# Patient Record
Sex: FEMALE | Race: WHITE | URBAN - METROPOLITAN AREA
[De-identification: names, ages, dates, MRNs, and addresses within clinical notes are randomized per-mention and may not be internally consistent; named-entity substitution may affect disease eponyms.]

---

## 2018-02-15 ENCOUNTER — TRANSFERRED RECORDS (OUTPATIENT)
Dept: HEALTH INFORMATION MANAGEMENT | Facility: CLINIC | Age: 43
End: 2018-02-15

## 2018-02-16 ENCOUNTER — MEDICAL CORRESPONDENCE (OUTPATIENT)
Dept: HEALTH INFORMATION MANAGEMENT | Facility: CLINIC | Age: 43
End: 2018-02-16

## 2018-02-19 ENCOUNTER — PRE VISIT (OUTPATIENT)
Dept: OTOLARYNGOLOGY | Facility: CLINIC | Age: 43
End: 2018-02-19

## 2018-02-19 NOTE — TELEPHONE ENCOUNTER
1.  Date of consult: 2/23/2018    2.  Reason for consult: tongue cancer    3.  Referring provider/facility: Nicola Gallardo    4. Scheduled by: Patient    5.  Outside records requested from: [facility] (faxed request for records, slides)    6.  Additional Information: RN notified to enter imaging order

## 2018-02-20 NOTE — TELEPHONE ENCOUNTER
Path report from [facility] in Care Everywhere. Slides requested from [facility]. [email or web address] No outside imaging.

## 2018-02-21 DIAGNOSIS — C02.9 TONGUE CANCER (H): Primary | ICD-10-CM

## 2018-02-21 NOTE — TELEPHONE ENCOUNTER
Slides received from [facility] - sent to pathology for review. Records received from [facility] - sent to HIM for scanning.

## 2018-02-23 ENCOUNTER — OFFICE VISIT (OUTPATIENT)
Dept: OTOLARYNGOLOGY | Facility: CLINIC | Age: 43
End: 2018-02-23
Payer: COMMERCIAL

## 2018-02-23 ENCOUNTER — THERAPY VISIT (OUTPATIENT)
Dept: SPEECH THERAPY | Facility: CLINIC | Age: 43
End: 2018-02-23
Payer: COMMERCIAL

## 2018-02-23 ENCOUNTER — HOSPITAL ENCOUNTER (OUTPATIENT)
Dept: PET IMAGING | Facility: CLINIC | Age: 43
End: 2018-02-23
Attending: OTOLARYNGOLOGY
Payer: COMMERCIAL

## 2018-02-23 ENCOUNTER — HOSPITAL ENCOUNTER (OUTPATIENT)
Dept: PET IMAGING | Facility: CLINIC | Age: 43
Discharge: HOME | End: 2018-02-23
Attending: OTOLARYNGOLOGY | Admitting: OTOLARYNGOLOGY
Payer: COMMERCIAL

## 2018-02-23 VITALS — HEIGHT: 58 IN | WEIGHT: 90 LBS | BODY MASS INDEX: 18.89 KG/M2

## 2018-02-23 DIAGNOSIS — C02.9 TONGUE CANCER (H): ICD-10-CM

## 2018-02-23 DIAGNOSIS — R13.12 OROPHARYNGEAL DYSPHAGIA: Primary | ICD-10-CM

## 2018-02-23 DIAGNOSIS — C10.9 SQUAMOUS CELL CARCINOMA OF OROPHARYNX (H): ICD-10-CM

## 2018-02-23 DIAGNOSIS — C02.9 TONGUE CANCER (H): Primary | ICD-10-CM

## 2018-02-23 PROBLEM — D64.9 ANEMIA: Status: ACTIVE | Noted: 2018-02-14

## 2018-02-23 PROBLEM — C06.9 SQUAMOUS CELL CARCINOMA OF ORAL CAVITY (H): Status: ACTIVE | Noted: 2018-02-21

## 2018-02-23 LAB
COPATH REPORT: NORMAL
GLUCOSE BLDC GLUCOMTR-MCNC: 103 MG/DL (ref 70–99)

## 2018-02-23 PROCEDURE — 74177 CT ABD & PELVIS W/CONTRAST: CPT

## 2018-02-23 PROCEDURE — A9552 F18 FDG: HCPCS | Performed by: OTOLARYNGOLOGY

## 2018-02-23 PROCEDURE — 25000128 H RX IP 250 OP 636: Performed by: OTOLARYNGOLOGY

## 2018-02-23 PROCEDURE — 82962 GLUCOSE BLOOD TEST: CPT

## 2018-02-23 PROCEDURE — 70491 CT SOFT TISSUE NECK W/DYE: CPT

## 2018-02-23 PROCEDURE — 00000346 ZZHCL STATISTIC REVIEW OUTSIDE SLIDES TC 88321: Performed by: OTOLARYNGOLOGY

## 2018-02-23 PROCEDURE — 34300033 ZZH RX 343: Performed by: OTOLARYNGOLOGY

## 2018-02-23 RX ORDER — FOLIC ACID 1 MG/1
1 TABLET ORAL
COMMUNITY
Start: 2018-02-14 | End: 2019-02-14

## 2018-02-23 RX ORDER — OXYCODONE HYDROCHLORIDE 5 MG/1
5 TABLET ORAL EVERY 4 HOURS PRN
Qty: 30 TABLET | Refills: 0 | Status: SHIPPED | OUTPATIENT
Start: 2018-02-23 | End: 2018-03-06

## 2018-02-23 RX ORDER — TRAMADOL HYDROCHLORIDE 50 MG/1
50-100 TABLET ORAL
COMMUNITY
Start: 2018-02-21 | End: 2018-03-06

## 2018-02-23 RX ORDER — IOPAMIDOL 755 MG/ML
45-135 INJECTION, SOLUTION INTRAVASCULAR ONCE
Status: COMPLETED | OUTPATIENT
Start: 2018-02-23 | End: 2018-02-23

## 2018-02-23 RX ORDER — FEXOFENADINE HCL 180 MG/1
TABLET ORAL
COMMUNITY
End: 2018-03-06

## 2018-02-23 RX ORDER — COVID-19 ANTIGEN TEST
KIT MISCELLANEOUS
COMMUNITY
End: 2018-03-06

## 2018-02-23 RX ADMIN — FLUDEOXYGLUCOSE F-18 10.22 MCI.: 500 INJECTION, SOLUTION INTRAVENOUS at 08:15

## 2018-02-23 RX ADMIN — IOPAMIDOL 58 ML: 755 INJECTION, SOLUTION INTRAVENOUS at 09:24

## 2018-02-23 ASSESSMENT — PAIN SCALES - GENERAL: PAINLEVEL: EXTREME PAIN (8)

## 2018-02-23 NOTE — NURSING NOTE
"Chief Complaint   Patient presents with     Consult     tongue cancer      Height 1.48 m (4' 10.27\"), weight 40.8 kg (90 lb).    Gabriele Burrell LPN    "

## 2018-02-23 NOTE — MR AVS SNAPSHOT
After Visit Summary   2018    Melinda Milligan    MRN: [id number]           Patient Information     Date Of Birth          1975        Visit Information        Provider Department      2018 1:20 PM Marga Arana MD [facility] Ear Nose and Throat        Today's Diagnoses     Tongue cancer (H)    -  1      Care Instructions    1. We will arrange consults and call you with information.   2. Please call the ENT clinic with any questions,concerns, new or worsening symptoms.    -Clinic number is [phone]   - Meeta's direct line (Dr. Arana's nurse) [phone]              Follow-ups after your visit        Future tests that were ordered for you today     Open Future Orders        Priority Expected Expires Ordered    CT Soft Tissue Neck w Contrast Routine  2019    PET Oncology Whole Body Routine  2019            Who to contact     Please call your clinic at [phone] to:    Ask questions about your health    Make or cancel appointments    Discuss your medicines    Learn about your test results    Speak to your doctor            Additional Information About Your Visit        MyChart Information     Coppertino is an electronic gateway that provides easy, online access to your medical records. With Coppertino, you can request a clinic appointment, read your test results, renew a prescription or communicate with your care team.     To sign up for MyChurcht visit the website at [email or web address]   You will be asked to enter the access code listed below, as well as some personal information. Please follow the directions to create your username and password.     Your access code is: [email or web address]  Expires: 2018  6:30 AM     Your access code will  in 90 days. If you need help or a new code, please contact your [facility] or call [phone] for assistance.        Care EveryWhere ID     This is your Care EveryWhere  "ID. This could be used by other organizations to access your [facility] medical records  [id number]        Your Vitals Were     Height BMI (Body Mass Index)                1.48 m (4' 10.27\") 18.64 kg/m2           Blood Pressure from Last 3 Encounters:   No data found for BP    Weight from Last 3 Encounters:   02/23/18 40.8 kg (90 lb)              We Performed the Following     IMAGESTREAM RECORDING ORDER        Primary Care Provider Fax #    Physician No Ref-Primary [phone]       No address on file        Equal Access to Services     RADHA MORALES : Hadii aad ku hadasho Soomaali, waaxda luqadaha, qaybta kaalmada adeegyada, waxay idiin hayaan adeeg khhugolucretia keller . So [facility] [phone].    ATENCIÓN: Si habla español, tiene a hawley disposición servicios gratuitos de asistencia lingüística. Llame al [phone].    We comply with applicable federal civil rights laws and [place] laws. We do not discriminate on the basis of race, color, national origin, age, disability, sex, sexual orientation, or gender identity.            Thank you!     Thank you for choosing [facility] EAR NOSE AND THROAT  for your care. Our goal is always to provide you with excellent care. Hearing back from our patients is one way we can continue to improve our services. Please take a few minutes to complete the written survey that you may receive in the mail after your visit with us. Thank you!             Your Updated Medication List - Protect others around you: Learn how to safely use, store and throw away your medicines at www.disposemymeds.org.          This list is accurate as of 2/23/18  2:33 PM.  Always use your most recent med list.                   Brand Name Dispense Instructions for use Diagnosis    ALLEGRA ALLERGY 180 MG tablet   Generic drug:  fexofenadine           folic acid 1 MG tablet    FOLVITE     Take 1 mg by mouth        lidocaine visc 2% 2.5mL/5mL & maalox/mylanta w/ simeth 2.5mL/5mL & diphenhydrAMINE 5mg/5mL Susp " suspension    [facility]     5 mLs        naproxen sodium 220 MG capsule           traMADol 50 MG tablet    ULTRAM     Take  mg by mouth

## 2018-02-23 NOTE — PATIENT INSTRUCTIONS
1. We will arrange consults and call you with information.   2. Please call the ENT clinic with any questions,concerns, new or worsening symptoms.    -Clinic number is [phone]   - Meeta's direct line (Dr. Arana's nurse) [phone]

## 2018-02-23 NOTE — LETTER
2/23/2018       RE: Melinda Milligan  [address] [facility] [address]     Dear Colleague,    Thank you for referring your patient, Melinda Milligan, to the [facility] EAR NOSE AND THROAT at [facility]. Please see a copy of my visit note below.    Dear Dr. Monsalve:    I had the pleasure of meeting Melinda Milligan in consultation today at the [facility] at your request.     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  She has had ongoing issues for about 6-8 months of thrush.  She has had treatment for this without significant improvement.  She was seen by her primary care physician who ordered some blood tests and she was found at that time to be anemic and was referred to a hematologist.  The hematologist started iron transfusions at that time looked in her throat and thought that there was concerns for malignancy.  She was referred to Dr. Monsalve who performed a biopsy of her right lateral tongue which was consistent with a squamous cell carcinoma.  She has had ongoing issues with pain that is in her throat (right greater than left), under the tongue as well as in her ear and extending into her head.  She has some right-sided neck pain as well.  She complains of difficulty with swallowing.  She says that when she drinks she has issues with liquids coming out through her nose.  She is unable to eat solid few because of pain with chewing.  She denies any pain with swallowing.  She denies having any neck masses.  She is not having any issues with nasal congestion or nosebleeds.  She is lost about 8 pounds in the last 3 weeks.    Social history: She cut back significantly on her smoking last week but was previously at least a 1 pack per day smoker since 1994.  She denies any chewing tobacco use.  She quit drinking alcohol.  She  denies any drug use.  She is  and has a young child at home and another who just went off to college.  She works from home in .    Past medical and surgical history: Anemia but otherwise healthy and denies any previous surgeries.    Family history: Negative for head and neck cancers    MEDICATIONS:     Current Outpatient Prescriptions   Medication Sig Dispense Refill     fexofenadine (ALLEGRA ALLERGY) 180 MG tablet        magic mouthwash suspension (diphenhydrAMINE, lidocaine, aluminum-magnesium & simethicone) 5 mLs       traMADol (ULTRAM) 50 MG tablet Take  mg by mouth       folic acid (FOLVITE) 1 MG tablet Take 1 mg by mouth       naproxen sodium 220 MG capsule        oxyCODONE IR (ROXICODONE) 5 MG tablet Take 1 tablet (5 mg) by mouth every 4 hours as needed for pain 30 tablet 0       ALLERGIES:    Allergies   Allergen Reactions     Ceftriaxone      Other reaction(s): Unknown Reaction     Chicken-Derived Products (Egg)      Other reaction(s): Vomiting  Other reaction(s): Unknown Reaction       HABITS/SOCIAL HISTORY:   She cut back significantly on her smoking last week but was previously at least a 1 pack per day smoker since 1994.  She denies any chewing tobacco use.  She quit drinking alcohol.  She denies any drug use.  She is  and has a young child at home and another who just went off to college.  She works from home in .    Social History     Social History     Marital status:      Spouse name: N/A     Number of children: N/A     Years of education: N/A     Occupational History     Not on file.     Social History Main Topics     Smoking status: Current Some Day Smoker     Packs/day: 0.00     Years: 23.00     Types: Cigarettes     Start date: 1/1/1993     Last attempt to quit: 2/22/2018     Smokeless tobacco: Never Used     Alcohol use No     Drug use: No     Sexual activity: Yes     Partners: Male     Birth control/ protection: Pull-out method, Condom     Other  "Topics Concern     Not on file     Social History Narrative     No narrative on file       PAST MEDICAL HISTORY:   Past Medical History:   Diagnosis Date     Allergic rhinitis      Anemia      Cancer (H)      Hoarseness      Uncomplicated asthma         PAST SURGICAL HISTORY: No past surgical history on file.    FAMILY HISTORY:    Family History   Problem Relation Age of Onset     Substance Abuse Father      Dementia Maternal Grandmother      DIABETES Maternal Grandmother      Asthma Brother      CANCER Maternal Grandfather        REVIEW OF SYSTEMS:  12 point ROS was negative other than the symptoms noted above in the HPI.  Patient Supplied Answers to Review of Systems  [facility] ROS 2/23/2018   Constitutional Weight loss, Unexplained fatigue, Problems with sleep   Neurology Numbness, Unexplained weakness, Headache   Ears, Nose, Throat Ear pain, Nasal congestion or drainage, Sore throat, Trouble swallowing, Hoarseness   Allergy/Immunology Allergies or hay fever   Hematologic Easy bruising         PHYSICAL EXAMINATION:   Ht 1.48 m (4' 10.27\")  Wt 40.8 kg (90 lb)  BMI 18.64 kg/m2   Appearance:   normal; NAD, slightly older appearing than stated age, thin and cachectic appearing, small stature   Communication:   normal; communicates verbally, slight hyernasality   Head/Face:   inspection -  Normal; no scars or visible lesions   Salivary glands -  Normal size, no tenderness, swelling, or palpable masses   Facial strength -  Normal and symmetric bilateral; H/B I/VI   Skin:  normal, no rash   Ocular Motility:  normal occular movements   Ears:  auricle (AD) -  normal  EAC (AD) -  normal  TM (AD) -  Mild effusion  auricle (AS) -  normal  EAC (AS) -  normal  TM (AS) -  Normal, no effusion  Normal clinical speech reception   Nose:  Ext. inspection -  Normal  Internal Inspection -  Normal mucosa, septum, and turbinates   Nasopharynx:  mass in right nasopharynx that occludes torus   Oral Cavity:  lips -  Normal mucosa, oral " competence, and stoma size  Dentition in poor repair   Hard palate, buccal, floor of mouth mucosa normal   Tongue - mass along posterolateral tongue, tender to palpation, extends toward base of tongue   Oropharynx:  uvula absent with ulcerative tumor present  There is exophytic ulcerative appearing tumor along both tonsils and extending along the soft palate - extends along the nasal side of the soft palate; mass effect on the lateral pharyngeal walls that narrows the nasal and oropharyngeal airway; unable to fully visualize the lateral portion of the base of tongue due to bulkiness of tonsils   Hypopharynx:  Normal pyriform sinus and pharyngeal wall mucosa   No pooled secretions    Larynx:  Epiglottis, false vocal cord, true vocal cord normal in appearance, bilaterally mobile cords    Neck: No visible mass or asymmetry   Normal range of motion   Lymphatic:  Small palpable nodes present along both sides of neck, about 1 cm in size   Cardiovascular:  warm, pink, well-perfused extremities without swelling, tenderness, or edema   Respiratory:  Normal respiratory effort, no stridor   Neuro/Psych.:  mood/affect -  normal  mental status -  normal  cranial nerves -  normal          PROCEDURES:   Flexible fiberoptic laryngoscopy: Scope exam was indicated due to oropharyngeal tumor. Verbal consent was obtained. The nasal cavity was prepped with an aerosolized solution of topical anesthetic and vasoconstrictive agent. The scope was passed through the anterior nasal cavity and advanced. Inspection of the nasopharynx revealed a mass in the nasopharynx that obstructs the torus on the right side.  There is tumor in the basal side of the soft palate.  There is mass-effect in the nasal oropharyngeal airway on the right side.  There are bulky tonsils bilaterally.  Full visualization of the lateral base of tongue was not possible due to the bulky nature of the tonsils.  There is no obvious disease in the vallecula.  The larynx was  normal with bilaterally mobile vocal cords.  The epiglottis is normal.  The pyriform sinuses were clear. The airway is patent. Procedure tolerated well with no immediate complications noted.      RESULTS REVIEWED:   I reviewed the referral records from Dr. Monsalve along with the pathology results which show a squamous cell carcinoma that was biopsied in the posterior lateral tongue.    I independently reviewed the PET CT scan images as well as discussed these with the neuroradiologist.  There is hypermetabolic activity with a corresponding CT scan mass that involves both tonsils, the soft palate, the right nasopharynx, the right base of tongue extending into the oral tongue.      Findings: There is FDG avid mass involving the right and left palatine  tonsils, extending anteriorly to and involving the posterior aspect of  soft palate, superiorly on the right extending to the right  posterolateral nasopharyngeal wall, inferiorly involving the right  lateral oropharyngeal wall. The max SUV is 26.       FDG avid bilateral level 2 lymph nodes; measuring 1.4 cm with max SUV  of 6 on the right and 1.0 cm on the left with Max SUV of 6.2 on the  left.     Regarding the major salivary glands, no abnormality is identified.  Regarding the thyroid gland, no abnormality is identified.     Limited evaluation of the cervical vertebra demonstrates that there is  no overt spinal canal stenosis. Regarding the visualized paranasal  sinuses, there is no evidence of significant debris or fluid.  Regarding the mastoid air cells, there is no evidence of significant  debris or fluid. Regarding the major vasculature in the neck, no  abnormality is identified.         Impression:   1. Hypermetabolic mass involving the bilateral palatine tonsils,  anteriorly extending to and involving the soft palate, superiorly  extending to and involving the right posterior lateral nasopharyngeal  wall and inferiorly extending to the right lateral  oropharyngeal wall  representing primary squamosal cancer.  2. Right and left metastatic FDG avid level IIa lymph nodes.  3. Please refer to the whole body PET CT performed as a separate  report, for the findings of the remainder of the body.       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  I discussed the diagnosis with the patient and her  today.  We discussed that typically had neck cancer treated with both surgical and nonsurgical techniques.  I explained that I think given the extent of her disease that she would be best served by upfront nonsurgical therapy with definitive chemoradiation.  We discussed that she could still require salvage surgery pending her tumor responsiveness.  I did also discussed that we will need to review her case at tumor board and ensure a consensus on treatment plan.      I discussed chemoradiation with the patient including the fact that treatment will take place over approximately 7 weeks.  She understands that radiation would be every day for approximately 7 week.  Her therapy frequent on the final regimen but may be every week versus every 3 weeks.   We discussed some of the side effects of her proposed treatment plan.  We discussed things such as xerostomia, altered taste, dysphagia, fibrosis, skin burns, neutropenia, among others.      We discussed the importance of good dental care prior to her treatment as well as afterwards.  She will need to undergo dental evaluation and may need some teeth pulled prior prior to starting treatment.  I discussed the importance of maintaining good dental hygiene even after her radiation for the rest of her life.  We discussed the risk of osteoradionecrosis and avoiding dental extractions in the future.    I explained to her that she will need to do regular swallowing exercises to help minimize the risk of radiation-induced fibrosis and long-term dysphagia.   She was counseled on the importance of keeping up her nutrition orally both before treatment as well as during her treatment.  We discussed nutritional supplementations.  I explained to her that I think she will probably need a PEG tube just given the extent of her disease, need for treatment of both necks and the entire oropharynx, and her already experiencing weight loss before starting treatment.  She understands that even if she has a PEG she needs to continue to use her normal musculature to help maintain her long her swallowing function.  She knows that she may require a port to be placed as well.    We discussed the complexity of treatment of H&N cancer. She would like to have her treatment at [facility] given the proximity to her home.  We will work on arranging appointments for her with radiation and medical oncology as well as with dental.  She will see speech pathology today to start discussing her swallowing function.    I did also discuss with her that it is not uncommon for patients to need to use antidepressants during her treatments as it is a lot for them to process and deal with.  We discussed that this is also difficult on families.  She does have a younger child at home and we discussed that this can impact her child is well and that he may need help dealing with his mom's diagnosis and treatment.    We will contact the patient with the final recommendations from tumor board.  I will plan on seeing her back after she completes her treatment.    CC:  Noel Monsalve DO  [facility]  [address], [address]  [place] [address]      Albert Ambrosio MD  Department of Radiation Oncology  [facility]      Again, thank you for allowing me to participate in the care of your patient.      Sincerely,    Marga Arana MD

## 2018-02-23 NOTE — MR AVS SNAPSHOT
"              After Visit Summary   2/23/2018    Melinda Milligan    MRN: [id number]           Patient Information     Date Of Birth          1975        Visit Information        Provider Department      2/23/2018 1:20 PM Nidhi Keys S[facility]        Today's Diagnoses     Oropharyngeal dysphagia    -  1    Tongue cancer (H)           Follow-ups after your visit        Additional Services     SPEECH THERAPY REFERRAL       *This therapy referral will be filtered to a centralized scheduling office at [facility] and the patient will receive a call to schedule an appointment at a [facility] location most convenient for them. *     [facility] provides Speech Therapy evaluation and treatment and many specialty services across the [facility] system.  If requesting a specialty program, please choose from the list below.  If you have not heard from the scheduling office within 2 business days, please call [phone] for all locations, with the exception of [place], please call [phone] and [facility], please call [phone]      Treatment: Evaluation & Treatment  Speech Treatment Diagnosis: Dysphagia  Special Instructions: Does not need to be scheduled.   Special Programs: Clinical Swallow Study    Please be aware that coverage of these services is subject to the terms and limitations of your health insurance plan.  Call member services at your health plan with any benefit or coverage questions.      **Note to Provider:  If you are referring outside of [facility] for the therapy appointment, please list the name of the location in the \"special instructions\" above, print the referral and give to the patient to schedule the appointment.                  Your next 10 appointments already scheduled     Mar 06, 2018  8:45 AM CST   [facility] Visit with Benedicto [facility] MD   [facility]    [address] [place] [address] " [id number]   [phone]            Mar 06, 2018 10:00 AM CST   [facility] Visit with [facility]   [facility]    [address] [place] [id number]   [phone]            Mar 06, 2018 11:30 AM CST   Ct Sim with Albert Ambrosio MD, MG RT SIMULATION   [facility]    [address] [place] [id number]   [phone]              Who to contact     Please call your clinic at [phone] to:    Ask questions about your health    Make or cancel appointments    Discuss your medicines    Learn about your test results    Speak to your doctor            Additional Information About Your Visit        MyChart Information     [employer] is an electronic gateway that provides easy, online access to your medical records. With [employer], you can request a clinic appointment, read your test results, renew a prescription or communicate with your care team.     To sign up for [employer] visit the website at [email or web address]   You will be asked to enter the access code listed below, as well as some personal information. Please follow the directions to create your username and password.     Your access code is: [email or web address]  Expires: 2018  6:30 AM     Your access code will  in 90 days. If you need help or a new code, please contact your [facility] or call [phone] for assistance.        Care EveryWhere ID     This is your Care EveryWhere ID. This could be used by other organizations to access your [facility] medical records  [id number]         Blood Pressure from Last 3 Encounters:   No data found for BP    Weight from Last 3 Encounters:   No data found for Wt              Today, you had the following     No orders found for display         Today's Medication Changes          These changes are accurate as of 18 11:59 PM.  If you have any questions, ask  your nurse or doctor.               Start taking these medicines.        Dose/Directions    oxyCODONE IR 5 MG tablet   Commonly known as:  ROXICODONE   Used for:  Tongue cancer (H)   Started by:  Marga Arana MD        Dose:  5 mg   Take 1 tablet (5 mg) by mouth every 4 hours as needed for pain   Quantity:  30 tablet   Refills:  0            Where to get your medicines      Some of these will need a paper prescription and others can be bought over the counter.  Ask your nurse if you have questions.     Bring a paper prescription for each of these medications     oxyCODONE IR 5 MG tablet                Primary Care Provider Fax #    Physician No Ref-Primary [phone]       No address on file        Equal Access to Services     [facility] : Rogelio Haile, ann wadsworth, pelon burnham, whitney keller . So [facility] [phone].    ATENCIÓN: Si habla español, tiene a hawley disposición servicios gratuitos de asistencia lingüística. Llame al [phone].    We comply with applicable federal civil rights laws and [place] laws. We do not discriminate on the basis of race, color, national origin, age, disability, sex, sexual orientation, or gender identity.            Thank you!     Thank you for choosing [facility]  for your care. Our goal is always to provide you with excellent care. Hearing back from our patients is one way we can continue to improve our services. Please take a few minutes to complete the written survey that you may receive in the mail after your visit with us. Thank you!             Your Updated Medication List - Protect others around you: Learn how to safely use, store and throw away your medicines at www.disposemymeds.org.          This list is accurate as of 2/23/18 11:59 PM.  Always use your most recent med list.                   Brand Name Dispense Instructions for use Diagnosis    ALLEGRA ALLERGY 180 MG tablet   Generic drug:   fexofenadine           folic acid 1 MG tablet    FOLVITE     Take 1 mg by mouth        lidocaine visc 2% 2.5mL/5mL & maalox/mylanta w/ simeth 2.5mL/5mL & diphenhydrAMINE 5mg/5mL Susp suspension    [facility]     5 mLs        naproxen sodium 220 MG capsule           oxyCODONE IR 5 MG tablet    ROXICODONE    30 tablet    Take 1 tablet (5 mg) by mouth every 4 hours as needed for pain    Tongue cancer (H)       traMADol 50 MG tablet    ULTRAM     Take  mg by mouth

## 2018-02-24 PROBLEM — C10.9 SQUAMOUS CELL CARCINOMA OF OROPHARYNX (H): Status: ACTIVE | Noted: 2018-02-24

## 2018-02-24 NOTE — PROGRESS NOTES
Dear Dr. Monsalve:    I had the pleasure of meeting Melinda Milligan in consultation today at the [facility] at your request.     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  She has had ongoing issues for about 6-8 months of thrush.  She has had treatment for this without significant improvement.  She was seen by her primary care physician who ordered some blood tests and she was found at that time to be anemic and was referred to a hematologist.  The hematologist started iron transfusions at that time looked in her throat and thought that there was concerns for malignancy.  She was referred to Dr. Monsalve who performed a biopsy of her right lateral tongue which was consistent with a squamous cell carcinoma.  She has had ongoing issues with pain that is in her throat (right greater than left), under the tongue as well as in her ear and extending into her head.  She has some right-sided neck pain as well.  She complains of difficulty with swallowing.  She says that when she drinks she has issues with liquids coming out through her nose.  She is unable to eat solid few because of pain with chewing.  She denies any pain with swallowing.  She denies having any neck masses.  She is not having any issues with nasal congestion or nosebleeds.  She is lost about 8 pounds in the last 3 weeks.    Social history: She cut back significantly on her smoking last week but was previously at least a 1 pack per day smoker since 1994.  She denies any chewing tobacco use.  She quit drinking alcohol.  She denies any drug use.  She is  and has a young child at home and another who just went off to college.  She works from home in .    Past medical and surgical history: Anemia but otherwise healthy and denies any previous surgeries.    Family history: Negative for head and neck  cancers    MEDICATIONS:     Current Outpatient Prescriptions   Medication Sig Dispense Refill     fexofenadine (ALLEGRA ALLERGY) 180 MG tablet        magic mouthwash suspension (diphenhydrAMINE, lidocaine, aluminum-magnesium & simethicone) 5 mLs       traMADol (ULTRAM) 50 MG tablet Take  mg by mouth       folic acid (FOLVITE) 1 MG tablet Take 1 mg by mouth       naproxen sodium 220 MG capsule        oxyCODONE IR (ROXICODONE) 5 MG tablet Take 1 tablet (5 mg) by mouth every 4 hours as needed for pain 30 tablet 0       ALLERGIES:    Allergies   Allergen Reactions     Ceftriaxone      Other reaction(s): Unknown Reaction     Chicken-Derived Products (Egg)      Other reaction(s): Vomiting  Other reaction(s): Unknown Reaction       HABITS/SOCIAL HISTORY:   She cut back significantly on her smoking last week but was previously at least a 1 pack per day smoker since 1994.  She denies any chewing tobacco use.  She quit drinking alcohol.  She denies any drug use.  She is  and has a young child at home and another who just went off to college.  She works from home in .    Social History     Social History     Marital status:      Spouse name: N/A     Number of children: N/A     Years of education: N/A     Occupational History     Not on file.     Social History Main Topics     Smoking status: Current Some Day Smoker     Packs/day: 0.00     Years: 23.00     Types: Cigarettes     Start date: 1/1/1993     Last attempt to quit: 2/22/2018     Smokeless tobacco: Never Used     Alcohol use No     Drug use: No     Sexual activity: Yes     Partners: Male     Birth control/ protection: Pull-out method, Condom     Other Topics Concern     Not on file     Social History Narrative     No narrative on file       PAST MEDICAL HISTORY:   Past Medical History:   Diagnosis Date     Allergic rhinitis      Anemia      Cancer (H)      Hoarseness      Uncomplicated asthma         PAST SURGICAL HISTORY: No past surgical  "history on file.    FAMILY HISTORY:    Family History   Problem Relation Age of Onset     Substance Abuse Father      Dementia Maternal Grandmother      DIABETES Maternal Grandmother      Asthma Brother      CANCER Maternal Grandfather        REVIEW OF SYSTEMS:  12 point ROS was negative other than the symptoms noted above in the HPI.  Patient Supplied Answers to Review of Systems  [facility] ROS 2/23/2018   Constitutional Weight loss, Unexplained fatigue, Problems with sleep   Neurology Numbness, Unexplained weakness, Headache   Ears, Nose, Throat Ear pain, Nasal congestion or drainage, Sore throat, Trouble swallowing, Hoarseness   Allergy/Immunology Allergies or hay fever   Hematologic Easy bruising         PHYSICAL EXAMINATION:   Ht 1.48 m (4' 10.27\")  Wt 40.8 kg (90 lb)  BMI 18.64 kg/m2   Appearance:   normal; NAD, slightly older appearing than stated age, thin and cachectic appearing, small stature   Communication:   normal; communicates verbally, slight hyernasality   Head/Face:   inspection -  Normal; no scars or visible lesions   Salivary glands -  Normal size, no tenderness, swelling, or palpable masses   Facial strength -  Normal and symmetric bilateral; H/B I/VI   Skin:  normal, no rash   Ocular Motility:  normal occular movements   Ears:  auricle (AD) -  normal  EAC (AD) -  normal  TM (AD) -  Mild effusion  auricle (AS) -  normal  EAC (AS) -  normal  TM (AS) -  Normal, no effusion  Normal clinical speech reception   Nose:  Ext. inspection -  Normal  Internal Inspection -  Normal mucosa, septum, and turbinates   Nasopharynx:  mass in right nasopharynx that occludes torus   Oral Cavity:  lips -  Normal mucosa, oral competence, and stoma size  Dentition in poor repair   Hard palate, buccal, floor of mouth mucosa normal   Tongue - mass along posterolateral tongue, tender to palpation, extends toward base of tongue   Oropharynx:  uvula absent with ulcerative tumor present  There is exophytic ulcerative " appearing tumor along both tonsils and extending along the soft palate - extends along the nasal side of the soft palate; mass effect on the lateral pharyngeal walls that narrows the nasal and oropharyngeal airway; unable to fully visualize the lateral portion of the base of tongue due to bulkiness of tonsils   Hypopharynx:  Normal pyriform sinus and pharyngeal wall mucosa   No pooled secretions    Larynx:  Epiglottis, false vocal cord, true vocal cord normal in appearance, bilaterally mobile cords    Neck: No visible mass or asymmetry   Normal range of motion   Lymphatic:  Small palpable nodes present along both sides of neck, about 1 cm in size   Cardiovascular:  warm, pink, well-perfused extremities without swelling, tenderness, or edema   Respiratory:  Normal respiratory effort, no stridor   Neuro/Psych.:  mood/affect -  normal  mental status -  normal  cranial nerves -  normal          PROCEDURES:   Flexible fiberoptic laryngoscopy: Scope exam was indicated due to oropharyngeal tumor. Verbal consent was obtained. The nasal cavity was prepped with an aerosolized solution of topical anesthetic and vasoconstrictive agent. The scope was passed through the anterior nasal cavity and advanced. Inspection of the nasopharynx revealed a mass in the nasopharynx that obstructs the torus on the right side.  There is tumor in the basal side of the soft palate.  There is mass-effect in the nasal oropharyngeal airway on the right side.  There are bulky tonsils bilaterally.  Full visualization of the lateral base of tongue was not possible due to the bulky nature of the tonsils.  There is no obvious disease in the vallecula.  The larynx was normal with bilaterally mobile vocal cords.  The epiglottis is normal.  The pyriform sinuses were clear. The airway is patent. Procedure tolerated well with no immediate complications noted.      RESULTS REVIEWED:   I reviewed the referral records from Dr. Monsalve along with the pathology  results which show a squamous cell carcinoma that was biopsied in the posterior lateral tongue.    I independently reviewed the PET CT scan images as well as discussed these with the neuroradiologist.  There is hypermetabolic activity with a corresponding CT scan mass that involves both tonsils, the soft palate, the right nasopharynx, the right base of tongue extending into the oral tongue.      Findings: There is FDG avid mass involving the right and left palatine  tonsils, extending anteriorly to and involving the posterior aspect of  soft palate, superiorly on the right extending to the right  posterolateral nasopharyngeal wall, inferiorly involving the right  lateral oropharyngeal wall. The max SUV is 26.       FDG avid bilateral level 2 lymph nodes; measuring 1.4 cm with max SUV  of 6 on the right and 1.0 cm on the left with Max SUV of 6.2 on the  left.     Regarding the major salivary glands, no abnormality is identified.  Regarding the thyroid gland, no abnormality is identified.     Limited evaluation of the cervical vertebra demonstrates that there is  no overt spinal canal stenosis. Regarding the visualized paranasal  sinuses, there is no evidence of significant debris or fluid.  Regarding the mastoid air cells, there is no evidence of significant  debris or fluid. Regarding the major vasculature in the neck, no  abnormality is identified.         Impression:   1. Hypermetabolic mass involving the bilateral palatine tonsils,  anteriorly extending to and involving the soft palate, superiorly  extending to and involving the right posterior lateral nasopharyngeal  wall and inferiorly extending to the right lateral oropharyngeal wall  representing primary squamosal cancer.  2. Right and left metastatic FDG avid level IIa lymph nodes.  3. Please refer to the whole body PET CT performed as a separate  report, for the findings of the remainder of the body.       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  I discussed the diagnosis with the patient and her  today.  We discussed that typically had neck cancer treated with both surgical and nonsurgical techniques.  I explained that I think given the extent of her disease that she would be best served by upfront nonsurgical therapy with definitive chemoradiation.  We discussed that she could still require salvage surgery pending her tumor responsiveness.  I did also discussed that we will need to review her case at tumor board and ensure a consensus on treatment plan.      I discussed chemoradiation with the patient including the fact that treatment will take place over approximately 7 weeks.  She understands that radiation would be every day for approximately 7 week.  Her therapy frequent on the final regimen but may be every week versus every 3 weeks.   We discussed some of the side effects of her proposed treatment plan.  We discussed things such as xerostomia, altered taste, dysphagia, fibrosis, skin burns, neutropenia, among others.      We discussed the importance of good dental care prior to her treatment as well as afterwards.  She will need to undergo dental evaluation and may need some teeth pulled prior prior to starting treatment.  I discussed the importance of maintaining good dental hygiene even after her radiation for the rest of her life.  We discussed the risk of osteoradionecrosis and avoiding dental extractions in the future.    I explained to her that she will need to do regular swallowing exercises to help minimize the risk of radiation-induced fibrosis and long-term dysphagia.  She was counseled on the importance of keeping up her nutrition orally both before treatment as well as during her treatment.  We discussed nutritional supplementations.  I explained to her that I think she will probably need a PEG tube just given the extent of her disease, need for treatment of  both necks and the entire oropharynx, and her already experiencing weight loss before starting treatment.  She understands that even if she has a PEG she needs to continue to use her normal musculature to help maintain her long her swallowing function.  She knows that she may require a port to be placed as well.    We discussed the complexity of treatment of H&N cancer. She would like to have her treatment at [facility] given the proximity to her home.  We will work on arranging appointments for her with radiation and medical oncology as well as with dental.  She will see speech pathology today to start discussing her swallowing function.    I did also discuss with her that it is not uncommon for patients to need to use antidepressants during her treatments as it is a lot for them to process and deal with.  We discussed that this is also difficult on families.  She does have a younger child at home and we discussed that this can impact her child is well and that he may need help dealing with his mom's diagnosis and treatment.    We will contact the patient with the final recommendations from tumor board.  I will plan on seeing her back after she completes her treatment.    Thank you very much for the opportunity to participate in the care of your patient.      Marga Arana M.D.  Otolaryngology- Head & Neck Surgery        CC:  Noel Monsalve,   [facility] [address], [address]  [place] [address]      Albert Ambrosio MD  Department of Radiation Oncology  [facility]

## 2018-02-26 ENCOUNTER — CARE COORDINATION (OUTPATIENT)
Dept: OTOLARYNGOLOGY | Facility: CLINIC | Age: 43
End: 2018-02-26

## 2018-02-26 NOTE — PROGRESS NOTES
Received a call from patient's spouse stating that since patient started using oxycodone she has developed a rash on her back and abdomen. Returned call to patient to discuss further. Patient states that rash has progressed from back to abdomen and into her waist area. Patient advised to discontinue oxycodone and see PCP for evaluation. Patient will see her PCP today and call back with further questions or concerns. She will check with them on prescribing her something different for pain as she will need a written prescription. Advised patient to call back if PCP is not willing to prescribe pain medications and we will get her a new prescription. Patient verbalized understanding.     Meeta Elder, RN, BSN

## 2018-02-27 ENCOUNTER — CARE COORDINATION (OUTPATIENT)
Dept: OTOLARYNGOLOGY | Facility: CLINIC | Age: 43
End: 2018-02-27

## 2018-02-27 NOTE — PROGRESS NOTES
Called patient and spouse with the following appointment info:    1. Dental radiation clearance- Tomorrow 2/28 at 2:00pm.   2. Medical oncology consult in [place]- 3/6 at 8:45am with Dr. Nieto  3. Radiation oncology consult in [place]- Dr. Ambrosio- 3/6 at 10:00am.     Patient and spouse verbalized understanding and confirmed appointments. They will be called on Friday with tumor board recommendations. Patient and spouse were encouraged to call with further questions or concerns.     Meeta Elder, RN, BSN      
Name band;

## 2018-02-28 ENCOUNTER — DOCUMENTATION ONLY (OUTPATIENT)
Dept: DENTISTRY | Facility: CLINIC | Age: 43
End: 2018-02-28

## 2018-02-28 NOTE — PROGRESS NOTES
Dental Service Progress Note        Melinda Milligan MRN# [id number]   YOB: 1975 Age: 42 year old              Chief Complaint:   None - Pt referred for dental clearance prior to chemoradiation         History of Present Illness:   This patient is a 42 year old female who presents with SCC of base of tongue to the [facility] for dental clearance prior to chemoradiation            Past Medical History:     Past Medical History:   Diagnosis Date     Allergic rhinitis      Anemia      Cancer (H)      Hoarseness      Uncomplicated asthma              Past Surgical History:   No past surgical history on file.            Social History:     Social History   Substance Use Topics     Smoking status: Current Some Day Smoker     Packs/day: 0.00     Years: 23.00     Types: Cigarettes     Start date: 1/1/1993     Last attempt to quit: 2/22/2018     Smokeless tobacco: Never Used     Alcohol use No             Family History:     Family History   Problem Relation Age of Onset     Substance Abuse Father      Dementia Maternal Grandmother      DIABETES Maternal Grandmother      Asthma Brother      CANCER Maternal Grandfather              Immunizations:     There is no immunization history on file for this patient.          Allergies:     Allergies   Allergen Reactions     Ceftriaxone      Other reaction(s): Unknown Reaction     Chicken-Derived Products (Egg)      Other reaction(s): Vomiting  Other reaction(s): Unknown Reaction             Medications:     Current Outpatient Prescriptions Ordered in Epic   Medication     fexofenadine (ALLEGRA ALLERGY) 180 MG tablet     magic mouthwash suspension (diphenhydrAMINE, lidocaine, aluminum-magnesium & simethicone)     traMADol (ULTRAM) 50 MG tablet     folic acid (FOLVITE) 1 MG tablet     naproxen sodium 220 MG capsule     oxyCODONE IR (ROXICODONE) 5 MG tablet     No current Epic-ordered facility-administered medications on file.             Review of Systems:    General: Pt appears healthy, awake alert and oriented at all times  HEENT: Unremarkable  Mouth: SCC tumor visible during dental examination, significant sensitivity and discomfort on examination, otherwise unremarkable  Neck: Unremarkable            Physical Exam:   Head and neck exam:  Unremarkable    Soft Tissue exam:  SCC tumor visible at base of tongue during dental examination, otherwise unremarkable    Hard Tissue exam:  Unremarkable         Data:   Radiographic interpretation: Full bony impacted ectopic UR third molar, inferior border of mandible intact and continuous, condyles passively seated, sinuses clear, normal bone trabeculation  Osseous pathology: None detected  Pulpal pathology: None detected  Periodontal Pathology: None detected  Caries: Small interproximal carious lesions noted on both maxillary central incisors and a defective restoration noted on maxillary left lateral incisor. Large breakdown of LR 2nd premolar. Decay noted on LR 2nd molar mesial surface  Odontogenic pathology: None detected         Assessment and Plan:   Assessment:  Restoration of LR 2nd premolar indicated prior to initiation of chemoradiation due to increased risk of dental infection from exposed dentin nearing pulp. Cleaning indicated prior to initiation of chemoradiation due to significant deposits and bacterial load in the oral cavity. Radiation guards and fluoride trays will be made as protective measures to maintain dentition and prevent burns from radiation    Plan:  Cleaning and LR 2nd premolar restoration to be completed prior to initiation of chemotherapy. Pt will have radiation guards and fluoride trays delivered at the next appointment as well    The patient was seen by and discussed with:   Heather Knutson DDS, and Cj Atkinson DDS    Next Appointment:   3/2/18 at 8:30am for cleaning, fluoride tray and radiation guard delivery  3/6/18 at 2pm for restoration of LR second premolar, and possibly LR 2nd molar as both  teeth are in the same quadrant      Heather Knutson DDS   PGY 1  Pager: [phone]

## 2018-03-02 DIAGNOSIS — C10.9 SQUAMOUS CELL CARCINOMA OF OROPHARYNX (H): Primary | ICD-10-CM

## 2018-03-02 RX ORDER — CLINDAMYCIN PHOSPHATE 900 MG/50ML
900 INJECTION, SOLUTION INTRAVENOUS SEE ADMIN INSTRUCTIONS
Status: CANCELLED | OUTPATIENT
Start: 2018-03-02

## 2018-03-02 RX ORDER — CLINDAMYCIN PHOSPHATE 900 MG/50ML
900 INJECTION, SOLUTION INTRAVENOUS
Status: CANCELLED | OUTPATIENT
Start: 2018-03-02

## 2018-03-02 NOTE — PROGRESS NOTES
RADIATION ONCOLOGY CONSULT NOTE    Date of Visit: Mar 6, 2018  Patient Name: Melinda Milligan  MRN: [id number]  : 1975    Melinda Milligan is being seen today for initial consultation at the request of Dr. Marga Arana for consideration of radiation therapy.    HISTORY OF PRESENT ILLNESS:  Ms. Milligan is a 42 year old female with locally advanced oropharyngeal SCC, p16 negative.    She presented with a lesion on her tongue and thrush for about 6-8 mos; her PCP treated her for thrush and amoxicillin, but they did not improve, and he eventually referred her to Dr. Monsalve.     2/15/18: Biopsy of L tongue lesion showed invasive keratinizing SCCa, mod diff, p16 negative.    18: PET and neck CT w/ contrast showed an FDG avid mass involving the R and L palatine tonsils, posterior soft palate, R posterolateral NPX wall occluding torus, R OPX wall with SUV max 26, and bilat level II LN up to 1.4 cm w/ SUV max of 6 on R and 1.0 cm with SUV max 6.2 on L; no distant metastases.    She saw Dr. Arana, who performed FFL showing an exophytic ulcerative tumor along both tonsils extending to the soft palate, narrowing the pharyngeal walls and NPX/OPX airway, normal vallecula, epiglottis, piriform sinus, and larynx; and small palpable LN bilat. Her case was discussed at tumor board with recommendation for chemoRT. She was referred to dental and speech pathology.    She has a ~25 pk yr hx of smoking but has started to cut back significantly. 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    Today, she complains of pain in her throat and R ear, taking naproxen but will stop and switch to tramadol/oxyIR. The pain is currently rated 3/10 but is 8/10 at its worst. She has difficulty swallowing including some regurgitation/reflux. She weighs 87 lbs currently, down from her baseline of 98 lbs. She is taking 2-3 ensures/day. She can only eat  a liquid/soft diet currently. She is currently smoking, and is down to 3 cigs/day. She also feels that her R ear is plugged. She is otherwise healthy and active. She is here today with her .    CHEMOTHERAPY HISTORY: none    RADIATION THERAPY HISTORY:  none    PAST MEDICAL/SURGICAL HISTORY:  Past Medical History:   Diagnosis Date     Allergic rhinitis      Anemia      Hoarseness      Oropharyngeal cancer (H) 02/15/2018     Uncomplicated asthma      Past Surgical History:   Procedure Laterality Date     BIOPSY  02/15/2018    Left Tongue - [facility]       ALLERGIES:  Allergies as of 03/06/2018 - [facility] as Reviewed 03/06/2018   Allergen Reaction Noted     Ceftriaxone  08/25/2012     Chicken-derived products (egg)  08/25/2012     No clinical screening - see comments Hives 03/06/2018       MEDICATIONS:  Current Outpatient Prescriptions   Medication Sig Dispense Refill     oxyCODONE IR (ROXICODONE) 5 MG tablet Take 1-2 tablets (5-10 mg) by mouth every 4 hours as needed for pain 60 tablet 0     folic acid (FOLVITE) 1 MG tablet Take 1 mg by mouth       Fexofenadine HCl (ALLEGRA ALLERGY PO) Take by mouth as needed for allergies          FAMILY HISTORY:  Family History   Problem Relation Age of Onset     Substance Abuse Father      Dementia Maternal Grandmother      DIABETES Maternal Grandmother      Asthma Brother      Prostate Cancer Maternal Grandfather        SOCIAL HISTORY:  Social History     Social History     Marital status:      Spouse name: N/A     Number of children: N/A     Years of education: N/A     Occupational History     Not on file.     Social History Main Topics     Smoking status: Current Every Day Smoker     Packs/day: 1.00     Years: 23.00     Types: Cigarettes     Start date: 1/1/1993     Smokeless tobacco: Never Used      Comment: Patient reports currently smoking 3 cigarettes per day - updated 3/6/2018     Alcohol use No     Drug use: No     Sexual activity: Yes     Partners: Male      "Birth control/ protection: Pull-out method, Condom     Other Topics Concern     Not on file     Social History Narrative       REVIEW OF SYSTEMS: A 10-point review of systems was obtained. Pertinent findings are noted in the HPI and are otherwise unremarkable.     PHYSICAL EXAM:  VITALS: /77  Pulse 87  Temp 99.3  F (37.4  C) (Oral)  Resp 16  Ht 4' 10\"  Wt 87 lb 6 oz  LMP 01/01/2018 (Approximate)  SpO2 99%  BMI 18.26 kg/m2  GEN: appears well, in no acute distress  HEENT: normocephalic and atraumatic, EOMI, PERRL, anicteric sclerae. 2 cm ulcerated leukoplakic lesion in R lateral base of tongue, palpable tumor in R BOT. Visible exophytic tumor with leukoplakic appearance extensively occupying bilat tonsils, soft palate, and R glossotonsillar sulcus. Uvula is not apparent. No other suspicious visible/palpable lesions in oral cavity.  Jaw opening 4 cm, no trismus. Normal tongue mobility.  CV: RRR, no m/g/r, no LE edema, no JVD  RESP: CTAB, normal respiration on room air, no stridor  ABDOMEN: soft, NT, ND  SKIN: normal color and turgor  MSK: moving all extremities well  LYMPHATICS: palpable 1 cm bilat level IIA LAD, mobile; no other cervical/supraclavicular LAD  NEURO: CN II-XII grossly intact, no focal neurologic deficit  PSYCH: appropriate mood, affect, and judgment    ECOG PERFORMANCE STATUS: 0    Flexible fiberoptic nasal endoscopy: the procedure was explained and risks/benefits/alternatives discussed, consent obtained. The bilat nasal passages were sprayed with topical anesthetic. The scope was advanced through the R nasal passage, however scope was unable to traverse the turbinates due to narrowing. No visible mass identified in anterior NPX. The scope was retracted and advanced through the L nasal passage. Tumor was visible in the posterior NPX which appeared to arise from R posterolateral NPX and extend to midline; bulky tumor visible occupying bilat OPX and obscuring view, unable to completely " evaluate BOT but tumor does not appear to lingual surface of epiglottis. Normal VC mobility, normal appearance of larynx and hypopharynx.    She tolerated the procedure well.    All pertinent laboratory, imaging, and pathology findings have been reviewed.     IMPRESSION AND RECOMMENDATIONS:  In summary, Ms. Milligan is a 42 year old female with cT3N2c SCCA of the oropharynx, likely arising from the R side but extending to the L OPX, soft palate, and NPX.    Her case was discussed at tumor board with recommendation for definitive chemoradiation, with which I agree. She has seen dental and speech pathology; no extractions needed but will have cleaning and had mouthguards made. She has seen medical oncology with recommendation for concurrent bolus cisplatin. I would recommend concurrent cisplatin with radiation to the head and neck region for 7 weeks, with interim PET at 4 weeks for adaptive radiation planning to account for weight changes and potential reduction in radiation volumes off of critical structures to lower the risk of toxicity.    Given her baseline anorexia and dysphagia, I would recommend placement of a feeding tube. I instructed her to try to maintain as much nutrition PO as possible to maintain her swallowing function. She has seen speech therapy and will see our dietitian. We also discussed smoking cessation; she is going to quit cold turkey with nicotine lozenges and I offered assistance and resources if she needs it.    The risks, benefits, alternatives, and logistics to radiation therapy were discussed in detail. Side effects of radiation therapy include, but are not limited to, dry mouth, changes in taste sensation, dysphagia that may require a feeding tube that can be permanent in rare cases, voice changes such as hoarseness, mucositis, skin changes including blisters, bleeding, infection, fibrosis and stiffness in the head and neck, lymphedema, hypothyroidism, dental complications including  caries and compromised oral cavity vascular supply, and rare risks such as osteoradionecrosis, injury to the spinal cord or other nerves or second malignancy. I also discussed the importance of thorough dental hygiene and follow-up with a dentist. She is aware that the side effects of radiation therapy may be severe and permanent, although we expect that such risks would be low and that they are outweighed by the benefit of treatment. She was given the opportunity to ask questions, which were answered. Informed consent was obtained. CT simulation performed today.    Albert Ambrosio M.D.  Attending Physician  Radiation Oncology  Clinic Phone #: [phone]  Pager #: [phone]

## 2018-03-05 ENCOUNTER — TELEPHONE (OUTPATIENT)
Dept: RADIATION ONCOLOGY | Facility: CLINIC | Age: 43
End: 2018-03-05

## 2018-03-05 ENCOUNTER — TELEPHONE (OUTPATIENT)
Dept: SURGERY | Facility: CLINIC | Age: 43
End: 2018-03-05

## 2018-03-05 DIAGNOSIS — C02.9 TONGUE CANCER (H): Primary | ICD-10-CM

## 2018-03-05 RX ORDER — CLINDAMYCIN PHOSPHATE 900 MG/50ML
900 INJECTION, SOLUTION INTRAVENOUS
Status: CANCELLED | OUTPATIENT
Start: 2018-03-05

## 2018-03-05 RX ORDER — CLINDAMYCIN PHOSPHATE 900 MG/50ML
900 INJECTION, SOLUTION INTRAVENOUS SEE ADMIN INSTRUCTIONS
Status: CANCELLED | OUTPATIENT
Start: 2018-03-05

## 2018-03-05 NOTE — TELEPHONE ENCOUNTER
Left a message for the patient to return my call at [phone] to schedule surgery with Dr Martinez.

## 2018-03-06 ENCOUNTER — OFFICE VISIT (OUTPATIENT)
Dept: RADIATION ONCOLOGY | Facility: CLINIC | Age: 43
End: 2018-03-06
Payer: COMMERCIAL

## 2018-03-06 ENCOUNTER — ONCOLOGY VISIT (OUTPATIENT)
Dept: ONCOLOGY | Facility: CLINIC | Age: 43
End: 2018-03-06
Payer: COMMERCIAL

## 2018-03-06 ENCOUNTER — CARE COORDINATION (OUTPATIENT)
Dept: ONCOLOGY | Facility: CLINIC | Age: 43
End: 2018-03-06

## 2018-03-06 ENCOUNTER — TELEPHONE (OUTPATIENT)
Dept: SURGERY | Facility: CLINIC | Age: 43
End: 2018-03-06

## 2018-03-06 VITALS
HEART RATE: 87 BPM | RESPIRATION RATE: 16 BRPM | SYSTOLIC BLOOD PRESSURE: 109 MMHG | DIASTOLIC BLOOD PRESSURE: 77 MMHG | WEIGHT: 87.38 LBS | BODY MASS INDEX: 18.34 KG/M2 | HEIGHT: 58 IN | OXYGEN SATURATION: 99 % | TEMPERATURE: 99.3 F

## 2018-03-06 VITALS
WEIGHT: 97.38 LBS | BODY MASS INDEX: 20.44 KG/M2 | DIASTOLIC BLOOD PRESSURE: 77 MMHG | SYSTOLIC BLOOD PRESSURE: 109 MMHG | HEIGHT: 58 IN | HEART RATE: 87 BPM | TEMPERATURE: 99.3 F | OXYGEN SATURATION: 99 %

## 2018-03-06 DIAGNOSIS — C10.9 SQUAMOUS CELL CARCINOMA OF OROPHARYNX (H): Primary | ICD-10-CM

## 2018-03-06 DIAGNOSIS — Z51.11 ENCOUNTER FOR ANTINEOPLASTIC CHEMOTHERAPY: ICD-10-CM

## 2018-03-06 DIAGNOSIS — C02.9 TONGUE CANCER (H): ICD-10-CM

## 2018-03-06 DIAGNOSIS — C06.9 SQUAMOUS CELL CARCINOMA OF ORAL CAVITY (H): Primary | ICD-10-CM

## 2018-03-06 DIAGNOSIS — R13.10 DYSPHAGIA: ICD-10-CM

## 2018-03-06 LAB — BETA HCG QUAL IFA URINE: NEGATIVE

## 2018-03-06 PROCEDURE — 77334 RADIATION TREATMENT AID(S): CPT | Performed by: RADIOLOGY

## 2018-03-06 PROCEDURE — 84703 CHORIONIC GONADOTROPIN ASSAY: CPT | Performed by: RADIOLOGY

## 2018-03-06 PROCEDURE — 99205 OFFICE O/P NEW HI 60 MIN: CPT | Performed by: INTERNAL MEDICINE

## 2018-03-06 PROCEDURE — 31575 DIAGNOSTIC LARYNGOSCOPY: CPT | Performed by: RADIOLOGY

## 2018-03-06 PROCEDURE — 99205 OFFICE O/P NEW HI 60 MIN: CPT | Mod: 25 | Performed by: RADIOLOGY

## 2018-03-06 PROCEDURE — 77470 SPECIAL RADIATION TREATMENT: CPT | Performed by: RADIOLOGY

## 2018-03-06 RX ORDER — SODIUM CHLORIDE 9 MG/ML
1000 INJECTION, SOLUTION INTRAVENOUS CONTINUOUS PRN
Status: CANCELLED
Start: 2018-03-15

## 2018-03-06 RX ORDER — EPINEPHRINE 0.3 MG/.3ML
0.3 INJECTION SUBCUTANEOUS EVERY 5 MIN PRN
Status: CANCELLED | OUTPATIENT
Start: 2018-03-15

## 2018-03-06 RX ORDER — LORAZEPAM 0.5 MG/1
0.5 TABLET ORAL DAILY PRN
Qty: 10 TABLET | Refills: 0 | Status: SHIPPED | OUTPATIENT
Start: 2018-03-06 | End: 2018-03-26

## 2018-03-06 RX ORDER — OXYCODONE HYDROCHLORIDE 5 MG/1
5-10 TABLET ORAL EVERY 4 HOURS PRN
Qty: 60 TABLET | Refills: 0 | Status: ON HOLD | OUTPATIENT
Start: 2018-03-06 | End: 2018-03-14

## 2018-03-06 RX ORDER — LORAZEPAM 0.5 MG/1
0.5 TABLET ORAL DAILY PRN
Qty: 10 TABLET | Refills: 0 | Status: SHIPPED | OUTPATIENT
Start: 2018-03-06 | End: 2018-03-06

## 2018-03-06 RX ORDER — ALBUTEROL SULFATE 90 UG/1
1-2 AEROSOL, METERED RESPIRATORY (INHALATION)
Status: CANCELLED
Start: 2018-03-15

## 2018-03-06 RX ORDER — MEPERIDINE HYDROCHLORIDE 50 MG/ML
25 INJECTION INTRAMUSCULAR; INTRAVENOUS; SUBCUTANEOUS EVERY 30 MIN PRN
Status: CANCELLED | OUTPATIENT
Start: 2018-03-15

## 2018-03-06 RX ORDER — PALONOSETRON 0.05 MG/ML
0.25 INJECTION, SOLUTION INTRAVENOUS ONCE
Status: CANCELLED
Start: 2018-03-15

## 2018-03-06 RX ORDER — ALBUTEROL SULFATE 0.83 MG/ML
2.5 SOLUTION RESPIRATORY (INHALATION)
Status: CANCELLED | OUTPATIENT
Start: 2018-03-15

## 2018-03-06 RX ORDER — METHYLPREDNISOLONE SODIUM SUCCINATE 125 MG/2ML
125 INJECTION, POWDER, LYOPHILIZED, FOR SOLUTION INTRAMUSCULAR; INTRAVENOUS
Status: CANCELLED
Start: 2018-03-15

## 2018-03-06 RX ORDER — LORAZEPAM 2 MG/ML
0.5 INJECTION INTRAMUSCULAR EVERY 4 HOURS PRN
Status: CANCELLED
Start: 2018-03-15

## 2018-03-06 RX ORDER — EPINEPHRINE 1 MG/ML
0.3 INJECTION, SOLUTION INTRAMUSCULAR; SUBCUTANEOUS EVERY 5 MIN PRN
Status: CANCELLED | OUTPATIENT
Start: 2018-03-15

## 2018-03-06 RX ORDER — DIPHENHYDRAMINE HYDROCHLORIDE 50 MG/ML
50 INJECTION INTRAMUSCULAR; INTRAVENOUS
Status: CANCELLED
Start: 2018-03-15

## 2018-03-06 ASSESSMENT — PAIN SCALES - GENERAL
PAINLEVEL: MODERATE PAIN (4)
PAINLEVEL: MILD PAIN (3)

## 2018-03-06 NOTE — NURSING NOTE
"Oncology Rooming Note    March 6, 2018 8:46 AM   Melinda Milligan is a 42 year old female who presents for:    Chief Complaint   Patient presents with     Oncology Clinic Visit     tongue cancer     Initial Vitals: /77  Pulse 87  Temp 99.3  F (37.4  C)  Ht 1.48 m (4' 10.25\")  Wt 44.2 kg (97 lb 6 oz)  SpO2 99%  BMI 20.18 kg/m2 Estimated body mass index is 20.18 kg/(m^2) as calculated from the following:    Height as of this encounter: 1.48 m (4' 10.25\").    Weight as of this encounter: 44.2 kg (97 lb 6 oz). Body surface area is 1.35 meters squared.  Moderate Pain (4) Comment: up to 8 in mouth and throat, ambesol   No LMP recorded.  Allergies reviewed: Yes  Medications reviewed: Yes    Medications: MEDICATION REFILLS NEEDED TODAY. Provider was notified.  Pharm[facility] into [facility] - [place], [place] - [address]      5 minutes for nursing intake (face to face time)     Sri Artis LPN              "

## 2018-03-06 NOTE — NURSING NOTE
"INITIAL PATIENT ASSESSMENT    Diagnosis: Oropharyngeal cancer    Prior radiation therapy: None    Prior chemotherapy: None    Prior hormonal therapy:No    Pain Eval:  Current history of pain associated with this visit:   Intensity: 3/10 at current visit, patient reports pain does not fall below \"3/10\" and without pain medications increases to \"8/10\".  Current: dull, aching and throbbing  Location: Right head, ear, mouth and throat.  Patient reports difficulty chewing food due to pain also reports upper throat pain/difficulty with swallowing.  Treatment: Patient reports taking Oxycodone 5 mg po every four hours, reports previously taking Naproxen po as needed and was recommended to stop taking this per medical oncology with chemotherapy.    Psychosocial  Living arrangements: Lives at home in [place] with  and children  Fall Risk: independent   referral needs: Not needed    Advanced Directive: No  Implantable Cardiac Device? No    Onset of menarche: age 11  LMP: Patient's last menstrual period was 01/01/2018 (approximate).  Onset of menopause: NA  Abnormal vaginal bleeding/discharge: No  Are you pregnant? No  Reproductive note: Patient reports history of four pregnancies with two live births, first at age 21.  Patient reports she has a 19-year-old daughter and a 13-year-old son.    Review of Systems     Constitutional: Positive for chills and weight loss. Negative for diaphoresis, fever and malaise/fatigue.        Patient reports average weight of 98 pounds, current weight 87 pounds.  Patient reports drinking two to three Ensure per day, able to eat soft, thick foods such as pudding and oatmeal.  Patient reports \"I chill easily\" at baseline.   HENT: Positive for ear pain, hearing loss and sore throat. Negative for congestion, ear discharge, nosebleeds and sinus pain.         Patient reports \"it feels like I am under water in my right ear\".  Patient reports on-going throat pain, worse with " swallowing.  Patient reports occasional drooling related to difficulty with swallowing.  Patient reports recent cold symptoms, notes frequent nasal drainage.  She also reports intermittent episodes of water coming out her nose when drinking fluids.   Eyes: Negative.    Respiratory: Negative.  Negative for stridor.    Cardiovascular: Negative.    Gastrointestinal: Positive for constipation. Negative for abdominal pain, blood in stool, diarrhea, heartburn, melena, nausea and vomiting.        Patient reports recent constipation, taking narcotic pain medications, reviewed use of stool softeners while taking pain medications.   Genitourinary: Negative.    Musculoskeletal: Negative.    Skin: Positive for rash. Negative for itching.        Patient reports recent development of rash on abdomen with use of Magic Mouthwash, discontinued use.   Neurological: Positive for headaches. Negative for dizziness, tingling, tremors, speech change, focal weakness, seizures, loss of consciousness and weakness.        See pain note.   Endo/Heme/Allergies: Negative.    Psychiatric/Behavioral: Negative.        Nurse face-to-face time: Level 5:  over 15 min face to face time

## 2018-03-06 NOTE — LETTER
3/6/2018         RE: Melinda Milligan  [address] [facility] [address]        Dear Colleague,    Thank you for referring your patient, Melinda Milligan, to the [facility]. Please see a copy of my visit note below.    Oncology initial visit:  Date on this visit: 3/6/2018    Melinda Milligan  is referred by Dr.Sobia Elroy Arana for an oncology consultation. She requires evaluation for new diagnosis of     Primary Physician: No Ref-Primary, Physician     History Of Present Illness:  Ms. 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 She initially presented with thrush several months ago which was not improved after treatment and more recently she was noted to have worsening swelling and pain on the right side of the tongue and cheek and she was referred to ENT for a biopsy of the tongue lesion which was consistent with squamous cell cancer. P 16 negative.  As part of her workup she had a PET scan and neck CT which showed   1. Hypermetabolic mass involving the bilateral palatine tonsils, anteriorly extending to and involving the soft palate, superiorly extending to and involving the right posterior lateral nasopharyngeal wall and inferiorly extending to the right lateral oropharyngeal wall representing primary squamosal cancer.  2. Right and left metastatic FDG avid level IIa lymph nodes.    Today she comes in with her  and tells me her main complaint has been pain for which he is taking oxycodone on average 6 a day and at least 4 times a day. It is difficult for her to chew and swallow and she has lost about 8 lbs during this time.  She feels tired but it is also related to how bad the pain is. If she is having less pain than she is able to do more work. She was started on Magic mouthwash but developed hives and itchy skin from it so she stopped it so now the skin is improving. Denies any hearing problem apart from  noticing fogginess in the right ear. No tinnitus. No neuropathy. No infections. No trouble breathing. No other swellings apart from mild swelling in the right side of the neck.    She also mentioned that she has heavy periods and was recently found to have iron deficiency anemia and was started on intravenous iron infed and she has received 2 out of 40 scheduled doses of it.      ROS:  A comprehensive ROS was otherwise neg      Past Medical/Surgical History:  Past Medical History:   Diagnosis Date     Allergic rhinitis      Anemia      Hoarseness      Oropharyngeal cancer (H) 02/15/2018     Uncomplicated asthma     iron deficiency anemia due to heavy menses  Past Surgical History:   Procedure Laterality Date     BIOPSY  02/15/2018    Left Tongue - [facility]     Cancer History:   As above    Allergies:  Allergies as of 03/06/2018 - [facility] as Reviewed 03/06/2018   Allergen Reaction Noted     Ceftriaxone  08/25/2012     Chicken-derived products (egg)  08/25/2012     No clinical screening - see comments Hives 03/06/2018     Current Medications:  Current Outpatient Prescriptions   Medication Sig Dispense Refill     oxyCODONE IR (ROXICODONE) 5 MG tablet Take 1-2 tablets (5-10 mg) by mouth every 4 hours as needed for pain 60 tablet 0     folic acid (FOLVITE) 1 MG tablet Take 1 mg by mouth       Fexofenadine HCl (ALLEGRA ALLERGY PO) Take by mouth as needed for allergies        Family History:  Family History   Problem Relation Age of Onset     Substance Abuse Father      Dementia Maternal Grandmother      DIABETES Maternal Grandmother      Asthma Brother      Prostate Cancer Maternal Grandfather      Social History:  Social History     Social History     Marital status:      Spouse name: N/A     Number of children: N/A     Years of education: N/A     Occupational History     Not on file.     Social History Main Topics     Smoking status: Current Every Day Smoker     Packs/day: 1.00     Years: 23.00     Types:  "Cigarettes     Start date: 1/1/1993     Smokeless tobacco: Never Used      Comment: Patient reports currently smoking 3 cigarettes per day - updated 3/6/2018     Alcohol use No     Drug use: No     Sexual activity: Yes     Partners: Male     Birth control/ protection: Pull-out method, Condom     Other Topics Concern     Not on file     Social History Narrative    she used to smoke 1 pack a day but has cut down to 3 cigarettes a day and is in process of quitting. Denies any use of alcohol. Lives with her . 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  Physical Exam:  /77  Pulse 87  Temp 99.3  F (37.4  C)  Ht 1.48 m (4' 10.25\")  Wt 44.2 kg (97 lb 6 oz)  SpO2 99%  BMI 20.18 kg/m2  CONSTITUTIONAL: no acute distress  EYES: PERRLA, mild palor no icterus.   ENT/MOUTH: Ears look normal. On the right side of the tongue there is the lesion which was recently biopsied and found to have a squamous cell cancer.  CVS: s1s2 no m r g .   RESPIRATORY: clear to auscultation b/l  GI: soft non tender no hepatosplenomegaly  NEURO: AAOX3  Grossly non focal neuro exam  INTEGUMENT: She has resolving hives across her abdomen and back  LYMPHATIC: There is right anterior cervical lymph nodes are mildly enlarged and palpable. There is possibly a small left anterior cervical lymph node which is also palpable. No other lymphadenopathy  MUSCULOSKELETAL: Unremarkable. No bony tenderness.   EXTREMITIES: no edema  PSYCH: Mentation, mood and affect are normal. Decision making capacity is intact    Laboratory/Imaging Studies    Labs also reviewed in care everywhere  Results for orders placed or performed during the hospital encounter of 02/23/18   CT Soft Tissue Neck w Contrast    Narrative    PET CT fusion examination  1. Neck CT with contrast  2. PET study of the neck  3. PET CT fusion study of the neck    Provided History:  ; Tongue cancer (H)   Comparison: none    Technique: Please refer to the accompanying " whole body PET-CT for  report of the dose and whole body PET-CT findings.  Regarding the neck, axial images were obtained after nonionic  intravenous contrast administration, with sagittal and coronal  reconstructions performed. Neck CT images were reviewed in bone, soft  tissue, and lung windows, with review of the fused PET-CT images as  well in multiple planes.  Dose: 58 cc of Isovue 370 inj    Findings: There is FDG avid mass involving the right and left palatine  tonsils, extending anteriorly to and involving the posterior aspect of  soft palate, superiorly on the right extending to the right  posterolateral nasopharyngeal wall, inferiorly involving the right  lateral oropharyngeal wall. The max SUV is 26.      FDG avid bilateral level 2 lymph nodes; measuring 1.4 cm with max SUV  of 6 on the right and 1.0 cm on the left with Max SUV of 6.2 on the  left.    Regarding the major salivary glands, no abnormality is identified.  Regarding the thyroid gland, no abnormality is identified.    Limited evaluation of the cervical vertebra demonstrates that there is  no overt spinal canal stenosis. Regarding the visualized paranasal  sinuses, there is no evidence of significant debris or fluid.  Regarding the mastoid air cells, there is no evidence of significant  debris or fluid. Regarding the major vasculature in the neck, no  abnormality is identified.      Impression    Impression:   1. Hypermetabolic mass involving the bilateral palatine tonsils,  anteriorly extending to and involving the soft palate, superiorly  extending to and involving the right posterior lateral nasopharyngeal  wall and inferiorly extending to the right lateral oropharyngeal wall  representing primary squamosal cancer.  2. Right and left metastatic FDG avid level IIa lymph nodes.  3. Please refer to the whole body PET CT performed as a separate  report, for the findings of the remainder of the body.    I have personally reviewed the examination  and initial interpretation  and I agree with the findings.    AAMIR KENYON MD   Pathology Consult   Result Value Ref Range    Copath Report       Patient Name: BOYD JIMENEZ  MR#: [id number]  Specimen #: [id number]  Collected: 2/23/2018  Received: 2/23/2018  Reported: 2/23/2018 16:47  Ordering Phy(s): TOMASA VELASCO  Additional Phy(s): [facility], Department of Pathology    For improved result formatting, select 'View Enhanced Report Format' under   Linked Documents section.    TEST(S):  3 Slides, case # [id number]    FINAL DIAGNOSIS:  CASE FROM [facility], [place], [place] ([id number], OBTAINED   2/15/18):  TONGUE, LEFT, BIOPSY:  - Invasive keratinizing squamous cell carcinoma, moderately differentiated    COMMENT:  P16 immunohistochemical stain was performed at the referring institution   and is negative.    I have personally reviewed all specimens and/or slides, including the   listed special stains, and used them  with my medical judgement to determine or confirm the final diagnosis.    Electronically signed out by:    Erika Dallas M.D., PhD, [facility]    CLINICAL HISTORY:  The patient is a 42 -year-old female.    GROSS:  Received from [facility] in [place], [place] are 3 stained   slides labeled [id number] (obtained  2/15/18) and a copy of the referring pathologist's report with patient   identifying information.  All slides  are returned.    CPT Codes:  A: 26908-VXO8    TESTING LAB LOCATION:  [address], [address]   [address] [phone]    COLLECTION SITE:  Client:  [facility]  Location:  [facility] (B)    Resident  [email or web address]     Glucose by meter   Result Value Ref Range    Glucose 103 (H) 70 - 99 mg/dL     ASSESSMENT/PLAN:    Stage IV a aD1D1pF3 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  She is not a good surgical candidate. I would recommend  concurrent chemotherapy and radiation with curative intent.  ECOG is 1  I recommend high-dose cisplatin along with radiation. We discussed the rationale for schedule and potential side effects of it. We also discussed a small chance of becoming infertile due to chemotherapy. She tells me that she has completed her family  We would like to start it when the radiation starts. This would hopefully be next week.  At this time she does not need port placement and we can try with peripheral lines.  She will be seen regularly during the treatment  12 weeks after completing treatment we will repeat PET scan. She should have close follow-up with ENT.    Because of cisplatin use I will check baseline audiogram.    Pain. I told her not to take a leave but she can try oxycodone 5-10 mg every 4 hours as needed. I refilled oxycodone. I also gave her a referral to see palliative care. I also told her that she can try taking Tylenol 500 mg 4 times a day.    Nutrition. She is going to get a G-tube placed. I also referred her to see a nutritionist because of recent weight loss.    Recent iron deficiency anemia due to heavy menses. She is getting intravenous iron infusions through her hematologist and she has 2 more planned which I encouraged her to take. I also encouraged her to talk to GYN doctor regarding heavy menses.    Smoking. I gave her smoking cessation counseling. She has significantly cut down on them from 1 pack a day to 3 cigarettes a day which is encouraging.    I answered all of her and her 's questions to their satisfaction and they're agreeable and comfortable with the plan    Benedicto Nieto        Again, thank you for allowing me to participate in the care of your patient.        Sincerely,        Benedicto Nieto MD

## 2018-03-06 NOTE — PATIENT INSTRUCTIONS
Refer to nutritionist    Schedule Audiogram    Start Cisplatin when radiation starts    1 week after start of Chemo, see Tyson with BMP prior    Needs provider appointment every week while getting treatment    3 weeks after start of chemo, see me or tyson with labs and next cisplatin    Cont iron infusions at your doctor's office    Refer to pall care    Refilled oxycodone

## 2018-03-06 NOTE — NURSING NOTE
Patient scheduled for radiation therapy while in clinic. Patient given start date for radiation therapy 3/15/18 9195 along with chemotherapy start prior and patient verbalized understanding. Patient informed of appointments to be added and will have scheduled mailed to home once completed. Patient verbalized understanding and had no questions     Roge ZAMORANO

## 2018-03-06 NOTE — PROGRESS NOTES
Oncology initial visit:  Date on this visit: 3/6/2018    Melinda Milligan  is referred by Dr.Sobia Elroy Arana for an oncology consultation. She requires evaluation for new diagnosis of     Primary Physician: No Ref-Primary, Physician     History Of Present Illness:  Ms. 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 She initially presented with thrush several months ago which was not improved after treatment and more recently she was noted to have worsening swelling and pain on the right side of the tongue and cheek and she was referred to ENT for a biopsy of the tongue lesion which was consistent with squamous cell cancer. P 16 negative.  As part of her workup she had a PET scan and neck CT which showed   1. Hypermetabolic mass involving the bilateral palatine tonsils, anteriorly extending to and involving the soft palate, superiorly extending to and involving the right posterior lateral nasopharyngeal wall and inferiorly extending to the right lateral oropharyngeal wall representing primary squamosal cancer.  2. Right and left metastatic FDG avid level IIa lymph nodes.    Today she comes in with her  and tells me her main complaint has been pain for which he is taking oxycodone on average 6 a day and at least 4 times a day. It is difficult for her to chew and swallow and she has lost about 8 lbs during this time.  She feels tired but it is also related to how bad the pain is. If she is having less pain than she is able to do more work. She was started on Magic mouthwash but developed hives and itchy skin from it so she stopped it so now the skin is improving. Denies any hearing problem apart from noticing fogginess in the right ear. No tinnitus. No neuropathy. No infections. No trouble breathing. No other swellings apart from mild swelling in the right side of the neck.    She also mentioned that she has heavy periods and was recently found to  have iron deficiency anemia and was started on intravenous iron infed and she has received 2 out of 40 scheduled doses of it.      ROS:  A comprehensive ROS was otherwise neg      Past Medical/Surgical History:  Past Medical History:   Diagnosis Date     Allergic rhinitis      Anemia      Hoarseness      Oropharyngeal cancer (H) 02/15/2018     Uncomplicated asthma     iron deficiency anemia due to heavy menses  Past Surgical History:   Procedure Laterality Date     BIOPSY  02/15/2018    Left Tongue - [facility]     Cancer History:   As above    Allergies:  Allergies as of 03/06/2018 - [facility] as Reviewed 03/06/2018   Allergen Reaction Noted     Ceftriaxone  08/25/2012     Chicken-derived products (egg)  08/25/2012     No clinical screening - see comments Hives 03/06/2018     Current Medications:  Current Outpatient Prescriptions   Medication Sig Dispense Refill     oxyCODONE IR (ROXICODONE) 5 MG tablet Take 1-2 tablets (5-10 mg) by mouth every 4 hours as needed for pain 60 tablet 0     folic acid (FOLVITE) 1 MG tablet Take 1 mg by mouth       Fexofenadine HCl (ALLEGRA ALLERGY PO) Take by mouth as needed for allergies        Family History:  Family History   Problem Relation Age of Onset     Substance Abuse Father      Dementia Maternal Grandmother      DIABETES Maternal Grandmother      Asthma Brother      Prostate Cancer Maternal Grandfather      Social History:  Social History     Social History     Marital status:      Spouse name: N/A     Number of children: N/A     Years of education: N/A     Occupational History     Not on file.     Social History Main Topics     Smoking status: Current Every Day Smoker     Packs/day: 1.00     Years: 23.00     Types: Cigarettes     Start date: 1/1/1993     Smokeless tobacco: Never Used      Comment: Patient reports currently smoking 3 cigarettes per day - updated 3/6/2018     Alcohol use No     Drug use: No     Sexual activity: Yes     Partners: Male     Birth control/  "protection: Pull-out method, Condom     Other Topics Concern     Not on file     Social History Narrative    she used to smoke 1 pack a day but has cut down to 3 cigarettes a day and is in process of quitting. Denies any use of alcohol. Lives with her . 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  Physical Exam:  /77  Pulse 87  Temp 99.3  F (37.4  C)  Ht 1.48 m (4' 10.25\")  Wt 44.2 kg (97 lb 6 oz)  SpO2 99%  BMI 20.18 kg/m2  CONSTITUTIONAL: no acute distress  EYES: PERRLA, mild palor no icterus.   ENT/MOUTH: Ears look normal. On the right side of the tongue there is the lesion which was recently biopsied and found to have a squamous cell cancer.  CVS: s1s2 no m r g .   RESPIRATORY: clear to auscultation b/l  GI: soft non tender no hepatosplenomegaly  NEURO: AAOX3  Grossly non focal neuro exam  INTEGUMENT: She has resolving hives across her abdomen and back  LYMPHATIC: There is right anterior cervical lymph nodes are mildly enlarged and palpable. There is possibly a small left anterior cervical lymph node which is also palpable. No other lymphadenopathy  MUSCULOSKELETAL: Unremarkable. No bony tenderness.   EXTREMITIES: no edema  PSYCH: Mentation, mood and affect are normal. Decision making capacity is intact    Laboratory/Imaging Studies    Labs also reviewed in care everywhere  Results for orders placed or performed during the hospital encounter of 02/23/18   CT Soft Tissue Neck w Contrast    Narrative    PET CT fusion examination  1. Neck CT with contrast  2. PET study of the neck  3. PET CT fusion study of the neck    Provided History:  ; Tongue cancer (H)   Comparison: none    Technique: Please refer to the accompanying whole body PET-CT for  report of the dose and whole body PET-CT findings.  Regarding the neck, axial images were obtained after nonionic  intravenous contrast administration, with sagittal and coronal  reconstructions performed. Neck CT images were " reviewed in bone, soft  tissue, and lung windows, with review of the fused PET-CT images as  well in multiple planes.  Dose: 58 cc of Isovue 370 inj    Findings: There is FDG avid mass involving the right and left palatine  tonsils, extending anteriorly to and involving the posterior aspect of  soft palate, superiorly on the right extending to the right  posterolateral nasopharyngeal wall, inferiorly involving the right  lateral oropharyngeal wall. The max SUV is 26.      FDG avid bilateral level 2 lymph nodes; measuring 1.4 cm with max SUV  of 6 on the right and 1.0 cm on the left with Max SUV of 6.2 on the  left.    Regarding the major salivary glands, no abnormality is identified.  Regarding the thyroid gland, no abnormality is identified.    Limited evaluation of the cervical vertebra demonstrates that there is  no overt spinal canal stenosis. Regarding the visualized paranasal  sinuses, there is no evidence of significant debris or fluid.  Regarding the mastoid air cells, there is no evidence of significant  debris or fluid. Regarding the major vasculature in the neck, no  abnormality is identified.      Impression    Impression:   1. Hypermetabolic mass involving the bilateral palatine tonsils,  anteriorly extending to and involving the soft palate, superiorly  extending to and involving the right posterior lateral nasopharyngeal  wall and inferiorly extending to the right lateral oropharyngeal wall  representing primary squamosal cancer.  2. Right and left metastatic FDG avid level IIa lymph nodes.  3. Please refer to the whole body PET CT performed as a separate  report, for the findings of the remainder of the body.    I have personally reviewed the examination and initial interpretation  and I agree with the findings.    AAMIR KENYON MD   Pathology Consult   Result Value Ref Range    Copath Report       Patient Name: BOYD JIMENEZ  MR#: [id number]  Specimen #: [id number]  Collected:  2/23/2018  Received: 2/23/2018  Reported: 2/23/2018 16:47  Ordering Phy(s): TOMASA VELASCO  Additional Phy(s): [facility], Department of Pathology    For improved result formatting, select 'View Enhanced Report Format' under   Linked Documents section.    TEST(S):  3 Slides, case # [id number]    FINAL DIAGNOSIS:  CASE FROM [facility], [place], [place] ([id number], OBTAINED   2/15/18):  TONGUE, LEFT, BIOPSY:  - Invasive keratinizing squamous cell carcinoma, moderately differentiated    COMMENT:  P16 immunohistochemical stain was performed at the referring institution   and is negative.    I have personally reviewed all specimens and/or slides, including the   listed special stains, and used them  with my medical judgement to determine or confirm the final diagnosis.    Electronically signed out by:    Erika Dallas M.D., PhD, [facility]    CLINICAL HISTORY:  The patient is a 42 -year-old female.    GROSS:  Received from [facility] in [place], [place] are 3 stained   slides labeled [id number] (obtained  2/15/18) and a copy of the referring pathologist's report with patient   identifying information.  All slides  are returned.    CPT Codes:  A: [id number]    TESTING LAB LOCATION:  [address], [address]   [address]  [phone]    COLLECTION SITE:  Client:  [facility]  Location:  [facility] (B)    Resident  OWG1     Glucose by meter   Result Value Ref Range    Glucose 103 (H) 70 - 99 mg/dL     ASSESSMENT/PLAN:    Stage IV a pP7N6vY1 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  She is not a good surgical candidate. I would recommend concurrent chemotherapy and radiation with curative intent.  ECOG is 1  I recommend high-dose cisplatin along with radiation. We discussed the rationale for schedule and potential side effects of it. We also discussed a small chance of  becoming infertile due to chemotherapy. She tells me that she has completed her family  We would like to start it when the radiation starts. This would hopefully be next week.  At this time she does not need port placement and we can try with peripheral lines.  She will be seen regularly during the treatment  12 weeks after completing treatment we will repeat PET scan. She should have close follow-up with ENT.    Because of cisplatin use I will check baseline audiogram.    Pain. I told her not to take a leave but she can try oxycodone 5-10 mg every 4 hours as needed. I refilled oxycodone. I also gave her a referral to see palliative care. I also told her that she can try taking Tylenol 500 mg 4 times a day.    Nutrition. She is going to get a G-tube placed. I also referred her to see a nutritionist because of recent weight loss.    Recent iron deficiency anemia due to heavy menses. She is getting intravenous iron infusions through her hematologist and she has 2 more planned which I encouraged her to take. I also encouraged her to talk to GYN doctor regarding heavy menses.    Smoking. I gave her smoking cessation counseling. She has significantly cut down on them from 1 pack a day to 3 cigarettes a day which is encouraging.    I answered all of her and her 's questions to their satisfaction and they're agreeable and comfortable with the plan    Benedicto Nieto

## 2018-03-06 NOTE — PROGRESS NOTES
"\"New Chemo,\" white folder given to patient with cancer related resources: American [employer], print-outs specific to Cisplatin chemotherapy educated pt on how chemos are given, frequency, route of admin & chemo side effects specific to therapy. Educated pt how to manage chemo side effects (ie. Lowered blood counts, Neutropenia, N/V, nutrition, diarrhea, constipation). Mouth care reviewed and instructed on baking soda/salt rinses.  Provided contact #s for RN coordinator, Triage, On-call MD. Emphasized reasons to call clinic or seek emergency care: fevers, chills, SOB, chest pain, uncontrolled bleeding, pain, n/v, diarrhea, constipation. Pt and spouse verbalized understanding.  Patient scheduled to have PEG tube placement next week.  She is basically only able to use liquid supplements like Ensure due to extreme sore throat- prescribed Oxycodone and will be scheduled to see palliative care. Discussed with her about seeing our dietician when she gets scheduled for chemotherapy.  She is accompanied by her , who is very supportive; discussed our supportive care staff in the clinic, psychologist,  and we could get her scheduled for those services.  Allowed time for questions.  She will be seen by radiation today and will need to coordinate with her chemotherapy every 3 weeks.  "

## 2018-03-06 NOTE — MR AVS SNAPSHOT
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)    -  1    Encounter for antineoplastic chemotherapy        Tongue cancer (H)          Care Instructions    Refer to nutritionist    Schedule Audiogram    Start Cisplatin when radiation starts    1 week after start of Chemo, see Tyson with BMP prior    Needs provider appointment every week while getting treatment    3 weeks after start of chemo, see me or tyson with labs and next cisplatin    Cont iron infusions at your doctor's office    Refer to pall care    Refilled oxycodone              Follow-ups after your visit        Additional Services     AUDIOLOGY ADULT REFERRAL       Your provider has referred you to: PREFERRED PROVIDERS:    Treatment:  Evaluation & Treatment  Specialty Testing:  Audiogram w/Tymps and Reflexes    Please be aware that coverage of these services is subject to the terms and limitations of your health insurance plan.  Call member services at your health plan with any benefit or coverage questions.      Please bring the following to your appointment:  >>   Any x-rays, CTs or MRIs which have been performed.  Contact the facility where they were done to arrange for  prior to your scheduled appointment.   >>   List of current medications  >>   This referral request   >>   Any documents/labs given to you for this referral            NUTRITION REFERRAL       Your provider has referred you to: PREFERRED PROVIDERS:    Please be aware that coverage of these services is subject to the terms and limitations of your health insurance plan.  Call member services at your health plan with any benefit or coverage questions.      Please bring the following with  you to your appointment:    (1) This referral request  (2) Any documents given to you regarding this referral  (3) Any specific questions you have about diet and/or food choices            PALLIATIVE CARE REFERRAL       Your provider has referred you to Palliative Care Services.    To schedule an Outpatient Palliative Care Referral appointment, please call: PREFERRED PROVIDERS:.    Please be aware that coverage of these services is subject to the terms and limitations of your health insurance plan.  Call member services at your health plan with any benefit or coverage questions.      Please bring the following with you to your appointment:    (1) Any X-Rays, CTs or MRIs which have been performed.  Contact the facility where they were done to arrange for  prior to your scheduled appointment.   (2) If you have recently seen a provider outside of the [facility], please bring your most recent clinic note and/or imaging results  (3) List of current medications - please bring ALL of the medications that you are currently taking (in their original bottles) to your appointment  (4) This referral request  (5) Any documents/labs given to you for this referral    Services Requested: Evaluate and treat symptoms (including writing prescriptions)    Please complete the following questions:  1. What is patient's life-limiting diagnosis?   2. What is the reason for the referral? Pain management  3. What is the patient's prognosis? good    Palliative Care Definition:    Palliative Care is specialized medical care for people with serious illness.  This type of care is focused on providing patients with relief from symptoms, pain and stresses of serious illness - whatever the diagnosis may be.  The goal of Palliative Care is to improve quality of life for both the patient and the family.  Palliative Care is provided by a team of doctors, nurses and other specialists who work with the patient's other doctors to provide an  extra layer of support.  Palliative Care is appropriate at any age and at any stage in a serious illness, and can be provided together with curative treatment.                  Your next 10 appointments already scheduled     Mar 07, 2018  9:30 AM CST   New Visit with Bronson Gold   Formerly named [facility])    [address] [place] [address]   [phone]            Mar 15, 2018  9:30 AM CDT   LAB with LAB ONC [facility])    [address] [place] [address]   [phone]           Please do not eat 10-12 hours before your appointment if you are coming in fasting for labs on lipids, cholesterol, or glucose (sugar). This does not apply to pregnant women. Water, hot tea and black coffee (with nothing added) are okay. Do not drink other fluids, diet soda or chew gum.            Mar 15, 2018 10:00 AM CDT   Level 6 with BAY 8 INFUSION   Formerly named [facility])    [address] [place] [address]   [phone]            Mar 15, 2018  4:15 PM CDT   New Treatment with Albert Ambrosio MD, RADIATION THERAPIST   Formerly named [facility])    [address] [place] [address]   [phone]            Mar 16, 2018 11:00 AM CDT   TREATMENT with RADIATION THERAPIST   Formerly named [facility])    [address] [place] [address]   [phone]            Mar 19, 2018  2:15 PM CDT   TREATMENT with RADIATION THERAPIST   Formerly named [facility])    [address] [place] [address]   [phone]            Mar 19, 2018  2:45 PM CDT   on treatment visit with Albert Ambrosio MD   Formerly named [facility])    [address] [facility] [address]   [phone]             Mar 20, 2018 12:45 PM CDT   TREATMENT with RADIATION THERAPIST   [facility]    [address] [place] [address] [phone]            Mar 20, 2018  1:30 PM CDT   Return Visit with Juli Monique RD   [facility]    [address] [place] [address] [phone]              Who to contact     If you have questions or need follow up information about today's clinic visit or your schedule please contact [facility] directly at [phone].  Normal or non-critical lab and imaging results will be communicated to you by MyChart, letter or phone within 4 business days after the clinic has received the results. If you do not hear from us within 7 days, please contact the clinic through [employer] or phone. If you have a critical or abnormal lab result, we will notify you by phone as soon as possible.  Submit refill requests through Normal or call your pharmacy and they will forward the refill request to us. Please allow 3 business days for your refill to be completed.          Additional Information About Your Visit        Normal Information     Normal is an electronic gateway that provides easy, online access to your medical records. With Normal, you can request a clinic appointment, read your test results, renew a prescription or communicate with your care team.     To sign up for Normal visit the website at [email or web address]   You will be asked to enter the access code listed below, as well as some personal information. Please follow the directions to create your username and password.     Your access code is: [email or web address]  Expires: 2018  6:30 AM     Your access code will  in 90 days. If you need help or a new code, please contact your [facility] or call [phone] for assistance.        Care EveryWhere ID   "   This is your Care EveryWhere ID. This could be used by other organizations to access your [facility] medical records  [id number]        Your Vitals Were     Pulse Temperature Height Pulse Oximetry BMI (Body Mass Index)       87 99.3  F (37.4  C) 1.48 m (4' 10.25\") 99% 20.18 kg/m2        Blood Pressure from Last 3 Encounters:   03/06/18 109/77   03/06/18 109/77    Weight from Last 3 Encounters:   03/06/18 39.6 kg (87 lb 6 oz)   03/06/18 44.2 kg (97 lb 6 oz)   02/23/18 40.8 kg (90 lb)              We Performed the Following     AUDIOLOGY ADULT REFERRAL     NUTRITION REFERRAL     PALLIATIVE CARE REFERRAL          Today's Medication Changes          These changes are accurate as of 3/6/18  1:10 PM.  If you have any questions, ask your nurse or doctor.               Start taking these medicines.        Dose/Directions    LORazepam 0.5 MG tablet   Commonly known as:  ATIVAN   Used for:  Squamous cell carcinoma of oropharynx (H)   Started by:  Albert Ambrosio MD        Dose:  0.5 mg   Take 1 tablet (0.5 mg) by mouth daily as needed for anxiety (for radiation)   Quantity:  10 tablet   Refills:  0         These medicines have changed or have updated prescriptions.        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  Ask your nurse if you have questions.     Bring a paper prescription for each of these medications     LORazepam 0.5 MG tablet    oxyCODONE IR 5 MG tablet                Primary Care Provider Office Phone # Fax #    Quang NEWTON " RADHA Wall [phone]       [facility] [address]  [facility] [address]        Equal Access to Services     JAVIER MORALES : Hadii aad ku hadtajo Solinusali, waaxda luqadaha, qaybta kaalmada adeegyada, whitney guardado magangerardo greer laCharliebess pace. So [facility] [phone].    ATENCIÓN: Si habla español, tiene a hawley disposición servicios gratuitos de asistencia lingüística. Llame al [phone].    We comply with applicable federal civil rights laws and [place] laws. We do not discriminate on the basis of race, color, national origin, age, disability, sex, sexual orientation, or gender identity.            Thank you!     Thank you for choosing [facility]  for your care. Our goal is always to provide you with excellent care. Hearing back from our patients is one way we can continue to improve our services. Please take a few minutes to complete the written survey that you may receive in the mail after your visit with us. Thank you!             Your Updated Medication List - Protect others around you: Learn how to safely use, store and throw away your medicines at www.disposemymeds.org.          This list is accurate as of 3/6/18  1:10 PM.  Always use your most recent med list.                   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

## 2018-03-06 NOTE — LETTER
3/6/2018         RE: Melinda Milligan  [address] [facility] [address]        Dear Colleague,    Thank you for referring your patient, Melinda Milligan, to the [facility]. Please see a copy of my visit note below.    RADIATION ONCOLOGY CONSULT NOTE    Date of Visit: Mar 6, 2018  Patient Name: Melinda Milligan  MRN: [id number]  : 1975    Melinda Milligan is being seen today for initial consultation at the request of Dr. Marga Arana for consideration of radiation therapy.    HISTORY OF PRESENT ILLNESS:  Ms. Milligan is a 42 year old female with locally advanced oropharyngeal SCC, p16 negative.    She presented with a lesion on her tongue and thrush for about 6-8 mos; her PCP treated her for thrush and amoxicillin, but they did not improve, and he eventually referred her to Dr. Monsalve.     2/15/18: Biopsy of L tongue lesion showed invasive keratinizing SCCa, mod diff, p16 negative.    18: PET and neck CT w/ contrast showed an FDG avid mass involving the R and L palatine tonsils, posterior soft palate, R posterolateral NPX wall occluding torus, R OPX wall with SUV max 26, and bilat level II LN up to 1.4 cm w/ SUV max of 6 on R and 1.0 cm with SUV max 6.2 on L; no distant metastases.    She saw Dr. Arana, who performed FFL showing an exophytic ulcerative tumor along both tonsils extending to the soft palate, narrowing the pharyngeal walls and NPX/OPX airway, normal vallecula, epiglottis, piriform sinus, and larynx; and small palpable LN bilat. Her case was discussed at tumor board with recommendation for chemoRT. She was referred to dental and speech pathology.    She has a ~25 pk yr hx of smoking but has started to cut back significantly. 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    Today, she complains of pain in her throat and R ear, taking naproxen but will stop and switch to  tramadol/oxyIR. The pain is currently rated 3/10 but is 8/10 at its worst. She has difficulty swallowing including some regurgitation/reflux. She weighs 87 lbs currently, down from her baseline of 98 lbs. She is taking 2-3 ensures/day. She can only eat a liquid/soft diet currently. She is currently smoking, and is down to 3 cigs/day. She also feels that her R ear is plugged. She is otherwise healthy and active. She is here today with her .    CHEMOTHERAPY HISTORY: none    RADIATION THERAPY HISTORY:  none    PAST MEDICAL/SURGICAL HISTORY:  Past Medical History:   Diagnosis Date     Allergic rhinitis      Anemia      Hoarseness      Oropharyngeal cancer (H) 02/15/2018     Uncomplicated asthma      Past Surgical History:   Procedure Laterality Date     BIOPSY  02/15/2018    Left Tongue - [facility]       ALLERGIES:  Allergies as of 03/06/2018 - Alfred as Reviewed 03/06/2018   Allergen Reaction Noted     Ceftriaxone  08/25/2012     Chicken-derived products (egg)  08/25/2012     No clinical screening - see comments Hives 03/06/2018       MEDICATIONS:  Current Outpatient Prescriptions   Medication Sig Dispense Refill     oxyCODONE IR (ROXICODONE) 5 MG tablet Take 1-2 tablets (5-10 mg) by mouth every 4 hours as needed for pain 60 tablet 0     folic acid (FOLVITE) 1 MG tablet Take 1 mg by mouth       Fexofenadine HCl (ALLEGRA ALLERGY PO) Take by mouth as needed for allergies          FAMILY HISTORY:  Family History   Problem Relation Age of Onset     Substance Abuse Father      Dementia Maternal Grandmother      DIABETES Maternal Grandmother      Asthma Brother      Prostate Cancer Maternal Grandfather        SOCIAL HISTORY:  Social History     Social History     Marital status:      Spouse name: N/A     Number of children: N/A     Years of education: N/A     Occupational History     Not on file.     Social History Main Topics     Smoking status: Current Every Day Smoker     Packs/day: 1.00     Years: 23.00      "Types: Cigarettes     Start date: 1/1/1993     Smokeless tobacco: Never Used      Comment: Patient reports currently smoking 3 cigarettes per day - updated 3/6/2018     Alcohol use No     Drug use: No     Sexual activity: Yes     Partners: Male     Birth control/ protection: Pull-out method, Condom     Other Topics Concern     Not on file     Social History Narrative       REVIEW OF SYSTEMS: A 10-point review of systems was obtained. Pertinent findings are noted in the HPI and are otherwise unremarkable.     PHYSICAL EXAM:  VITALS: /77  Pulse 87  Temp 99.3  F (37.4  C) (Oral)  Resp 16  Ht 4' 10\"  Wt 87 lb 6 oz  LMP 01/01/2018 (Approximate)  SpO2 99%  BMI 18.26 kg/m2  GEN: appears well, in no acute distress  HEENT: normocephalic and atraumatic, EOMI, PERRL, anicteric sclerae. 2 cm ulcerated leukoplakic lesion in R lateral base of tongue, palpable tumor in R BOT. Visible exophytic tumor with leukoplakic appearance extensively occupying bilat tonsils, soft palate, and R glossotonsillar sulcus. Uvula is not apparent. No other suspicious visible/palpable lesions in oral cavity.  Jaw opening 4 cm, no trismus. Normal tongue mobility.  CV: RRR, no m/g/r, no LE edema, no JVD  RESP: CTAB, normal respiration on room air, no stridor  ABDOMEN: soft, NT, ND  SKIN: normal color and turgor  MSK: moving all extremities well  LYMPHATICS: palpable 1 cm bilat level IIA LAD, mobile; no other cervical/supraclavicular LAD  NEURO: CN II-XII grossly intact, no focal neurologic deficit  PSYCH: appropriate mood, affect, and judgment    ECOG PERFORMANCE STATUS: 0    Flexible fiberoptic nasal endoscopy: the procedure was explained and risks/benefits/alternatives discussed, consent obtained. The bilat nasal passages were sprayed with topical anesthetic. The scope was advanced through the R nasal passage, however scope was unable to traverse the turbinates due to narrowing. No visible mass identified in anterior NPX. The scope was " retracted and advanced through the L nasal passage. Tumor was visible in the posterior NPX which appeared to arise from R posterolateral NPX and extend to midline; bulky tumor visible occupying majority of bilat OPX and vallecula, abutting but appearing distinct from lingual surface of epiglottis. Normal VC mobility, normal appearance of larynx and hypopharynx.    She tolerated the procedure well.    All pertinent laboratory, imaging, and pathology findings have been reviewed.     IMPRESSION AND RECOMMENDATIONS:  In summary, Ms. Milligan is a 42 year old female with cT3N2c SCCA of the oropharynx, likely arising from the R side but extending to the L OPX, soft palate, and NPX.    Her case was discussed at tumor board with recommendation for definitive chemoradiation, with which I agree. She has seen dental and speech pathology; no extractions needed but will have cleaning and had mouthguards made. She has seen medical oncology with recommendation for concurrent bolus cisplatin. I would recommend concurrent cisplatin with radiation to the head and neck region for 7 weeks, with interim PET at 4 weeks for adaptive radiation planning to account for weight changes and potential reduction in radiation volumes off of critical structures to lower the risk of toxicity.    Given her baseline anorexia and dysphagia, I would recommend placement of a feeding tube. I instructed her to try to maintain as much nutrition PO as possible to maintain her swallowing function. She has seen speech therapy and will see our dietitian. We also discussed smoking cessation; she is going to quit cold turkey with nicotine lozenges and I offered assistance and resources if she needs it.    The risks, benefits, alternatives, and logistics to radiation therapy were discussed in detail. Side effects of radiation therapy include, but are not limited to, dry mouth, changes in taste sensation, dysphagia that may require a feeding tube that can be  permanent in rare cases, voice changes such as hoarseness, mucositis, skin changes including blisters, bleeding, infection, fibrosis and stiffness in the head and neck, lymphedema, hypothyroidism, dental complications including caries and compromised oral cavity vascular supply, and rare risks such as osteoradionecrosis, injury to the spinal cord or other nerves or second malignancy. I also discussed the importance of thorough dental hygiene and follow-up with a dentist. She is aware that the side effects of radiation therapy may be severe and permanent, although we expect that such risks would be low and that they are outweighed by the benefit of treatment. She was given the opportunity to ask questions, which were answered. Informed consent was obtained. CT simulation performed today.    Albert Ambrosio M.D.  Attending Physician  Radiation Oncology  Clinic Phone #: [phone]  Pager #: [phone]        Again, thank you for allowing me to participate in the care of your patient.        Sincerely,        Albert Ambrosio MD

## 2018-03-06 NOTE — TELEPHONE ENCOUNTER
Pt called back and we decided on 3/14 for surgery. She said her oncologist told her she only needs a PEG placement now, not a port too. So we scheduled just PEG. Setting her up for a PEG check appt and including all the info in her surgery packet email.

## 2018-03-06 NOTE — MR AVS SNAPSHOT
"              After Visit Summary   3/6/2018    Melinda Milligan    MRN: [id number]           Patient Information     Date Of Birth          1975        Visit Information        Provider Department      3/6/2018 10:00 AM Albert Ambrosio MD [facility]        Today's Diagnoses     Squamous cell carcinoma of oropharynx (H)    -  1    Dysphagia           Follow-ups after your visit        Additional Services     SPEECH THERAPY REFERRAL       *This therapy referral will be filtered to a centralized scheduling office at [facility] and the patient will receive a call to schedule an appointment at a [place] location most convenient for them. *     [facility] provides Speech Therapy evaluation and treatment and many specialty services across the [place] system.  If requesting a specialty program, please choose from the list below.  If you have not heard from the scheduling office within 2 business days, please call [phone] for all locations, with the exception of [place], please call [phone] and [facility], please call [phone]      Treatment: Evaluation & Treatment  Speech Treatment Diagnosis: Dysphagia  Special Instructions: Evaluation and treatment, newly diagnosed head and neck cancer with plans to start chemotherapy and radiation treatment on 3/15/2018  Special Programs: Cancer Rehabilitation    Please be aware that coverage of these services is subject to the terms and limitations of your health insurance plan.  Call member services at your health plan with any benefit or coverage questions.      **Note to Provider:  If you are referring outside of [place] for the therapy appointment, please list the name of the location in the \"special instructions\" above, print the referral and give to the patient to schedule the appointment.                  Your next 10 appointments already scheduled     Mar 07, 2018  9:30 AM CST   [facility] " [facility]    [address] [facility] [address]   [phone]            Mar 15, 2018  9:30 AM CDT   LAB with LAB ONC [facility] ([facility])    [address] [facility] [address]   [phone]           Please do not eat 10-12 hours before your appointment if you are coming in fasting for labs on lipids, cholesterol, or glucose (sugar). This does not apply to pregnant women. Water, hot tea and black coffee (with nothing added) are okay. Do not drink other fluids, diet soda or chew gum.            Mar 15, 2018 10:00 AM CDT   Level 6 with BAY 8 INFUSION   [facility]    [address] [facility] [address]   [phone]            Mar 15, 2018  4:15 PM CDT   New Treatment with Albert Ambrosio MD, RADIATION THERAPIST   [facility])    [address] [facility] [address]   [phone]            Mar 16, 2018 11:00 AM CDT   TREATMENT with RADIATION THERAPIST   [facility]    [address] [facility] [address]   [phone]            Mar 19, 2018  2:15 PM CDT   TREATMENT with RADIATION THERAPIST   [facility]    [address] [facility] [address]   [phone]            Mar 19, 2018  2:45 PM CDT   on treatment visit with Albert Ambrosio MD   [facility])    [address] [facility] [address]   [phone]            Mar 20, 2018 12:45 PM CDT   TREATMENT with RADIATION THERAPIST   [facility]    [address] [facility] [address]   [phone]            Mar 20, 2018  1:30 PM CDT   Return Visit with Juli Pope  ZACKERY Monique   [facility] ([facility])    [address] [place] [address] [phone]              Who to contact     If you have questions or need follow up information about today's clinic visit or your schedule please contact [facility] directly at [phone].  Normal or non-critical lab and imaging results will be communicated to you by MyChart, letter or phone within 4 business days after the clinic has received the results. If you do not hear from us within 7 days, please contact the clinic through MyChart or phone. If you have a critical or abnormal lab result, we will notify you by phone as soon as possible.  Submit refill requests through [employer] or call your pharmacy and they will forward the refill request to us. Please allow 3 business days for your refill to be completed.          Additional Information About Your Visit        [employer] Information     [employer] is an electronic gateway that provides easy, online access to your medical records. With [employer], you can request a clinic appointment, read your test results, renew a prescription or communicate with your care team.     To sign up for [employer] visit the website at [email or web address]   You will be asked to enter the access code listed below, as well as some personal information. Please follow the directions to create your username and password.     Your access code is: [email or web address]  Expires: 2018  6:30 AM     Your access code will  in 90 days. If you need help or a new code, please contact your [facility] or call [phone] for assistance.        Care EveryWhere ID     This is your Care EveryWhere ID. This could be used by other organizations to access your [place] medical records  [id number]        Your Vitals Were     Pulse Temperature Respirations Height Last Period Pulse Oximetry    87 99.3  F (37.4  C) (Oral) 16 4'  "10\" 01/01/2018 (Approximate) 99%    BMI (Body Mass Index)                   18.26 kg/m2            Blood Pressure from Last 3 Encounters:   03/06/18 109/77   03/06/18 109/77    Weight from Last 3 Encounters:   03/06/18 87 lb 6 oz   03/06/18 97 lb 6 oz   02/23/18 90 lb              We Performed the Following     Beta HCG qual IFA urine     SPEECH THERAPY REFERRAL          Today's Medication Changes          These changes are accurate as of 3/6/18  1:24 PM.  If you have any questions, ask your nurse or doctor.               Start taking these medicines.        Dose/Directions    LORazepam 0.5 MG tablet   Commonly known as:  ATIVAN   Used for:  Squamous cell carcinoma of oropharynx (H)   Started by:  Albert Ambrosio MD        Dose:  0.5 mg   Take 1 tablet (0.5 mg) by mouth daily as needed for anxiety (for radiation)   Quantity:  10 tablet   Refills:  0         These medicines have changed or have updated prescriptions.        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  Ask your nurse if you have questions.     Bring a paper prescription for each of these medications     LORazepam 0.5 MG tablet    oxyCODONE IR 5 MG tablet                Primary Care Provider Office Phone # Fax #    Quang Wall PA-C [phone]       [facility] [address] [facility] [address]  [facility] [address]        Equal Access to Services     JAVIER MORALES AH: Rogelio Haile, ann wadsworth, qaybta kaalmary kay burnham, whitney hewitt " geo keller ah. So [facility] [place] [phone].    ATENCIÓN: Si habla wes, tiene a hawley disposición servicios gratuitos de asistencia lingüística. Juanjose diana [phone].    We comply with applicable federal civil rights laws and [place] laws. We do not discriminate on the basis of race, color, national origin, age, disability, sex, sexual orientation, or gender identity.            Thank you!     Thank you for choosing [facility]  for your care. Our goal is always to provide you with excellent care. Hearing back from our patients is one way we can continue to improve our services. Please take a few minutes to complete the written survey that you may receive in the mail after your visit with us. Thank you!             Your Updated Medication List - Protect others around you: Learn how to safely use, store and throw away your medicines at www.disposemymeds.org.          This list is accurate as of 3/6/18  1:24 PM.  Always use your most recent med list.                   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

## 2018-03-07 ENCOUNTER — TRANSFERRED RECORDS (OUTPATIENT)
Dept: HEALTH INFORMATION MANAGEMENT | Facility: CLINIC | Age: 43
End: 2018-03-07

## 2018-03-07 ENCOUNTER — OFFICE VISIT (OUTPATIENT)
Dept: AUDIOLOGY | Facility: CLINIC | Age: 43
End: 2018-03-07
Payer: COMMERCIAL

## 2018-03-07 DIAGNOSIS — H93.8X1 EAR FULLNESS, RIGHT: Primary | ICD-10-CM

## 2018-03-07 PROCEDURE — 92588 EVOKED AUDITORY TST COMPLETE: CPT | Performed by: AUDIOLOGIST

## 2018-03-07 PROCEDURE — 92557 COMPREHENSIVE HEARING TEST: CPT | Performed by: AUDIOLOGIST

## 2018-03-07 PROCEDURE — 92567 TYMPANOMETRY: CPT | Performed by: AUDIOLOGIST

## 2018-03-07 NOTE — MR AVS SNAPSHOT
After Visit Summary   3/7/2018    Melinda Milligan    MRN: [id number]           Patient Information     Date Of Birth          1975        Visit Information        Provider Department      3/7/2018 9:30 AM Caleb Monsalve AuD [facility]        Today's Diagnoses     Ear fullness, right    -  1       Follow-ups after your visit        Your next 10 appointments already scheduled     Mar 09, 2018 11:30 AM CST   Return Visit with Juli Monique RD   [facility])    [address] [place] [address]   [phone]            Mar 14, 2018   Procedure with Edna Martinez MD   [facility] [place], Same Day Surgery (--)    [address]  [place] [address]   [phone]            Mar 15, 2018  9:30 AM CDT   LAB with LAB ONC [facility])    [address] [place] [address]   [phone]           Please do not eat 10-12 hours before your appointment if you are coming in fasting for labs on lipids, cholesterol, or glucose (sugar). This does not apply to pregnant women. Water, hot tea and black coffee (with nothing added) are okay. Do not drink other fluids, diet soda or chew gum.            Mar 15, 2018 10:00 AM CDT   Level 6 with BAY 8 INFUSION   [facility] ([facility])    [address] [place] [address]   [phone]            Mar 15, 2018  4:15 PM CDT   New Treatment with Albert Ambrosio MD, RADIATION THERAPIST   [facility])    [address] [place] [address]   [phone]            Mar 16, 2018 11:00 AM CDT   TREATMENT with RADIATION THERAPIST   [facility] ([facility])    [address] [place] [address]   [phone]            Mar 19, 2018  2:15 PM CDT   TREATMENT with  RADIATION THERAPIST   [facility] ([facility])    [address] [place] [address] [phone]            Mar 19, 2018  2:45 PM CDT   on treatment visit with Albert Ambrosio MD   [facility] ([facility])    [address] [place] [address]   [phone]            Mar 20, 2018  9:00 AM CDT   (Arrive by 8:45 AM)   Return Visit with NANCY Cardenas CNP   [facility] ([facility])    [address] [facility]  [address]  [facility] [address] [phone]              Who to contact     If you have questions or need follow up information about today's clinic visit or your schedule please contact [facility] directly at [phone].  Normal or non-critical lab and imaging results will be communicated to you by MyChart, letter or phone within 4 business days after the clinic has received the results. If you do not hear from us within 7 days, please contact the clinic through gogamingohart or phone. If you have a critical or abnormal lab result, we will notify you by phone as soon as possible.  Submit refill requests through [employer] or call your pharmacy and they will forward the refill request to us. Please allow 3 business days for your refill to be completed.          Additional Information About Your Visit        gogamingohart Information     [employer] is an electronic gateway that provides easy, online access to your medical records. With [employer], you can request a clinic appointment, read your test results, renew a prescription or communicate with your care team.     To sign up for [employer] visit the website at [email or web address]   You will be asked to enter the access code listed below, as well as some personal information. Please follow the directions to create your username and password.     Your access code is: [email or web address]  Expires:  2018  6:30 AM     Your access code will  in 90 days. If you need help or a new code, please contact your [facility] or call [phone] for assistance.        Care EveryWhere ID     This is your Care EveryWhere ID. This could be used by other organizations to access your [place] medical records  [id number]         Blood Pressure from Last 3 Encounters:   18 109/77   18 109/77    Weight from Last 3 Encounters:   18 87 lb 6 oz (39.6 kg)   18 97 lb 6 oz (44.2 kg)   18 90 lb (40.8 kg)              We Performed the Following     AUD EVOKED OTOACOUSTIC EMISSIONS, COMPREHENSIVE     AUDIOGRAM/TYMPANOGRAM - INTERFACE     COMPREHENSIVE HEARING TEST     TYMPANOMETRY        Primary Care Provider Office Phone # Fax #    Quang Wall PA-C [phone]       [facility] [address] [facility] [address]  [facility] [address]        Equal Access to Services     JAVIER MORALES : Hadii aad ku hadasho Soomaali, waaxda luqadaha, qaybta kaalmada adeegyada, waxay idiin hayaan maganeg kharalucretia keller . So [facility] [phone].    ATENCIÓN: Si habla español, tiene a hawley disposición servicios gratuitos de asistencia lingüística. [facility] [phone].    We comply with applicable federal civil rights laws and [place] laws. We do not discriminate on the basis of race, color, national origin, age, disability, sex, sexual orientation, or gender identity.            Thank you!     Thank you for choosing [facility]  for your care. Our goal is always to provide you with excellent care. Hearing back from our patients is one way we can continue to improve our services. Please take a few minutes to complete the written survey that you may receive in the mail after your visit with us. Thank you!             Your Updated Medication List - Protect others around you: Learn how to safely use, store and throw away your medicines at www.disposemymeds.org.           This list is accurate as of 3/7/18 10:13 AM.  Always use your most recent med list.                   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

## 2018-03-07 NOTE — PROGRESS NOTES
AUDIOLOGY REPORT    SUBJECTIVE:  Melinda Milligan is a 42 year old female who was seen in the Audiology Clinic at the [facility]  for audiologic evaluation, referred by Benedicto Nieto MD. The patient is scheduled to undergo radiation and chemotherapy next week for cancer of the oropharynx. The patient reports intermittent otalgia, aural fullness, and some concern about hearing in her right ear. The patient denies otorrhea, tinnitus, and dizziness.      OBJECTIVE:    Otoscopic exam indicates ears are clear of cerumen bilaterally     Pure Tone Thresholds assessed using conventional audiometry with good  reliability from 250-8000 Hz bilaterally using circumaural headphones     RIGHT:  normal hearing thresholds    LEFT:    normal hearing thresholds    Tympanogram:    RIGHT: negative pressure     LEFT:   normal eardrum mobility    Reflexes (reported by stimulus ear):  Could not maintain seals    Speech Reception Threshold:    RIGHT: 10 dB HL    LEFT:   5 dB HL  Word Recognition Score:     RIGHT: 100% at 50 dB HL using NU-6 recorded word list.    LEFT:   100% at 45 dB HL using NU-6 recorded word list.      ASSESSMENT:     ICD-10-CM    1. Ear fullness, right H93.8X1 COMPREHENSIVE HEARING TEST     TYMPANOMETRY     AUD EVOKED OTOACOUSTIC EMISSIONS, COMPREHENSIVE       Today s results were discussed with the patient in detail.     PLAN:  Patient was counseled regarding ototoxicity. It is recommended that the patient's hearing be monitored throughout treatment.  Please call this clinic with questions regarding these results or recommendations.        Shraddha Lopez.  Doctor of Audiology  [place] License # [id number]

## 2018-03-08 ENCOUNTER — CARE COORDINATION (OUTPATIENT)
Dept: RADIATION ONCOLOGY | Facility: CLINIC | Age: 43
End: 2018-03-08

## 2018-03-08 NOTE — PROGRESS NOTES
Received telephone message from patient wondering if there is anything she could do for her nasal issues/congestion while sleeping and trying to breath at night.  This RN reviewed with Dr. Ambrosio, discussed use of saline nasal spray, humidifier at bedside or Guaifenesin, however concerns are likely due to the nasal obstruction from her tumor so she may not experience much relief.  This RN attempted contact with patient x 2 to review recommendations from Dr. Ambrosio.  No answer, voice message left requesting return call to this RN at [phone].    Vandana Clifton, RN BSN [facility]

## 2018-03-09 ENCOUNTER — CARE COORDINATION (OUTPATIENT)
Dept: ONCOLOGY | Facility: CLINIC | Age: 43
End: 2018-03-09

## 2018-03-09 ENCOUNTER — ONCOLOGY VISIT (OUTPATIENT)
Dept: ONCOLOGY | Facility: CLINIC | Age: 43
End: 2018-03-09
Payer: COMMERCIAL

## 2018-03-09 ENCOUNTER — TELEPHONE (OUTPATIENT)
Dept: ONCOLOGY | Facility: CLINIC | Age: 43
End: 2018-03-09

## 2018-03-09 DIAGNOSIS — C06.9 SQUAMOUS CELL CARCINOMA OF ORAL CAVITY (H): Primary | ICD-10-CM

## 2018-03-09 DIAGNOSIS — C02.9 TONGUE CANCER (H): ICD-10-CM

## 2018-03-09 PROCEDURE — 97802 MEDICAL NUTRITION INDIV IN: CPT | Performed by: DIETITIAN, REGISTERED

## 2018-03-09 NOTE — MR AVS SNAPSHOT
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)    -  1    Tongue cancer (H)           Follow-ups after your visit        Your next 10 appointments already scheduled     Mar 14, 2018   Procedure with Edna Martinez MD   [facility] [place], Same Day Surgery (--)    [address]  [place] [address]   [phone]            Mar 15, 2018  9:30 AM CDT   LAB with LAB ONC [facility])    [address] [place] [id number] [phone]           Please do not eat 10-12 hours before your appointment if you are coming in fasting for labs on lipids, cholesterol, or glucose (sugar). This does not apply to pregnant women. Water, hot tea and black coffee (with nothing added) are okay. Do not drink other fluids, diet soda or chew gum.            Mar 15, 2018 10:00 AM CDT   Level 6 with BAY 8 INFUSION   [facility] ([facility])    [address] [facility] [address] [phone]            Mar 15, 2018  4:15 PM CDT   New Treatment with Albert Ambrosio MD, RADIATION THERAPIST   [facility])    [address] [facility] [address] [phone]            Mar 16, 2018 11:00 AM CDT   TREATMENT with RADIATION THERAPIST   [facility] ([facility])    [address] [facility] [address] [phone]            Mar 19, 2018  2:15 PM CDT   TREATMENT with RADIATION THERAPIST   [facility] ([facility])    [address] [facility] [address] [phone]            Mar  19, 2018  2:45 PM CDT   on treatment visit with Albert Ambrosio MD   [facility]    [address] [place] [id number] [phone]            Mar 20, 2018  9:00 AM CDT   (Arrive by 8:45 AM)   Return Visit with NANCY Cardenas CNP   [facility] ([facility])    [address] [facility]  [address]  [facility] [address] [phone]            Mar 20, 2018 12:45 PM CDT   TREATMENT with RADIATION THERAPIST   [facility])    [address] [place] [id number] [phone]              Who to contact     If you have questions or need follow up information about today's clinic visit or your schedule please contact [facility] directly at [phone].  Normal or non-critical lab and imaging results will be communicated to you by [employer], letter or phone within 4 business days after the clinic has received the results. If you do not hear from us within 7 days, please contact the clinic through [employer] or phone. If you have a critical or abnormal lab result, we will notify you by phone as soon as possible.  Submit refill requests through [employer] or call your pharmacy and they will forward the refill request to us. Please allow 3 business days for your refill to be completed.          Additional Information About Your Visit        [employer] Information     [employer] is an electronic gateway that provides easy, online access to your medical records. With [employer], you can request a clinic appointment, read your test results, renew a prescription or communicate with your care team.     To sign up for [employer] visit the website at [email or web address]   You will be asked to enter the access code listed below, as well as some personal information. Please follow the directions to create your username and password.     Your  access code is: [email or web address]  Expires: 2018  6:30 AM     Your access code will  in 90 days. If you need help or a new code, please contact your [facility] or call [phone] for assistance.        Care EveryWhere ID     This is your Care EveryWhere ID. This could be used by other organizations to access your [place] medical records  [id number]         Blood Pressure from Last 3 Encounters:   18 109/77   18 109/77    Weight from Last 3 Encounters:   18 39.6 kg (87 lb 6 oz)   18 44.2 kg (97 lb 6 oz)   18 40.8 kg (90 lb)              We Performed the Following     MNT INDIVIDUAL INITIAL EA 15 MIN        Primary Care Provider Office Phone # Fax #    Quang Wall PA-C [phone]       [facility] [address]  [facility] [address]        Equal Access to Services     JAVIER MORALES AH: Hadii aad ku hadasho Soomaali, waaxda luqadaha, qaybta kaalmada adeegyada, waxay idiin hayaan adeeg kharash lamargarita . So [facility] [phone].    ATENCIÓN: Si habla español, tiene a hawley disposición servicios gratuitos de asistencia lingüística. [facility] [phone].    We comply with applicable federal civil rights laws and [place] laws. We do not discriminate on the basis of race, color, national origin, age, disability, sex, sexual orientation, or gender identity.            Thank you!     Thank you for choosing [facility]  for your care. Our goal is always to provide you with excellent care. Hearing back from our patients is one way we can continue to improve our services. Please take a few minutes to complete the written survey that you may receive in the mail after your visit with us. Thank you!             Your Updated Medication List - Protect others around you: Learn how to safely use, store and throw away your medicines at www.disposemymeds.org.          This list is accurate as of 3/9/18  1:00 PM.  Always use  your most recent med list.                   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

## 2018-03-09 NOTE — PROGRESS NOTES
02/23/18 1320   General Information   Type Of Visit Initial   Start Of Care Date 02/23/18   Referring Physician Dr. Marga Arana   Orders Evaluate And Treat   Orders Comment Clinical swallow evaluation   Medical Diagnosis Tongue cancer; oropharyngeal dysphagia   Onset Of Illness/injury Or Date Of Surgery 02/15/18   Precautions/limitations No Known Precautions/limitations   Hearing Adequate for conversation   Pertinent History of Current Problem/OT: Additional Occupational Profile Info Melinda Milligan is a 42-year-old woman with a recent diagnosis of SCC of tongue. Tentative plan is for definitive chemoradiation. Upon clinical interview, pt endorsed swallowing difficulties at this time. She reported odynophagia and pain while chewing which significantly hinders her ability to eat solid foods. As such, she is taking mostly a liquid diet at this time. She endorsed unintentional weight loss. She denied recent pneumonias. She reported occasionally having liquids come out her nose when swallowing.    Respiratory Status Room air   Prior Level Of Function Swallowing   Prior Level Of Function Comment Liquid diet only d/t pain   General Observations Pt was pleasant and cooperative. She stated she was feeling overwhelmed today.    Patient/family Goals To continue to eat and drink during and after treatment.    General Information Comments Pt was accompanied by her spouse.    Pain Assessment   Pain Reported Yes   Pain Location Mouth   Fall Risk Screen   Have you fallen 2 or more times in the past year? No   Have you fallen and had an injury in the past year? No   Is patient a fall risk? No   Clinical Swallow Evaluation   Oral Musculature anomalies present   Structural Abnormalities present   Dentition present and adequate   Mandibular Strength and Mobility impaired  (reduced ROM)   Oral Labial Strength and Mobility WFL   Lingual Strength and Mobility impaired protrusion;impaired anterior elevation;impaired left lateral  movement;impaired right lateral movement;impaired coordination  (reduced strength)   Velar Elevation impaired   Oral Musculature Comments Reduced strength and ROM of oral musculature.    Additional Documentation Yes   Clinical Swallow Eval: Thin Liquid Texture Trial   Mode of Presentation, Thin Liquids cup;self-fed   Volume of Liquid or Food Presented sips via cup   Oral Phase of Swallow WFL   Pharyngeal Phase of Swallow intact   Diagnostic Statement No overt s/sx of aspiration.   Clinical Swallow Eval: Puree Solid Texture Trial   Mode of Presentation, Puree spoon;self-fed   Volume of Puree Presented teaspoons   Oral Phase, Puree other (see comments)  (pain)   Pharyngeal Phase, Puree intact   Diagnostic Statement No overt s/sx of aspiration.   Swallow Compensations   Swallow Compensations Alternate viscosity of consistencies   Results Other (see comments)  (Pain with intake of purees)   Educational Assessment   Barriers to Learning No barriers   Esophageal Phase of Swallow   Patient reports or presents with symptoms of esophageal dysphagia No   General Therapy Interventions   Planned Therapy Interventions Dysphagia Treatment   Dysphagia treatment Oropharyngeal exercise training;Modified diet education;Instruction of safe swallow strategies;Compensatory strategies for swallowing   Swallow Eval: Clinical Impressions   Skilled Criteria for Therapy Intervention Skilled criteria met.  Treatment indicated.   Functional Assessment Scale (FAS) 3   Treatment Diagnosis Moderate oropharyngeal dysphagia   Diet texture recommendations Thin liquids;Dysphagia diet level 1   Recommended Feeding/Eating Techniques alternate between small bites and sips of food/liquid;small sips/bites;other (see comments)  (oral cares)   Rehab Potential fair, will monitor progress closely   Therapy Frequency other (see comments)  (2x/month x 3 months)   Anticipated Discharge Disposition home w/ outpatient services   Risks and Benefits of Treatment  have been explained. Yes   Patient, family and/or staff in agreement with Plan of Care Yes   Clinical Impression Comments Pt presents with moderate oropharyngeal dysphagia d/t SCC of tongue. Oral phase is marked by reduced ROM and coordination of oral musculature which in turn impacts mastication of solid foods and closure of nasal cavity. Poor movement of velum results in nasal regurgitation of liquids at times. Pain with mastication is pt's greatest barrier to intake of solid foods at this time. Pharyngeal phase is marked by odynophagia. Pt demonstrates prompt swallow response with consistent hyolaryngeal elevation. No overt s/sx of aspiration present with all PO trials on this date; however, odynophagia further hinders pt's ability to eat solid textures. Recommend pt continue thin liquid diet with trials of puree textures as she is able to tolerate d/t odynophagia. Recommend consideration of PEG prior to treatment to ensure adequate nutrition and hydration. Pt is at risk of progressive dysphagia secondary to XRT fibrosis, mucositis, odynophagia, xerostomia, and trismus; thus, she will likely benefit from ongoing SLP services to prevent and treat these issues in order to maintain functional and safe swallowing abilities during and after radiation.     Swallow Goals   SLP Swallow Goals 1   Swallow Goal 1   Goal Identifier Education   Goal Description 1. Pt/caregivers will verbalize/demonstrate understanding of recommended diet textures, acute side effects of radiation and management strategies, SLP goals throughout treatment, and swallowing exercises when presented written/verbal information.    Target Date 02/23/18   Date Met 02/23/18   Total Session Time   Total Session Time 30   Total Evaluation Time 10     Thank you for the referral of Melinda Milligan. If you have any questions about this report, please contact me using the information below.     Nidhi Keys MA, CCC-SLP  Speech-Language Pathologist    [facility] [place]  Department of Otolaryngology/D&T - 4th floor  Pager: [phone]  Phone: [phone]  Email: [email or web address]

## 2018-03-09 NOTE — PROGRESS NOTES
"CLINICAL NUTRITION SERVICES - ASSESSMENT NOTE    Melinda Milligan 42 year old referred for MNT related to tongue cancer    Time Spent: 45 minutes  Visit Type: Initial  Referring Physician: Dr. Nieto  Pt accompanied by: self.     NUTRITION HISTORY  Factors affecting nutrition intake include:decreased appetite and swallowing difficulties  Current diet: soft foods, liquids  Current appetite/intake: Poor  PEG Tube: Scheduled for placement on 3/14  Chemotherapy: Planned weekly Cisplatin starting on 3/15   Radiation: Starting 3/15       Melinda reports that her intake has been poor 2/2 dysphagia.    She has been relying on mostly soft foods and ONS (Ensure Plus).   She was drinking 3 Ensure Plus/day (meeting >75% of her calorie/protien needs), however, she reports that she has reduced to 1-2/day as they have been less tolerating, causing too much thick phlegm.      Diet Recall  She has been eating oatmeal made with almond milk, yoplait yogurt (regular), Zuchinni bread (no butter), 2 Ensure Plus and >64 oz water.      ANTHROPOMETRICS  Height: 4'10\"  Weight: 87 lbs/39kg  BMI: 18.1  Weight Status:  Underweight BMI <18.5  IBW: 90 lbs  % IBW: 96%  Weight History: down 3 lbs x past 2 weeks (4% )  Wt Readings from Last 6 Encounters:   03/06/18 39.6 kg (87 lb 6 oz)   02/23/18 40.8 kg (90 lb)     Medications/vitamins/minerals/herbals:   Reviewed    LABS  Labs reviewed    ASSESSED NUTRITION NEEDS:  Estimated Energy Needs: 0187-4187 kcals (30-35 Kcal/Kg)  Justification: increased needs for repletion/CRT  Estimated Protein Needs: 45-60 grams protein (1.2-1.5 g pro/Kg)  Justification:  increased needs for repletion/CRT  Estimated Fluid Needs: 7084-2979 mL   Justification: increased needs with Cisplatin    MALNUTRITION:  % Weight Loss:  > 2% in 1 week (severe malnutrition)  % Intake:  <75% for >/= 1 month (non-severe malnutrition)  Subcutaneous Fat Loss:  Orbital region severe depletion, Thoracic region severe depletion and Lumbar " region severe depletion  Muscle Loss:  Clavicle bone region severe depletion, Patellar region severe depletion and Anterior thigh region severe depletion  Fluid Retention:  Not observed     Malnutrition Diagnosis: Severe malnutrition  In Context of:  Chronic illness or disease    NUTRITION DIAGNOSIS:  Inadequate oral intake related to dysphagia as evidenced by diet recall, 4 % wt loss x past 2 weeks.     INTERVENTIONS  Recommendations / Nutrition Prescription  1. 1200-1400kcal/day via small freqent meals - aim for 3-4 ONS (Ensure Plus, Ensure Enlive)  2. If pt is unable to consume ~1000kcal/day - would suggest initiating EN upon placement of PEG tube.  At this time, pt may be at risk for refeeding syndrome.  Would suggest the following:  RECOMMENDATIONS FOR MD/PROVIDER TO ORDER:   Enteral Nutrition   Formula: Isosource 1.5 mariel  Volume: 3.5  cartons/day (850mL, 665 ml free water)  Provisions:  1312kcal (33kcal/kg), 85 g protein (1.5g/kg)    If patient is not able to consume at least 1000kcal/day with weight maintenance over the past 5-7 days, she may be at risk for refeeding with initiation of EN feedings.  Electrolytes (Mg, Phos and Potassium will need to be monitored).     Nutrition Education     Provided written & verbal education:  - Ways to maximize kcal and protein intake. Discussed calorie and protein needs for maintenance of weight and nutrition status.  Advised pt to aim for at least 1200kcal and 60g protein via 5-6 small frequent meals.   - PO intake/PEG tube transition - Discussed transition from PO intake to PEG feedings. Discussed formula and administration methods (Continuous, gravity and syringe feedings).  Discussed that this would be able to provide full nutrition prn.  Discussed potential risk for refeeding if she is not able to consume at least 1000kcal with weight maintenance over the past 5-7 days until PEG placement.    - Discussed ONS (Ensure Enlive, Ensure Plus/Boost Plus, CIB, Benecalbert,  Scandi shake etc). Suggested ways to incorporate these supplements to avoid flavor fatigue. Encouraged pt to start consuming 3-4 ONS daily.       Provided pt with corresponding education materials/handouts on:  High Calorie, High Protein Recipe booklet, Tips to Increase the Calories in Your Diet    Pt verbalize understanding of materials provided during consult.   Patient Understanding: Good  Expected Compliance: Good     Implementation  Collaboration and Referral of Nutrition care - message sent to [facility] inquiring about PLC class for Feeding Tubes.  Pt could take class on 3/20 when she returns to the  for her PEG tube check.     Goals  1.  Aim for 5-6 small frequent meals  2.  Aim for 1200kcal and 60g protein/day  3. Weight maintenance through cancer treatment      Follow-Up Plans: Pt has RD contact information for questions.    Pt encouraged to follow up with RD in 2 weeks at the time of infusion or radiation treatment.     MONITORING AND EVALUATION:  -Food intake  -EN access/need to initiate   -Liquid meal replacement or supplement  -Weight trends    Juli Monique RD, LD

## 2018-03-09 NOTE — LETTER
"    3/9/2018         RE: Melinda Milligan  [address] [facility] [address]        Dear Colleague,    Thank you for referring your patient, Melinda Milligan, to the [facility]. Please see a copy of my visit note below.    CLINICAL NUTRITION SERVICES - ASSESSMENT NOTE    Melinda Milligan 42 year old referred for MNT related to tongue cancer    Time Spent: 45 minutes  Visit Type:  Initial  Referring Physician: Dr. Nieto  Pt accompanied by:  self.     NUTRITION HISTORY  Factors affecting nutrition intake include:decreased appetite and swallowing difficulties  Current diet: soft foods, liquids  Current appetite/intake: Poor  PEG Tube: Scheduled for placement on 3/14  Chemotherapy: Planned weekly Cisplatin starting on 3/15   Radiation: Starting 3/15       Melinda reports that her intake has been poor 2/2 dysphagia.    She has been relying on mostly soft foods and ONS (Ensure Plus).   She was drinking 3 Ensure Plus/day (meeting >75% of her calorie/protien needs), however, she reports that she has reduced to 1-2/day as they have been less tolerating, causing too much thick phlegm.      Diet Recall  She has been eating oatmeal made with almond milk, yoplait yogurt (regular), Zuchinni bread (no butter), 2 Ensure Plus and >64 oz water.      ANTHROPOMETRICS  Height: 4'10\"  Weight: 87 lbs/39kg  BMI: 18.1  Weight Status:  Underweight BMI <18.5  IBW: 90 lbs  % IBW: 96%  Weight History: down 3 lbs x past 2 weeks (4% )  Wt Readings from Last 6 Encounters:   03/06/18 39.6 kg (87 lb 6 oz)   02/23/18 40.8 kg (90 lb)     Medications/vitamins/minerals/herbals:   Reviewed    LABS  Labs reviewed    ASSESSED NUTRITION NEEDS:  Estimated Energy Needs: 7320-4458 kcals (30-35 Kcal/Kg)  Justification: increased needs for repletion/CRT  Estimated Protein Needs: 45-60 grams protein (1.2-1.5 g pro/Kg)  Justification:  increased needs for repletion/CRT  Estimated Fluid Needs: 6897-6747 mL   Justification: increased " needs with Cisplatin    MALNUTRITION:  % Weight Loss:  > 2% in 1 week (severe malnutrition)  % Intake:  <75% for >/= 1 month (non-severe malnutrition)  Subcutaneous Fat Loss:  Orbital region severe depletion, Thoracic region severe depletion and Lumbar region severe depletion  Muscle Loss:  Clavicle bone region severe depletion, Patellar region severe depletion and Anterior thigh region severe depletion  Fluid Retention:  Not observed     Malnutrition Diagnosis: Severe malnutrition  In Context of:  Chronic illness or disease    NUTRITION DIAGNOSIS:  Inadequate oral intake related to dysphagia as evidenced by diet recall, 4 % wt loss x past 2 weeks.     INTERVENTIONS  Recommendations / Nutrition Prescription  1. 1200-1400kcal/day via small freqent meals - aim for 3-4 ONS (Ensure Plus, Ensure Enlive)  2. If pt is unable to consume ~1000kcal/day - would suggest initiating EN upon placement of PEG tube.  At this time, pt may be at risk for refeeding syndrome.  Would suggest the following:  RECOMMENDATIONS FOR MD/PROVIDER TO ORDER:   Enteral Nutrition   Formula: Isosource 1.5 mariel  Volume: 3.5  cartons/day (850mL, 665 ml free water)  Provisions:  1312kcal (33kcal/kg), 85 g protein (1.5g/kg)    If patient is not able to consume at least 1000kcal/day with weight maintenance over the past 5-7 days, she may be at risk for refeeding with initiation of EN feedings.  Electrolytes (Mg, Phos and Potassium will need to be monitored).     Nutrition Education     Provided written & verbal education:  - Ways to maximize kcal and protein intake. Discussed calorie and protein needs for maintenance of weight and nutrition status.  Advised pt to aim for at least 1200kcal and 60g protein via 5-6 small frequent meals.   - PO intake/PEG tube transition - Discussed transition from PO intake to PEG feedings. Discussed formula and administration methods (Continuous, gravity and syringe feedings).  Discussed that this would be able to provide  full nutrition prn.  Discussed potential risk for refeeding if she is not able to consume at least 1000kcal with weight maintenance over the past 5-7 days until PEG placement.    - Discussed ONS (Ensure Enlive, Ensure Plus/Boost Plus, CIB, Benecalorie, Scandi shake etc). Suggested ways to incorporate these supplements to avoid flavor fatigue. Encouraged pt to start consuming 3-4 ONS daily.       Provided pt with corresponding education materials/handouts on:  High Calorie, High Protein Recipe booklet, Tips to Increase the Calories in Your Diet    Pt verbalize understanding of materials provided during consult.   Patient Understanding: Good  Expected Compliance: Good     Implementation  Collaboration and Referral of Nutrition care - message sent to [employer] inquiring about PLC class for Feeding Tubes.  Pt could take class on 3/20 when she returns to the  for her PEG tube check.     Goals  1.  Aim for 5-6 small frequent meals  2.  Aim for 1200kcal and 60g protein/day  3. Weight maintenance through cancer treatment      Follow-Up Plans: Pt has RD contact information for questions.    Pt encouraged to follow up with RD in 2 weeks at the time of infusion or radiation treatment.     MONITORING AND EVALUATION:  -Food intake  -EN access/need to initiate   -Liquid meal replacement or supplement  -Weight trends    Juli Monique RD, LD        Again, thank you for allowing me to participate in the care of your patient.        Sincerely,        Juli Monique RD

## 2018-03-09 NOTE — TELEPHONE ENCOUNTER
Received call from ,Noe. No consent to communicate documented so writer just listened. States in the information they received for resources available to patient, there was reference made to a therapist available for emotional support. Patient had a panic attack last evening from thinking about her upcoming treatments. It was bad enough he had to call a family friend to come over and talk to pt. Advised he speak to his wife and have her call clinic for appointment. Noe expressed understanding and will speak to her shortly and call back.  Yarelis Spring  RN, BSN, OCN

## 2018-03-09 NOTE — PROGRESS NOTES
Reached out to patient (per recommendation of Juli) to let her know after her feeding tube insertion, she has been scheduled [facility] the  for education on the feeding tube.  Message was left on patient's voicemail with date and time for 3/14 @ 1:00 p.m. And advised to just let staff know she is scheduled there after procedure.

## 2018-03-13 ENCOUNTER — APPOINTMENT (OUTPATIENT)
Dept: RADIATION ONCOLOGY | Facility: CLINIC | Age: 43
End: 2018-03-13
Payer: COMMERCIAL

## 2018-03-13 ENCOUNTER — ANESTHESIA EVENT (OUTPATIENT)
Dept: SURGERY | Facility: CLINIC | Age: 43
End: 2018-03-13
Payer: COMMERCIAL

## 2018-03-13 PROCEDURE — 77300 RADIATION THERAPY DOSE PLAN: CPT | Performed by: RADIOLOGY

## 2018-03-13 PROCEDURE — 77338 DESIGN MLC DEVICE FOR IMRT: CPT | Performed by: RADIOLOGY

## 2018-03-13 PROCEDURE — 77301 RADIOTHERAPY DOSE PLAN IMRT: CPT | Performed by: RADIOLOGY

## 2018-03-13 ASSESSMENT — LIFESTYLE VARIABLES: TOBACCO_USE: 1

## 2018-03-13 NOTE — ANESTHESIA PREPROCEDURE EVALUATION
Anesthesia Evaluation     .             ROS/MED HX    ENT/Pulmonary:     (+)allergic rhinitis, other ENT- oropharyngeal cancer involving tongue, soft palate, oropharyngeal and nasopharyngeal cavities, hoarsness  , tobacco use, Past use 1 packs/day  Intermittent asthma , . .    Neurologic:  - neg neurologic ROS     Cardiovascular: Comment: History of congestive heart failure with birth of son, per PCP note recovered after 1 year        METS/Exercise Tolerance:     Hematologic: Comments: On iron infusions     (+) Anemia, -      Musculoskeletal:  - neg musculoskeletal ROS       GI/Hepatic: Comment: Alcoholic consuming ~6 beers/night    (+) Other GI/Hepatic malnutrition 2/2 difficulty with swallowing       Renal/Genitourinary:  - ROS Renal section negative       Endo:  - neg endo ROS       Psychiatric:     (+) psychiatric history anxiety and depression      Infectious Disease:  - neg infectious disease ROS       Malignancy:   (+) Malignancy History of Other  Other CA oropharyngeal cancner (squamous cell) Active status post         Other:    - neg other ROS                 Physical Exam  Normal systems: pulmonary and dental    Airway   Mallampati: III  TM distance: >3 FB  Neck ROM: full    Dental     Cardiovascular   Rhythm and rate: regular and normal      Pulmonary                     Anesthesia Plan      History & Physical Review  History and physical reviewed and following examination; no interval change.    ASA Status:  3 .    NPO Status:  > 8 hours    Plan for General and ETT with Intravenous induction. Maintenance will be Inhalation and Balanced.    PONV prophylaxis:  Ondansetron (or other 5HT-3) and Dexamethasone or Solumedrol  Additional equipment: Videolaryngoscope      Postoperative Care  Postoperative pain management:  IV analgesics.      Consents  Anesthetic plan, risks, benefits and alternatives discussed with:  Patient..        Procedure: Procedure(s):  Laparoscopic Assisted Percutaneous Endoscopic  Gastrostomy Tube Insertion - Wound Class:     HPI: 42 year old female with malnutrition 2/2 oropharyngeal cancer who requires anesthesia for Procedure(s):  Laparoscopic Assisted Percutaneous Endoscopic Gastrostomy Tube Insertion - Wound Class: .     PMHx significant for current daily tobacco use (23 pk/yr history), uncomplicated asthma, allergic rhinitis, hoarseness, anemia, MDD/PAOLA, alcoholism, malnutrition, and recently diagnosed oropharyngeal cancer.    PMH/PSH:  Past Medical History:   Diagnosis Date     Allergic rhinitis      Anemia      Hoarseness      Oropharyngeal cancer (H) 02/15/2018     Uncomplicated asthma        Past Surgical History:   Procedure Laterality Date     BIOPSY  02/15/2018    Left Tongue - [facility]         No current facility-administered medications on file prior to encounter.   Current Outpatient Prescriptions on File Prior to Encounter:  oxyCODONE IR (ROXICODONE) 5 MG tablet Take 1-2 tablets (5-10 mg) by mouth every 4 hours as needed for pain   Fexofenadine HCl (ALLEGRA ALLERGY PO) Take by mouth as needed for allergies   LORazepam (ATIVAN) 0.5 MG tablet Take 1 tablet (0.5 mg) by mouth daily as needed for anxiety (for radiation)   folic acid (FOLVITE) 1 MG tablet Take 1 mg by mouth       SH:   Social History   Substance Use Topics     Smoking status: Current Every Day Smoker     Packs/day: 1.00     Years: 23.00     Types: Cigarettes     Start date: 1/1/1993     Smokeless tobacco: Never Used      Comment: Patient reports currently smoking 3 cigarettes per day - updated 3/6/2018     Alcohol use No       Allergies:   Allergies   Allergen Reactions     Ceftriaxone      Other reaction(s): Unknown Reaction     Chicken-Derived Products (Egg)      Other reaction(s): Vomiting  Other reaction(s): Unknown Reaction     No Clinical Screening - See Comments Hives     MAGIC MOUTHWASH       NPO Status: Per ASA Guidelines    Labs:    Blood Bank:  No results found for: ABO, RH, AS  BMP:  No results for  input(s): NA, POTASSIUM, CHLORIDE, CO2, BUN, CR, GLC, ARLENE in the last 07973 hours.  CBC:   No results for input(s): WBC, RBC, HGB, HCT, MCV, MCH, MCHC, RDW, PLT in the last 89485 hours.  Coags:  No results for input(s): INR, PTT, FIBR in the last 70673 hours.    To be discussed with staff.   - ASA 3  - GETA with standard ASA monitors, IV induction, balanced anesthetic  - PIV  - Antibiotics per surgery  - PONV prophylaxis    Andrew Dc DO  [facility]   Department of Anesthesiology  P: [phone]                      .

## 2018-03-14 ENCOUNTER — ANESTHESIA (OUTPATIENT)
Dept: SURGERY | Facility: CLINIC | Age: 43
End: 2018-03-14
Payer: COMMERCIAL

## 2018-03-14 ENCOUNTER — CARE COORDINATION (OUTPATIENT)
Dept: ONCOLOGY | Facility: CLINIC | Age: 43
End: 2018-03-14

## 2018-03-14 ENCOUNTER — SURGERY (OUTPATIENT)
Age: 43
End: 2018-03-14

## 2018-03-14 ENCOUNTER — HOSPITAL ENCOUNTER (OUTPATIENT)
Facility: CLINIC | Age: 43
Discharge: HOME | End: 2018-03-14
Attending: STUDENT IN AN ORGANIZED HEALTH CARE EDUCATION/TRAINING PROGRAM | Admitting: STUDENT IN AN ORGANIZED HEALTH CARE EDUCATION/TRAINING PROGRAM
Payer: COMMERCIAL

## 2018-03-14 ENCOUNTER — APPOINTMENT (OUTPATIENT)
Dept: EDUCATION SERVICES | Facility: CLINIC | Age: 43
End: 2018-03-14
Attending: STUDENT IN AN ORGANIZED HEALTH CARE EDUCATION/TRAINING PROGRAM
Payer: COMMERCIAL

## 2018-03-14 ENCOUNTER — HOME INFUSION (PRE-WILLOW HOME INFUSION) (OUTPATIENT)
Dept: PHARMACY | Facility: CLINIC | Age: 43
End: 2018-03-14

## 2018-03-14 VITALS
OXYGEN SATURATION: 99 % | SYSTOLIC BLOOD PRESSURE: 108 MMHG | DIASTOLIC BLOOD PRESSURE: 67 MMHG | WEIGHT: 83.55 LBS | TEMPERATURE: 98.6 F | RESPIRATION RATE: 12 BRPM | HEIGHT: 59 IN | BODY MASS INDEX: 16.84 KG/M2

## 2018-03-14 DIAGNOSIS — C06.9 SQUAMOUS CELL CARCINOMA OF ORAL CAVITY (H): Primary | ICD-10-CM

## 2018-03-14 DIAGNOSIS — C10.9 SQUAMOUS CELL CARCINOMA OF OROPHARYNX (H): ICD-10-CM

## 2018-03-14 DIAGNOSIS — C06.9 SQUAMOUS CELL CARCINOMA OF ORAL CAVITY (H): ICD-10-CM

## 2018-03-14 DIAGNOSIS — C02.9 TONGUE CANCER (H): Primary | ICD-10-CM

## 2018-03-14 LAB
ANION GAP SERPL CALCULATED.3IONS-SCNC: 12 MMOL/L (ref 3–14)
B-HCG SERPL-ACNC: <1 IU/L (ref 0–5)
BUN SERPL-MCNC: 10 MG/DL (ref 7–30)
CALCIUM SERPL-MCNC: 9.7 MG/DL (ref 8.5–10.1)
CHLORIDE SERPL-SCNC: 100 MMOL/L (ref 94–109)
CO2 SERPL-SCNC: 22 MMOL/L (ref 20–32)
CREAT SERPL-MCNC: 0.58 MG/DL (ref 0.52–1.04)
ERYTHROCYTE [DISTWIDTH] IN BLOOD BY AUTOMATED COUNT: 18.9 % (ref 10–15)
GFR SERPL CREATININE-BSD FRML MDRD: >90 ML/MIN/1.7M2
GLUCOSE BLDC GLUCOMTR-MCNC: 85 MG/DL (ref 70–99)
GLUCOSE SERPL-MCNC: 85 MG/DL (ref 70–99)
HCG UR QL: NEGATIVE
HCT VFR BLD AUTO: 35.2 % (ref 35–47)
HGB BLD-MCNC: 11.5 G/DL (ref 11.7–15.7)
MCH RBC QN AUTO: 28.8 PG (ref 26.5–33)
MCHC RBC AUTO-ENTMCNC: 32.7 G/DL (ref 31.5–36.5)
MCV RBC AUTO: 88 FL (ref 78–100)
PLATELET # BLD AUTO: 336 10E9/L (ref 150–450)
POTASSIUM SERPL-SCNC: 3.6 MMOL/L (ref 3.4–5.3)
RBC # BLD AUTO: 3.99 10E12/L (ref 3.8–5.2)
SODIUM SERPL-SCNC: 134 MMOL/L (ref 133–144)
WBC # BLD AUTO: 7.4 10E9/L (ref 4–11)

## 2018-03-14 PROCEDURE — 71000014 ZZH RECOVERY PHASE 1 LEVEL 2 FIRST HR: Performed by: STUDENT IN AN ORGANIZED HEALTH CARE EDUCATION/TRAINING PROGRAM

## 2018-03-14 PROCEDURE — 25000125 ZZHC RX 250: Performed by: ANESTHESIOLOGY

## 2018-03-14 PROCEDURE — 25000125 ZZHC RX 250: Performed by: STUDENT IN AN ORGANIZED HEALTH CARE EDUCATION/TRAINING PROGRAM

## 2018-03-14 PROCEDURE — 27210794 ZZH OR GENERAL SUPPLY STERILE: Performed by: STUDENT IN AN ORGANIZED HEALTH CARE EDUCATION/TRAINING PROGRAM

## 2018-03-14 PROCEDURE — 81025 URINE PREGNANCY TEST: CPT | Performed by: ANESTHESIOLOGY

## 2018-03-14 PROCEDURE — 36000057 ZZH SURGERY LEVEL 3 1ST 30 MIN - UMMC: Performed by: STUDENT IN AN ORGANIZED HEALTH CARE EDUCATION/TRAINING PROGRAM

## 2018-03-14 PROCEDURE — 40000170 ZZH STATISTIC PRE-PROCEDURE ASSESSMENT II: Performed by: STUDENT IN AN ORGANIZED HEALTH CARE EDUCATION/TRAINING PROGRAM

## 2018-03-14 PROCEDURE — 37000008 ZZH ANESTHESIA TECHNICAL FEE, 1ST 30 MIN: Performed by: STUDENT IN AN ORGANIZED HEALTH CARE EDUCATION/TRAINING PROGRAM

## 2018-03-14 PROCEDURE — 25000128 H RX IP 250 OP 636: Performed by: ANESTHESIOLOGY

## 2018-03-14 PROCEDURE — 25000128 H RX IP 250 OP 636: Performed by: STUDENT IN AN ORGANIZED HEALTH CARE EDUCATION/TRAINING PROGRAM

## 2018-03-14 PROCEDURE — C9399 UNCLASSIFIED DRUGS OR BIOLOG: HCPCS | Performed by: STUDENT IN AN ORGANIZED HEALTH CARE EDUCATION/TRAINING PROGRAM

## 2018-03-14 PROCEDURE — 25000566 ZZH SEVOFLURANE, EA 15 MIN: Performed by: STUDENT IN AN ORGANIZED HEALTH CARE EDUCATION/TRAINING PROGRAM

## 2018-03-14 PROCEDURE — 71000015 ZZH RECOVERY PHASE 1 LEVEL 2 EA ADDTL HR: Performed by: STUDENT IN AN ORGANIZED HEALTH CARE EDUCATION/TRAINING PROGRAM

## 2018-03-14 PROCEDURE — 84702 CHORIONIC GONADOTROPIN TEST: CPT | Performed by: CLINICAL NURSE SPECIALIST

## 2018-03-14 PROCEDURE — 85027 COMPLETE CBC AUTOMATED: CPT | Performed by: CLINICAL NURSE SPECIALIST

## 2018-03-14 PROCEDURE — 80048 BASIC METABOLIC PNL TOTAL CA: CPT | Performed by: CLINICAL NURSE SPECIALIST

## 2018-03-14 PROCEDURE — 36000059 ZZH SURGERY LEVEL 3 EA 15 ADDTL MIN UMMC: Performed by: STUDENT IN AN ORGANIZED HEALTH CARE EDUCATION/TRAINING PROGRAM

## 2018-03-14 PROCEDURE — 36415 COLL VENOUS BLD VENIPUNCTURE: CPT | Performed by: CLINICAL NURSE SPECIALIST

## 2018-03-14 PROCEDURE — 82962 GLUCOSE BLOOD TEST: CPT

## 2018-03-14 PROCEDURE — 25000132 ZZH RX MED GY IP 250 OP 250 PS 637: Performed by: STUDENT IN AN ORGANIZED HEALTH CARE EDUCATION/TRAINING PROGRAM

## 2018-03-14 PROCEDURE — 71000027 ZZH RECOVERY PHASE 2 EACH 15 MINS: Performed by: STUDENT IN AN ORGANIZED HEALTH CARE EDUCATION/TRAINING PROGRAM

## 2018-03-14 PROCEDURE — 37000009 ZZH ANESTHESIA TECHNICAL FEE, EACH ADDTL 15 MIN: Performed by: STUDENT IN AN ORGANIZED HEALTH CARE EDUCATION/TRAINING PROGRAM

## 2018-03-14 RX ORDER — FENTANYL CITRATE 50 UG/ML
INJECTION, SOLUTION INTRAMUSCULAR; INTRAVENOUS PRN
Status: DISCONTINUED | OUTPATIENT
Start: 2018-03-14 | End: 2018-03-14

## 2018-03-14 RX ORDER — METOPROLOL TARTRATE 1 MG/ML
INJECTION, SOLUTION INTRAVENOUS PRN
Status: DISCONTINUED | OUTPATIENT
Start: 2018-03-14 | End: 2018-03-14

## 2018-03-14 RX ORDER — ONDANSETRON 2 MG/ML
4 INJECTION INTRAMUSCULAR; INTRAVENOUS EVERY 30 MIN PRN
Status: DISCONTINUED | OUTPATIENT
Start: 2018-03-14 | End: 2018-03-14 | Stop reason: HOSPADM

## 2018-03-14 RX ORDER — DEXAMETHASONE 4 MG/1
8 TABLET ORAL
Qty: 6 TABLET | Refills: 0 | Status: SHIPPED | OUTPATIENT
Start: 2018-03-15 | End: 2018-04-04

## 2018-03-14 RX ORDER — NALOXONE HYDROCHLORIDE 0.4 MG/ML
.1-.4 INJECTION, SOLUTION INTRAMUSCULAR; INTRAVENOUS; SUBCUTANEOUS
Status: DISCONTINUED | OUTPATIENT
Start: 2018-03-14 | End: 2018-03-14 | Stop reason: HOSPADM

## 2018-03-14 RX ORDER — HYDROCODONE BITARTRATE AND ACETAMINOPHEN 7.5; 325 MG/15ML; MG/15ML
10 SOLUTION ORAL
Status: DISCONTINUED | OUTPATIENT
Start: 2018-03-14 | End: 2018-03-14 | Stop reason: HOSPADM

## 2018-03-14 RX ORDER — CLINDAMYCIN PHOSPHATE 900 MG/50ML
900 INJECTION, SOLUTION INTRAVENOUS
Status: COMPLETED | OUTPATIENT
Start: 2018-03-14 | End: 2018-03-14

## 2018-03-14 RX ORDER — FENTANYL CITRATE 50 UG/ML
25-50 INJECTION, SOLUTION INTRAMUSCULAR; INTRAVENOUS
Status: DISCONTINUED | OUTPATIENT
Start: 2018-03-14 | End: 2018-03-14 | Stop reason: HOSPADM

## 2018-03-14 RX ORDER — SODIUM CHLORIDE, SODIUM LACTATE, POTASSIUM CHLORIDE, CALCIUM CHLORIDE 600; 310; 30; 20 MG/100ML; MG/100ML; MG/100ML; MG/100ML
INJECTION, SOLUTION INTRAVENOUS CONTINUOUS
Status: DISCONTINUED | OUTPATIENT
Start: 2018-03-14 | End: 2018-03-14 | Stop reason: HOSPADM

## 2018-03-14 RX ORDER — PROCHLORPERAZINE MALEATE 10 MG
10 TABLET ORAL EVERY 6 HOURS PRN
Qty: 30 TABLET | Refills: 2 | Status: SHIPPED | OUTPATIENT
Start: 2018-03-14 | End: 2018-03-26

## 2018-03-14 RX ORDER — OXYMETAZOLINE HYDROCHLORIDE 0.05 G/100ML
SPRAY NASAL PRN
Status: DISCONTINUED | OUTPATIENT
Start: 2018-03-14 | End: 2018-03-14

## 2018-03-14 RX ORDER — LABETALOL HYDROCHLORIDE 5 MG/ML
10 INJECTION, SOLUTION INTRAVENOUS
Status: DISCONTINUED | OUTPATIENT
Start: 2018-03-14 | End: 2018-03-14 | Stop reason: HOSPADM

## 2018-03-14 RX ORDER — LIDOCAINE HYDROCHLORIDE 20 MG/ML
INJECTION, SOLUTION INFILTRATION; PERINEURAL PRN
Status: DISCONTINUED | OUTPATIENT
Start: 2018-03-14 | End: 2018-03-14

## 2018-03-14 RX ORDER — PROPOFOL 10 MG/ML
INJECTION, EMULSION INTRAVENOUS PRN
Status: DISCONTINUED | OUTPATIENT
Start: 2018-03-14 | End: 2018-03-14

## 2018-03-14 RX ORDER — OXYCODONE HYDROCHLORIDE 5 MG/1
5 TABLET ORAL EVERY 4 HOURS PRN
Qty: 120 TABLET | Refills: 0 | Status: SHIPPED | OUTPATIENT
Start: 2018-03-14 | End: 2018-03-26

## 2018-03-14 RX ORDER — MEPERIDINE HYDROCHLORIDE 50 MG/ML
12.5 INJECTION INTRAMUSCULAR; INTRAVENOUS; SUBCUTANEOUS
Status: DISCONTINUED | OUTPATIENT
Start: 2018-03-14 | End: 2018-03-14 | Stop reason: HOSPADM

## 2018-03-14 RX ORDER — OXYCODONE HCL 5 MG/5 ML
10 SOLUTION, ORAL ORAL ONCE
Status: COMPLETED | OUTPATIENT
Start: 2018-03-14 | End: 2018-03-14

## 2018-03-14 RX ORDER — LIDOCAINE 40 MG/G
CREAM TOPICAL
Status: DISCONTINUED | OUTPATIENT
Start: 2018-03-14 | End: 2018-03-14 | Stop reason: HOSPADM

## 2018-03-14 RX ORDER — KETAMINE HYDROCHLORIDE 10 MG/ML
INJECTION INTRAMUSCULAR; INTRAVENOUS PRN
Status: DISCONTINUED | OUTPATIENT
Start: 2018-03-14 | End: 2018-03-14

## 2018-03-14 RX ORDER — IPRATROPIUM BROMIDE AND ALBUTEROL SULFATE 2.5; .5 MG/3ML; MG/3ML
3 SOLUTION RESPIRATORY (INHALATION) ONCE
Status: COMPLETED | OUTPATIENT
Start: 2018-03-14 | End: 2018-03-14

## 2018-03-14 RX ORDER — LORAZEPAM 0.5 MG/1
0.5 TABLET ORAL EVERY 4 HOURS PRN
Qty: 30 TABLET | Refills: 2 | Status: SHIPPED | OUTPATIENT
Start: 2018-03-14 | End: 2018-03-26

## 2018-03-14 RX ORDER — ONDANSETRON 4 MG/1
4 TABLET, ORALLY DISINTEGRATING ORAL EVERY 30 MIN PRN
Status: DISCONTINUED | OUTPATIENT
Start: 2018-03-14 | End: 2018-03-14 | Stop reason: HOSPADM

## 2018-03-14 RX ORDER — ONDANSETRON 2 MG/ML
INJECTION INTRAMUSCULAR; INTRAVENOUS PRN
Status: DISCONTINUED | OUTPATIENT
Start: 2018-03-14 | End: 2018-03-14

## 2018-03-14 RX ORDER — HYDROMORPHONE HYDROCHLORIDE 1 MG/ML
.3-.5 INJECTION, SOLUTION INTRAMUSCULAR; INTRAVENOUS; SUBCUTANEOUS ONCE
Status: COMPLETED | OUTPATIENT
Start: 2018-03-14 | End: 2018-03-14

## 2018-03-14 RX ORDER — SODIUM CHLORIDE, SODIUM LACTATE, POTASSIUM CHLORIDE, CALCIUM CHLORIDE 600; 310; 30; 20 MG/100ML; MG/100ML; MG/100ML; MG/100ML
INJECTION, SOLUTION INTRAVENOUS CONTINUOUS PRN
Status: DISCONTINUED | OUTPATIENT
Start: 2018-03-14 | End: 2018-03-14

## 2018-03-14 RX ORDER — CLINDAMYCIN PHOSPHATE 900 MG/50ML
900 INJECTION, SOLUTION INTRAVENOUS SEE ADMIN INSTRUCTIONS
Status: DISCONTINUED | OUTPATIENT
Start: 2018-03-14 | End: 2018-03-14 | Stop reason: HOSPADM

## 2018-03-14 RX ORDER — OXYMETAZOLINE HYDROCHLORIDE 0.05 G/100ML
SPRAY NASAL PRN
Status: DISCONTINUED | OUTPATIENT
Start: 2018-03-14 | End: 2018-03-14 | Stop reason: HOSPADM

## 2018-03-14 RX ORDER — DEXAMETHASONE SODIUM PHOSPHATE 10 MG/ML
INJECTION, SOLUTION INTRAMUSCULAR; INTRAVENOUS PRN
Status: DISCONTINUED | OUTPATIENT
Start: 2018-03-14 | End: 2018-03-14

## 2018-03-14 RX ORDER — OXYCODONE HYDROCHLORIDE 5 MG/1
5-10 TABLET ORAL EVERY 4 HOURS PRN
Qty: 10 TABLET | Refills: 0 | Status: SHIPPED | OUTPATIENT
Start: 2018-03-14 | End: 2018-04-02 | Stop reason: DRUGHIGH

## 2018-03-14 RX ADMIN — LIDOCAINE HYDROCHLORIDE 2 ML: 10 INJECTION, SOLUTION EPIDURAL; INFILTRATION; INTRACAUDAL; PERINEURAL at 12:26

## 2018-03-14 RX ADMIN — SODIUM CHLORIDE, POTASSIUM CHLORIDE, SODIUM LACTATE AND CALCIUM CHLORIDE: 600; 310; 30; 20 INJECTION, SOLUTION INTRAVENOUS at 11:47

## 2018-03-14 RX ADMIN — FENTANYL CITRATE 150 MCG: 50 INJECTION, SOLUTION INTRAMUSCULAR; INTRAVENOUS at 12:01

## 2018-03-14 RX ADMIN — FENTANYL CITRATE 25 MCG: 50 INJECTION, SOLUTION INTRAMUSCULAR; INTRAVENOUS at 13:32

## 2018-03-14 RX ADMIN — ONDANSETRON 4 MG: 2 INJECTION INTRAMUSCULAR; INTRAVENOUS at 12:39

## 2018-03-14 RX ADMIN — FENTANYL CITRATE 50 MCG: 50 INJECTION, SOLUTION INTRAMUSCULAR; INTRAVENOUS at 13:58

## 2018-03-14 RX ADMIN — Medication 0.5 MG: at 11:32

## 2018-03-14 RX ADMIN — METOPROLOL TARTRATE 2 MG: 5 INJECTION INTRAVENOUS at 12:36

## 2018-03-14 RX ADMIN — OXYMETAZOLINE HYDROCHLORIDE 3 SPRAY: 5 SPRAY NASAL at 12:42

## 2018-03-14 RX ADMIN — PHENYLEPHRINE HYDROCHLORIDE 100 MCG: 10 INJECTION, SOLUTION INTRAMUSCULAR; INTRAVENOUS; SUBCUTANEOUS at 12:18

## 2018-03-14 RX ADMIN — ROCURONIUM BROMIDE 50 MG: 10 INJECTION INTRAVENOUS at 12:02

## 2018-03-14 RX ADMIN — FENTANYL CITRATE 25 MCG: 50 INJECTION, SOLUTION INTRAMUSCULAR; INTRAVENOUS at 13:50

## 2018-03-14 RX ADMIN — IPRATROPIUM BROMIDE AND ALBUTEROL SULFATE 3 ML: .5; 3 SOLUTION RESPIRATORY (INHALATION) at 09:37

## 2018-03-14 RX ADMIN — MIDAZOLAM 2 MG: 1 INJECTION INTRAMUSCULAR; INTRAVENOUS at 11:50

## 2018-03-14 RX ADMIN — SUGAMMADEX 100 MG: 100 INJECTION, SOLUTION INTRAVENOUS at 12:51

## 2018-03-14 RX ADMIN — DEXAMETHASONE SODIUM PHOSPHATE 6 MG: 10 INJECTION, SOLUTION INTRAMUSCULAR; INTRAVENOUS at 12:12

## 2018-03-14 RX ADMIN — KETAMINE HCL-NACL SOLN PREF SY 50 MG/5ML-0.9% (10MG/ML) 50 MG: 10 SOLUTION PREFILLED SYRINGE at 12:03

## 2018-03-14 RX ADMIN — PROPOFOL 100 MG: 10 INJECTION, EMULSION INTRAVENOUS at 12:01

## 2018-03-14 RX ADMIN — LIDOCAINE HYDROCHLORIDE 100 MG: 20 INJECTION, SOLUTION INFILTRATION; PERINEURAL at 12:01

## 2018-03-14 RX ADMIN — HYDROMORPHONE HYDROCHLORIDE 0.5 MG: 1 INJECTION, SOLUTION INTRAMUSCULAR; INTRAVENOUS; SUBCUTANEOUS at 14:19

## 2018-03-14 RX ADMIN — OXYMETAZOLINE HYDROCHLORIDE 3 SPRAY: 5 SPRAY NASAL at 12:52

## 2018-03-14 RX ADMIN — OXYCODONE HYDROCHLORIDE 10 MG: 5 SOLUTION ORAL at 16:19

## 2018-03-14 RX ADMIN — CLINDAMYCIN PHOSPHATE 900 MG: 18 INJECTION, SOLUTION INTRAVENOUS at 12:11

## 2018-03-14 RX ADMIN — ONDANSETRON 4 MG: 2 INJECTION INTRAMUSCULAR; INTRAVENOUS at 15:19

## 2018-03-14 RX ADMIN — HYDROMORPHONE HYDROCHLORIDE 0.3 MG: 1 INJECTION, SOLUTION INTRAMUSCULAR; INTRAVENOUS; SUBCUTANEOUS at 14:08

## 2018-03-14 NOTE — OR NURSING
Dr. Koenig notified for sign out. [facility] states pt can go to Phase 2 but would like to see her prior to discharge. Will inform Phase 2 RN and continue to monitor.

## 2018-03-14 NOTE — OP NOTE
DATE OF PROCEDURE:         PREOPERATIVE DIAGNOSIS:  Head and Neck cancer with need for enteral access for nutrition     POSTOPERATIVE DIAGNOSIS:   Head and Neck cancer with need for enteral access for nutrition     PROCEDURE PERFORMED:    1. Esophagogastroscopy  2. Percutaneous Endoscopic Gastrostomy tube insertion    SURGEON:  Edna Martinez MD       ASSISTANT SURGEON:  Anthony Dooley MD, Power Pinto PA-C (for EGD)      ANESTHESIA:  General      ESTIMATED BLOOD LOSS:  Minimal.       DESCRIPTION OF PROCEDURE:  After obtaining informed consent, the patient was brought to the operating room and laid supine on the operating table.  After induction of anesthesia and intubation, we proceeded to perform an esophagogastroscopy. There were no significant endoscopic findings.  An area on the abdominal wall in the left upper quadrant that was easily transilluminated was identified.  A small incision was made here and with endoscopic visualization, a needle was passed through here.  A wire was passed through the needle and using a snare through the gastroscope the wire was pulled out into the mouth.  A 20-Thai Ponsky PEG tube was then guided back into the stomach using the wire to emerge at the left upper quadrant incision site.  Its position in the stomach was then confirmed with endoscopy and was tightened down, making sure it was neither too tight nor too loose, and that there was no abnormal bleeding.  It was then secured in place.  The stomach was desufflated.  The patient tolerated the procedure well.

## 2018-03-14 NOTE — OR NURSING
Per Dr. Dooley pt to remain in PACU for 90 minutes total to monitor airway. Will continue to monitor.

## 2018-03-14 NOTE — OR NURSING
Pt states she is out of oxycodone at home. No script to be written by surgical team per Dr. Dooley. Pt's spouse attempting to contact PCP to refill prescription after discharge. Will continue to monitor.

## 2018-03-14 NOTE — BRIEF OP NOTE
[facility], [place]    Brief Operative Note    Pre-operative diagnosis: Squamous Cell Carcinoma Of Oropharynx   Post-operative diagnosis same  Procedure: Procedure(s):  Percutaneous Endoscopic Gastrostomy Tube Insertion, Esophagogastroduodenoscopy - Wound Class: I-Clean  Surgeon: Surgeon(s) and Role:     * Edna Martinez MD - Primary     * Trey Pinto PA-C - Resident - Assisting     * Anthony Dooley MD - Assisting  Anesthesia: General   Estimated blood loss: Minimal  Drains: None  Specimens: None  Findings:   Normal anatomy, friable pharyngeal mucosa.  Complications: None.  Implants: 20 Fr Ponsky G tube

## 2018-03-14 NOTE — IP AVS SNAPSHOT
Same Day Surgery [address] [address]    Phone:  [phone]                                       After Visit Summary   3/14/2018    Melinda Milligan    MRN: [id number]           After Visit Summary Signature Page     I have received my discharge instructions, and my questions have been answered. I have discussed any challenges I see with this plan with the nurse or doctor.    ..........................................................................................................................................  Patient/Patient Representative Signature      ..........................................................................................................................................  Patient Representative Print Name and Relationship to Patient    ..................................................               ................................................  Date                                            Time    ..........................................................................................................................................  Reviewed by Signature/Title    ...................................................              ..............................................  Date                                                            Time

## 2018-03-14 NOTE — PLAN OF CARE
Melinda's  Noe was seen for Gtube care and bolus/gravity feeds. He was shown site care, securing tube, water flushes as well as bolus and gravity bag feeds. He did well with all practice skills, asked good questions and stated understanding of all class information. He has all written materials and the PLC card to call with questions.

## 2018-03-14 NOTE — OR NURSING
[facility] appt scheduled for pt and spouse at 1300 for G tube education. Pt not out of OR until 1305. PLC notified. Educator already en route to 3C. Will educate spouse only as pt unavailable. Will continue to monitor.

## 2018-03-14 NOTE — PROGRESS NOTES
Returned patient's call to Recovery Room where patient is today - s/p PEG tube placement.  She states she will need refill for Oxycodone (taking for dysphagia)in the next day.  Will provide this for her - discussed with Dr. Nieto - she received 10 pills after procedure today.  Patient's  received [facility] on tube feeding [facility] (scheduled) but due to timing of PEG placement, patient was not able to attend.  He is asking about supplement order.  Writer contacted [facility] - benefits checked and she will have 100 percent coverage of product, supplies and nursing.  Order placed and faxed to [facility] at [phone].  [facility] states a nurse would be in contact with the patient.

## 2018-03-14 NOTE — ANESTHESIA POSTPROCEDURE EVALUATION
Patient: Melinda Milligan    Procedure(s):  Percutaneous Endoscopic Gastrostomy Tube Insertion, Esophagogastroduodenoscopy - Wound Class: I-Clean    Diagnosis:Squamous Cell Carcinoma Of Oropharynx   Diagnosis Additional Information: No value filed.    Anesthesia Type:  General, ETT    Note:  Anesthesia Post Evaluation    Patient location during evaluation: PACU  Patient participation: Able to fully participate in evaluation  Level of consciousness: awake and alert  Pain management: adequate  Airway patency: patent  Cardiovascular status: blood pressure returned to baseline and hemodynamically stable  Respiratory status: room air  Hydration status: acceptable  PONV: none       Comments: Melinda denies any feeling of post nasal drip that is worse than her baseline. Her posterior oropharynx is raw and erythematous but no active bleeding is noted. She denies nausea or emesis.           Last vitals:  Vitals:    03/14/18 1600 03/14/18 1615 03/14/18 1630   BP: 115/71 114/73 108/67   Resp: 14 14 12   Temp:   37  C (98.6  F)   SpO2: 100% 100% 99%         Electronically Signed By: Jeffry Koenig MD  March 14, 2018  6:04 PM

## 2018-03-14 NOTE — IP AVS SNAPSHOT
MRN:[id number]                      After Visit Summary   3/14/2018    Melinda Milligan    MRN: [id number]           Thank you!     Thank you for choosing [facility] for your care. Our goal is always to provide you with excellent care. Hearing back from our patients is one way we can continue to improve our services. Please take a few minutes to complete the written survey that you may receive in the mail after you visit with us. Thank you!        Patient Information     Date Of Birth          1975        About your hospital stay     You were admitted on:  March 14, 2018 You last received care in the:  Same Day Surgery [facility]    You were discharged on:  March 14, 2018       Who to Call     For medical emergencies, please call 911.  For non-urgent questions about your medical care, please call your primary care provider or clinic, [phone]  For questions related to your surgery, please call your surgery clinic        Attending Provider     Provider Edna Dumont MD Thoracic Diseases       Primary Care Provider Office Phone # Fax #    Quang Wall PA-C [phone]      After Care Instructions     Discharge Instructions       Patient to follow up with Alejandra Lee NP in clinic on 3/20/18 for a PEG tube check            Dressing       See PEG tube instructions                  Your next 10 appointments already scheduled     Mar 15, 2018  9:30 AM CDT   LAB with LAB ONC [facility] ([facility])    [address] [place] [id number] [phone]           Please do not eat 10-12 hours before your appointment if you are coming in fasting for labs on lipids, cholesterol, or glucose (sugar). This does not apply to pregnant women. Water, hot tea and black coffee (with nothing added) are okay. Do not drink other fluids, diet soda or chew gum.            Mar 15, 2018 10:00 AM CDT   Level 6 with BAY 8  INFUSION   [facility]    [address] [facility] [address]   [phone]            Mar 15, 2018  4:15 PM CDT   New Treatment with Albert Ambrosio MD, RADIATION THERAPIST   [facility])    [address] [place] [address]   [phone]            Mar 16, 2018 11:00 AM CDT   TREATMENT with RADIATION THERAPIST   [facility]    [address] [place] [address]   [phone]            Mar 19, 2018  2:15 PM CDT   TREATMENT with RADIATION THERAPIST   [facility] ([facility])    [address] [place] [phone]   [phone]            Mar 19, 2018  2:45 PM CDT   on treatment visit with Albert Ambrosio MD   [facility])    [address] [place] [address]   [phone]            Mar 20, 2018  9:00 AM CDT   (Arrive by 8:45 AM)   Return Visit with NANCY Cardenas CNP   [facility] ([facility])    [address] [place] [id number] [phone]            Mar 20, 2018 12:45 PM CDT   TREATMENT with RADIATION THERAPIST   [facility]    [address] [place] [address]   [phone]            Mar 21, 2018 12:45 PM CDT   TREATMENT with RADIATION THERAPIST   [facility] ([facility])    [address] [place] [address]   [phone]              Further instructions from your care team       PEG TUBE INSTRUCTIONS:    Venting:  -You should vent or temporarily put your tube to gravity bag (or basin) drainage if you experience nausea or bloating.  This is important as it will help to protect the hernia repair.   If you are uncertain how  to do this, please ask your nurse for instruction.    Skin care:  -Change the gauze dressing around your PEG tube daily.  -Check for redness and swelling in the area where the tube goes into your skin.   -Keep the skin around your tube dry and with a split gauze under the flange. If the hole where the tube enters your body is draining fluid, you may need to put barrier cream or antibiotic cream on the skin around your tube after you are done cleaning it. Cover the area with bandages, and call your caregiver.   -If there are no stitches holding your PEG tube in place on your skin, gently turn the tube. This may decrease pressure on your skin, and help prevent an infection. Ask your caregiver if you should turn your PEG tube, and how to do it correctly.     Flushes:  -Flush your feeding tube with 30cc of water twice daily to ensure it does not become plugged  -If administering medications or tube feedings via your tube, make sure to flush with water immediately after use to prevent the tube from plugging  ________________________    [facility]  Same-Day Surgery   Adult Discharge Orders & Instructions     For 24 hours after surgery    1. Get plenty of rest.  A responsible adult must stay with you for at least 24 hours after you leave the hospital.   2. Do not drive or use heavy equipment.  If you have weakness or tingling, don't drive or use heavy equipment until this feeling goes away.  3. Do not drink alcohol.  4. Avoid strenuous or risky activities.  Ask for help when climbing stairs.   5. You may feel lightheaded.  IF so, sit for a few minutes before standing.  Have someone help you get up.   6. If you have nausea (feel sick to your stomach): Drink only clear liquids such as apple juice, ginger ale, broth or 7-Up.  Rest may also help.  Be sure to drink enough fluids.  Move to a regular diet as you feel able.  7. You may have a slight fever. Call the doctor if your fever is over  "100 F (37.7 C) (taken under the tongue) or lasts longer than 24 hours.  8. You may have a dry mouth, a sore throat, muscle aches or trouble sleeping.  These should go away after 24 hours.  9. Do not make important or legal decisions.   Call your doctor for any of the followin.  Signs of infection (fever, growing tenderness at the surgery site, a large amount of drainage or bleeding, severe pain, foul-smelling drainage, redness, swelling).    2. It has been over 8 to 10 hours since surgery and you are still not able to urinate (pass water).    3.  Headache for over 24 hours.    To contact a doctor, call Dr. Edna Martinez @ [phone] (clinic) or [phone] (office) or:        [phone] and ask for the resident on call for Thoracic Surgery (answered 24 hours a day)      Emergency Department:    [facility]: [phone]       (TTY for hearing impaired: [phone])          Additional Information     If you use hormonal birth control (such as the pill, patch, ring or implants): You'll need a second form of birth control for 7 days (condoms, a diaphragm or contraceptive foam). While in the hospital, you received a medicine called Bridion. Your normal birth control will not work as well for a week after taking this medicine.          Pending Results     No orders found from 3/12/2018 to 3/15/2018.            Admission Information     Date & Time Provider Department Dept. Phone    3/14/2018 Edna Martinez MD Same Day Surgery [facility] [phone]      Your Vitals Were     Blood Pressure Temperature Respirations Height Weight Pulse Oximetry    118/73 98.3  F (36.8  C) (Oral) 12 1.499 m (4' 11\") 37.9 kg (83 lb 8.9 oz) 100%    BMI (Body Mass Index)                   16.88 kg/m2           [employer] Information     [email or web address] lets you send messages to your doctor, view your test results, renew your prescriptions, schedule appointments and more. To sign up, go to [email or web address] . Click on \"Log " "in\" on the left side of the screen, which will take you to the Welcome page. Then click on \"Sign up Now\" on the right side of the page.     You will be asked to enter the access code listed below, as well as some personal information. Please follow the directions to create your username and password.     Your access code is: [email or web address]  Expires: 2018  7:30 AM     Your access code will  in 90 days. If you need help or a new code, please call your [facility] clinic or [phone].        Care EveryWhere ID     This is your Care EveryWhere ID. This could be used by other organizations to access your [facility] medical records  [id number]        Equal Access to Services     JAVIER MORALES : Rogelio Haile, waebenezer wadsworth, qaybvalencia kaalmawally burnham, whitney keller . So [facility] [phone].    ATENCIÓN: Si habla español, tiene a hawley disposición servicios gratuitos de asistencia lingüística. Llame al [phone].    We comply with applicable federal civil rights laws and [place] laws. We do not discriminate on the basis of race, color, national origin, age, disability, sex, sexual orientation, or gender identity.               Review of your medicines      UNREVIEWED medicines. Ask your doctor about these medicines        Dose / Directions    folic acid 1 MG tablet   Commonly known as:  FOLVITE        Dose:  1 mg   Take 1 mg by mouth   Refills:  0         CONTINUE these medicines which may have CHANGED, or have new prescriptions. If we are uncertain of the size of tablets/capsules you have at home, strength may be listed as something that might have changed.        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)        Dose:  5-10 mg   Take 1-2 tablets (5-10 mg) by mouth every 4 hours as needed for pain maximum 12 tablet(s) per day "   Quantity:  10 tablet   Refills:  0         CONTINUE these medicines which have NOT CHANGED        Dose / Directions    ACETAMINOPHEN PO        Dose:  1000 mg   Take 1,000 mg by mouth   Refills:  0       ALLEGRA ALLERGY PO        Take by mouth as needed for allergies   Refills:  0       dexamethasone 4 MG tablet   Commonly known as:  DECADRON   Used for:  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 Read the directions carefully, and ask your doctor or other care provider to review them with you.         Where to get your medicines      Some of these will need a paper prescription and others can be bought over the counter. Ask your nurse if you have questions.     Bring a paper  prescription for each of these medications     oxyCODONE IR 5 MG tablet                Protect others around you: Learn how to safely use, store and throw away your medicines at www.disposemymeds.org.        Information about OPIOIDS     PRESCRIPTION OPIOIDS: WHAT YOU NEED TO KNOW    Prescription opioids can be used to help relieve moderate to severe pain and are often prescribed following a surgery or injury, or for certain health conditions. These medications can be an important part of treatment but also come with serious risks. It is important to work with your health care provider to make sure you are getting the safest, most effective care.    WHAT ARE THE RISKS AND SIDE EFFECTS OF OPIOID USE?  Prescription opioids carry serious risks of addiction and overdose, especially with prolonged use. An opioid overdose, often marked by slowed breathing can cause sudden death. The use of prescription opioids can have a number of side effects as well, even when taken as directed:      Tolerance - meaning you might need to take more of a medication for the same pain relief    Physical dependence - meaning you have symptoms of withdrawal when a medication is stopped    Increased sensitivity to pain    Constipation    Nausea, vomiting, and dry mouth    Sleepiness and dizziness    Confusion    Depression    Low levels of testosterone that can result in lower sex drive, energy, and strength    Itching and sweating    RISKS ARE GREATER WITH:    History of drug misuse, substance use disorder, or overdose    Mental health conditions (such as depression or anxiety)    Sleep apnea    Older age (65 years or older)    Pregnancy    Avoid alcohol while taking prescription opioids.   Also, unless specifically advised by your health care provider, medications to avoid include:    Benzodiazepines (such as Xanax or Valium)    Muscle relaxants (such as Soma or Flexeril)    Hypnotics (such as Ambien or Lunesta)    Other prescription  opioids    KNOW YOUR OPTIONS:  Talk to your health care provider about ways to manage your pain that do not involve prescription opioids. Some of these options may actually work better and have fewer risks and side effects:    Pain relievers such as acetaminophen, ibuprofen, and naproxen    Some medications that are also used for depression or seizures    Physical therapy and exercise    Cognitive behavioral therapy, a psychological, goal-directed approach, in which patients learn how to modify physical, behavioral, and emotional triggers of pain and stress    IF YOU ARE PRESCRIBED OPIOIDS FOR PAIN:    Never take opioids in greater amounts or more often than prescribed    Follow up with your primary health care provider and work together to create a plan on how to manage your pain.    Talk about ways to help manage your pain that do not involve prescription opioids    Talk about all concerns and side effects    Help prevent misuse and abuse    Never sell or share prescription opioids    Never use another person's prescription opioids    Store prescription opioids in a secure place and out of reach of others (this may include visitors, children, friends, and family)    Visit www.cdc.gov/drugoverdose to learn about risks of opioid abuse and overdose    If you believe you may be struggling with addiction, tell your health care provider and ask for guidance or call [facility]'s National Helpline at 3-732-226-HELP    LEARN MORE / www.cdc.gov/drugoverdose/prescribing/guideline.html    Safely dispose of unused prescription opioids: Find your local drug take-back programs and more information about the importance of safe disposal at www.doseofreality.[place].gov             Medication List: This is a list of all your medications and when to take them. Check marks below indicate your daily home schedule. Keep this list as a reference.      Medications           Morning Afternoon Evening Bedtime As Needed    ACETAMINOPHEN PO   Take  1,000 mg by mouth                                ALLEGRA ALLERGY PO   Take by mouth as needed for allergies                                dexamethasone 4 MG tablet   Commonly known as:  DECADRON   Take 2 tablets (8 mg) by mouth daily (with breakfast) for 3 days Start the day after chemotherapy.   Start taking on:  3/15/2018                                folic acid 1 MG tablet   Commonly known as:  FOLVITE   Take 1 mg by mouth                                * LORazepam 0.5 MG tablet   Commonly known as:  ATIVAN   Take 1 tablet (0.5 mg) by mouth daily as needed for anxiety (for radiation)                                * LORazepam 0.5 MG tablet   Commonly known as:  ATIVAN   Take 1 tablet (0.5 mg) by mouth every 4 hours as needed (Anxiety, Nausea/Vomiting or Sleep)                                oxyCODONE IR 5 MG tablet   Commonly known as:  ROXICODONE   Take 1-2 tablets (5-10 mg) by mouth every 4 hours as needed for pain maximum 12 tablet(s) per day                                prochlorperazine 10 MG tablet   Commonly known as:  COMPAZINE   Take 1 tablet (10 mg) by mouth every 6 hours as needed (Nausea/Vomiting)                                * Notice:  This list has 2 medication(s) that are the same as other medications prescribed for you. Read the directions carefully, and ask your doctor or other care provider to review them with you.              More Information        Gastrostomy Feeding Tube Care: Flushing  With gastrostomy tube feeding, you need to keep the tube from getting clogged by flushing it with warm water after each feeding and before and after giving any medicines.             For continuous feeding  Flush the feeding tube with warm water and a clean syringe before the first daily feeding, after the last daily feeding, and other times as instructed. Follow the steps below:  1. Fill a clean bowl with warm water.  2. Put the tip of the syringe in the water.  3. Draw up 50 cc of water.  4. Turn off  the pump.  5. Close the clamp on the feeding bag tubing.  6. Remove the tubing from the port.  7. Put the tip of the syringe in the feeding port.  8. Push the plunger down.  9. Let the water run through the feeding tube.  10. Start the feeding or close the cap on the feeding port.  11. Tape the tube to the skin with medical tape.         For bolus feeding  You may be told to flush the feeding tube before and after each feeding, or just after feedings. Use a clean syringe and warm water. Follow the steps below:  1. Fill a clean bowl with warm water.  2. Put the tip of the syringe in the water.  3. Draw up 50 cc of water (tap water is OK to use).   4. Open the cap on the feeding port.  5. Put the tip of the syringe in the feeding port.  6. Push down on the plunger. Let the water run through the tube.  7. Close the cap.  8. Tape the tube to the skin with medical tape.  Date Last Reviewed: 6/1/2016 2000-2017 [employer]. [address] [place] [address]. All rights reserved. This information is not intended as a substitute for professional medical care. Always follow your healthcare professional's instructions.                Feeding Tube Insertion  A gastrostomy is a direct opening through your abdominal wall into your stomach. A gastrostomy tube (sometimes called PEG tube) is passed through this opening. This allows you to receive food and medicine without having to swallow. It is more comfortable and easier to use than a tube from the nose to the stomach. Gastrostomy tubes are used in temporary or permanent conditions where there is difficulty swallowing. This includes stroke, cancer of the mouth, throat, or esophagus, or radiation to the chest.  The most common way this type of feeding tube is inserted is during an endoscopy. This is a test where a flexible tube with video camera is placed through your mouth into your stomach. You are usually sedated with IV medicine for comfort. Once the  feeding tube is placed, the scope is removed.  Feeding tubes generally last 6 to 12 months before they need to be replaced. Replacement is usually easier than the initial insertion, since the opening is already there.  Home care  The following will help you care for yourself at home:    During the first weeks after insertion, do the following:    Clean the area around the wound daily, and apply a topical antibiotic ointment. Keep the site covered with clean gauze.    Watch for redness, swelling, or pus coming from the opening in the skin.    Watch for leakage around the tube and report this to your healthcare provider.    If the tube falls out, it must be put back in within 24 hours or the wound will close up.    Begin feedings as instructed.    Wash your hands with soap and water before preparing the formula or touching the feeding tube.    The tube is narrow, so use only commercial feeding formulas. This will make sure the tube will not get clogged. These formulas are designed to provide all the protein, carbohydrates, vitamins, and minerals needed. Follow your healthcare provider s advice (or the registered dietitian or nurse who is working with you) about the number of feedings to give each day.    Flush the tube with clear water before and after feedings or after medicine has been given through the tube. This is very important. Otherwise the tube can become blocked.    When feeding, you should be upright or reclining at not less than 30 degrees to reduce the risk of the feeding solution draining improperly and causing aspiration into the lungs. Stay in this position for at least 30 minutes after the feeding.    Infuse food slowly. It may take more than 1 hour for 1 feeding session.    If the tube becomes blocked, flush with a syringe full of water.    If you feel bloated after feeding, remove the cap from the end of the tube so that extra air in the stomach can flow out. Cough to help remove the extra  air.    Oral and dental care is needed, even if you are not taking any food or liquids by mouth. Brush your teeth and gums daily. Keep the lips moist with a lip balm or petroleum jelly.  Follow-up care  Follow up with your healthcare provider, or as advised.  Call 911  Call 911 if any of the following occur:    Chest pain    Trouble breathing  When to seek medical advice  Call your healthcare provider right away if any of these occur:    Feeding tube falls out    Feeding tube becomes blocked and you can't clear it    Fever of 100.4 F (38 C) or higher, or as directed by your healthcare provider    Leakage of stomach contents around the tube onto the abdomen    Pain or swelling in your abdomen that gets worse    Redness, pus, or bleeding at the insertion site  Date Last Reviewed: 7/1/2016 2000-2017 [employer]. [address] [place] [address], [place] [address]. All rights reserved. This information is not intended as a substitute for professional medical care. Always follow your healthcare professional's instructions.

## 2018-03-14 NOTE — DISCHARGE INSTRUCTIONS
PEG TUBE INSTRUCTIONS:    Venting:  -You should vent or temporarily put your tube to gravity bag (or basin) drainage if you experience nausea or bloating.  This is important as it will help to protect the hernia repair.   If you are uncertain how to do this, please ask your nurse for instruction.    Skin care:  -Change the gauze dressing around your PEG tube daily.  -Check for redness and swelling in the area where the tube goes into your skin.   -Keep the skin around your tube dry and with a split gauze under the flange. If the hole where the tube enters your body is draining fluid, you may need to put barrier cream or antibiotic cream on the skin around your tube after you are done cleaning it. Cover the area with bandages, and call your caregiver.   -If there are no stitches holding your PEG tube in place on your skin, gently turn the tube. This may decrease pressure on your skin, and help prevent an infection. Ask your caregiver if you should turn your PEG tube, and how to do it correctly.     Flushes:  -Flush your feeding tube with 30cc of water twice daily to ensure it does not become plugged  -If administering medications or tube feedings via your tube, make sure to flush with water immediately after use to prevent the tube from plugging  ________________________    [facility], [place]  Same-Day Surgery   Adult Discharge Orders & Instructions     For 24 hours after surgery    1. Get plenty of rest.  A responsible adult must stay with you for at least 24 hours after you leave the hospital.   2. Do not drive or use heavy equipment.  If you have weakness or tingling, don't drive or use heavy equipment until this feeling goes away.  3. Do not drink alcohol.  4. Avoid strenuous or risky activities.  Ask for help when climbing stairs.   5. You may feel lightheaded.  IF so, sit for a few minutes before standing.  Have someone help you get up.   6. If you have nausea (feel sick to your  stomach): Drink only clear liquids such as apple juice, ginger ale, broth or 7-Up.  Rest may also help.  Be sure to drink enough fluids.  Move to a regular diet as you feel able.  7. You may have a slight fever. Call the doctor if your fever is over 100 F (37.7 C) (taken under the tongue) or lasts longer than 24 hours.  8. You may have a dry mouth, a sore throat, muscle aches or trouble sleeping.  These should go away after 24 hours.  9. Do not make important or legal decisions.   Call your doctor for any of the followin.  Signs of infection (fever, growing tenderness at the surgery site, a large amount of drainage or bleeding, severe pain, foul-smelling drainage, redness, swelling).    2. It has been over 8 to 10 hours since surgery and you are still not able to urinate (pass water).    3.  Headache for over 24 hours.    To contact a doctor, call Dr. Edna Martinez @ [phone] (clinic) or [phone] (office) or:        [phone] and ask for the resident on call for Thoracic Surgery (answered 24 hours a day)      Emergency Department:    [facility]: [phone]       (TTY for hearing impaired: [phone])

## 2018-03-14 NOTE — ANESTHESIA CARE TRANSFER NOTE
Patient: Melinda Milligan    Procedure(s):  Percutaneous Endoscopic Gastrostomy Tube Insertion, Esophagogastroduodenoscopy - Wound Class: I-Clean    Diagnosis: Squamous Cell Carcinoma Of Oropharynx   Diagnosis Additional Information: No value filed.    Anesthesia Type:   General, ETT     Note:  Airway :Face Mask  Patient transferred to:PACU  Comments: VSS. Breathing spontaneously at a regular rate with adequate tidal volumes and maintaining O2 sats on 6L facemask. Denies nausea or pain. No apparent complications from anesthesia. Pt with bleeding at end of case in oropharynx at site of tumor. Bleeding controlled with afrin soaked gauze and pressure. Pt to be monitored in PACU for 1-2 hours to determine if bleeding reoccurs     Andrew Dc DO  CA-1  Handoff Report: Identifed the Patient, Identified the Reponsible Provider, Reviewed the pertinent medical history, Discussed the surgical course, Reviewed Intra-OP anesthesia mangement and issues during anesthesia, Set expectations for post-procedure period and Allowed opportunity for questions and acknowledgement of understanding      Vitals: (Last set prior to Anesthesia Care Transfer)    CRNA VITALS  3/14/2018 1232 - 3/14/2018 1312      3/14/2018             Pulse: 91    Ht Rate: 91    SpO2: 100 %    Resp Rate (observed): 8    Resp Rate (set): 10                Electronically Signed By: Andrew Dc DO  March 14, 2018  1:12 PM

## 2018-03-14 NOTE — OR NURSING
Paged Dr. Koenig to see the pt before discharge, but he had already left the hospital. Paged Dr. Dooley to give an RX for a few pain pills to get pt through the night because pt is almost out, and cannot get refilled till tomorrow. Dr. Dooley came to the pt's room and talked to the pt and , and left a RX.  picked up the RX.  gave pt liquid Oxycodone via G-tube to show return demonstration of proper g-tube handling. Pt's  did well with reminders and verbal cues, and showed good technique. [facility] will contact pt at home to come and set up the tube feedings, either today or tomorrow.  feels confident giving water or Ensure via the G-tube as per pt need. Pt can drink also, and is encouraged to drink as tolerated. I sent pt home with supplies for tonight: piston syringe, drain gauze, graduate, drainage bag, and tape fasteners.

## 2018-03-15 ENCOUNTER — APPOINTMENT (OUTPATIENT)
Dept: RADIATION ONCOLOGY | Facility: CLINIC | Age: 43
End: 2018-03-15
Payer: COMMERCIAL

## 2018-03-15 ENCOUNTER — HOME INFUSION (PRE-WILLOW HOME INFUSION) (OUTPATIENT)
Dept: PHARMACY | Facility: CLINIC | Age: 43
End: 2018-03-15

## 2018-03-15 ENCOUNTER — ONCOLOGY VISIT (OUTPATIENT)
Dept: ONCOLOGY | Facility: CLINIC | Age: 43
End: 2018-03-15
Payer: COMMERCIAL

## 2018-03-15 ENCOUNTER — INFUSION THERAPY VISIT (OUTPATIENT)
Dept: INFUSION THERAPY | Facility: CLINIC | Age: 43
End: 2018-03-15
Payer: COMMERCIAL

## 2018-03-15 VITALS
DIASTOLIC BLOOD PRESSURE: 72 MMHG | HEART RATE: 73 BPM | SYSTOLIC BLOOD PRESSURE: 116 MMHG | OXYGEN SATURATION: 97 % | TEMPERATURE: 97.7 F | RESPIRATION RATE: 18 BRPM

## 2018-03-15 DIAGNOSIS — E87.6 HYPOKALEMIA: ICD-10-CM

## 2018-03-15 DIAGNOSIS — C10.9 SQUAMOUS CELL CARCINOMA OF OROPHARYNX (H): ICD-10-CM

## 2018-03-15 DIAGNOSIS — K59.03 DRUG-INDUCED CONSTIPATION: ICD-10-CM

## 2018-03-15 DIAGNOSIS — G89.3 CANCER RELATED PAIN: Primary | ICD-10-CM

## 2018-03-15 DIAGNOSIS — C06.9 SQUAMOUS CELL CARCINOMA OF ORAL CAVITY (H): ICD-10-CM

## 2018-03-15 DIAGNOSIS — R63.4 LOSS OF WEIGHT: ICD-10-CM

## 2018-03-15 DIAGNOSIS — C02.9 TONGUE CANCER (H): Primary | ICD-10-CM

## 2018-03-15 LAB
ALBUMIN SERPL-MCNC: 3.4 G/DL (ref 3.4–5)
ALP SERPL-CCNC: 60 U/L (ref 40–150)
ALT SERPL W P-5'-P-CCNC: 11 U/L (ref 0–50)
ANION GAP SERPL CALCULATED.3IONS-SCNC: 7 MMOL/L (ref 3–14)
AST SERPL W P-5'-P-CCNC: 10 U/L (ref 0–45)
BASOPHILS # BLD AUTO: 0 10E9/L (ref 0–0.2)
BASOPHILS NFR BLD AUTO: 0.5 %
BILIRUB SERPL-MCNC: 0.4 MG/DL (ref 0.2–1.3)
BUN SERPL-MCNC: 11 MG/DL (ref 7–30)
CALCIUM SERPL-MCNC: 9.4 MG/DL (ref 8.5–10.1)
CHLORIDE SERPL-SCNC: 99 MMOL/L (ref 94–109)
CO2 SERPL-SCNC: 28 MMOL/L (ref 20–32)
CREAT SERPL-MCNC: 0.4 MG/DL (ref 0.52–1.04)
DIFFERENTIAL METHOD BLD: ABNORMAL
EOSINOPHIL # BLD AUTO: 0.2 10E9/L (ref 0–0.7)
EOSINOPHIL NFR BLD AUTO: 1.9 %
ERYTHROCYTE [DISTWIDTH] IN BLOOD BY AUTOMATED COUNT: 20 % (ref 10–15)
GFR SERPL CREATININE-BSD FRML MDRD: >90 ML/MIN/1.7M2
GLUCOSE SERPL-MCNC: 110 MG/DL (ref 70–99)
HCT VFR BLD AUTO: 33 % (ref 35–47)
HGB BLD-MCNC: 10.6 G/DL (ref 11.7–15.7)
IMM GRANULOCYTES # BLD: 0 10E9/L (ref 0–0.4)
IMM GRANULOCYTES NFR BLD: 0.3 %
LYMPHOCYTES # BLD AUTO: 1.3 10E9/L (ref 0.8–5.3)
LYMPHOCYTES NFR BLD AUTO: 16.6 %
MAGNESIUM SERPL-MCNC: 1.9 MG/DL (ref 1.6–2.3)
MCH RBC QN AUTO: 28.3 PG (ref 26.5–33)
MCHC RBC AUTO-ENTMCNC: 32.1 G/DL (ref 31.5–36.5)
MCV RBC AUTO: 88 FL (ref 78–100)
MONOCYTES # BLD AUTO: 0.4 10E9/L (ref 0–1.3)
MONOCYTES NFR BLD AUTO: 4.9 %
NEUTROPHILS # BLD AUTO: 6 10E9/L (ref 1.6–8.3)
NEUTROPHILS NFR BLD AUTO: 75.8 %
PLATELET # BLD AUTO: 392 10E9/L (ref 150–450)
POTASSIUM SERPL-SCNC: 3.2 MMOL/L (ref 3.4–5.3)
PROT SERPL-MCNC: 7.3 G/DL (ref 6.8–8.8)
RBC # BLD AUTO: 3.75 10E12/L (ref 3.8–5.2)
SODIUM SERPL-SCNC: 134 MMOL/L (ref 133–144)
WBC # BLD AUTO: 7.9 10E9/L (ref 4–11)

## 2018-03-15 PROCEDURE — 77014 ZZHC CT GUIDE FOR PLACEMENT RADIATION THERAPY FIELDS: CPT | Performed by: RADIOLOGY

## 2018-03-15 PROCEDURE — 96367 TX/PROPH/DG ADDL SEQ IV INF: CPT | Performed by: NURSE PRACTITIONER

## 2018-03-15 PROCEDURE — 36415 COLL VENOUS BLD VENIPUNCTURE: CPT | Performed by: INTERNAL MEDICINE

## 2018-03-15 PROCEDURE — 80053 COMPREHEN METABOLIC PANEL: CPT | Performed by: INTERNAL MEDICINE

## 2018-03-15 PROCEDURE — 99214 OFFICE O/P EST MOD 30 MIN: CPT | Mod: 25 | Performed by: NURSE PRACTITIONER

## 2018-03-15 PROCEDURE — 96366 THER/PROPH/DIAG IV INF ADDON: CPT | Performed by: NURSE PRACTITIONER

## 2018-03-15 PROCEDURE — 96413 CHEMO IV INFUSION 1 HR: CPT | Performed by: NURSE PRACTITIONER

## 2018-03-15 PROCEDURE — 85025 COMPLETE CBC W/AUTO DIFF WBC: CPT | Performed by: INTERNAL MEDICINE

## 2018-03-15 PROCEDURE — 96415 CHEMO IV INFUSION ADDL HR: CPT | Performed by: NURSE PRACTITIONER

## 2018-03-15 PROCEDURE — 99207 ZZC NO CHARGE LOS: CPT

## 2018-03-15 PROCEDURE — 77386 ZZC IMRT TREATMENT DELIVERY, COMPLEX: CPT | Performed by: RADIOLOGY

## 2018-03-15 PROCEDURE — 83735 ASSAY OF MAGNESIUM: CPT | Performed by: INTERNAL MEDICINE

## 2018-03-15 PROCEDURE — 96375 TX/PRO/DX INJ NEW DRUG ADDON: CPT | Performed by: NURSE PRACTITIONER

## 2018-03-15 RX ORDER — OXYCODONE HCL 5 MG/5 ML
10 SOLUTION, ORAL ORAL EVERY 4 HOURS PRN
Qty: 300 ML | Refills: 0 | Status: SHIPPED | OUTPATIENT
Start: 2018-03-15 | End: 2018-04-18

## 2018-03-15 RX ORDER — MORPHINE SULFATE 30 MG/1
30 TABLET, FILM COATED, EXTENDED RELEASE ORAL EVERY 12 HOURS
Qty: 60 TABLET | Refills: 0 | Status: SHIPPED | OUTPATIENT
Start: 2018-03-15 | End: 2018-04-18

## 2018-03-15 RX ORDER — PALONOSETRON 0.05 MG/ML
0.25 INJECTION, SOLUTION INTRAVENOUS ONCE
Status: COMPLETED | OUTPATIENT
Start: 2018-03-15 | End: 2018-03-15

## 2018-03-15 RX ORDER — OXYCODONE HCL 20 MG/1
20 TABLET, FILM COATED, EXTENDED RELEASE ORAL EVERY 12 HOURS
Qty: 60 TABLET | Refills: 0 | Status: SHIPPED | OUTPATIENT
Start: 2018-03-15 | End: 2018-03-15

## 2018-03-15 RX ORDER — POLYETHYLENE GLYCOL 3350 17 G/17G
1 POWDER, FOR SOLUTION ORAL DAILY
Qty: 225 G | Refills: 1 | Status: SHIPPED | OUTPATIENT
Start: 2018-03-15

## 2018-03-15 RX ADMIN — PALONOSETRON 0.25 MG: 0.05 INJECTION, SOLUTION INTRAVENOUS at 12:18

## 2018-03-15 ASSESSMENT — PAIN SCALES - GENERAL: PAINLEVEL: MILD PAIN (3)

## 2018-03-15 NOTE — LETTER
3/15/2018         RE: Melinda Milligan  [address] [facility] [address]        Dear Colleague,    Thank you for referring your patient, Melinda Milligan, to the [facility]. Please see a copy of my visit note below.    Oncology Follow Up Visit: March 15, 2018    Oncologist: Dr Benedicto Nieto  PCP: Quang Wall  ENT: Dr Marga Arana    Diagnosis: Squamous carcinoma of the tongue- complaining of increased pain  Melinda Milligan is a 43 yo female who presented with thrush then noted swelling and pain to right side of tongue and cheek. Biopsy of tongue lesion proved squamous cell carcinoma. Further imaging work up showed mass involving bilateral palatine tonsils, soft palpate with extension to nasopharyngeal and oropharyngeal walls with level IIa lymph node involvement.   * also found to have iron deficiency anemia and was started on Infed dosing.   Treatment:   3/13/2018 gtube placed  3/14/2018 begins chemoradiation with cisplatin    Interval History: Ms. Milligan comes to clinic today with  and mother for start of chemoradiation for her head and neck cancer with use of cisplatin and asked to speak to someone about her pain. She comes complaining of increased pain to right side of face and neck. Currently  using oxycodone 10 mg every 4 hours around the clock and tylenol but pain is still ranked 8/10 and getting very little sleep. She is constipated- using OTC product for bowels which have not moved in 3 days. SHe is now using her new feeding tube placed yesterday for most all feedingfluids since swallowing is painful- has home nurse coming on weekend for review and further teaching-  is very attentive and helpful for cares.   Rest of comprehensive and complete ROS is reviewed and is negative.   Past Medical History:   Diagnosis Date     Allergic rhinitis      Anemia      Hoarseness      Oropharyngeal cancer (H) 02/15/2018     Uncomplicated asthma      Current  Outpatient Prescriptions   Medication     dexamethasone (DECADRON) 4 MG tablet     LORazepam (ATIVAN) 0.5 MG tablet     prochlorperazine (COMPAZINE) 10 MG tablet     ACETAMINOPHEN PO     oxyCODONE IR (ROXICODONE) 5 MG tablet     order for DME     oxyCODONE IR (ROXICODONE) 5 MG tablet     Fexofenadine HCl (ALLEGRA ALLERGY PO)     LORazepam (ATIVAN) 0.5 MG tablet     folic acid (FOLVITE) 1 MG tablet     No current facility-administered medications for this visit.      Facility-Administered Medications Ordered in Other Visits   Medication     sodium chloride 0.9 % 1,000 mL with potassium chloride 20 mEq, magnesium sulfate 2 g infusion     palonosetron (ALOXI) injection 0.25 mg     CISplatin (PLATINOL) 127 mg in sodium chloride 0.9 % 1,227 mL CHEMOTHERAPY     sodium chloride 0.9 % 1,000 mL with potassium chloride 20 mEq *See DOSE to administer in MAR details (order question)     Allergies   Allergen Reactions     Ceftriaxone      Other reaction(s): Unknown Reaction     Chicken-Derived Products (Egg)      Other reaction(s): Vomiting  Other reaction(s): Unknown Reaction     No Clinical Screening - See Comments Hives     MAGIC MOUTHWASH     Physical Exam: 97.7, 18,73,and O2=97%. Last weight was 3/14=83.9 lbs.   ECOG PS- 1  Constitutional: Alert, tired appearing and in pain with smiling- holding right side of face in hands   ENT: Eyes bright, No mouth sores at present  Respiratory: Breathing easy- no cough  GI: Abdomen with new Gtube site with very mild redness.   MS: Muscle tone normal, extremities normal with no edema.   Skin: No suspicious lesions or rashes  Neuro: Sensory grossly WNL, gait normal.   Psych: Mentation appears normal and affect normal    Laboratory Results:   Results for orders placed or performed in visit on 03/15/18   Comprehensive metabolic panel   Result Value Ref Range    Sodium 134 133 - 144 mmol/L    Potassium 3.2 (L) 3.4 - 5.3 mmol/L    Chloride 99 94 - 109 mmol/L    Carbon Dioxide 28 20 - 32  mmol/L    Anion Gap 7 3 - 14 mmol/L    Glucose 110 (H) 70 - 99 mg/dL    Urea Nitrogen 11 7 - 30 mg/dL    Creatinine 0.40 (L) 0.52 - 1.04 mg/dL    GFR Estimate >90 >60 mL/min/1.7m2    GFR Estimate If Black >90 >60 mL/min/1.7m2    Calcium 9.4 8.5 - 10.1 mg/dL    Bilirubin Total 0.4 0.2 - 1.3 mg/dL    Albumin 3.4 3.4 - 5.0 g/dL    Protein Total 7.3 6.8 - 8.8 g/dL    Alkaline Phosphatase 60 40 - 150 U/L    ALT 11 0 - 50 U/L    AST 10 0 - 45 U/L   Magnesium   Result Value Ref Range    Magnesium 1.9 1.6 - 2.3 mg/dL   CBC with platelets differential   Result Value Ref Range    WBC 7.9 4.0 - 11.0 10e9/L    RBC Count 3.75 (L) 3.8 - 5.2 10e12/L    Hemoglobin 10.6 (L) 11.7 - 15.7 g/dL    Hematocrit 33.0 (L) 35.0 - 47.0 %    MCV 88 78 - 100 fl    MCH 28.3 26.5 - 33.0 pg    MCHC 32.1 31.5 - 36.5 g/dL    RDW 20.0 (H) 10.0 - 15.0 %    Platelet Count 392 150 - 450 10e9/L    Diff Method Automated Method     % Neutrophils 75.8 %    % Lymphocytes 16.6 %    % Monocytes 4.9 %    % Eosinophils 1.9 %    % Basophils 0.5 %    % Immature Granulocytes 0.3 %    Absolute Neutrophil 6.0 1.6 - 8.3 10e9/L    Absolute Lymphocytes 1.3 0.8 - 5.3 10e9/L    Absolute Monocytes 0.4 0.0 - 1.3 10e9/L    Absolute Eosinophils 0.2 0.0 - 0.7 10e9/L    Absolute Basophils 0.0 0.0 - 0.2 10e9/L    Abs Immature Granulocytes 0.0 0 - 0.4 10e9/L     Assessment and Plan:   Squamous cell carcinoma of the tongue- Started chemoradiation with cisplatin today. Reviewed side effects that may be expected in first days. Answered questions from pt and . Pharmacist also reviewed medications with pt today  She returns weekly for visit with provider for symptom review.   Cancer related pain-Pain is to right side of face, jw and down into neck. She is currently using 10mg of the oxycodone every 4 hours with tylenol 3 x daily. Pain is still at 8/10 to face and some to gtube site. WIll start pt on Oxycontin 20 mg every 12 hours with change to liquid oxycodone 10 mg every 4  hours prn for breakthrough pain and may continue with tylenol as needed. May still use ativan for sleep if needed. She has appt for review with this provider next week 3/21.   Has appointment with Dr Jie hardin for 4/4/2018  Constipation- will order miralax to be taken every 12 hours until bowels move then may use daily if that is all that is needed. Fluids to be reviewed with dietician.   Nutrition with noted weight loss- did receive G tube placement yesterday. They are feeding and flushing as taught according to  since she is not swallowing well. Using biotine for mouth at this time.  Has met with Juli prior to Gtube and will see her again for recommendations on 3.20/2018.   This was a 25 min visit with > 50% in counseling and coordinating care including education and management of concerns.    Elba Fry CNP      Again, thank you for allowing me to participate in the care of your patient.        Sincerely,        Elba Fry, DELANO, APRN CNP

## 2018-03-15 NOTE — MR AVS SNAPSHOT
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)        Hypokalemia           Follow-ups after your visit        Your next 10 appointments already scheduled     Mar 19, 2018  2:15 PM CDT   TREATMENT with RADIATION THERAPIST   Formerly named [facility])    [id number] [place] [address]   [phone]            Mar 19, 2018  2:45 PM CDT   on treatment visit with Albert Ambrosio MD   Formerly named [facility])    [address] [place] [address]   [phone]            Mar 20, 2018  9:00 AM CDT   (Arrive by 8:45 AM)   Return Visit with NANCY Cardenas CNP   [facility] ([facility])    [address] [place] [address]  [address] [place] [address]   [phone]            Mar 20, 2018 12:45 PM CDT   TREATMENT with RADIATION THERAPIST   Formerly named [facility])    [address] [place] [address]   [phone]            Mar 21, 2018 12:30 PM CDT   LAB with [facility])    [address] [place] [address]   [phone]           Please do not eat 10-12 hours before your appointment if you are coming in fasting for labs on lipids, cholesterol, or glucose (sugar). This does not apply to pregnant women. Water, hot tea and black coffee (with nothing added) are okay. Do not drink other fluids, diet soda or chew gum.            Mar 21, 2018 12:45 PM CDT   TREATMENT with RADIATION  THERAPIST   [facility]    [address] [facility] [address]   [phone]            Mar 21, 2018  1:15 PM CDT   Return Visit with NANCY Cai CNP   [facility] ([facility])    [address] [facility] [address]   [phone]            Mar 22, 2018 12:45 PM CDT   TREATMENT with RADIATION THERAPIST   [facility] ([facility])    [address] [facility] [address]   [phone]            Mar 23, 2018 12:45 PM CDT   TREATMENT with RADIATION THERAPIST   [facility] ([facility])    [address] [facility] [address]   [phone]            Mar 26, 2018 12:45 PM CDT   TREATMENT with RADIATION THERAPIST   [facility] ([facility])    [address] [place] [address]   [phone]              Future tests that were ordered for you today     Open Future Orders        Priority Expected Expires Ordered    PET Oncology Whole Body Routine 4/11/2018 3/13/2019 3/13/2018            Who to contact     If you have questions or need follow up information about today's clinic visit or your schedule please contact [facility] directly at [phone].  Normal or non-critical lab and imaging results will be communicated to you by MyChart, letter or phone within 4 business days after the clinic has received the results. If you do not hear from us within 7 days, please contact the clinic through MyChart or phone. If you have a critical or abnormal lab result, we will notify you by phone as soon as possible.  Submit refill requests through [employer] or call your pharmacy and they will forward the refill request to us. Please allow 3 business days for your refill to be completed.          Additional Information About Your Visit        MyChart Information      [employer] is an electronic gateway that provides easy, online access to your medical records. With [employer], you can request a clinic appointment, read your test results, renew a prescription or communicate with your care team.     To sign up for [employer] visit the website at [email or web address]   You will be asked to enter the access code listed below, as well as some personal information. Please follow the directions to create your username and password.     Your access code is: [email or web address]  Expires: 2018  7:30 AM     Your access code will  in 90 days. If you need help or a new code, please contact your [facility] or call [phone] for assistance.        Care EveryWhere ID     This is your Care EveryWhere ID. This could be used by other organizations to access your [facility] medical records  [id number]        Your Vitals Were     Pulse Temperature Respirations Pulse Oximetry          73 97.7  F (36.5  C) (Oral) 18 97%         Blood Pressure from Last 3 Encounters:   03/15/18 116/72   18 108/67   18 109/77    Weight from Last 3 Encounters:   18 37.9 kg (83 lb 8.9 oz)   18 39.6 kg (87 lb 6 oz)   18 44.2 kg (97 lb 6 oz)              Today, you had the following     No orders found for display         Today's Medication Changes          These changes are accurate as of 3/15/18 11:59 PM.  If you have any questions, ask your nurse or doctor.               Start taking these medicines.        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        Dose/Directions    * oxyCODONE IR 5 MG tablet   Commonly known as:  ROXICODONE   This may have changed:  Another medication with the same name was added. Make sure you understand how and when to take each.   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 Make sure you understand how and when to take each.   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 Make sure you understand how and when to take each.   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 Read the directions carefully, and ask your doctor or other care provider to review them with  you.         Where to get your medicines      These medications were sent to [facility] - [place], [place] - [address], [address]  [address], [address], [facility] [address]     Phone:  [phone]     polyethylene glycol powder         Some of these will need a paper prescription and others can be bought over the counter.  Ask your nurse if you have questions.     Bring a paper prescription for each of these medications     morphine 30 MG 12 hr tablet    oxyCODONE 5 MG/5ML solution                Primary Care Provider Office Phone # Fax #    Quang Wall PA-C [phone]       [facility] [address] [facility] [address]  [facility] [address]        Equal Access to Services     JAVIER MORALES : Hadii aad ku hadasho Soomaali, waaxda luqadaha, qaybta kaalmada adeegyada, whitney pace. So [facility] [phone].    ATENCIÓN: Si habla español, tiene a hawley disposición servicios gratuitos de asistencia lingüística. [facility] [phone].    We comply with applicable federal civil rights laws and [place] laws. We do not discriminate on the basis of race, color, national origin, age, disability, sex, sexual orientation, or gender identity.            Thank you!     Thank you for choosing [facility]  for your care. Our goal is always to provide you with excellent care. Hearing back from our patients is one way we can continue to improve our services. Please take a few minutes to complete the written survey that you may receive in the mail after your visit with us. Thank you!             Your Updated Medication List - Protect others around you: Learn how to safely use, store and throw away your medicines at www.disposemymeds.org.          This list is accurate as of 3/15/18 11:59 PM.  Always use your most recent med list.                   Brand Name Dispense Instructions for use Diagnosis    ACETAMINOPHEN PO      Take 1,000 mg by mouth         ALLEGRA ALLERGY PO      Take by mouth as needed for allergies        dexamethasone 4 MG tablet    DECADRON    6 tablet    Take 2 tablets (8 mg) by mouth daily (with breakfast) for 3 days Start the day after chemotherapy.    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)       morphine 30 MG 12 hr tablet    MS CONTIN    60 tablet    Take 1 tablet (30 mg) by mouth every 12 hours maximum 2 tablet(s) per day    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), Cancer related pain       order for DME     90 Can    Sig:  Isosource 1.5 calories:  Instill 3.5 cartons per day via PEG tube by syringe or gravity flow    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)       * oxyCODONE IR 5 MG tablet    ROXICODONE    10 tablet    Take 1-2 tablets (5-10 mg) by mouth every 4 hours as needed for pain maximum 12 tablet(s) per day    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)       * oxyCODONE IR 5 MG tablet    ROXICODONE    120 tablet    Take 1 tablet (5 mg) by mouth every 4 hours as needed for moderate to severe pain (May take up to 2 tablets every 4 hours as needed for pain)    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)       * oxyCODONE 5 MG/5ML solution    ROXICODONE    300 mL    Take 10 mLs (10 mg) by mouth every 4 hours as needed for breakthrough pain or moderate to severe pain    Cancer related pain, 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)       polyethylene glycol powder     MIRALAX/GLYCOLAX    225 g    Take 17 g (1 capful) by mouth daily    Drug-induced constipation, Cancer related pain, 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 Read the directions carefully, and ask your doctor or other care provider to review them with you.

## 2018-03-15 NOTE — MR AVS SNAPSHOT
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)        Drug-induced constipation        Loss of weight           Follow-ups after your visit        Your next 10 appointments already scheduled     Mar 15, 2018  4:15 PM CDT   New Treatment with Albert Ambrosio MD, RADIATION THERAPIST   [facility])    [id number] [place] [address]   [phone]            Mar 16, 2018 11:00 AM CDT   TREATMENT with RADIATION THERAPIST   [facility]    [id number] [place] [address]   [phone]            Mar 19, 2018  2:15 PM CDT   TREATMENT with RADIATION THERAPIST   [facility])    [id number] [place] [address]   [phone]            Mar 19, 2018  2:45 PM CDT   on treatment visit with Albert Ambrosio MD   [facility])    [id number] [place] [address]   [phone]            Mar 20, 2018  9:00 AM CDT   (Arrive by 8:45 AM)   Return Visit with NANCY Cardenas CNP   [facility] ([facility])    [address] [place] [address]  [address] [place] [id number] [phone]            Mar 20, 2018 12:45 PM CDT   TREATMENT with RADIATION THERAPIST   [facility])    [address] [place] [address]   [phone]            Mar 21, 2018 12:30 PM CDT   LAB with LAB ONC [facility])    [address] [place] [id number] [phone]           Please do not eat 10-12 hours before your appointment if you are coming in fasting for labs on lipids, cholesterol, or glucose (sugar). This does not apply to pregnant women. Water, hot tea and black coffee (with nothing added) are okay. Do not drink other fluids, diet soda or chew gum.            Mar 21, 2018 12:45 PM CDT   TREATMENT with RADIATION THERAPIST   [facility]    [address] [place] [id number] [phone]            Mar 21, 2018  1:15 PM CDT   Return Visit with NANCY Cai CNP   [facility]    [address] [place] [id number] [phone]              Future tests that were ordered for you today     Open Future Orders        Priority Expected Expires Ordered    PET Oncology Whole Body Routine 4/11/2018 3/13/2019 3/13/2018            Who to contact     If you have questions or need follow up information about today's clinic visit or your schedule please contact [facility] directly at [phone].  Normal or non-critical lab and imaging results will be communicated to you by Zoomyhart, letter or phone within 4 business days after the clinic has received the results. If you do not hear from us within 7 days, please contact the clinic through Zoomyhart or phone. If you have a critical or abnormal lab result, we will notify you by phone as soon as possible.  Submit refill requests through zEconomy or call your pharmacy and they will forward the refill request to us. Please allow 3 business days for your refill to be completed.          Additional Information About Your Visit        zEconomy Information     zEconomy is an electronic gateway that provides easy, online access to your medical records. With zEconomy, you can request a clinic appointment, read your test results,  renew a prescription or communicate with your care team.     To sign up for [employer] Horizonhart visit the website at [email or web address]   You will be asked to enter the access code listed below, as well as some personal information. Please follow the directions to create your username and password.     Your access code is: [email or web address]  Expires: 2018  7:30 AM     Your access code will  in 90 days. If you need help or a new code, please contact your [facility] or call [phone] for assistance.        Care EveryWhere ID     This is your Care EveryWhere ID. This could be used by other organizations to access your [place] medical records  [id number]         Blood Pressure from Last 3 Encounters:   03/15/18 116/72   18 108/67   18 109/77    Weight from Last 3 Encounters:   18 37.9 kg (83 lb 8.9 oz)   18 39.6 kg (87 lb 6 oz)   18 44.2 kg (97 lb 6 oz)              Today, you had the following     No orders found for display         Today's Medication Changes          These changes are accurate as of 3/15/18  2:54 PM.  If you have any questions, ask your nurse or doctor.               Start taking these medicines.        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        Dose/Directions    * oxyCODONE IR 5 MG tablet   Commonly known as:  ROXICODONE   This may have changed:  Another medication with the same name was added. Make sure you understand how and when to take each.   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 Make sure you understand how and when to take each.   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 Make sure you understand how and when to take each.   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 Make sure you understand how and when to take each.   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 Read the directions carefully, and ask your doctor or other care provider to review them with you.         Where to get your medicines      These medications were sent to [facility] - [place], [place] - [address],  [address]  [address], [address], [facility] [address]     Phone:  [phone]     polyethylene glycol powder         Some of these will need a paper prescription and others can be bought over the counter.  Ask your nurse if you have questions.     Bring a paper prescription for each of these medications     oxyCODONE 20 MG 12 hr tablet    oxyCODONE 5 MG/5ML solution                Primary Care Provider Office Phone # Fax #    Quang Wall PA-C [phone]       [facility] [address]  [facility] [address]        Equal Access to Services     [facility] Center[facility] : Hadii aad ku hadasho Soomaali, waaxda luqadaha, qaybta kaalmada adeegyada, waxay idiin hayaan adeeg zoey keller . So [facility] [phone].    ATENCIÓN: Si habla español, tiene a hawley disposición servicios gratuitos de asistencia lingüística. Llame al [phone].    We comply with applicable federal civil rights laws and [place] laws. We do not discriminate on the basis of race, color, national origin, age, disability, sex, sexual orientation, or gender identity.            Thank you!     Thank you for choosing [facility]  for your care. Our goal is always to provide you with excellent care. Hearing back from our patients is one way we can continue to improve our services. Please take a few minutes to complete the written survey that you may receive in the mail after your visit with us. Thank you!             Your Updated Medication List - Protect others around you: Learn how to safely use, store and throw away your medicines at www.disposemymeds.org.          This list is accurate as of 3/15/18  2:54 PM.  Always use your most recent med list.                   Brand Name Dispense Instructions for use Diagnosis    ACETAMINOPHEN PO      Take 1,000 mg by mouth        ALLEGRA ALLERGY PO      Take by mouth as needed for allergies        dexamethasone 4 MG tablet    DECADRON    6 tablet    Take 2 tablets  (8 mg) by mouth daily (with breakfast) for 3 days Start the day after chemotherapy.    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)       order for DME     90 Can    Sig:  Isosource 1.5 calories:  Instill 3.5 cartons per day via PEG tube by syringe or gravity flow    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)       * oxyCODONE IR 5 MG tablet    ROXICODONE    10 tablet    Take 1-2 tablets (5-10 mg) by mouth every 4 hours as needed for pain maximum 12 tablet(s) per day    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)       * oxyCODONE IR 5 MG tablet    ROXICODONE    120 tablet    Take 1 tablet (5 mg) by mouth every 4 hours as needed for moderate to severe pain (May take up to 2 tablets every 4 hours as needed for pain)    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)       * oxyCODONE 20 MG 12 hr tablet    OXYCONTIN    60 tablet    Take 1 tablet (20 mg) by mouth every 12 hours    Cancer related pain, 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)       * oxyCODONE 5 MG/5ML solution    ROXICODONE    300 mL    Take 10 mLs (10 mg) by mouth every 4 hours as needed for breakthrough pain or moderate to severe pain    Cancer related pain, 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 Read the directions carefully, and ask your doctor or other care provider to review them with you.

## 2018-03-15 NOTE — PROGRESS NOTES
Infusion Nursing Note:  Melinda Milligan presents today for Cisplatin.    Patient seen by provider today: No   present during visit today: Not Applicable.    Note: Pt c/o extreme pain in her R jaw up into her ear.  She rates this as 8/10 and even after taking 10mg oxycodone, she only rates at 7/10.  I asked NP to see patient.  appt made and patient started on long acting - MS Contin.   Oxycontin required PA and uncertain as to how long for approval.    Pt reminded of need for good hydration given the cisplatin nephrotoxicity.  Pt should do free water per NG tube if unable to take oral fluids  She has had difficulty with this due to pain.      Intravenous Access:  Peripheral IV placed.    Treatment Conditions:  Results reviewed, labs MET treatment parameters, ok to proceed with treatment.    Post Infusion Assessment:  Patient tolerated infusion without incident.    Discharge Plan:   RTC daily for radiation and 4/5  for provider/chemo.   Pt encouraged to call with any significant nausea or other side effects.     MAY COLORADO RN

## 2018-03-15 NOTE — PROGRESS NOTES
Therapy: Enteral  Insurance: [employer]  100% coverage for enteral through a tube    In reference to referral made 3/14/18 to check enteral coverage.    Please contact Intake with any questions, [phone] or In Basket pool, [facility] ([phone]).

## 2018-03-15 NOTE — PROGRESS NOTES
Oncology Follow Up Visit: March 15, 2018    Oncologist: Dr Benedicto Nieto  PCP: Quang Wall  ENT: Dr Marga Arana    Diagnosis: Squamous carcinoma of the tongue- complaining of increased pain  Melinda Milligan is a 43 yo female who presented with thrush then noted swelling and pain to right side of tongue and cheek. Biopsy of tongue lesion proved squamous cell carcinoma. Further imaging work up showed mass involving bilateral palatine tonsils, soft palpate with extension to nasopharyngeal and oropharyngeal walls with level IIa lymph node involvement.   * also found to have iron deficiency anemia and was started on Infed dosing.   Treatment:   3/13/2018 gtube placed  3/14/2018 begins chemoradiation with cisplatin    Interval History: Ms. Milligan comes to clinic today with  and mother for start of chemoradiation for her head and neck cancer with use of cisplatin and asked to speak to someone about her pain. She comes complaining of increased pain to right side of face and neck. Currently  using oxycodone 10 mg every 4 hours around the clock and tylenol but pain is still ranked 8/10 and getting very little sleep. She is constipated- using OTC product for bowels which have not moved in 3 days. SHe is now using her new feeding tube placed yesterday for most all feedingfluids since swallowing is painful- has home nurse coming on weekend for review and further teaching-  is very attentive and helpful for cares.   Rest of comprehensive and complete ROS is reviewed and is negative.   Past Medical History:   Diagnosis Date     Allergic rhinitis      Anemia      Hoarseness      Oropharyngeal cancer (H) 02/15/2018     Uncomplicated asthma      Current Outpatient Prescriptions   Medication     dexamethasone (DECADRON) 4 MG tablet     LORazepam (ATIVAN) 0.5 MG tablet     prochlorperazine (COMPAZINE) 10 MG tablet     ACETAMINOPHEN PO     oxyCODONE IR (ROXICODONE) 5 MG tablet     order for DME      oxyCODONE IR (ROXICODONE) 5 MG tablet     Fexofenadine HCl (ALLEGRA ALLERGY PO)     LORazepam (ATIVAN) 0.5 MG tablet     folic acid (FOLVITE) 1 MG tablet     No current facility-administered medications for this visit.      Facility-Administered Medications Ordered in Other Visits   Medication     sodium chloride 0.9 % 1,000 mL with potassium chloride 20 mEq, magnesium sulfate 2 g infusion     palonosetron (ALOXI) injection 0.25 mg     CISplatin (PLATINOL) 127 mg in sodium chloride 0.9 % 1,227 mL CHEMOTHERAPY     sodium chloride 0.9 % 1,000 mL with potassium chloride 20 mEq *See DOSE to administer in MAR details (order question)     Allergies   Allergen Reactions     Ceftriaxone      Other reaction(s): Unknown Reaction     Chicken-Derived Products (Egg)      Other reaction(s): Vomiting  Other reaction(s): Unknown Reaction     No Clinical Screening - See Comments Hives     MAGIC MOUTHWASH     Physical Exam: 97.7, 18,73,and O2=97%. Last weight was 3/14=83.9 lbs.   ECOG PS- 1  Constitutional: Alert, tired appearing and in pain with smiling- holding right side of face in hands   ENT: Eyes bright, No mouth sores at present  Respiratory: Breathing easy- no cough  GI: Abdomen with new Gtube site with very mild redness.   MS: Muscle tone normal, extremities normal with no edema.   Skin: No suspicious lesions or rashes  Neuro: Sensory grossly WNL, gait normal.   Psych: Mentation appears normal and affect normal    Laboratory Results:   Results for orders placed or performed in visit on 03/15/18   Comprehensive metabolic panel   Result Value Ref Range    Sodium 134 133 - 144 mmol/L    Potassium 3.2 (L) 3.4 - 5.3 mmol/L    Chloride 99 94 - 109 mmol/L    Carbon Dioxide 28 20 - 32 mmol/L    Anion Gap 7 3 - 14 mmol/L    Glucose 110 (H) 70 - 99 mg/dL    Urea Nitrogen 11 7 - 30 mg/dL    Creatinine 0.40 (L) 0.52 - 1.04 mg/dL    GFR Estimate >90 >60 mL/min/1.7m2    GFR Estimate If Black >90 >60 mL/min/1.7m2    Calcium 9.4 8.5 - 10.1  mg/dL    Bilirubin Total 0.4 0.2 - 1.3 mg/dL    Albumin 3.4 3.4 - 5.0 g/dL    Protein Total 7.3 6.8 - 8.8 g/dL    Alkaline Phosphatase 60 40 - 150 U/L    ALT 11 0 - 50 U/L    AST 10 0 - 45 U/L   Magnesium   Result Value Ref Range    Magnesium 1.9 1.6 - 2.3 mg/dL   CBC with platelets differential   Result Value Ref Range    WBC 7.9 4.0 - 11.0 10e9/L    RBC Count 3.75 (L) 3.8 - 5.2 10e12/L    Hemoglobin 10.6 (L) 11.7 - 15.7 g/dL    Hematocrit 33.0 (L) 35.0 - 47.0 %    MCV 88 78 - 100 fl    MCH 28.3 26.5 - 33.0 pg    MCHC 32.1 31.5 - 36.5 g/dL    RDW 20.0 (H) 10.0 - 15.0 %    Platelet Count 392 150 - 450 10e9/L    Diff Method Automated Method     % Neutrophils 75.8 %    % Lymphocytes 16.6 %    % Monocytes 4.9 %    % Eosinophils 1.9 %    % Basophils 0.5 %    % Immature Granulocytes 0.3 %    Absolute Neutrophil 6.0 1.6 - 8.3 10e9/L    Absolute Lymphocytes 1.3 0.8 - 5.3 10e9/L    Absolute Monocytes 0.4 0.0 - 1.3 10e9/L    Absolute Eosinophils 0.2 0.0 - 0.7 10e9/L    Absolute Basophils 0.0 0.0 - 0.2 10e9/L    Abs Immature Granulocytes 0.0 0 - 0.4 10e9/L     Assessment and Plan:   Squamous cell carcinoma of the tongue- Started chemoradiation with cisplatin today. Reviewed side effects that may be expected in first days. Answered questions from pt and . Pharmacist also reviewed medications with pt today  She returns weekly for visit with provider for symptom review.   Cancer related pain-Pain is to right side of face, jw and down into neck. She is currently using 10mg of the oxycodone every 4 hours with tylenol 3 x daily. Pain is still at 8/10 to face and some to gtube site. WIll start pt on Oxycontin 20 mg every 12 hours with change to liquid oxycodone 10 mg every 4 hours prn for breakthrough pain and may continue with tylenol as needed. May still use ativan for sleep if needed. She has appt for review with this provider next week 3/21.   Has appointment with Dr Ceron set for 4/4/2018  Constipation- will order  miralax to be taken every 12 hours until bowels move then may use daily if that is all that is needed. Fluids to be reviewed with dietician.   Nutrition with noted weight loss- did receive G tube placement yesterday. They are feeding and flushing as taught according to  since she is not swallowing well. Using biotine for mouth at this time.  Has met with Juli prior to Gtube and will see her again for recommendations on 3.20/2018.   This was a 25 min visit with > 50% in counseling and coordinating care including education and management of concerns.    Elba Fry CNP    Addendum: Oxycontin denies by insurance. Will send MS Contin through now at equivalent dosing to get her better pain coverage at this time. FIDELINA Johnson

## 2018-03-16 ENCOUNTER — APPOINTMENT (OUTPATIENT)
Dept: RADIATION ONCOLOGY | Facility: CLINIC | Age: 43
End: 2018-03-16
Payer: COMMERCIAL

## 2018-03-16 ENCOUNTER — HOME INFUSION (PRE-WILLOW HOME INFUSION) (OUTPATIENT)
Dept: PHARMACY | Facility: CLINIC | Age: 43
End: 2018-03-16

## 2018-03-16 PROCEDURE — 77014 ZZHC CT GUIDE FOR PLACEMENT RADIATION THERAPY FIELDS: CPT | Performed by: RADIOLOGY

## 2018-03-16 PROCEDURE — 77386 ZZC IMRT TREATMENT DELIVERY, COMPLEX: CPT | Performed by: RADIOLOGY

## 2018-03-19 ENCOUNTER — DOCUMENTATION ONLY (OUTPATIENT)
Dept: LAB | Facility: CLINIC | Age: 43
End: 2018-03-19

## 2018-03-19 ENCOUNTER — OFFICE VISIT (OUTPATIENT)
Dept: RADIATION ONCOLOGY | Facility: CLINIC | Age: 43
End: 2018-03-19
Payer: COMMERCIAL

## 2018-03-19 ENCOUNTER — APPOINTMENT (OUTPATIENT)
Dept: RADIATION ONCOLOGY | Facility: CLINIC | Age: 43
End: 2018-03-19
Payer: COMMERCIAL

## 2018-03-19 VITALS — RESPIRATION RATE: 16 BRPM | WEIGHT: 85 LBS | BODY MASS INDEX: 17.17 KG/M2

## 2018-03-19 DIAGNOSIS — C10.9 SQUAMOUS CELL CARCINOMA OF OROPHARYNX (H): Primary | ICD-10-CM

## 2018-03-19 PROCEDURE — 99207 ZZC DROP WITH A PROCEDURE: CPT | Performed by: RADIOLOGY

## 2018-03-19 PROCEDURE — 77386 ZZC IMRT TREATMENT DELIVERY, COMPLEX: CPT | Performed by: RADIOLOGY

## 2018-03-19 PROCEDURE — 77014 ZZHC CT GUIDE FOR PLACEMENT RADIATION THERAPY FIELDS: CPT | Performed by: RADIOLOGY

## 2018-03-19 ASSESSMENT — PAIN SCALES - GENERAL: PAINLEVEL: NO PAIN (0)

## 2018-03-19 NOTE — MR AVS SNAPSHOT
After Visit Summary   3/19/2018    Melinda Milligan    MRN: [id number]           Patient Information     Date Of Birth          1975        Visit Information        Provider Department      3/19/2018 2:45 PM Albert Ambrosio MD [facility]        Today's Diagnoses     Squamous cell carcinoma of oropharynx (H)    -  1      Care Instructions    Please contact [facility] RN with questions or concerns following today's appointment: [phone].    Thank you!            Follow-ups after your visit        Your next 10 appointments already scheduled     Mar 20, 2018  9:00 AM CDT   (Arrive by 8:45 AM)   Return Visit with NANCY Cardenas CNP   [facility] ([facility])    [address] [place] [address] [phone]            Mar 20, 2018 11:00 AM CDT   TREATMENT with RADIATION THERAPIST   [facility])    [address] [place] [address] [phone]            Mar 21, 2018 12:30 PM CDT   LAB with LAB ONC [facility] ([facility])    [id number] [facility] [address] [phone]           Please do not eat 10-12 hours before your appointment if you are coming in fasting for labs on lipids, cholesterol, or glucose (sugar). This does not apply to pregnant women. Water, hot tea and black coffee (with nothing added) are okay. Do not drink other fluids, diet soda or chew gum.            Mar 21, 2018 12:45 PM CDT   TREATMENT with RADIATION THERAPIST   [facility] ([facility])    [id number] [facility] [address] [phone]            Mar 21, 2018  1:15 PM CDT   Return Visit with NANCY Cai CNP   [facility] ([facility])    [address] [address]  [address]   [phone]            Mar 22, 2018 12:45 PM CDT   TREATMENT with RADIATION THERAPIST   [facility] ([facility])    [address] [facility] [address]   [phone]            Mar 23, 2018 12:45 PM CDT   TREATMENT with RADIATION THERAPIST   [facility] ([facility])    [address] [facility] [address]   [phone]            Mar 26, 2018 12:45 PM CDT   TREATMENT with RADIATION THERAPIST   [facility] ([facility])    [address] [facility] [address]   [phone]            Mar 26, 2018  1:15 PM CDT   on treatment visit with Albert Ambrosio MD   [facility] ([facility])    [address] [facility] [address]   [phone]            Mar 27, 2018 12:45 PM CDT   TREATMENT with RADIATION THERAPIST   [facility] ([facility])    [address] [facility] [address]   [phone]              Future tests that were ordered for you today     Open Future Orders        Priority Expected Expires Ordered    Basic metabolic panel Routine 3/21/2018 3/19/2019 3/19/2018    PET Oncology Whole Body Routine 4/11/2018 3/13/2019 3/13/2018            Who to contact     If you have questions or need follow up information about today's clinic visit or your schedule please contact [facility] directly at [phone].  Normal or non-critical lab and imaging results will be communicated to you by MyChart, letter or phone within 4 business days after the clinic has received the results. If you do not hear from us within 7 days, please contact the clinic through MyChart or phone. If you have a critical or abnormal lab result, we will notify you by phone as soon as possible.  Submit refill requests through [employer] or call your pharmacy and they will forward the refill  request to us. Please allow 3 business days for your refill to be completed.          Additional Information About Your Visit        Rhomaniahart Information     [employer] is an electronic gateway that provides easy, online access to your medical records. With [employer], you can request a clinic appointment, read your test results, renew a prescription or communicate with your care team.     To sign up for [employer] visit the website at [email or web address]   You will be asked to enter the access code listed below, as well as some personal information. Please follow the directions to create your username and password.     Your access code is: [email or web address]  Expires: 2018  7:30 AM     Your access code will  in 90 days. If you need help or a new code, please contact your [facility] or call [phone] for assistance.        Care EveryWhere ID     This is your Care EveryWhere ID. This could be used by other organizations to access your [place] medical records  [id number]        Your Vitals Were     Respirations BMI (Body Mass Index)                16 17.17 kg/m2           Blood Pressure from Last 3 Encounters:   03/15/18 116/72   18 108/67   18 109/77    Weight from Last 3 Encounters:   18 85 lb   18 83 lb 8.9 oz   18 87 lb 6 oz               Primary Care Provider Office Phone # Fax #    Quang Wall PA-C [phone]       [facility] [address] [facility] [address]  [facility] [address]        Equal Access to Services     JAVIER MORALES : Hadii aad ku hadasho Soomaali, waaxda luqadaha, qaybta kaalmada adeegyada, waxay idiin hayeverettn kyle keller . So [facility] [phone].    ATENCIÓN: Si habla español, tiene a hawley disposición servicios gratuitos de asistencia lingüística. Llame al [phone].    We comply with applicable federal civil rights laws and [place] laws. We do not discriminate on the basis of race, color, national  origin, age, disability, sex, sexual orientation, or gender identity.            Thank you!     Thank you for choosing [facility]  for your care. Our goal is always to provide you with excellent care. Hearing back from our patients is one way we can continue to improve our services. Please take a few minutes to complete the written survey that you may receive in the mail after your visit with us. Thank you!             Your Updated Medication List - Protect others around you: Learn how to safely use, store and throw away your medicines at www.disposemymeds.org.          This list is accurate as of 3/19/18  3:31 PM.  Always use your most recent med list.                   Brand Name Dispense Instructions for use Diagnosis    ACETAMINOPHEN PO      Take 1,000 mg by mouth        ALLEGRA ALLERGY PO      Take by mouth as needed for allergies        dexamethasone 4 MG tablet    DECADRON    6 tablet    Take 2 tablets (8 mg) by mouth daily (with breakfast) for 3 days Start the day after chemotherapy.    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)       morphine 30 MG 12 hr tablet    MS CONTIN    60 tablet    Take 1 tablet (30 mg) by mouth every 12 hours maximum 2 tablet(s) per day    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), Cancer related pain       order for DME     90 Can    Sig:  Isosource 1.5 calories:  Instill 3.5 cartons per day via PEG tube by  syringe or gravity flow    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)       * oxyCODONE IR 5 MG tablet    ROXICODONE    10 tablet    Take 1-2 tablets (5-10 mg) by mouth every 4 hours as needed for pain maximum 12 tablet(s) per day    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)       * oxyCODONE IR 5 MG tablet    ROXICODONE    120 tablet    Take 1 tablet (5 mg) by mouth every 4 hours as needed for moderate to severe pain (May take up to 2 tablets every 4 hours as needed for pain)    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)       * oxyCODONE 5 MG/5ML solution    ROXICODONE    300 mL    Take 10 mLs (10 mg) by mouth every 4 hours as needed for breakthrough pain or moderate to severe pain    Cancer related pain, 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 Read the directions carefully, and ask your doctor or other care provider to review them with you.

## 2018-03-19 NOTE — PROGRESS NOTES
This is a recent snapshot of the patient's [facility] medical record.  For current drug dose and complete information and questions, call [phone] or In B[facility] pool, fv home infusion ([id number])  CSN Number:  [id number]

## 2018-03-19 NOTE — PROGRESS NOTES
This is a recent snapshot of the patient's [facility] medical record.  For current drug dose and complete information and questions, call [phone] or In [facility] pool, fv home infusion ([id number])  CSN Number:  [id number]

## 2018-03-19 NOTE — PROGRESS NOTES
[facility] PHYSICIANS  SPECIALIZING IN BREAKTHROUGHS  Radiation Oncology    On Treatment Visit Note      Melinda Milligan      Date: 3/19/2018   MRN: [id number]   : 1975  Diagnosis: oropharyngeal cancer      Reason for Visit:  On Radiation Treatment Visit     Treatment Summary to Date  Treatment Site: head and neck + lymph nodes Current Dose: 540/7000 cGy Fractions: 3/35      Chemotherapy  Chemo concurrent with radx?: Yes  Oncologist: Dr. Nieto  Drug Name/Frequency 1: Cisplatin    Subjective:   Began CRT last week; having dry mouth and some nausea over the weekend. Taking liquids PO, but using feeding tube for nutrition as well. Pain controlled with MS contin.    Nursing ROS:   Nutrition Alteration  Diet Type: Gastrostomy Tube Feedings  Nutrition Note: Isosource 1.5 cans per day, free water through PEG and by mouth  Skin  Skin Reaction: 0 - No changes  Skin Intervention: skin changes and skin cares reviewed, reviewed Aquaphor     ENT and Mouth Exam  ENT/Mouth Note: reviewed baking soda and salt rinse, has humidifier at bedside, doing nasal spray as needed, reviewed dry mouth and thick saliva, taste changes, mouth and throat sores     Gastrointestinal  Nausea: 0 - None  GI Note: taking Compazine and Ativan po two times daily with MS Contin     Psychosocial  Mood - Anxiety: 0 - Normal  Mood - Depression: 0 - Normal  Pyschosocial Note: slight fatigue  Pain Assessment  0-10 Pain Scale: 0  Pain Treatment: MS Contin 30 mg po two times daily, Tylenol po as needed, patient denies need for Oxycodone      Objective:   Resp 16  Wt 85 lb  BMI 17.17 kg/m2  NAD  No skin changes    Labs:  CBC RESULTS:   Recent Labs   Lab Test  03/15/18   0936   WBC  7.9   RBC  3.75*   HGB  10.6*   HCT  33.0*   MCV  88   MCH  28.3   MCHC  32.1   RDW  20.0*   PLT  392     ELECTROLYTES:  Recent Labs   Lab Test  03/15/18   0936   NA  134   POTASSIUM  3.2*   CHLORIDE  99   ARLENE  9.4   CO2  28   BUN  11   CR  0.40*   GLC  110*        Assessment:    Tolerating radiation therapy well.  All questions and concerns addressed.    Plan:   1. Continue current therapy.        Mosaiq chart and setup information reviewed  MVCT/IGRT images checked    Medication Review  Med list reviewed with patient?: Yes  Med list printed and given: Offered and declined    Educational Topic Discussed  Education Instructions: Radiation therapy side effects: fatigue, skinc changes and skin cares, dry mouth and thick saliva, mouth and throat sores, taste changes      Albert Ambrosio MD

## 2018-03-19 NOTE — PROGRESS NOTES
REASON FOR VISIT: Peg Tube Check    TUBE TYPE: 20 [address] Ponsky    DATE PLACED: 03/14/18    SUBJECTIVE: Pt denies any pain or leaking from her PEG at this point. She has started using for tube feeds. She is flushing BID and after enteral feedings.     OBJECTIVE: Tube site is clean and dry. Surrounding skin is on erythema or swelling.     SITE CARE INSTRUCTIONS: OK to remove dressing to shower. NO submerging in water (hot tubs, pools, etc) advised barium cream to avoid skin breakdown. Reviewed flushing recommendations.    PLAN: Tube was loosened slightly. Instructed pt to contact our office for any problems/concerns related to her PEG.    Follow up once treatment is completed for removal of PEG or PRN for complications.    Total time spent, 10 minutes, all in counseling and coordination of care.    NANCY Pham, CNP  Thoracic and Forgut Surgery  [facility]

## 2018-03-19 NOTE — PATIENT INSTRUCTIONS
Please contact [facility] RN with questions or concerns following today's appointment: [phone].    Thank you!

## 2018-03-20 ENCOUNTER — APPOINTMENT (OUTPATIENT)
Dept: RADIATION ONCOLOGY | Facility: CLINIC | Age: 43
End: 2018-03-20
Payer: COMMERCIAL

## 2018-03-20 ENCOUNTER — OFFICE VISIT (OUTPATIENT)
Dept: SURGERY | Facility: CLINIC | Age: 43
End: 2018-03-20
Attending: NURSE PRACTITIONER
Payer: COMMERCIAL

## 2018-03-20 ENCOUNTER — ONCOLOGY VISIT (OUTPATIENT)
Dept: ONCOLOGY | Facility: CLINIC | Age: 43
End: 2018-03-20
Payer: COMMERCIAL

## 2018-03-20 VITALS
BODY MASS INDEX: 16.77 KG/M2 | WEIGHT: 83.2 LBS | RESPIRATION RATE: 16 BRPM | OXYGEN SATURATION: 95 % | HEART RATE: 83 BPM | TEMPERATURE: 98.6 F | HEIGHT: 59 IN | DIASTOLIC BLOOD PRESSURE: 73 MMHG | SYSTOLIC BLOOD PRESSURE: 102 MMHG

## 2018-03-20 DIAGNOSIS — C10.9 SQUAMOUS CELL CARCINOMA OF OROPHARYNX (H): ICD-10-CM

## 2018-03-20 DIAGNOSIS — C06.9 SQUAMOUS CELL CARCINOMA OF ORAL CAVITY (H): Primary | ICD-10-CM

## 2018-03-20 DIAGNOSIS — Z93.1 S/P PERCUTANEOUS ENDOSCOPIC GASTROSTOMY (PEG) TUBE PLACEMENT (H): ICD-10-CM

## 2018-03-20 PROCEDURE — 77386 ZZC IMRT TREATMENT DELIVERY, COMPLEX: CPT | Performed by: RADIOLOGY

## 2018-03-20 PROCEDURE — 77014 ZZHC CT GUIDE FOR PLACEMENT RADIATION THERAPY FIELDS: CPT | Performed by: RADIOLOGY

## 2018-03-20 PROCEDURE — 97803 MED NUTRITION INDIV SUBSEQ: CPT | Performed by: DIETITIAN, REGISTERED

## 2018-03-20 PROCEDURE — G0463 HOSPITAL OUTPT CLINIC VISIT: HCPCS | Mod: ZF

## 2018-03-20 ASSESSMENT — PAIN SCALES - GENERAL: PAINLEVEL: MILD PAIN (2)

## 2018-03-20 NOTE — MR AVS SNAPSHOT
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      Your next 10 appointments already scheduled     Mar 20, 2018 11:00 AM CDT   TREATMENT with RADIATION THERAPIST   [facility])    [address] [place] [address] [phone]            Mar 20, 2018 11:00 AM CDT   Return Visit with Juli Monique RD   [facility])    [address] [place] [address] [phone]            Mar 21, 2018 12:30 PM CDT   LAB with LAB ONC [facility])    [address] [place] [address] [phone]           Please do not eat 10-12 hours before your appointment if you are coming in fasting for labs on lipids, cholesterol, or glucose (sugar). This does not apply to pregnant women. Water, hot tea and black coffee (with nothing added) are okay. Do not drink other fluids, diet soda or chew gum.            Mar 21, 2018 12:45 PM CDT   TREATMENT with RADIATION THERAPIST   [facility] ([facility])    [address] [place] [address] [phone]            Mar 21, 2018  1:15 PM CDT   Return Visit with NANCY Cai CNP   [facility])    [address] [place] [address] [phone]            Mar 22,  2018 12:45 PM CDT   TREATMENT with RADIATION THERAPIST   [facility])    [address] [facility] [address]   [phone]            Mar 23, 2018 12:45 PM CDT   TREATMENT with RADIATION THERAPIST   [facility] ([facility])    [address] [facility] [address]   [phone]            Mar 26, 2018 12:45 PM CDT   TREATMENT with RADIATION THERAPIST   [facility] ([facility])    [address] [facility] [address]   [phone]            Mar 26, 2018  1:15 PM CDT   on treatment visit with Albert Ambrosio MD   [facility] ([facility])    [address] [facility] [address]   [phone]            Mar 27, 2018 12:45 PM CDT   TREATMENT with RADIATION THERAPIST   [facility])    [address] [facility] [address]   [phone]              Future tests that were ordered for you today     Open Future Orders        Priority Expected Expires Ordered    Basic metabolic panel Routine 3/21/2018 3/19/2019 3/19/2018    PET Oncology Whole Body Routine 4/11/2018 3/13/2019 3/13/2018            Who to contact     If you have questions or need follow up information about today's clinic visit or your schedule please contact [facility] directly at [phone].  Normal or non-critical lab and imaging results will be communicated to you by MyChart, letter or phone within 4 business days after the clinic has received the results. If you do not hear from us within 7 days, please contact the clinic through MyChart or phone. If you have a critical or abnormal lab result, we will notify you by phone as soon as possible.  Submit refill requests through [employer] or call your pharmacy and they will forward the refill request to us. Please allow 3 business days for  "your refill to be completed.          Additional Information About Your Visit        [employer] Information     [employer] lets you send messages to your doctor, view your test results, renew your prescriptions, schedule appointments and more. To sign up, go to [email or web address] . Click on \"Log in\" on the left side of the screen, which will take you to the Welcome page. Then click on \"Sign up Now\" on the right side of the page.     You will be asked to enter the access code listed below, as well as some personal information. Please follow the directions to create your username and password.     Your access code is: [email or web address]  Expires: 2018  7:30 AM     Your access code will  in 90 days. If you need help or a new code, please call your [place] clinic or [phone].        Care EveryWhere ID     This is your [facility] EveryWhere ID. This could be used by other organizations to access your [place] medical records  [id number]        Your Vitals Were     Pulse Temperature Respirations Height Pulse Oximetry BMI (Body Mass Index)    83 98.6  F (37  C) (Oral) 16 1.499 m (4' 11.02\") 95% 16.8 kg/m2       Blood Pressure from Last 3 Encounters:   18 102/73   03/15/18 116/72   18 108/67    Weight from Last 3 Encounters:   18 37.7 kg (83 lb 3.2 oz)   18 38.6 kg (85 lb)   18 37.9 kg (83 lb 8.9 oz)              Today, you had the following     No orders found for display       Primary Care Provider Office Phone # Fax #    Quang Wall PA-C [phone]       [facility] [address] [facility] [address]  [facility] [address]        Equal Access to Services     JAVIER MORALES : Rogelio alberto Somonique, waaxda luqadaha, qaybta kaalmada adeegyada, whitney pace. So [facility] [phone].    ATENCIÓN: Si habla español, tiene a hawley disposición servicios gratuitos de asistencia lingüística. Llame al [phone].    We comply with applicable federal civil " rights laws and [place] laws. We do not discriminate on the basis of race, color, national origin, age, disability, sex, sexual orientation, or gender identity.            Thank you!     Thank you for choosing [facility]  for your care. Our goal is always to provide you with excellent care. Hearing back from our patients is one way we can continue to improve our services. Please take a few minutes to complete the written survey that you may receive in the mail after your visit with us. Thank you!             Your Updated Medication List - Protect others around you: Learn how to safely use, store and throw away your medicines at www.disposemymeds.org.          This list is accurate as of 3/20/18 10:42 AM.  Always use your most recent med list.                   Brand Name Dispense Instructions for use Diagnosis    ACETAMINOPHEN PO      Take 1,000 mg by mouth        ALLEGRA ALLERGY PO      Take by mouth as needed for allergies        dexamethasone 4 MG tablet    DECADRON    6 tablet    Take 2 tablets (8 mg) by mouth daily (with breakfast) for 3 days Start the day after chemotherapy.    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)       morphine 30 MG 12 hr tablet    MS CONTIN    60 tablet    Take 1 tablet (30 mg) by mouth every 12 hours maximum 2 tablet(s) per day    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), Cancer related pain       order for  DME     90 Can    Sig:  Isosource 1.5 calories:  Instill 3.5 cartons per day via PEG tube by syringe or gravity flow    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)       * oxyCODONE IR 5 MG tablet    ROXICODONE    10 tablet    Take 1-2 tablets (5-10 mg) by mouth every 4 hours as needed for pain maximum 12 tablet(s) per day    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)       * oxyCODONE IR 5 MG tablet    ROXICODONE    120 tablet    Take 1 tablet (5 mg) by mouth every 4 hours as needed for moderate to severe pain (May take up to 2 tablets every 4 hours as needed for pain)    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)       * oxyCODONE 5 MG/5ML solution    ROXICODONE    300 mL    Take 10 mLs (10 mg) by mouth every 4 hours as needed for breakthrough pain or moderate to severe pain    Cancer related pain, 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 Read the directions carefully, and ask your doctor or other care provider to review them with you.

## 2018-03-20 NOTE — LETTER
3/20/2018         RE: Melinda Milligan  [address] [facility] [address]        Dear Colleague,    Thank you for referring your patient, Melinda Milligan, to the [facility]. Please see a copy of my visit note below.    CLINICAL NUTRITION SERVICES - REASSESSMENT NOTE   EVALUATION OF PREVIOUS PLAN OF CARE:   Time spent: 30 minutes  Referring Physician:  Hebert  Current diet: soft foods, liquids  Current appetite/intake: Poor  PEG Tube: Scheduled for placement on 3/14  Chemotherapy: Planned weekly Cisplatin - received 1st cycle 3/15  Radiation: Started 3/15  ~3/35 fractions completed    Monitoring from previous assessment:   -Food intake - taking bites of donut, breakfast sandwich, yogurt, chicken nuggets    -EN access/need to initiate - started EN 3/16 - currently working her way up towards goal of 3.5 cartons/day.  Currently taking 1 1/2 cartons/day (1/2 carton am, 1 carton pm). Flushing with 60mL water before/after each feeding.     Previous recs:   Enteral Nutrition   Formula: Isosource 1.5 mariel  Volume: 3.5  cartons/day (850mL, 665 ml free water)  Provisions:  1312kcal (33kcal/kg), 85 g protein (1.5g/kg)    -Liquid meal replacement or supplement - currently taking 1/2 - 1 Ensure plus or Ensure compact daily (220-350kcal).     -Weight trends - stable   Wt Readings from Last 6 Encounters:   03/20/18 37.7 kg (83 lb 3.2 oz)   03/19/18 38.6 kg (85 lb)   03/14/18 37.9 kg (83 lb 8.9 oz)   03/06/18 39.6 kg (87 lb 6 oz)   03/06/18 44.2 kg (97 lb 6 oz)   02/23/18 40.8 kg (90 lb)       Previous Goals:   1.  Aim for 5-6 small frequent meals  2.  Aim for 1200kcal and 60g protein/day  3. Weight maintenance through cancer treatment  Evaluation: Not met   Previous Nutrition Diagnosis:   Inadequate oral intake related to dysphagia as evidenced by diet recall, 4 % wt loss x past 2 weeks.   Evaluation: Improving with no further weight loss  NEW FINDINGS:   PEG tube/teaching 3/15   Constipation - reports  that she thinks she has not had a BM in almost a week.  Has been taking 1 cap of MiraLax (17g) plus 2 Dulcolax tabs daily for ~5 days.     CURRENT NUTRITION DIAGNOSIS   Inadequate oral intake related to dysphagia as evidenced by pt dependent upon EN and ONS to meet >75% of nutrition needs.    INTERVENTIONS   Recommendations / Nutrition Prescription   1) 3.5 cartons of Isosource 1.5 mariel daily  2) 1 L water in addition to EN (via PO and/or FT)   Implementation  EN Composition, EN Schedule, Feeding Tube Flush - reviewed nutrition needs, feeding type, duration and fluid flushes.  Encouraged pt to continue to work towards goal feedings of 3.5 cartons/day.    General/healthful diet and Medical Food Supplement - encouraged pt to continue to focus on calorie dense, soft foods.  Discussed the balance of PO intake with EN.  Discussed that if she is only able to consume 2 cartons of Isosource, (750kcal), she will need to take an additional 500-700kcal via soft foods or ONS.   Collaboration of care with MD/RNCC regarding constipation -   Suggested pt try 4 oz warm prune juice BID for 2 days, if constipation persists, MD and RD suggest taking 1 bottle of Magnesium Citrate.  Goals   1. EN to meet 100% of nutrition needs  2. Weight stability/weight gain towards 100 lbs as able    Follow up/Monitoring:   -Food/beverage intake  -Enteral Nutrition/fluids intake  -Weight trends     Juli Monique RD, LD      Again, thank you for allowing me to participate in the care of your patient.        Sincerely,        Juli Monique RD

## 2018-03-20 NOTE — NURSING NOTE
"Oncology Rooming Note    March 20, 2018 9:03 AM   Melinda Milligan is a 42 year old female who presents for:    Chief Complaint   Patient presents with     RECHECK     Tongue cancer      Initial Vitals: /73 (BP Location: Right arm, Patient Position: Chair, Cuff Size: Adult Regular)  Pulse 83  Temp 98.6  F (37  C) (Oral)  Resp 16  Ht 1.499 m (4' 11.02\")  Wt 37.7 kg (83 lb 3.2 oz)  SpO2 95%  BMI 16.8 kg/m2 Estimated body mass index is 16.8 kg/(m^2) as calculated from the following:    Height as of this encounter: 1.499 m (4' 11.02\").    Weight as of this encounter: 37.7 kg (83 lb 3.2 oz). Body surface area is 1.25 meters squared.  Mild Pain (2) Comment: throat and head - tylenol    No LMP recorded.  Allergies reviewed: Yes  Medications reviewed: Yes    Medications: Medication refills not needed today.  Ph[facility] into [employer] - [place], [place] - [address]    Clinical concerns:  No new concerns  Provider was notified.    5 minutes for nursing intake (face to face time)     Bárbara Tobin MA              "

## 2018-03-20 NOTE — PROGRESS NOTES
CLINICAL NUTRITION SERVICES - REASSESSMENT NOTE   EVALUATION OF PREVIOUS PLAN OF CARE:   Time spent: 30 minutes  Referring Physician: Hebert  Current diet: soft foods, liquids  Current appetite/intake: Poor  PEG Tube: Scheduled for placement on 3/14  Chemotherapy: Planned weekly Cisplatin - received 1st cycle 3/15  Radiation: Started 3/15  ~3/35 fractions completed    Monitoring from previous assessment:   -Food intake - taking bites of donut, breakfast sandwich, yogurt, chicken nuggets    -EN access/need to initiate - started EN 3/16 - currently working her way up towards goal of 3.5 cartons/day.  Currently taking 1 1/2 cartons/day (1/2 carton am, 1 carton pm). Flushing with 60mL water before/after each feeding.     Previous recs:   Enteral Nutrition   Formula: Isosource 1.5 mariel  Volume: 3.5  cartons/day (850mL, 665 ml free water)  Provisions:  1312kcal (33kcal/kg), 58 g protein (1.5g/kg)    -Liquid meal replacement or supplement - currently taking 1/2 - 1 Ensure plus or Ensure compact daily (220-350kcal).     -Weight trends - stable   Wt Readings from Last 6 Encounters:   03/20/18 37.7 kg (83 lb 3.2 oz)   03/19/18 38.6 kg (85 lb)   03/14/18 37.9 kg (83 lb 8.9 oz)   03/06/18 39.6 kg (87 lb 6 oz)   03/06/18 44.2 kg (97 lb 6 oz)   02/23/18 40.8 kg (90 lb)       Previous Goals:   1.  Aim for 5-6 small frequent meals  2.  Aim for 1200kcal and 60g protein/day  3. Weight maintenance through cancer treatment  Evaluation: Not met   Previous Nutrition Diagnosis:   Inadequate oral intake related to dysphagia as evidenced by diet recall, 4 % wt loss x past 2 weeks.   Evaluation: Improving with no further weight loss  NEW FINDINGS:   PEG tube/teaching 3/15   Constipation - reports that she thinks she has not had a BM in almost a week.  Has been taking 1 cap of MiraLax (17g) plus 2 Dulcolax tabs daily for ~5 days.     CURRENT NUTRITION DIAGNOSIS   Inadequate oral intake related to dysphagia as evidenced by pt dependent upon EN and  ONS to meet >75% of nutrition needs.    INTERVENTIONS   Recommendations / Nutrition Prescription   1) 3.5 cartons of Isosource 1.5 mariel daily  2) 1 L water in addition to EN (via PO and/or FT)   Implementation  EN Composition, EN Schedule, Feeding Tube Flush - reviewed nutrition needs, feeding type, duration and fluid flushes.  Encouraged pt to continue to work towards goal feedings of 3.5 cartons/day.    General/healthful diet and Medical Food Supplement - encouraged pt to continue to focus on calorie dense, soft foods.  Discussed the balance of PO intake with EN.  Discussed that if she is only able to consume 2 cartons of Isosource, (750kcal), she will need to take an additional 500-700kcal via soft foods or ONS.   Collaboration of care with MD/RNCC regarding constipation -   Suggested pt try 4 oz warm prune juice BID for 2 days, if constipation persists, MD and RD suggest taking 1 bottle of Magnesium Citrate.  Goals   1. EN to meet 100% of nutrition needs  2. Weight stability/weight gain towards 100 lbs as able    Follow up/Monitoring:   -Food/beverage intake  -Enteral Nutrition/fluids intake  -Weight trends     Juli Monique RD, LD

## 2018-03-20 NOTE — MR AVS SNAPSHOT
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 This does not apply to pregnant women. Water, hot tea and black coffee (with nothing added) are okay. Do not drink other fluids, diet soda or chew gum.            Mar 21, 2018 12:45 PM CDT   TREATMENT with RADIATION THERAPIST   [facility])    [address] [facility] [address]   [phone]            Mar 21, 2018  1:15 PM CDT   Return Visit with NANCY Cai CNP   [facility])    [address] [facility] [address]   [phone]            Mar 22, 2018 12:45 PM CDT   TREATMENT with RADIATION THERAPIST   [facility])    [address] [facility] [address]   [phone]            Mar 23, 2018 12:45 PM CDT   TREAT[facility] with RADIATION THERAPIST   [facility]    [address] [facility] [address]   [phone]            Mar 26, 2018 12:45 PM CDT   TREATMENT with RADIATION THERAPIST   [facility]    [address]  [facility] [address]   [phone]            Mar 26, 2018  1:15 PM CDT   on treatment visit with Albert Ambrosio MD   [facility]    [address] [facility] [address]   [phone]            Mar 27, 2018 12:45 PM CDT   TREATMENT with RADIATION THERAPIST   [facility]    [address] [facility] [address]   [phone]            Mar 28, 2018 12:45 PM CDT   TREATMENT with RADIATION THERAPIST   [facility]    [address] [facility] [place] [address]   [phone]            Mar 29, 2018 12:45 PM CDT   TREATMENT with RADIATION THERAPIST   [facility])    [address] [facility] [address]   [phone]              Future tests that were ordered for you today     Open Future Orders        Priority Expected Expires Ordered    Basic metabolic panel Routine 3/21/2018 3/19/2019 3/19/2018    PET Oncology Whole Body Routine 4/11/2018 3/13/2019 3/13/2018            Who to contact     If you have questions or need follow up information about today's clinic visit or your schedule please contact [facility] directly at [phone].  Normal or non-critical lab and imaging results will be communicated to you by MyChart, letter or phone within 4 business days after the clinic has received the results. If you do not hear from us within 7 days, please contact the clinic through [employer] or phone. If you have a critical or abnormal lab result, we will notify you by phone as soon as possible.  Submit refill requests through [employer] or call your pharmacy and they will forward the refill request to us. Please allow 3 business days for your refill to be completed.          Additional Information About Your Visit        [employer] Information     [employer] is an electronic  gateway that provides easy, online access to your medical records. With "[employer] you can request a clinic appointment, read your test results, renew a prescription or communicate with your care team.     To sign up for "[employer] visit the website at [email or web address]   You will be asked to enter the access code listed below, as well as some personal information. Please follow the directions to create your username and password.     Your access code is: [email or web address]  Expires: 2018  7:30 AM     Your access code will  in 90 days. If you need help or a new code, please contact your [facility] or call [phone] for assistance.        Care EveryWhere ID     This is your Care EveryWhere ID. This could be used by other organizations to access your [place] medical records  [id number]         Blood Pressure from Last 3 Encounters:   18 102/73   03/15/18 116/72   18 108/67    Weight from Last 3 Encounters:   18 37.7 kg (83 lb 3.2 oz)   18 38.6 kg (85 lb)   18 37.9 kg (83 lb 8.9 oz)              We Performed the Following     MNT INDIVIDUAL F/U REASSESS, EA 15 MIN        Primary Care Provider Office Phone # Fax #    Quang Wall PA-C [phone]       [facility] [address] [facility] [address]  [facility] [address]        Equal Access to Services     JAVIER MORALES : Hadii aad ku hadasho Soomaali, waaxda luqadaha, qaybta kaalmada adeegyada, waxay idiin hayaan kyle keller . So [facility] [phone].    ATENCIÓN: Si habla anastasiaañol, tiene a hawley disposición servicios gratuitos de asistencia lingüística. Llame al [phone].    We comply with applicable federal civil rights laws and [place] laws. We do not discriminate on the basis of race, color, national origin, age, disability, sex, sexual orientation, or gender identity.            Thank you!     Thank you for choosing [facility]  for your care. Our goal  is always to provide you with excellent care. Hearing back from our patients is one way we can continue to improve our services. Please take a few minutes to complete the written survey that you may receive in the mail after your visit with us. Thank you!             Your Updated Medication List - Protect others around you: Learn how to safely use, store and throw away your medicines at www.disposemymeds.org.          This list is accurate as of 3/20/18  1:15 PM.  Always use your most recent med list.                   Brand Name Dispense Instructions for use Diagnosis    ACETAMINOPHEN PO      Take 1,000 mg by mouth        ALLEGRA ALLERGY PO      Take by mouth as needed for allergies        dexamethasone 4 MG tablet    DECADRON    6 tablet    Take 2 tablets (8 mg) by mouth daily (with breakfast) for 3 days Start the day after chemotherapy.    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)       morphine 30 MG 12 hr tablet    MS CONTIN    60 tablet    Take 1 tablet (30 mg) by mouth every 12 hours maximum 2 tablet(s) per day    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), Cancer related pain       order for DME     [address] Can    Sig:  Isosource 1.5 calories:  Instill 3.5 cartons per day via PEG tube by syringe or gravity flow    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)       * oxyCODONE IR 5 MG tablet    ROXICODONE    10 tablet    Take 1-2 tablets (5-10 mg) by mouth every  4 hours as needed for pain maximum 12 tablet(s) per day    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)       * oxyCODONE IR 5 MG tablet    ROXICODONE    120 tablet    Take 1 tablet (5 mg) by mouth every 4 hours as needed for moderate to severe pain (May take up to 2 tablets every 4 hours as needed for pain)    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)       * oxyCODONE 5 MG/5ML solution    ROXICODONE    300 mL    Take 10 mLs (10 mg) by mouth every 4 hours as needed for breakthrough pain or moderate to severe pain    Cancer related pain, 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 Read the directions carefully, and ask your doctor or other care provider to review them with you.

## 2018-03-21 ENCOUNTER — HOME INFUSION (PRE-WILLOW HOME INFUSION) (OUTPATIENT)
Dept: PHARMACY | Facility: CLINIC | Age: 43
End: 2018-03-21

## 2018-03-21 ENCOUNTER — APPOINTMENT (OUTPATIENT)
Dept: RADIATION ONCOLOGY | Facility: CLINIC | Age: 43
End: 2018-03-21
Payer: COMMERCIAL

## 2018-03-21 ENCOUNTER — DOCUMENTATION ONLY (OUTPATIENT)
Dept: SPIRITUAL SERVICES | Facility: CLINIC | Age: 43
End: 2018-03-21

## 2018-03-21 ENCOUNTER — INFUSION THERAPY VISIT (OUTPATIENT)
Dept: INFUSION THERAPY | Facility: CLINIC | Age: 43
End: 2018-03-21
Payer: COMMERCIAL

## 2018-03-21 ENCOUNTER — ONCOLOGY VISIT (OUTPATIENT)
Dept: ONCOLOGY | Facility: CLINIC | Age: 43
End: 2018-03-21
Payer: COMMERCIAL

## 2018-03-21 VITALS
SYSTOLIC BLOOD PRESSURE: 94 MMHG | HEART RATE: 73 BPM | BODY MASS INDEX: 16.58 KG/M2 | WEIGHT: 82.25 LBS | DIASTOLIC BLOOD PRESSURE: 64 MMHG | OXYGEN SATURATION: 98 % | TEMPERATURE: 98.3 F | HEIGHT: 59 IN

## 2018-03-21 DIAGNOSIS — Z71.81 SPIRITUAL OR RELIGIOUS COUNSELING: Primary | ICD-10-CM

## 2018-03-21 DIAGNOSIS — K59.03 DRUG-INDUCED CONSTIPATION: ICD-10-CM

## 2018-03-21 DIAGNOSIS — E87.1 HYPONATREMIA: Primary | ICD-10-CM

## 2018-03-21 DIAGNOSIS — R63.4 LOSS OF WEIGHT: ICD-10-CM

## 2018-03-21 DIAGNOSIS — C10.9 SQUAMOUS CELL CARCINOMA OF OROPHARYNX (H): Primary | ICD-10-CM

## 2018-03-21 DIAGNOSIS — E87.6 HYPOKALEMIA: ICD-10-CM

## 2018-03-21 DIAGNOSIS — E87.1 HYPONATREMIA: ICD-10-CM

## 2018-03-21 DIAGNOSIS — G89.3 CANCER RELATED PAIN: ICD-10-CM

## 2018-03-21 DIAGNOSIS — C10.9 SQUAMOUS CELL CARCINOMA OF OROPHARYNX (H): ICD-10-CM

## 2018-03-21 DIAGNOSIS — K12.30 MUCOSITIS: ICD-10-CM

## 2018-03-21 LAB
ANION GAP SERPL CALCULATED.3IONS-SCNC: 7 MMOL/L (ref 3–14)
BUN SERPL-MCNC: 12 MG/DL (ref 7–30)
CALCIUM SERPL-MCNC: 9.6 MG/DL (ref 8.5–10.1)
CHLORIDE SERPL-SCNC: 89 MMOL/L (ref 94–109)
CO2 SERPL-SCNC: 36 MMOL/L (ref 20–32)
CREAT SERPL-MCNC: 0.67 MG/DL (ref 0.52–1.04)
GFR SERPL CREATININE-BSD FRML MDRD: >90 ML/MIN/1.7M2
GLUCOSE SERPL-MCNC: 105 MG/DL (ref 70–99)
POTASSIUM SERPL-SCNC: 3 MMOL/L (ref 3.4–5.3)
SODIUM SERPL-SCNC: 132 MMOL/L (ref 133–144)

## 2018-03-21 PROCEDURE — 80048 BASIC METABOLIC PNL TOTAL CA: CPT | Performed by: INTERNAL MEDICINE

## 2018-03-21 PROCEDURE — 77336 RADIATION PHYSICS CONSULT: CPT | Performed by: RADIOLOGY

## 2018-03-21 PROCEDURE — 96366 THER/PROPH/DIAG IV INF ADDON: CPT | Performed by: NURSE PRACTITIONER

## 2018-03-21 PROCEDURE — 77386 ZZC IMRT TREATMENT DELIVERY, COMPLEX: CPT | Performed by: RADIOLOGY

## 2018-03-21 PROCEDURE — 99214 OFFICE O/P EST MOD 30 MIN: CPT | Mod: 25 | Performed by: NURSE PRACTITIONER

## 2018-03-21 PROCEDURE — 77014 ZZHC CT GUIDE FOR PLACEMENT RADIATION THERAPY FIELDS: CPT | Performed by: RADIOLOGY

## 2018-03-21 PROCEDURE — 77427 RADIATION TX MANAGEMENT X5: CPT | Performed by: RADIOLOGY

## 2018-03-21 PROCEDURE — 99207 ZZC NO CHARGE LOS: CPT

## 2018-03-21 PROCEDURE — 36415 COLL VENOUS BLD VENIPUNCTURE: CPT | Performed by: INTERNAL MEDICINE

## 2018-03-21 PROCEDURE — 96365 THER/PROPH/DIAG IV INF INIT: CPT | Performed by: NURSE PRACTITIONER

## 2018-03-21 RX ORDER — POTASSIUM CHLORIDE 3 G/15ML
20 SOLUTION ORAL DAILY
Qty: 1 BOTTLE | Refills: 1 | Status: SHIPPED | OUTPATIENT
Start: 2018-03-21

## 2018-03-21 ASSESSMENT — PAIN SCALES - GENERAL: PAINLEVEL: MILD PAIN (2)

## 2018-03-21 NOTE — PROGRESS NOTES
SPIRITUAL HEALTH SERVICES  SPIRITUAL ASSESSMENT Progress Note  [facility]     introduced himself to Melinda Milligan and informed her of his availability.    Danielito Lowe M.Div., [facility]  Staff   Office tel: [phone]

## 2018-03-21 NOTE — LETTER
3/21/2018         RE: Melinda Milligan  [address] [facility] [address]        Dear Colleague,    Thank you for referring your patient, Melinda Milligan, to the [facility]. Please see a copy of my visit note below.    Oncology Follow Up Visit: March 21, 2018     Oncologist: Dr Benedicto Nieto  PCP: Quang Wall  ENT: Dr Marga Arana    Diagnosis: Squamous carcinoma of the tongue- complaining of increased pain  Melinda Milligan is a 41 yo female who presented with thrush then noted swelling and pain to right side of tongue and cheek. Biopsy of tongue lesion proved squamous cell carcinoma. Further imaging work up showed mass involving bilateral palatine tonsils, soft palpate with extension to nasopharyngeal and oropharyngeal walls with level IIa lymph node involvement.   * also found to have iron deficiency anemia and was started on Infed dosing.   Treatment:   3/13/2018 gtube placed  3/14/2018 begins chemoradiation with cisplatin    Interval History: Ms. Milligan comes to clinic today for review of symptoms with continued chemoradiation. Pt shares her pain is much better now with use of the MS Contin 15 mg bid- only using Tylenol as needed for pain- no oxycodone needed and is sleeping better now- pain ranked 2/10. She continues to take fluids by mouth but is needing Gtube for nutrition- has seen dietician yesterday and will have increased intake now with the isosource feedings - has lost 1/2 lb since last weeks visit.  Has not had a bowel movement in 1 week and has been using miralax bid and dulcolax over last few days though has no abdominal pain or discomfort. Admits she does get mildly light headed getting up from sitting. Using salt/soda mouthwash regularly and biotine for mouth.   Rest of comprehensive and complete ROS is reviewed and is negative.   Past Medical History:   Diagnosis Date     Allergic rhinitis      Anemia      Hoarseness      Oropharyngeal cancer (H)  "02/15/2018     Uncomplicated asthma      Current Outpatient Prescriptions   Medication     polyethylene glycol (MIRALAX/GLYCOLAX) powder     oxyCODONE (ROXICODONE) 5 MG/5ML solution     morphine (MS CONTIN) 30 MG 12 hr tablet     dexamethasone (DECADRON) 4 MG tablet     LORazepam (ATIVAN) 0.5 MG tablet     prochlorperazine (COMPAZINE) 10 MG tablet     ACETAMINOPHEN PO     oxyCODONE IR (ROXICODONE) 5 MG tablet     order for DME     oxyCODONE IR (ROXICODONE) 5 MG tablet     Fexofenadine HCl (ALLEGRA ALLERGY PO)     LORazepam (ATIVAN) 0.5 MG tablet     folic acid (FOLVITE) 1 MG tablet     No current facility-administered medications for this visit.      Allergies   Allergen Reactions     Ceftriaxone      Other reaction(s): Unknown Reaction     Chicken-Derived Products (Egg)      Other reaction(s): Vomiting  Other reaction(s): Unknown Reaction     No Clinical Screening - See Comments Hives     MAGIC MOUTHWASH     Physical Exam: BP 94/64  Pulse 73  Temp 98.3  F (36.8  C)  Ht 1.499 m (4' 11\")  Wt 37.3 kg (82 lb 4 oz)  SpO2 98%  BMI 16.61 kg/m2   ECOG PS- 1  Constitutional: Alert, tired appearing but cooperative. Thin.  ENT: Eyes bright, mouth with breakdown of the back of the tongue and upper throat noted with halatosis. Speech is clear  Respiratory: Breathing easy- no cough  GI: Abdomen with Gtube site with no signs of redness or infection.  MS: Muscle tone normal, extremities normal with no edema.   Skin: Dry but intact No suspicious lesions  Neuro: Sensory grossly WNL, gait normal.   Psych: Mentation appears normal and affect normal with easy smile    Laboratory Results:   Results for orders placed or performed in visit on 03/21/18   Basic metabolic panel   Result Value Ref Range    Sodium 132 (L) 133 - 144 mmol/L    Potassium 3.0 (L) 3.4 - 5.3 mmol/L    Chloride 89 (L) 94 - 109 mmol/L    Carbon Dioxide 36 (H) 20 - 32 mmol/L    Anion Gap 7 3 - 14 mmol/L    Glucose 105 (H) 70 - 99 mg/dL    Urea Nitrogen 12 7 - 30 " mg/dL    Creatinine 0.67 0.52 - 1.04 mg/dL    GFR Estimate >90 >60 mL/min/1.7m2    GFR Estimate If Black >90 >60 mL/min/1.7m2    Calcium 9.6 8.5 - 10.1 mg/dL     Assessment and Plan:   Squamous cell carcinoma of the tongue- Started chemoradiation with cisplatin on 3/14 and is seeing weight loss though Gtube feeding, hyponatremia and hypokalemia with some weakness but better pain control from prior to start of treatment with new pain medications.   She continues with daily radiation therapy and today will receive 1 liter NS with 20mEq potassium replacement. Since she has needed replacement x 1 already and is progressively low today- will ask that she begin daily 20 mEq potassium solution per Gtube as well- recheck in 1 week.   She returns weekly for visit with provider for symptom review - will get CMP and magnesium next week  Cancer related pain-Pain is to right side of face, jaw and down into neck. She is currently using MS Contin 15 mg bid with significant improvement in pain. Using tylenol as needed. Feels she is functional but pain controlled and able to sleep at night again.   Has appointment with Dr Jie hardin for 4/4/2018  Mucositis- using the salt/soda rinses several times daily and Biotin. Back of palate appears raw at early stage of treatment. Has been treated for thrush but is at high risk of return.   Constipation- Pt has not had a bowel movement in 1 week. SUggested suppository tonight and continue the miralax to be taken every 12 hours.  Liter of fluids today may help with bowels. Additional food and fluids ordered by dietician may be very helpful as well.  Nutrition with noted weight loss- Now has G tube and is using isosource as directed by dietician. Increase in feeding should slow weight loss and additional fluids helpful for blood pressure and more. Will continue to monitor with weekly visits.   This was a 25 min visit with > 50% in counseling and coordinating care including education and  management of concerns.    Elba Fry CNP      Again, thank you for allowing me to participate in the care of your patient.        Sincerely,        Elba Fry, NP, APRN CNP

## 2018-03-21 NOTE — MR AVS SNAPSHOT
After Visit Summary   3/21/2018    Melinda Milligan    MRN: [id number]           Patient Information     Date Of Birth          1975        Visit Information        Provider Department      3/21/2018 1:15 PM Elba Fry APRN CNP [facility]        Today's Diagnoses     Squamous cell carcinoma of oropharynx (H)    -  1    Hypokalemia        Hyponatremia        Cancer related pain        Mucositis        Drug-induced constipation        Loss of weight           Follow-ups after your visit        Your next 10 appointments already scheduled     Mar 22, 2018 12:45 PM CDT   TREATMENT with RADIATION THERAPIST   [facility] ([facility])    [address] [facility] [address]   [phone]            Mar 23, 2018 12:45 PM CDT   TREATMENT with RADIATION THERAPIST   [facility] ([facility])    [address] [facility] [address]   [phone]            Mar 26, 2018 12:45 PM CDT   TREATMENT with RADIATION THERAPIST   [facility] ([facility])    [address] [facility] [address]   [phone]            Mar 26, 2018  1:15 PM CDT   on treatment visit with Albert Ambrosio MD   [facility] ([facility])    [address] [facility] [address]   [phone]            Mar 27, 2018 12:45 PM CDT   TREATMENT with RADIATION THERAPIST   [facility] ([facility])    [address] [facility] [address]   [phone]            Mar 28, 2018 12:45 PM CDT   TREATMENT with RADIATION THERAPIST   [facility] ([facility])    [address] [facility] [address]   [phone]            Mar 29, 2018 12:45 PM CDT   TREATMENT with RADIATION THERAPIST   [facility] ([facility])    [address]  [facility] [address]   [phone]            Mar 30, 2018 12:45 PM CDT   TREATMENT with RADIATION THERAPIST   [facility]    [address] [facility] [address]   [phone]            Mar 30, 2018  1:45 PM CDT   Return Visit with NANCY Cai CNP   [facility]    [address] [facility] [address]   [phone]            Apr 02, 2018  1:45 PM CDT   TREATMENT with RADIATION THERAPIST   [facility]    [address] [facility] [address] [phone]              Future tests that were ordered for you today     Open Future Orders        Priority Expected Expires Ordered    Comprehensive metabolic panel Routine  3/28/2018 3/21/2018    Magnesium Routine  3/28/2018 3/21/2018            Who to contact     If you have questions or need follow up information about today's clinic visit or your schedule please contact [facility] directly at [phone].  Normal or non-critical lab and imaging results will be communicated to you by MyChart, letter or phone within 4 business days after the clinic has received the results. If you do not hear from us within 7 days, please contact the clinic through MyChart or phone. If you have a critical or abnormal lab result, we will notify you by phone as soon as possible.  Submit refill requests through SEVENROOMS or call your pharmacy and they will forward the refill request to us. Please allow 3 business days for your refill to be completed.          Additional Information About Your Visit        STAR FESTIVALhart Information     SEVENROOMS is an electronic gateway that provides easy, online access to your medical records. With SEVENROOMS, you can request a clinic appointment, read your test results, renew a prescription or communicate with your care team.     To sign up for  "MyChart visit the website at [email or web address]   You will be asked to enter the access code listed below, as well as some personal information. Please follow the directions to create your username and password.     Your access code is: [email or web address]  Expires: 2018  7:30 AM     Your access code will  in 90 days. If you need help or a new code, please contact your [facility] or call [phone] for assistance.        Care EveryWhere ID     This is your Care EveryWhere ID. This could be used by other organizations to access your [facility] medical records  [id number]        Your Vitals Were     Pulse Temperature Height Pulse Oximetry BMI (Body Mass Index)       73 98.3  F (36.8  C) 1.499 m (4' 11\") 98% 16.61 kg/m2        Blood Pressure from Last 3 Encounters:   18 94/64   18 102/73   03/15/18 116/72    Weight from Last 3 Encounters:   18 37.3 kg (82 lb 4 oz)   18 37.7 kg (83 lb 3.2 oz)   18 38.6 kg (85 lb)                 Today's Medication Changes          These changes are accurate as of 3/21/18  2:34 PM.  If you have any questions, ask your nurse or doctor.               Start taking these medicines.        Dose/Directions    Potassium Chloride 40 MEQ/15ML (20%) Soln   Used for:  Squamous cell carcinoma of oropharynx (H), Hypokalemia   Started by:  Elba Fry APRN CNP        Dose:  20 mEq   7.5 mLs (20 mEq) by Oral or G tube route daily   Quantity:  1 Bottle   Refills:  1            Where to get your medicines      These medications were sent to [facility] [place] - [place], [place] - [address]  [address], [facility] [address]     Phone:  [phone]     Potassium Chloride 40 MEQ/15ML (20%) Soln                Primary Care Provider Office Phone # Fax #    Quang Wall PA-C [phone]       [facility] [address] [facility] [address]  [facility] [address]      "   Equal Access to Services     [facility] : Hadii aad ku hadtajcaty [place], wasonyda luqadaha, qavandanata ananthsalinawhitney de la rosa. So [facility] [phone].    ATENCIÓN: Si habla español, tiene a hawley disposición servicios gratuitos de asistencia lingüística. Rajiame al [phone].    We comply with applicable federal civil rights laws and [place] laws. We do not discriminate on the basis of race, color, national origin, age, disability, sex, sexual orientation, or gender identity.            Thank you!     Thank you for choosing [facility]  for your care. Our goal is always to provide you with excellent care. Hearing back from our patients is one way we can continue to improve our services. Please take a few minutes to complete the written survey that you may receive in the mail after your visit with us. Thank you!             Your Updated Medication List - Protect others around you: Learn how to safely use, store and throw away your medicines at www.disposemymeds.org.          This list is accurate as of 3/21/18  2:34 PM.  Always use your most recent med list.                   Brand Name Dispense Instructions for use Diagnosis    ACETAMINOPHEN PO      Take 1,000 mg by mouth        ALLEGRA ALLERGY PO      Take by mouth as needed for allergies        dexamethasone 4 MG tablet    DECADRON    6 tablet    Take 2 tablets (8 mg) by mouth daily (with breakfast) for 3 days Start the day after chemotherapy.    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)       morphine 30 MG 12 hr tablet    MS CONTIN    60 tablet    Take 1 tablet (30 mg) by mouth every 12 hours maximum 2 tablet(s) per day    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), Cancer related pain       order for DME     90 Can    Sig:  Isosource 1.5 calories:  Instill 3.5 cartons per day via PEG tube by syringe or gravity flow    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)       * oxyCODONE IR 5 MG tablet    ROXICODONE    10 tablet    Take 1-2 tablets (5-10 mg) by mouth every 4 hours as needed for pain maximum 12 tablet(s) per day    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)       * oxyCODONE IR 5 MG tablet    ROXICODONE    120 tablet    Take 1 tablet (5 mg) by mouth every 4 hours as needed for moderate to severe pain (May take up to 2 tablets every 4 hours as needed for pain)    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)       * oxyCODONE 5 MG/5ML solution    ROXICODONE    300 mL    Take 10 mLs (10 mg) by mouth every 4 hours as needed for breakthrough pain or moderate to severe pain    Cancer related pain, 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)       polyethylene glycol powder    MIRALAX/GLYCOLAX    225 g    Take 17 g (1 capful) by mouth daily    Drug-induced constipation, Cancer related pain, 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 Read the directions  carefully, and ask your doctor or other care provider to review them with you.

## 2018-03-21 NOTE — PROGRESS NOTES
Infusion Nursing Note:  Melinda Milligan presents today for IVF/ Potassium replacement.    Patient seen by provider today: Yes: Elba Fry NP   present during visit today: Not Applicable.    Note: N/A.    Intravenous Access:  Peripheral IV placed.    Treatment Conditions:  Lab Results   Component Value Date    HGB 10.6 03/15/2018     Lab Results   Component Value Date    WBC 7.9 03/15/2018      Lab Results   Component Value Date    ANEU 6.0 03/15/2018     Lab Results   Component Value Date     03/15/2018      Lab Results   Component Value Date     03/21/2018                   Lab Results   Component Value Date    POTASSIUM 3.0 03/21/2018           Lab Results   Component Value Date    MAG 1.9 03/15/2018            Lab Results   Component Value Date    CR 0.67 03/21/2018                   Lab Results   Component Value Date    ARLENE 9.6 03/21/2018                Lab Results   Component Value Date    BILITOTAL 0.4 03/15/2018           Lab Results   Component Value Date    ALBUMIN 3.4 03/15/2018                    Lab Results   Component Value Date    ALT 11 03/15/2018           Lab Results   Component Value Date    AST 10 03/15/2018       Results reviewed, labs MET treatment parameters, ok to proceed with treatment.      Post Infusion Assessment:  Patient tolerated infusion without incident.  Site patent and intact, free from redness, edema or discomfort.  No evidence of extravasations.  Access discontinued per protocol.    Discharge Plan:   Discharge instructions reviewed with: Patient.  Patient and/or family verbalized understanding of discharge instructions and all questions answered.  Patient discharged in stable condition accompanied by: self.  Departure Mode: Ambulatory.    Anila Junior RN

## 2018-03-21 NOTE — MR AVS SNAPSHOT
After Visit Summary   3/21/2018    Melinda Milligan    MRN: [id number]           Patient Information     Date Of Birth          1975        Visit Information        Provider Department      3/21/2018 1:00 PM [address] 1 INFUSION [facility]        Today's Diagnoses     Hyponatremia    -  1    Squamous cell carcinoma of oropharynx (H)        Hypokalemia           Follow-ups after your visit        Your next 10 appointments already scheduled     Mar 22, 2018 12:45 PM CDT   TREATMENT with RADIATION THERAPIST   [facility] ([facility])    [address] [facility] [address]   [phone]            Mar 23, 2018 12:45 PM CDT   TREATMENT with RADIATION THERAPIST   [facility] ([facility])    [address] [facility] [address]   [phone]            Mar 26, 2018 12:45 PM CDT   TREATMENT with RADIATION THERAPIST   [facility] ([facility])    [address] [facility] [address]   [phone]            Mar 26, 2018  1:15 PM CDT   on treatment visit with Albert Ambrosio MD   [facility] ([facility])    [address] [facility] [address]   [phone]            Mar 27, 2018 12:45 PM CDT   TREATMENT with RADIATION THERAPIST   [facility] ([facility])    [address] [facility] [address]   [phone]            Mar 28, 2018 12:45 PM CDT   TREATMENT with RADIATION THERAPIST   [facility] ([facility])    [address] [facility] [address]   [phone]            Mar 29, 2018 12:45 PM CDT   TREATMENT with RADIATION THERAPIST   [facility] ([facility])    [address] [facility] [address]   [phone]            Mar 30, 2018 12:45 PM CDT   TREATMENT with  RADIATION THERAPIST   [facility] ([facility])    [address] [facility] [address] [phone]            Mar 30, 2018  1:45 PM CDT   Return Visit with NANCY Cai CNP   [facility] ([facility])    [address] [facility] [address] [phone]            Apr 02, 2018  1:45 PM CDT   TREATMENT with RADIATION THERAPIST   [facility] ([facility])    [address] [facility] [address] [phone]              Future tests that were ordered for you today     Open Future Orders        Priority Expected Expires Ordered    Comprehensive metabolic panel Routine  3/28/2018 3/21/2018    Magnesium Routine  3/28/2018 3/21/2018            Who to contact     If you have questions or need follow up information about today's clinic visit or your schedule please contact [facility] directly at [phone].  Normal or non-critical lab and imaging results will be communicated to you by Mobykohart, letter or phone within 4 business days after the clinic has received the results. If you do not hear from us within 7 days, please contact the clinic through Mobykohart or phone. If you have a critical or abnormal lab result, we will notify you by phone as soon as possible.  Submit refill requests through Azimuth or call your pharmacy and they will forward the refill request to us. Please allow 3 business days for your refill to be completed.          Additional Information About Your Visit        Azimuth Information     Azimuth is an electronic gateway that provides easy, online access to your medical records. With Azimuth, you can request a clinic appointment, read your test results, renew a prescription or communicate with your care team.     To sign up for Azimuth visit the website at [email or web address]   You will be asked to enter the access code listed  below, as well as some personal information. Please follow the directions to create your username and password.     Your access code is: [email or web address]  Expires: 2018  7:30 AM     Your access code will  in 90 days. If you need help or a new code, please contact your [facility] or call [phone] for assistance.        Care EveryWhere ID     This is your Care EveryWhere ID. This could be used by other organizations to access your [facility] medical records  [id number]         Blood Pressure from Last 3 Encounters:   18 94/64   18 102/73   03/15/18 116/72    Weight from Last 3 Encounters:   18 37.3 kg (82 lb 4 oz)   18 37.7 kg (83 lb 3.2 oz)   18 38.6 kg (85 lb)              Today, you had the following     No orders found for display         Today's Medication Changes          These changes are accurate as of 3/21/18  4:19 PM.  If you have any questions, ask your nurse or doctor.               Start taking these medicines.        Dose/Directions    Potassium Chloride 40 MEQ/15ML (20%) Soln   Used for:  Squamous cell carcinoma of oropharynx (H), Hypokalemia   Started by:  Elba Fry APRN CNP        Dose:  20 mEq   7.5 mLs (20 mEq) by Oral or G tube route daily   Quantity:  1 Bottle   Refills:  1            Where to get your medicines      These medications were sent to [facility] [place] - [place], [place] - [address]  [address], [facility] [address]     Phone:  [phone]     Potassium Chloride 40 MEQ/15ML (20%) Soln                Primary Care Provider Office Phone # Fax #    Quang Wall PA-C [phone]       [facility] [address] [facility] [address]  [facility] [address]        Equal Access to Services     JAVIER MORALES AH: Hadii sunny alberto Somonique, waaxda luqadaha, qaybta kaalmada adesandip, waxmemo pace. Ana [facility]  [phone].    ATENCIÓN: Si rhiannon harvey, tiene a hawley disposición servicios gratuitos de asistencia lingüística. Juanjose diana [phone].    We comply with applicable federal civil rights laws and [place] laws. We do not discriminate on the basis of race, color, national origin, age, disability, sex, sexual orientation, or gender identity.            Thank you!     Thank you for choosing [facility]  for your care. Our goal is always to provide you with excellent care. Hearing back from our patients is one way we can continue to improve our services. Please take a few minutes to complete the written survey that you may receive in the mail after your visit with us. Thank you!             Your Updated Medication List - Protect others around you: Learn how to safely use, store and throw away your medicines at www.disposemymeds.org.          This list is accurate as of 3/21/18  4:19 PM.  Always use your most recent med list.                   Brand Name Dispense Instructions for use Diagnosis    ACETAMINOPHEN PO      Take 1,000 mg by mouth        ALLEGRA ALLERGY PO      Take by mouth as needed for allergies        dexamethasone 4 MG tablet    DECADRON    6 tablet    Take 2 tablets (8 mg) by mouth daily (with breakfast) for 3 days Start the day after chemotherapy.    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)       morphine 30 MG 12 hr tablet    MS  CONTIN    60 tablet    Take 1 tablet (30 mg) by mouth every 12 hours maximum 2 tablet(s) per day    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), Cancer related pain       order for DME     [address] Can    Sig:  Isosource 1.5 calories:  Instill 3.5 cartons per day via PEG tube by syringe or gravity flow    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)       * oxyCODONE IR 5 MG tablet    ROXICODONE    10 tablet    Take 1-2 tablets (5-10 mg) by mouth every 4 hours as needed for pain maximum 12 tablet(s) per day    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)       * oxyCODONE IR 5 MG tablet    ROXICODONE    120 tablet    Take 1 tablet (5 mg) by mouth every 4 hours as needed for moderate to severe pain (May take up to 2 tablets every 4 hours as needed for pain)    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)       * oxyCODONE 5 MG/5ML solution    ROXICODONE    300 mL    Take 10 mLs (10 mg) by mouth every 4 hours as needed for breakthrough pain or moderate to severe pain    Cancer related pain, 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)       polyethylene glycol powder    MIRALAX/GLYCOLAX    225 g    Take 17 g (1 capful) by mouth daily    Drug-induced constipation, Cancer related pain, 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 Read the directions carefully, and ask your doctor or other care provider to review them with you.

## 2018-03-21 NOTE — NURSING NOTE
"Oncology Rooming Note    March 21, 2018 1:21 PM   Melinda Milligan is a 42 year old female who presents for:    Chief Complaint   Patient presents with     Oncology Clinic Visit     follow up     Initial Vitals: BP 94/64  Pulse 73  Temp 98.3  F (36.8  C)  Ht 1.499 m (4' 11\")  Wt 37.3 kg (82 lb 4 oz)  SpO2 98%  BMI 16.61 kg/m2 Estimated body mass index is 16.61 kg/(m^2) as calculated from the following:    Height as of this encounter: 1.499 m (4' 11\").    Weight as of this encounter: 37.3 kg (82 lb 4 oz). Body surface area is 1.25 meters squared.  Mild Pain (2) Comment: feeding tube insertion area, tylenol   No LMP recorded.  Allergies reviewed: Yes  Medications reviewed: Yes    Medications: Medication refills not needed today.  Pharmacy name entered into [employer]: [facility] - [place], [place] - [address]      5 minutes for nursing intake (face to face time)     Sri Artis LPN              "

## 2018-03-21 NOTE — PROGRESS NOTES
Oncology Follow Up Visit: March 21, 2018     Oncologist: Dr Benedicto Nieto  PCP: Quang Wall  ENT: Dr Marga Arana    Diagnosis: Squamous carcinoma of the tongue- complaining of increased pain  Melinda Milligan is a 41 yo female who presented with thrush then noted swelling and pain to right side of tongue and cheek. Biopsy of tongue lesion proved squamous cell carcinoma. Further imaging work up showed mass involving bilateral palatine tonsils, soft palpate with extension to nasopharyngeal and oropharyngeal walls with level IIa lymph node involvement.   * also found to have iron deficiency anemia and was started on Infed dosing.   Treatment:   3/13/2018 gtube placed  3/14/2018 begins chemoradiation with cisplatin    Interval History: Ms. Milligan comes to clinic today for review of symptoms with continued chemoradiation. Pt shares her pain is much better now with use of the MS Contin 15 mg bid- only using Tylenol as needed for pain- no oxycodone needed and is sleeping better now- pain ranked 2/10. She continues to take fluids by mouth but is needing Gtube for nutrition- has seen dietician yesterday and will have increased intake now with the isosource feedings - has lost 1/2 lb since last weeks visit.  Has not had a bowel movement in 1 week and has been using miralax bid and dulcolax over last few days though has no abdominal pain or discomfort. Admits she does get mildly light headed getting up from sitting. Using salt/soda mouthwash regularly and biotine for mouth.   Rest of comprehensive and complete ROS is reviewed and is negative.   Past Medical History:   Diagnosis Date     Allergic rhinitis      Anemia      Hoarseness      Oropharyngeal cancer (H) 02/15/2018     Uncomplicated asthma      Current Outpatient Prescriptions   Medication     polyethylene glycol (MIRALAX/GLYCOLAX) powder     oxyCODONE (ROXICODONE) 5 MG/5ML solution     morphine (MS CONTIN) 30 MG 12 hr tablet     dexamethasone (DECADRON)  "4 MG tablet     LORazepam (ATIVAN) 0.5 MG tablet     prochlorperazine (COMPAZINE) 10 MG tablet     ACETAMINOPHEN PO     oxyCODONE IR (ROXICODONE) 5 MG tablet     order for DME     oxyCODONE IR (ROXICODONE) 5 MG tablet     Fexofenadine HCl (ALLEGRA ALLERGY PO)     LORazepam (ATIVAN) 0.5 MG tablet     folic acid (FOLVITE) 1 MG tablet     No current facility-administered medications for this visit.      Allergies   Allergen Reactions     Ceftriaxone      Other reaction(s): Unknown Reaction     Chicken-Derived Products (Egg)      Other reaction(s): Vomiting  Other reaction(s): Unknown Reaction     No Clinical Screening - See Comments Hives     MAGIC MOUTHWASH     Physical Exam: BP 94/64  Pulse 73  Temp 98.3  F (36.8  C)  Ht 1.499 m (4' 11\")  Wt 37.3 kg (82 lb 4 oz)  SpO2 98%  BMI 16.61 kg/m2   ECOG PS- 1  Constitutional: Alert, tired appearing but cooperative. Thin.  ENT: Eyes bright, mouth with breakdown of the back of the tongue and upper throat noted with halatosis. Speech is clear  Respiratory: Breathing easy- no cough  GI: Abdomen with Gtube site with no signs of redness or infection.  MS: Muscle tone normal, extremities normal with no edema.   Skin: Dry but intact No suspicious lesions  Neuro: Sensory grossly WNL, gait normal.   Psych: Mentation appears normal and affect normal with easy smile    Laboratory Results:   Results for orders placed or performed in visit on 03/21/18   Basic metabolic panel   Result Value Ref Range    Sodium 132 (L) 133 - 144 mmol/L    Potassium 3.0 (L) 3.4 - 5.3 mmol/L    Chloride 89 (L) 94 - 109 mmol/L    Carbon Dioxide 36 (H) 20 - 32 mmol/L    Anion Gap 7 3 - 14 mmol/L    Glucose 105 (H) 70 - 99 mg/dL    Urea Nitrogen 12 7 - 30 mg/dL    Creatinine 0.67 0.52 - 1.04 mg/dL    GFR Estimate >90 >60 mL/min/1.7m2    GFR Estimate If Black >90 >60 mL/min/1.7m2    Calcium 9.6 8.5 - 10.1 mg/dL     Assessment and Plan:   Squamous cell carcinoma of the tongue- Started chemoradiation with " cisplatin on 3/14 and is seeing weight loss though Gtube feeding, hyponatremia and hypokalemia with some weakness but better pain control from prior to start of treatment with new pain medications.   She continues with daily radiation therapy and today will receive 1 liter NS with 20mEq potassium replacement. Since she has needed replacement x 1 already and is progressively low today- will ask that she begin daily 20 mEq potassium solution per Gtube as well- recheck in 1 week.   She returns weekly for visit with provider for symptom review - will get CMP and magnesium next week  Cancer related pain-Pain is to right side of face, jaw and down into neck. She is currently using MS Contin 15 mg bid with significant improvement in pain. Using tylenol as needed. Feels she is functional but pain controlled and able to sleep at night again.   Has appointment with Dr Jie hardin for 4/4/2018  Mucositis- using the salt/soda rinses several times daily and Biotin. Back of palate appears raw at early stage of treatment. Has been treated for thrush but is at high risk of return.   Constipation- Pt has not had a bowel movement in 1 week. SUggested suppository tonight and continue the miralax to be taken every 12 hours.  Liter of fluids today may help with bowels. Additional food and fluids ordered by dietician may be very helpful as well.  Nutrition with noted weight loss- Now has G tube and is using isosource as directed by dietician. Increase in feeding should slow weight loss and additional fluids helpful for blood pressure and more. Will continue to monitor with weekly visits.   This was a 25 min visit with > 50% in counseling and coordinating care including education and management of concerns.    Elba Fry,CNP

## 2018-03-22 ENCOUNTER — APPOINTMENT (OUTPATIENT)
Dept: RADIATION ONCOLOGY | Facility: CLINIC | Age: 43
End: 2018-03-22
Payer: COMMERCIAL

## 2018-03-22 PROCEDURE — 77386 ZZC IMRT TREATMENT DELIVERY, COMPLEX: CPT | Performed by: RADIOLOGY

## 2018-03-22 PROCEDURE — 77014 ZZHC CT GUIDE FOR PLACEMENT RADIATION THERAPY FIELDS: CPT | Performed by: RADIOLOGY

## 2018-03-22 NOTE — PROGRESS NOTES
This is a recent snapshot of the patient's [facility] medical record.  For current drug dose and complete information and questions, call [phone] or In Basket pool, [facility] ([id number])  CSN Number:  [id number]

## 2018-03-23 ENCOUNTER — APPOINTMENT (OUTPATIENT)
Dept: RADIATION ONCOLOGY | Facility: CLINIC | Age: 43
End: 2018-03-23
Payer: COMMERCIAL

## 2018-03-23 ENCOUNTER — CARE COORDINATION (OUTPATIENT)
Dept: ONCOLOGY | Facility: CLINIC | Age: 43
End: 2018-03-23

## 2018-03-23 PROCEDURE — 77386 ZZC IMRT TREATMENT DELIVERY, COMPLEX: CPT | Performed by: RADIOLOGY

## 2018-03-23 PROCEDURE — 77014 ZZHC CT GUIDE FOR PLACEMENT RADIATION THERAPY FIELDS: CPT | Performed by: RADIOLOGY

## 2018-03-23 NOTE — PROGRESS NOTES
CHEMO FOLLOW UP CALL    DATE OF FIRST CHEMO: 3/15/18  DRUG: Cisplatin  HOW ARE YOU FEELING TODAY? Feeling really well    DO YOU HAVE QUESTIONS ABOUT YOUR CHEMO? Not today.  DO YOU HAVE QUESTIONS ABOUT YOUR TAKE HOME MEDS? Elba helped me with my take home medications  CONTACT # GIVEN FOR AFTER CLINIC HRS? Yes    DO YOU KNOW WHEN YOUR NEXT CHEMO IS SCHEDULED FOR? Yes  DO YOU HAVE ANY OTHER QUESTIONS OR CONCERNS? Nothing today.  She is getting used to her tube feedings.    OTHER :  No other concerns today

## 2018-03-26 ENCOUNTER — OFFICE VISIT (OUTPATIENT)
Dept: RADIATION ONCOLOGY | Facility: CLINIC | Age: 43
End: 2018-03-26
Payer: COMMERCIAL

## 2018-03-26 ENCOUNTER — APPOINTMENT (OUTPATIENT)
Dept: RADIATION ONCOLOGY | Facility: CLINIC | Age: 43
End: 2018-03-26
Payer: COMMERCIAL

## 2018-03-26 VITALS
HEART RATE: 76 BPM | BODY MASS INDEX: 16.76 KG/M2 | RESPIRATION RATE: 14 BRPM | SYSTOLIC BLOOD PRESSURE: 110 MMHG | DIASTOLIC BLOOD PRESSURE: 63 MMHG | WEIGHT: 83 LBS | OXYGEN SATURATION: 96 %

## 2018-03-26 DIAGNOSIS — C06.9 SQUAMOUS CELL CARCINOMA OF ORAL CAVITY (H): ICD-10-CM

## 2018-03-26 DIAGNOSIS — C02.9 TONGUE CANCER (H): ICD-10-CM

## 2018-03-26 DIAGNOSIS — C10.9 SQUAMOUS CELL CARCINOMA OF OROPHARYNX (H): ICD-10-CM

## 2018-03-26 PROCEDURE — 99207 ZZC DROP WITH A PROCEDURE: CPT | Performed by: RADIOLOGY

## 2018-03-26 PROCEDURE — 77386 ZZC IMRT TREATMENT DELIVERY, COMPLEX: CPT | Performed by: RADIOLOGY

## 2018-03-26 PROCEDURE — 77014 ZZHC CT GUIDE FOR PLACEMENT RADIATION THERAPY FIELDS: CPT | Performed by: RADIOLOGY

## 2018-03-26 RX ORDER — LORAZEPAM 0.5 MG/1
0.5 TABLET ORAL EVERY 4 HOURS PRN
Qty: 30 TABLET | Refills: 1 | Status: SHIPPED | OUTPATIENT
Start: 2018-03-26 | End: 2018-03-27

## 2018-03-26 RX ORDER — PROCHLORPERAZINE MALEATE 10 MG
10 TABLET ORAL EVERY 6 HOURS PRN
Qty: 30 TABLET | Refills: 1 | Status: SHIPPED | OUTPATIENT
Start: 2018-03-26 | End: 2018-04-05

## 2018-03-26 ASSESSMENT — PAIN SCALES - GENERAL: PAINLEVEL: NO PAIN (0)

## 2018-03-26 NOTE — PATIENT INSTRUCTIONS
Please contact [facility] RN with questions or concerns following today's appointment: [phone].    Thank you!

## 2018-03-26 NOTE — MR AVS SNAPSHOT
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    Thank you!            Follow-ups after your visit        Your next 10 appointments already scheduled     Mar 27, 2018 12:45 PM CDT   TREATMENT with RADIATION THERAPIST   [facility] ([facility]    [address] [facility] [address]   [phone]            Mar 28, 2018 12:45 PM CDT   TREATMENT with RADIATION THERAPIST   [facility] ([facility])    [address] [facility] [address]   [phone]            Mar 29, 2018 12:45 PM CDT   TREATMENT with RADIATION THERAPIST   [facility] ([facility])    [address] [facility] [address]   [phone]            Mar 30, 2018 12:45 PM CDT   TREATMENT with RADIATION THERAPIST   [facility] ([facility])    [address] [facility] [address]   [phone]            Mar 30, 2018  1:45 PM CDT   Return Visit with NANCY Cai CNP   [facility] ([facility])    [address] [facility] [address]   [phone]            Apr 02, 2018  1:45 PM CDT   TREATMENT with RADIATION THERAPIST   [facility] ([facility])    [address] [facility] [address]   [phone]            Apr 02, 2018  2:15 PM CDT   on treatment  visit with Albert Ambrosio MD   [facility]    [address] [place] [address]   [phone]            Apr 03, 2018 12:45 PM CDT   TREATMENT with RADIATION THERAPIST   [facility] ([facility])    [address] [facility] [address]   [phone]            Apr 04, 2018 12:45 PM CDT   TREATMENT with RADIATION THERAPIST   [facility] ([facility])    [address] [place] [address]   [phone]            Apr 04, 2018  1:15 PM CDT   New Visit with Deneen Ceron MD   [facility] ([facility])    [address] [facility] [address]   [phone]              Who to contact     If you have questions or need follow up information about today's clinic visit or your schedule please contact [facility] directly at [phone].  Normal or non-critical lab and imaging results will be communicated to you by MyChart, letter or phone within 4 business days after the clinic has received the results. If you do not hear from us within 7 days, please contact the clinic through [employer] or phone. If you have a critical or abnormal lab result, we will notify you by phone as soon as possible.  Submit refill requests through [employer] or call your pharmacy and they will forward the refill request to us. Please allow 3 business days for your refill to be completed.          Additional Information About Your Visit        [employer] Information     [employer] is an electronic gateway that provides easy, online access to your medical records. With [employer], you can request a clinic appointment, read your test results, renew a prescription or communicate with your care team.     To sign up for [employer] visit the website at [email or web address]   You will be asked to enter the access code listed below, as well  as some personal information. Please follow the directions to create your username and password.     Your access code is: [email or web address]  Expires: 2018  7:30 AM     Your access code will  in 90 days. If you need help or a new code, please contact your [facility] or call [phone] for assistance.        Care EveryWhere ID     This is your Care EveryWhere ID. This could be used by other organizations to access your [facility] medical records  [id number]        Your Vitals Were     Pulse Respirations Pulse Oximetry BMI (Body Mass Index)          76 14 96% 16.76 kg/m2         Blood Pressure from Last 3 Encounters:   18 110/63   18 94/64   18 102/73    Weight from Last 3 Encounters:   18 83 lb   18 82 lb 4 oz   18 83 lb 3.2 oz              Today, you had the following     No orders found for display         Today's Medication Changes          These changes are accurate as of 3/26/18  1:29 PM.  If you have any questions, ask your nurse or doctor.               These medicines have changed or have updated prescriptions.        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  Ask your nurse if you have questions.      Bring a paper prescription for each of these medications     LORazepam 0.5 MG tablet                Primary Care Provider Office Phone # Fax #    Quang Wall PA-C [phone]       [facility] [address] [facility] [address]  [facility] [address]        Equal Access to Services     JAVIER MORALES : Hadii aad ku hadasho Soomaali, waaxda luqadaha, qaybta kaalmada adeegyada, waxay sierrain hayaan adegerardo higginshugolucretia pace. So [facility] [phone].    ATENCIÓN: Si habla español, tiene a hawley disposición servicios gratuitos de asistencia lingüística. Rajimadai al [phone].    We comply with applicable federal civil rights laws and [place] laws. We do not discriminate on the basis of race, color, national origin, age, disability, sex, sexual orientation, or gender identity.            Thank you!     Thank you for choosing [facility]  for your care. Our goal is always to provide you with excellent care. Hearing back from our patients is one way we can continue to improve our services. Please take a few minutes to complete the written survey that you may receive in the mail after your visit with us. Thank you!             Your Updated Medication List - Protect others around you: Learn how to safely use, store and throw away your medicines at www.disposemymeds.org.          This list is accurate as of 3/26/18  1:29 PM.  Always use your most recent med list.                   Brand Name Dispense Instructions for use Diagnosis    ACETAMINOPHEN PO      Take 1,000 mg by mouth        ALLEGRA ALLERGY PO      Take by mouth as needed for allergies        dexamethasone 4 MG tablet    DECADRON    6 tablet    Take 2 tablets (8 mg) by mouth daily (with breakfast) for 3 days Start the day after chemotherapy.    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), Cancer related pain       order for DME     90 Can    Sig:  Isosource 1.5 calories:  Instill 3.5 cartons per day via PEG tube by syringe or gravity flow    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)       * oxyCODONE IR 5 MG tablet    ROXICODONE    10 tablet    Take 1-2 tablets (5-10 mg) by mouth every 4 hours as needed for pain maximum 12 tablet(s) per day    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)       * oxyCODONE 5 MG/5ML solution    ROXICODONE    300 mL    Take 10 mLs (10 mg) by mouth every 4 hours as needed for breakthrough pain or moderate to severe pain    Cancer related pain, 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)       polyethylene glycol powder    MIRALAX/GLYCOLAX    225 g    Take 17 g (1 capful) by mouth daily    Drug-induced constipation, Cancer related pain, 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 Read the directions carefully, and ask your doctor or other care provider to review them with you.

## 2018-03-26 NOTE — PROGRESS NOTES
[facility]  SPECIALIZING IN BREAKTHROUGHS  Radiation Oncology    On Treatment Visit Note      Melinda Milligan      Date: 3/26/2018   MRN: [id number]   : 1975  Diagnosis: oropharyngeal cancer      Reason for Visit:  On Radiation Treatment Visit     Treatment Summary to Date  Treatment Site: head and neck + lymph nodes Current Dose: 1440/7000 cGy Fractions:       Chemotherapy  Chemo concurrent with radx?: Yes  Oncologist: Dr. Nieto  Drug Name/Frequency 1: Cisplatin    Subjective:   Tolerating CRT relatively well, pain is well controlled with long acting morphine, not requiring short acting. Weight is stable. Taking small amounts of PO intake and using PEG tube. Nausea managed with compazine and ativan. Also taking ativan for anxiety with RT. Loss of taste.    Nursing ROS:   Nutrition Alteration  Diet Type: Gastrostomy Tube Feedings  Nutrition Note: Isosource 2.5 cans per day. patient reports taking small amounts of food orally and will use water to help        ENT and Mouth Exam  ENT/Mouth Note: patient reports suing STS and biotin. patinet reports no new mouth sore and denies pain     Gastrointestinal  Nausea: 0 - None  GI Note: taking Compazine and Ativan po two times daily with MS Contin     Psychosocial  Pyschosocial Note: slight fatigue  Pain Assessment  0-10 Pain Scale: 0  Pain Treatment: MS Contin 30 mg po two times daily, Tylenol po as needed, patient denies need for Oxycodone      Objective:   /63 (BP Location: Left arm, Patient Position: Sitting, Cuff Size: Adult Regular)  Pulse 76  Resp 14  Wt 83 lb  SpO2 96%  BMI 16.76 kg/m2  NAD  No skin reaction  Patchy mucositis in oropharynx, tumor evident in bilat tonsils and R lateral BOT    Labs:  CBC RESULTS:   Recent Labs   Lab Test  03/15/18   0936   WBC  7.9   RBC  3.75*   HGB  10.6*   HCT  33.0*   MCV  88   MCH  28.3   MCHC  32.1   RDW  20.0*   PLT  392     ELECTROLYTES:  Recent Labs   Lab Test  18   1222    NA  132*   POTASSIUM  3.0*   CHLORIDE  89*   ARLENE  9.6   CO2  36*   BUN  12   CR  0.67   GLC  105*       Assessment:    Tolerating radiation therapy well.  All questions and concerns addressed.    Plan:   1. Continue current therapy.  Taking replacement for hypokalemia. Discussed control of nausea/pain, and oral rinses.      Mosaiq chart and setup information reviewed  MVCT/IGRT images checked    Medication Review  Med list reviewed with patient?: Yes           Albert Ambrosio MD

## 2018-03-27 ENCOUNTER — APPOINTMENT (OUTPATIENT)
Dept: RADIATION ONCOLOGY | Facility: CLINIC | Age: 43
End: 2018-03-27
Payer: COMMERCIAL

## 2018-03-27 ENCOUNTER — TELEPHONE (OUTPATIENT)
Dept: PEDIATRICS | Facility: CLINIC | Age: 43
End: 2018-03-27

## 2018-03-27 PROCEDURE — 77386 ZZC IMRT TREATMENT DELIVERY, COMPLEX: CPT | Performed by: RADIOLOGY

## 2018-03-27 PROCEDURE — 77014 ZZHC CT GUIDE FOR PLACEMENT RADIATION THERAPY FIELDS: CPT | Performed by: RADIOLOGY

## 2018-03-27 RX ORDER — ONDANSETRON 8 MG/1
8 TABLET, FILM COATED ORAL EVERY 12 HOURS PRN
Qty: 21 TABLET | Refills: 1 | Status: SHIPPED | OUTPATIENT
Start: 2018-03-27 | End: 2018-06-05

## 2018-03-27 RX ORDER — LORAZEPAM 0.5 MG/1
0.5 TABLET ORAL DAILY PRN
Qty: 30 TABLET | Refills: 1
Start: 2018-03-27 | End: 2018-06-05

## 2018-03-27 NOTE — TELEPHONE ENCOUNTER
**Please Do Not Close This Encounter**               ** Until This Has Been Addressed**          PRIOR AUTHORIZATION REQUIED PER INSURANCE       PATIENT: Melinda Milligan YOB: 1975          MEDICATION: Lorazepam 0.5 mg                    SI by mouth every 4 hrs as needed for anxiety with radiation       NDC: 37090-188-15      INSURANCE: [employer] [place]       INSURANCE PHONE #: [phone]      ID #: [id number]      INSURANCE REJECT: 76 - plan limitations exceeded      PHARMACY:  [facility]      PHARMACY NPI: [id number]      PHARMACY PHONE #: [phone]                                                          Please let us know when Prior Auth approved/denied, prescriber refusal to complete prior auth or if you would like to change medication/discontinue                                                            Thank you

## 2018-03-28 ENCOUNTER — APPOINTMENT (OUTPATIENT)
Dept: RADIATION ONCOLOGY | Facility: CLINIC | Age: 43
End: 2018-03-28
Payer: COMMERCIAL

## 2018-03-28 PROCEDURE — 77336 RADIATION PHYSICS CONSULT: CPT | Performed by: RADIOLOGY

## 2018-03-28 PROCEDURE — 77014 ZZHC CT GUIDE FOR PLACEMENT RADIATION THERAPY FIELDS: CPT | Performed by: RADIOLOGY

## 2018-03-28 PROCEDURE — 77427 RADIATION TX MANAGEMENT X5: CPT | Performed by: RADIOLOGY

## 2018-03-28 PROCEDURE — 77386 ZZC IMRT TREATMENT DELIVERY, COMPLEX: CPT | Performed by: RADIOLOGY

## 2018-03-29 ENCOUNTER — APPOINTMENT (OUTPATIENT)
Dept: RADIATION ONCOLOGY | Facility: CLINIC | Age: 43
End: 2018-03-29
Payer: COMMERCIAL

## 2018-03-29 PROCEDURE — 77386 ZZC IMRT TREATMENT DELIVERY, COMPLEX: CPT | Performed by: RADIOLOGY

## 2018-03-29 PROCEDURE — 77014 ZZHC CT GUIDE FOR PLACEMENT RADIATION THERAPY FIELDS: CPT | Performed by: RADIOLOGY

## 2018-03-30 ENCOUNTER — ONCOLOGY VISIT (OUTPATIENT)
Dept: ONCOLOGY | Facility: CLINIC | Age: 43
End: 2018-03-30
Payer: COMMERCIAL

## 2018-03-30 ENCOUNTER — HOME INFUSION (PRE-WILLOW HOME INFUSION) (OUTPATIENT)
Dept: PHARMACY | Facility: CLINIC | Age: 43
End: 2018-03-30

## 2018-03-30 ENCOUNTER — APPOINTMENT (OUTPATIENT)
Dept: RADIATION ONCOLOGY | Facility: CLINIC | Age: 43
End: 2018-03-30
Payer: COMMERCIAL

## 2018-03-30 VITALS
OXYGEN SATURATION: 99 % | RESPIRATION RATE: 15 BRPM | BODY MASS INDEX: 17.18 KG/M2 | HEIGHT: 59 IN | DIASTOLIC BLOOD PRESSURE: 71 MMHG | HEART RATE: 74 BPM | SYSTOLIC BLOOD PRESSURE: 109 MMHG | TEMPERATURE: 98.8 F | WEIGHT: 85.2 LBS

## 2018-03-30 DIAGNOSIS — K59.03 DRUG-INDUCED CONSTIPATION: ICD-10-CM

## 2018-03-30 DIAGNOSIS — R63.6 UNDERWEIGHT: ICD-10-CM

## 2018-03-30 DIAGNOSIS — C06.9 SQUAMOUS CELL CARCINOMA OF ORAL CAVITY (H): ICD-10-CM

## 2018-03-30 DIAGNOSIS — C10.9 SQUAMOUS CELL CARCINOMA OF OROPHARYNX (H): ICD-10-CM

## 2018-03-30 DIAGNOSIS — Z72.0 TOBACCO USE: ICD-10-CM

## 2018-03-30 DIAGNOSIS — E87.1 HYPONATREMIA: ICD-10-CM

## 2018-03-30 DIAGNOSIS — E87.6 HYPOKALEMIA: ICD-10-CM

## 2018-03-30 DIAGNOSIS — B37.0 THRUSH: Primary | ICD-10-CM

## 2018-03-30 LAB
ALBUMIN SERPL-MCNC: 3.1 G/DL (ref 3.4–5)
ALP SERPL-CCNC: 54 U/L (ref 40–150)
ALT SERPL W P-5'-P-CCNC: 14 U/L (ref 0–50)
ANION GAP SERPL CALCULATED.3IONS-SCNC: 7 MMOL/L (ref 3–14)
AST SERPL W P-5'-P-CCNC: 10 U/L (ref 0–45)
BILIRUB SERPL-MCNC: 0.2 MG/DL (ref 0.2–1.3)
BUN SERPL-MCNC: 9 MG/DL (ref 7–30)
CALCIUM SERPL-MCNC: 8.6 MG/DL (ref 8.5–10.1)
CHLORIDE SERPL-SCNC: 101 MMOL/L (ref 94–109)
CO2 SERPL-SCNC: 30 MMOL/L (ref 20–32)
CREAT SERPL-MCNC: 0.46 MG/DL (ref 0.52–1.04)
GFR SERPL CREATININE-BSD FRML MDRD: >90 ML/MIN/1.7M2
GLUCOSE SERPL-MCNC: 84 MG/DL (ref 70–99)
MAGNESIUM SERPL-MCNC: 2.3 MG/DL (ref 1.6–2.3)
POTASSIUM SERPL-SCNC: 4 MMOL/L (ref 3.4–5.3)
PROT SERPL-MCNC: 6.4 G/DL (ref 6.8–8.8)
SODIUM SERPL-SCNC: 138 MMOL/L (ref 133–144)

## 2018-03-30 PROCEDURE — 77386 ZZC IMRT TREATMENT DELIVERY, COMPLEX: CPT | Performed by: RADIOLOGY

## 2018-03-30 PROCEDURE — 77014 ZZHC CT GUIDE FOR PLACEMENT RADIATION THERAPY FIELDS: CPT | Performed by: RADIOLOGY

## 2018-03-30 PROCEDURE — 36415 COLL VENOUS BLD VENIPUNCTURE: CPT | Performed by: NURSE PRACTITIONER

## 2018-03-30 PROCEDURE — 80053 COMPREHEN METABOLIC PANEL: CPT | Performed by: NURSE PRACTITIONER

## 2018-03-30 PROCEDURE — 83735 ASSAY OF MAGNESIUM: CPT | Performed by: NURSE PRACTITIONER

## 2018-03-30 PROCEDURE — 99214 OFFICE O/P EST MOD 30 MIN: CPT | Performed by: NURSE PRACTITIONER

## 2018-03-30 RX ORDER — NYSTATIN 100000/ML
500000 SUSPENSION, ORAL (FINAL DOSE FORM) ORAL 4 TIMES DAILY
Qty: 280 ML | Refills: 0 | Status: SHIPPED | OUTPATIENT
Start: 2018-03-30 | End: 2018-06-05

## 2018-03-30 ASSESSMENT — PAIN SCALES - GENERAL: PAINLEVEL: MILD PAIN (2)

## 2018-03-30 NOTE — NURSING NOTE
"Oncology Rooming Note    March 30, 2018 1:38 PM   Melinda Milligan is a 42 year old female who presents for:    Chief Complaint   Patient presents with     Oncology Clinic Visit     follow up      Initial Vitals: /71  Pulse 74  Temp 98.8  F (37.1  C)  Resp 15  Ht 1.499 m (4' 11.02\")  Wt 38.6 kg (85 lb 3.2 oz)  SpO2 99%  BMI 17.2 kg/m2 Estimated body mass index is 17.2 kg/(m^2) as calculated from the following:    Height as of this encounter: 1.499 m (4' 11.02\").    Weight as of this encounter: 38.6 kg (85 lb 3.2 oz). Body surface area is 1.27 meters squared.  Mild Pain (2) Comment: Head pain. Oxy.    No LMP recorded.  Allergies reviewed: Yes  Medications reviewed: Yes    Medications: Medication refills not needed today.  Pharmacy name entered into [employer] [facility]: [facility] - [place], [place] - [address]    Clinical     5 minutes for nursing intake (face to face time)     Tsering Gaona LPN              "

## 2018-03-30 NOTE — LETTER
3/30/2018         RE: Melinda Milligan  [address] [facility] [address]        Dear Colleague,    Thank you for referring your patient, Melinda Milligan, to the [facility]. Please see a copy of my visit note below.    Oncology Follow Up Visit: March 30, 2018     Oncologist: Dr Benedicto Nieto  PCP: Quang Wall  ENT: Dr Marga Arana    Diagnosis: Squamous carcinoma of the tongue  Melinda Milligan is a 43 yo female who presented with thrush then noted swelling and pain to right side of tongue and cheek. Biopsy of tongue lesion proved squamous cell carcinoma. Further imaging work up showed mass involving bilateral palatine tonsils, soft palpate with extension to nasopharyngeal and oropharyngeal walls with level IIa lymph node involvement.   * also found to have iron deficiency anemia and was started on Infed dosing.   Treatment:   3/13/2018 gtube placed  3/14/2018 begins chemoradiation with cisplatin    Interval History: Ms. Milligan comes to clinic today for review of symptoms with continued chemoradiation. Pt shares she is feeling much better now with pain controlled with the morphine she is using 2x daily ( MS Contin) she is needed oxycodone only intermittently- pain ranked 2/10 currently to right face and neck. She has noted several papules that started this week at radiation site but no skin peeling or other changes noted and confirms using the aquafor bid. Consitpation is much improved after use of suppository- now using dulcolax and miralax as needed and Gtube feedings going much better with weight gain seen this week.Has loss of taste now but has a dirt taste noted this week- is able to eat orally but no flavor so usin 3 cartons of formula daily with gravity feeding.   Denies SOB,GERD, fevers, neuropathy, depression or trouble sleeping. Admits she is still smoking 2-3 cigarettes daily in morning.  Rest of comprehensive and complete ROS is reviewed and is negative.  "  Past Medical History:   Diagnosis Date     Allergic rhinitis      Anemia      Hoarseness      Oropharyngeal cancer (H) 02/15/2018     Uncomplicated asthma      Current Outpatient Prescriptions   Medication     ondansetron (ZOFRAN) 8 MG tablet     LORazepam (ATIVAN) 0.5 MG tablet     prochlorperazine (COMPAZINE) 10 MG tablet     Potassium Chloride 40 MEQ/15ML (20%) SOLN     polyethylene glycol (MIRALAX/GLYCOLAX) powder     oxyCODONE (ROXICODONE) 5 MG/5ML solution     morphine (MS CONTIN) 30 MG 12 hr tablet     ACETAMINOPHEN PO     oxyCODONE IR (ROXICODONE) 5 MG tablet     order for DME     Fexofenadine HCl (ALLEGRA ALLERGY PO)     folic acid (FOLVITE) 1 MG tablet     dexamethasone (DECADRON) 4 MG tablet     No current facility-administered medications for this visit.      Allergies   Allergen Reactions     Ceftriaxone      Other reaction(s): Unknown Reaction     Chicken-Derived Products (Egg)      Other reaction(s): Vomiting  Other reaction(s): Unknown Reaction     No Clinical Screening - See Comments Hives     MAGIC MOUTHWASH     Physical Exam: /71  Pulse 74  Temp 98.8  F (37.1  C)  Resp 15  Ht 1.499 m (4' 11.02\")  Wt 38.6 kg (85 lb 3.2 oz)  SpO2 99%  BMI 17.2 kg/m2   ECOG PS- 0  Constitutional: Alert, upbeat and moving well.cooperative. Thin.  ENT: Eyes bright, mouth with breakdown of the back of the tongue and upper throat noted and tongue is coated with white/blue colored thick coating. Speech is clear  Respiratory: Breathing easy. Lung sounds clear to auscultation- no cough  GI: Abdomen with Gtube site with no signs of redness or infection.  MS: Muscle tone normal- moving easy, extremities normal with no edema.   Skin:  No suspicious lesions- area at neck showing new scattered flesh colored papules ar radiation site. No redness or peeling noted.   Neuro: Sensory grossly WNL, gait normal.   Psych: Mentation appears normal and affect normal with easy smile    Laboratory Results:   Results for orders " "placed or performed in visit on 03/30/18   Comprehensive metabolic panel   Result Value Ref Range    Sodium 138 133 - 144 mmol/L    Potassium 4.0 3.4 - 5.3 mmol/L    Chloride 101 94 - 109 mmol/L    Carbon Dioxide 30 20 - 32 mmol/L    Anion Gap 7 3 - 14 mmol/L    Glucose 84 70 - 99 mg/dL    Urea Nitrogen 9 7 - 30 mg/dL    Creatinine 0.46 (L) 0.52 - 1.04 mg/dL    GFR Estimate >90 >60 mL/min/1.7m2    GFR Estimate If Black >90 >60 mL/min/1.7m2    Calcium 8.6 8.5 - 10.1 mg/dL    Bilirubin Total 0.2 0.2 - 1.3 mg/dL    Albumin 3.1 (L) 3.4 - 5.0 g/dL    Protein Total 6.4 (L) 6.8 - 8.8 g/dL    Alkaline Phosphatase 54 40 - 150 U/L    ALT 14 0 - 50 U/L    AST 10 0 - 45 U/L   Magnesium   Result Value Ref Range    Magnesium 2.3 1.6 - 2.3 mg/dL     Assessment and Plan:   Squamous cell carcinoma of the tongue- Started chemoradiation with cisplatin on 3/14. She continues on radiation therapy and since pain has been addressed she has been tolerating the treatment well. Hyponatremia and hypokalemia - now resolved with better nutrition and fluid plan with Gtube feedings- has been using daily 20 mEq potassium solution per Gtube as well as increased flushes between feedings.   Nutrition with noted weight loss- Now has G tube and is using isosource as directed by dietician. Pt is now back to increasing weight - since she is underweight need to continue to monitor weight weekly  Pt also using compazine each morning to prevent nausea.   Cancer related pain-Pain is to right side of face, jaw and down into neck. She is currently using MS Contin 15 mg bid with well controlled pain. Using oxycodone only as needed- 0-1 x daily  Has appointment with Dr Ceron set for 4/4/2018  Mucositis- using the salt/soda rinses several times daily and Biotin. Believe she also has thrush issues today and will restart her on Nystatin swish and swallow. This may be cause of her abnormal taste of \"dirt\"   Constipation- Pt seeing much better bowel regemine after " use of suppository and now continues use of dulcolax or miralax. She is taking her additional fluids per dietician as well.   Tobacco use- still smoking 2-3 cigarettes daily. Reviewed that this can influence her cancer and healing. Offered assistance for smoking cessation. States they have nicotine patches at home if needed.   This was a 25 min visit with > 50% in counseling and coordinating care including education and management of concerns.    Elab Fry,CNP      Again, thank you for allowing me to participate in the care of your patient.        Sincerely,        Elba Fry, NP, APRN CNP

## 2018-03-30 NOTE — PROGRESS NOTES
Oncology Follow Up Visit: March 30, 2018     Oncologist: Dr Benedicto Nieto  PCP: Quang Wall  ENT: Dr Marga Arana    Diagnosis: Squamous carcinoma of the tongue  Melinda Milligan is a 41 yo female who presented with thrush then noted swelling and pain to right side of tongue and cheek. Biopsy of tongue lesion proved squamous cell carcinoma. Further imaging work up showed mass involving bilateral palatine tonsils, soft palpate with extension to nasopharyngeal and oropharyngeal walls with level IIa lymph node involvement.   * also found to have iron deficiency anemia and was started on Infed dosing.   Treatment:   3/13/2018 gtube placed  3/14/2018 begins chemoradiation with cisplatin    Interval History: Ms. Milligan comes to clinic today for review of symptoms with continued chemoradiation. Pt shares she is feeling much better now with pain controlled with the morphine she is using 2x daily ( MS Contin) she is needed oxycodone only intermittently- pain ranked 2/10 currently to right face and neck. She has noted several papules that started this week at radiation site but no skin peeling or other changes noted and confirms using the aquafor bid. Consitpation is much improved after use of suppository- now using dulcolax and miralax as needed and Gtube feedings going much better with weight gain seen this week.Has loss of taste now but has a dirt taste noted this week- is able to eat orally but no flavor so usin 3 cartons of formula daily with gravity feeding.   Denies SOB,GERD, fevers, neuropathy, depression or trouble sleeping. Admits she is still smoking 2-3 cigarettes daily in morning.  Rest of comprehensive and complete ROS is reviewed and is negative.   Past Medical History:   Diagnosis Date     Allergic rhinitis      Anemia      Hoarseness      Oropharyngeal cancer (H) 02/15/2018     Uncomplicated asthma      Current Outpatient Prescriptions   Medication     ondansetron (ZOFRAN) 8 MG tablet      "LORazepam (ATIVAN) 0.5 MG tablet     prochlorperazine (COMPAZINE) 10 MG tablet     Potassium Chloride 40 MEQ/15ML (20%) SOLN     polyethylene glycol (MIRALAX/GLYCOLAX) powder     oxyCODONE (ROXICODONE) 5 MG/5ML solution     morphine (MS CONTIN) 30 MG 12 hr tablet     ACETAMINOPHEN PO     oxyCODONE IR (ROXICODONE) 5 MG tablet     order for DME     Fexofenadine HCl (ALLEGRA ALLERGY PO)     folic acid (FOLVITE) 1 MG tablet     dexamethasone (DECADRON) 4 MG tablet     No current facility-administered medications for this visit.      Allergies   Allergen Reactions     Ceftriaxone      Other reaction(s): Unknown Reaction     Chicken-Derived Products (Egg)      Other reaction(s): Vomiting  Other reaction(s): Unknown Reaction     No Clinical Screening - See Comments Hives     MAGIC MOUTHWASH     Physical Exam: /71  Pulse 74  Temp 98.8  F (37.1  C)  Resp 15  Ht 1.499 m (4' 11.02\")  Wt 38.6 kg (85 lb 3.2 oz)  SpO2 99%  BMI 17.2 kg/m2   ECOG PS- 0  Constitutional: Alert, upbeat and moving well.cooperative. Thin.  ENT: Eyes bright, mouth with breakdown of the back of the tongue and upper throat noted and tongue is coated with white/blue colored thick coating. Speech is clear  Respiratory: Breathing easy. Lung sounds clear to auscultation- no cough  GI: Abdomen with Gtube site with no signs of redness or infection.  MS: Muscle tone normal- moving easy, extremities normal with no edema.   Skin:  No suspicious lesions- area at neck showing new scattered flesh colored papules ar radiation site. No redness or peeling noted.   Neuro: Sensory grossly WNL, gait normal.   Psych: Mentation appears normal and affect normal with easy smile    Laboratory Results:   Results for orders placed or performed in visit on 03/30/18   Comprehensive metabolic panel   Result Value Ref Range    Sodium 138 133 - 144 mmol/L    Potassium 4.0 3.4 - 5.3 mmol/L    Chloride 101 94 - 109 mmol/L    Carbon Dioxide 30 20 - 32 mmol/L    Anion Gap 7 3 " "- 14 mmol/L    Glucose 84 70 - 99 mg/dL    Urea Nitrogen 9 7 - 30 mg/dL    Creatinine 0.46 (L) 0.52 - 1.04 mg/dL    GFR Estimate >90 >60 mL/min/1.7m2    GFR Estimate If Black >90 >60 mL/min/1.7m2    Calcium 8.6 8.5 - 10.1 mg/dL    Bilirubin Total 0.2 0.2 - 1.3 mg/dL    Albumin 3.1 (L) 3.4 - 5.0 g/dL    Protein Total 6.4 (L) 6.8 - 8.8 g/dL    Alkaline Phosphatase 54 40 - 150 U/L    ALT 14 0 - 50 U/L    AST 10 0 - 45 U/L   Magnesium   Result Value Ref Range    Magnesium 2.3 1.6 - 2.3 mg/dL     Assessment and Plan:   Squamous cell carcinoma of the tongue- Started chemoradiation with cisplatin on 3/14. She continues on radiation therapy and since pain has been addressed she has been tolerating the treatment well. Hyponatremia and hypokalemia - now resolved with better nutrition and fluid plan with Gtube feedings- has been using daily 20 mEq potassium solution per Gtube as well as increased flushes between feedings.   Nutrition with noted weight loss- Now has G tube and is using isosource as directed by dietician. Pt is now back to increasing weight - since she is underweight need to continue to monitor weight weekly  Pt also using compazine each morning to prevent nausea.   Cancer related pain-Pain is to right side of face, jaw and down into neck. She is currently using MS Contin 15 mg bid with well controlled pain. Using oxycodone only as needed- 0-1 x daily  Has appointment with Dr Jie hardin for 4/4/2018  Mucositis- using the salt/soda rinses several times daily and Biotin. Believe she also has thrush issues today and will restart her on Nystatin swish and swallow. This may be cause of her abnormal taste of \"dirt\"   Constipation- Pt seeing much better bowel regemine after use of suppository and now continues use of dulcolax or miralax. She is taking her additional fluids per dietician as well.   Tobacco use- still smoking 2-3 cigarettes daily. Reviewed that this can influence her cancer and healing. Offered " assistance for smoking cessation. States they have nicotine patches at home if needed.   This was a 25 min visit with > 50% in counseling and coordinating care including education and management of concerns.    Elba Fry,CNP

## 2018-03-30 NOTE — MR AVS SNAPSHOT
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)        Hypokalemia        Underweight        Drug-induced constipation        Tobacco use           Follow-ups after your visit        Your next 10 appointments already scheduled     Apr 02, 2018  1:45 PM CDT   TREATMENT with RADIATION THERAPIST   [facility] ([facility])    [address] [facility] [address]   [phone]            Apr 02, 2018  2:15 PM CDT   on treatment visit with Albert Ambrosio MD   [facility])    [address] [facility] [address]   [phone]            Apr 03, 2018 12:45 PM CDT   TREATMENT with RADIATION THERAPIST   [facility])    [address] [facility] [address]   [phone]            Apr 03, 2018  1:00 PM CDT   Return Visit with Juli Monique RD   [facility])    [address] [facility] [address]   [phone]            Apr 04, 2018 12:45 PM CDT   TREATMENT with RADIATION THERAPIST   [facility])    [address] [facility] [address]   [phone]            Apr 04, 2018  1:15 PM CDT   New Visit with Deneen Ceron MD   [facility])    [address] [facility] [address]   [phone]            Apr 05, 2018  7:45 AM CDT   LAB with LAB ONC [facility])    [address] [address]  [address] [phone]           Please do not eat 10-12 hours before your appointment if you are coming in fasting for labs on lipids, cholesterol, or glucose (sugar). This does not apply to pregnant women. Water, hot tea and black coffee (with nothing added) are okay. Do not drink other fluids, diet soda or chew gum.            Apr 05, 2018  8:15 AM CDT   Return Visit with NANCY Cai CNP   [facility] ([facility])    [address] [place] [address]   [phone]            Apr 05, 2018  8:45 AM CDT   Level 6 with BAY 10 INFUSION   [facility])    [address] [place] [address] [phone]            Apr 05, 2018  2:45 PM CDT   TREATMENT with RADIATION THERAPIST   [facility])    [address] [place] [address] [phone]              Who to contact     If you have questions or need follow up information about today's clinic visit or your schedule please contact [facility] directly at [phone].  Normal or non-critical lab and imaging results will be communicated to you by [employer], letter or phone within 4 business days after the clinic has received the results. If you do not hear from us within 7 days, please contact the clinic through [employer] or phone. If you have a critical or abnormal lab result, we will notify you by phone as soon as possible.  Submit refill requests through Number 100 or call your pharmacy and they will forward the refill request to us. Please allow 3 business days for your refill to be completed.          Additional Information About Your Visit        Number 100 Information     Number 100 is an electronic gateway that provides easy, online access to your medical records. With Number 100, you can request a clinic appointment, read your test results, renew a prescription or communicate  "with your care team.     To sign up for MyChart visit the website at [email or web address]   You will be asked to enter the access code listed below, as well as some personal information. Please follow the directions to create your username and password.     Your access code is: [email or web address]  Expires: 2018  7:30 AM     Your access code will  in 90 days. If you need help or a new code, please contact your [facility] or call [phone] for assistance.        Care EveryWhere ID     This is your Care EveryWhere ID. This could be used by other organizations to access your [facility] medical records  [id number]        Your Vitals Were     Pulse Temperature Respirations Height Pulse Oximetry BMI (Body Mass Index)    74 98.8  F (37.1  C) 15 1.499 m (4' 11.02\") 99% 17.2 kg/m2       Blood Pressure from Last 3 Encounters:   18 109/71   18 110/63   18 94/64    Weight from Last 3 Encounters:   18 38.6 kg (85 lb 3.2 oz)   18 37.6 kg (83 lb)   18 37.3 kg (82 lb 4 oz)              Today, you had the following     No orders found for display         Today's Medication Changes          These changes are accurate as of 3/30/18  2:22 PM.  If you have any questions, ask your nurse or doctor.               Start taking these medicines.        Dose/Directions    nystatin 224384 UNIT/ML suspension   Commonly known as:  MYCOSTATIN   Used for:  Thrush   Started by:  Elba Fry APRN CNP        Dose:  463119 Units   Take 5 mLs (500,000 Units) by mouth 4 times daily   Quantity:  280 mL   Refills:  0            Where to get your medicines      These medications were sent to [facility] - [place], [place] - [address] AT [facility] [address] ( [address])  [address], [place] [address]     Phone:  [phone]     nystatin [id number] UNIT/ML suspension                Primary Care Provider Office Phone # Fax #    Quang NEWTON " RADHA Wall [phone]       [facility] [address] [facility] [address]  [facility] [address]        Equal Access to Services     JAVIER MORALES : Hadferdinand aad ku hadtajo Solinusali, waaxda luqadaha, qaybta kaalmada adegerardoyada, whitney guardado magangerardo greer laCharliebess pace. So [facility] [phone].    ATENCIÓN: Si habla español, tiene a hawley disposición servicios gratuitos de asistencia lingüística. Llame al [phone].    We comply with applicable federal civil rights laws and [place] laws. We do not discriminate on the basis of race, color, national origin, age, disability, sex, sexual orientation, or gender identity.            Thank you!     Thank you for choosing [facility]  for your care. Our goal is always to provide you with excellent care. Hearing back from our patients is one way we can continue to improve our services. Please take a few minutes to complete the written survey that you may receive in the mail after your visit with us. Thank you!             Your Updated Medication List - Protect others around you: Learn how to safely use, store and throw away your medicines at www.disposemymeds.org.          This list is accurate as of 3/30/18  2:22 PM.  Always use your most recent med list.                   Brand Name Dispense Instructions for use Diagnosis    ACETAMINOPHEN PO      Take 1,000 mg by mouth        ALLEGRA ALLERGY PO      Take by mouth as needed for allergies        dexamethasone 4 MG tablet    DECADRON    6 tablet    Take 2 tablets (8 mg) by mouth daily (with breakfast) for 3 days Start the day after chemotherapy.    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), Cancer related pain       nystatin 233448 UNIT/ML suspension    MYCOSTATIN    280 mL    Take 5 mLs (500,000 Units) by mouth 4 times daily    Thrush       ondansetron 8 MG tablet    ZOFRAN    21 tablet    Take 1 tablet (8 mg) by mouth every 12 hours as needed for nausea    Squamous cell carcinoma of oropharynx (H)       order for DME     90 Can    Sig:  Isosource 1.5 calories:  Instill 3.5 cartons per day via PEG tube by syringe or gravity flow    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)       * oxyCODONE IR 5 MG tablet    ROXICODONE    10 tablet    Take 1-2 tablets (5-10 mg) by mouth every 4 hours as needed for pain maximum 12 tablet(s) per day    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)       * oxyCODONE 5 MG/5ML solution    ROXICODONE    300 mL    Take 10 mLs (10 mg) by mouth every 4 hours as needed for breakthrough pain or moderate to severe pain    Cancer related pain, 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)       polyethylene glycol powder    MIRALAX/GLYCOLAX    225 g    Take 17 g (1 capful) by mouth daily    Drug-induced constipation, Cancer related pain, 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 Read the directions carefully, and ask your doctor or other care provider  to review them with you.

## 2018-04-02 ENCOUNTER — APPOINTMENT (OUTPATIENT)
Dept: RADIATION ONCOLOGY | Facility: CLINIC | Age: 43
End: 2018-04-02
Payer: COMMERCIAL

## 2018-04-02 ENCOUNTER — OFFICE VISIT (OUTPATIENT)
Dept: RADIATION ONCOLOGY | Facility: CLINIC | Age: 43
End: 2018-04-02
Payer: COMMERCIAL

## 2018-04-02 VITALS — BODY MASS INDEX: 16.88 KG/M2 | WEIGHT: 83.6 LBS

## 2018-04-02 DIAGNOSIS — C10.9 SQUAMOUS CELL CARCINOMA OF OROPHARYNX (H): Primary | ICD-10-CM

## 2018-04-02 PROCEDURE — 77014 ZZHC CT GUIDE FOR PLACEMENT RADIATION THERAPY FIELDS: CPT | Performed by: RADIOLOGY

## 2018-04-02 PROCEDURE — 99207 ZZC DROP WITH A PROCEDURE: CPT | Performed by: RADIOLOGY

## 2018-04-02 PROCEDURE — 77386 ZZC IMRT TREATMENT DELIVERY, COMPLEX: CPT | Performed by: RADIOLOGY

## 2018-04-02 RX ORDER — DIPHENHYDRAMINE HYDROCHLORIDE AND LIDOCAINE HYDROCHLORIDE AND ALUMINUM HYDROXIDE AND MAGNESIUM HYDRO
5-10 KIT EVERY 6 HOURS PRN
Qty: 237 ML | Refills: 1 | Status: SHIPPED | OUTPATIENT
Start: 2018-04-02 | End: 2018-04-02

## 2018-04-02 ASSESSMENT — PAIN SCALES - GENERAL: PAINLEVEL: SEVERE PAIN (6)

## 2018-04-02 NOTE — MR AVS SNAPSHOT
After Visit Summary   4/2/2018    Melinda Milligan    MRN: [id number]           Patient Information     Date Of Birth          1975        Visit Information        Provider Department      4/2/2018 2:15 PM Albert Ambrosio MD [facility]        Today's Diagnoses     Squamous cell carcinoma of oropharynx (H)    -  1      Care Instructions    [facility] with questions or concerns following today's appointment: [phone].    Thank you!            Follow-ups after your visit        Your next 10 appointments already scheduled     Apr 02, 2018  2:15 PM CDT   on treatment visit with Albert Ambrosio MD   [facility] ([facility])    [address] [facility] [address]   [phone]            Apr 03, 2018 12:45 PM CDT   TREATMENT with RADIATION THERAPIST   [facility])    [address] [facility] [address]   [phone]            Apr 03, 2018  1:00 PM CDT   Return Visit with Juli Monique RD   [facility] ([facility])    [address] [facility] [address]   [phone]            Apr 04, 2018 12:45 PM CDT   TREATMENT with RADIATION THERAPIST   [facility] ([facility])    [address] [facility] [address]   [phone]            Apr 04, 2018  1:15 PM CDT   New Visit with Deneen Ceron MD   [facility])    [address] [facility] [address]   [phone]            Apr 05, 2018  7:45 AM CDT   LAB with LAB ONC [facility] ([facility])    [address] [facility] [address]   [phone]           Please do not eat 10-12 hours before your appointment if you are coming in fasting for labs on lipids,  cholesterol, or glucose (sugar). This does not apply to pregnant women. Water, hot tea and black coffee (with nothing added) are okay. Do not drink other fluids, diet soda or chew gum.            Apr 05, 2018  8:15 AM CDT   Return Visit with NANCY Cai CNP   [facility]    [address] [place] [address] [phone]            Apr 05, 2018  8:45 AM CDT   Level 6 with BAY [address] INFUSION   [facility] ([facility])    [address] [place] [address] [phone]            Apr 05, 2018  2:45 PM CDT   TREATMENT with RADIATION THERAPIST   [facility])    [address] [place] [address] [phone]            Apr 06, 2018 12:45 PM CDT   TREATMENT with RADIATION THERAPIST   [facility])    [address] [place] [address]   [phone]              Who to contact     If you have questions or need follow up information about today's clinic visit or your schedule please contact [facility] directly at [phone].  Normal or non-critical lab and imaging results will be communicated to you by [employer], letter or phone within 4 business days after the clinic has received the results. If you do not hear from us within 7 days, please contact the clinic through [employer] or phone. If you have a critical or abnormal lab result, we will notify you by phone as soon as possible.  Submit refill requests through [employer] or call your pharmacy and they will forward the refill request to us. Please allow 3 business days for your refill to be completed.          Additional Information About Your Visit        [employer] Information     [employer] is an electronic gateway that provides easy, online access to your medical records. With [employer], you can request a clinic appointment,  read your test results, renew a prescription or communicate with your care team.     To sign up for [employer] visit the website at [email or web address]   You will be asked to enter the access code listed below, as well as some personal information. Please follow the directions to create your username and password.     Your access code is: [email or web address]  Expires: 2018  7:30 AM     Your access code will  in 90 days. If you need help or a new code, please contact your [facility] or call [phone] for assistance.        Care EveryWhere ID     This is your Care EveryWhere ID. This could be used by other organizations to access your [place] medical records  [id number]        Your Vitals Were     BMI (Body Mass Index)                   16.88 kg/m2            Blood Pressure from Last 3 Encounters:   18 109/71   18 110/63   18 94/64    Weight from Last 3 Encounters:   18 83 lb 9.6 oz   18 85 lb 3.2 oz   18 83 lb              Today, you had the following     No orders found for display         Today's Medication Changes          These changes are accurate as of 18  1:46 PM.  If you have any questions, ask your nurse or doctor.               Start taking these medicines.        Dose/Directions    magic mouthwash suspension (diphenhydrAMINE, lidocaine, aluminum-magnesium & simethicone) Susp compounding kit   Used for:  Squamous cell carcinoma of oropharynx (H)   Started by:  Albert Ambrosio MD        Dose:  5-10 mL   Swish and swallow 5-10 mLs in mouth every 6 hours as needed for mouth sores   Quantity:  237 mL   Refills:  1       varenicline 0.5 MG X 11 & 1 MG X 42 tablet   Commonly known as:  CHANTIX STARTING MONTH    Used for:  Squamous cell carcinoma of oropharynx (H)   Started by:  Albert Ambrosio MD        Take 0.5 mg tab daily for 3 days, then 0.5 mg tab twice daily for 4 days, then 1 mg twice daily.   Quantity:  53  tablet   Refills:  0         These medicines have changed or have updated prescriptions.        Dose/Directions    oxyCODONE 5 MG/5ML solution   Commonly known as:  ROXICODONE   This may have changed:  Another medication with the same name was removed. Continue taking this medication, and follow the directions you see here.   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)   Changed by:  Albert Ambrosio MD        Dose:  10 mg   Take 10 mLs (10 mg) by mouth every 4 hours as needed for breakthrough pain or moderate to severe pain   Quantity:  300 mL   Refills:  0            Where to get your medicines      These medications were sent to [facility] [place], [place] - [address]  [address], [facility] [address]     Phone:  [phone]     magic mouthwash suspension (diphenhydrAMINE, lidocaine, aluminum-magnesium & simethicone) Susp compounding kit    varenicline 0.5 MG X 11 & 1 MG X 42 tablet                Primary Care Provider Office Phone # Fax #    Quang Wall PA-C [phone]       [facility] [address] [facility] [address]  [facility] [address]        Equal Access to Services     JAVIER MORALES AH: Hadii aad ku hadasho Soomaali, waaxda luqadaha, qaybta kaalmada adeegyada, waxay idiin hayaan adeeg kharalucretia pace. So [facility] [phone].    ATENCIÓN: Si habla español, tiene a hawley disposición servicios gratuitos de asistencia lingüística. Llame al [phone].    We comply with applicable federal civil rights laws and [place] laws. We do not discriminate on the basis of race, color, national origin, age, disability, sex, sexual orientation, or gender identity.            Thank you!     Thank you for choosing [facility]  for your care. Our goal is always to provide you with excellent care. Hearing back from our patients is one way we can continue to improve our services. Please take a few minutes  to complete the written survey that you may receive in the mail after your visit with us. Thank you!             Your Updated Medication List - Protect others around you: Learn how to safely use, store and throw away your medicines at www.disposemymeds.org.          This list is accurate as of 4/2/18  1:46 PM.  Always use your most recent med list.                   Brand Name Dispense Instructions for use Diagnosis    ACETAMINOPHEN PO      Take 1,000 mg by mouth        ALLEGRA ALLERGY PO      Take by mouth as needed for allergies        dexamethasone 4 MG tablet    DECADRON    6 tablet    Take 2 tablets (8 mg) by mouth daily (with breakfast) for 3 days Start the day after chemotherapy.    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)       magic mouthwash suspension (diphenhydrAMINE, lidocaine, aluminum-magnesium & simethicone) Susp compounding kit     237 mL    Swish and swallow 5-10 mLs in mouth every 6 hours as needed for mouth sores    Squamous cell carcinoma of oropharynx (H)       morphine 30 MG 12 hr tablet    MS CONTIN    60 tablet    Take 1 tablet (30 mg) by mouth every 12 hours maximum 2 tablet(s) per day    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), Cancer related pain       nystatin 980704 UNIT/ML suspension    MYCOSTATIN    280 mL    Take 5 mLs (500,000 Units) by mouth 4 times daily    Thrush       ondansetron 8 MG tablet    ZOFRAN    21 tablet    Take 1 tablet (8 mg) by mouth every 12 hours as needed for nausea    Squamous cell carcinoma of oropharynx (H)       order for DME     90 Can    Sig:  Isosource 1.5 calories:  Instill 3.5 cartons per day  via PEG tube by syringe or gravity flow    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)       oxyCODONE 5 MG/5ML solution    ROXICODONE    300 mL    Take 10 mLs (10 mg) by mouth every 4 hours as needed for breakthrough pain or moderate to severe pain    Cancer related pain, 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)       polyethylene glycol powder    MIRALAX/GLYCOLAX    225 g    Take 17 g (1 capful) by mouth daily    Drug-induced constipation, Cancer related pain, 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)       Potassium Chloride 40 MEQ/15ML (20%) Soln     1 Bottle    7.5 mLs (20 mEq) by Oral or G tube route daily    Squamous cell carcinoma of oropharynx (H), Hypokalemia       prochlorperazine 10 MG tablet    COMPAZINE    30 tablet    Take 1 tablet (10 mg) by mouth every 6 hours as needed (Nausea/Vomiting)    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    Squamous cell carcinoma of oropharynx (H)

## 2018-04-02 NOTE — PROGRESS NOTES
[facility] PHYSICIANS  SPECIALIZING IN BREAKTHROUGHS  Radiation Oncology    On Treatment Visit Note      Melinda Milligan      Date: 2018   MRN: [id number]   : 1975  Diagnosis: oropharyngeal cancer      Reason for Visit:  On Radiation Treatment Visit     Treatment Summary to Date  Treatment Site: head and neck + lymph nodes Current Dose: 2340/7000 cGy Fractions:       Chemotherapy  Chemo concurrent with radx?: Yes  Oncologist: Dr. Nieto  Drug Name/Frequency 1: Cisplatin    Subjective:   Tolerating CRT with mucositis and dysphagia, dry mouth. Still taking liquids PO. Given nystatin for thrush. Aquaphor on skin. Nausea and pain well controlled with zofran and MS contin. Still smoking 3 cigs/day. Nicotine patches were poorly tolerated (nausea, headache, tachycardia). Constipation improved with suppository.    Nursing ROS:   Nutrition Alteration  Diet Type: Gastrostomy Tube Feedings  Nutrition Note: Isosource 3 cans daily with additional free water, drinking liquids orally as able - patient reports liquids continue to come out through her nose  Skin  Skin Reaction: 1 - Faint erythema or dry desquamation  Skin Intervention: patient using Aquaphor at minimum two times daily     ENT and Mouth Exam  ENT/Mouth Note: patient using baking soda and salt rinse, using Biotene products, taking Nystatin as prescribed by Elba Fry NP for thrush, reports worsening throat pain     Gastrointestinal  Nausea: 0 - None  GI Note: taking Compazine/zofran two times daily with MS Contin     Psychosocial  Mood - Anxiety: 0 - Normal  Mood - Depression: 0 - Normal  Pyschosocial Note: slight fatigue  Pain Assessment  0-10 Pain Scale: 6  Pain Descriptors: Sore (throat)  Pain Treatment: MS Contin 30 mg po two times daily, Tylenol po as needed, Oxycodone po as needed      Objective:   Wt 83 lb 9.6 oz  BMI 16.88 kg/m2  NAD  Patchy mucositis in bilat oropharynx  Whitish appearing tongue, but does not slough off  like classic thrush  Improved lesion in R posterior tongue    Labs:  CBC RESULTS:   Recent Labs   Lab Test  03/15/18   0936   WBC  7.9   RBC  3.75*   HGB  10.6*   HCT  33.0*   MCV  88   MCH  28.3   MCHC  32.1   RDW  20.0*   PLT  392     ELECTROLYTES:  Recent Labs   Lab Test  03/30/18   1343   NA  138   POTASSIUM  4.0   CHLORIDE  101   ARLENE  8.6   CO2  30   BUN  9   CR  0.46*   GLC  84       Assessment:    Tolerating radiation therapy well.  All questions and concerns addressed.    Plan:   1. Continue current therapy.  Chemo and labs this week. PET and resim on 4/13.  2. Cont MS contin and oxycodone for pain. Was going to start magic mouthwash but pt said it gave her hives in the past.  3. Salt/soda rinses for dry mouth and secretions.   4. aquaphor on skin.  5. Nausea - insurance limited ativan, so will restrict this to only prn for radiation anxiety; and gave her zofran.  6. Will start varenicline for smoking cessation; discussed possible side effects.  7. Thrush - appearance is atypical, but may continue empiric treatment with nystatin.      Mosaiq chart and setup information reviewed  MVCT/IGRT images checked    Medication Review  Med list reviewed with patient?: Yes  Med list printed and given: Offered and declined    Educational Topic Discussed  Education Instructions: follow-up with Elba Fry NP scheduled on 4/5/2018 with chemotherapy      Albert Ambrosio MD

## 2018-04-02 NOTE — PATIENT INSTRUCTIONS
Please contact [facility] RN with questions or concerns following today's appointment: [phone].    Thank you!

## 2018-04-03 ENCOUNTER — APPOINTMENT (OUTPATIENT)
Dept: RADIATION ONCOLOGY | Facility: CLINIC | Age: 43
End: 2018-04-03
Payer: COMMERCIAL

## 2018-04-03 PROCEDURE — 77014 ZZHC CT GUIDE FOR PLACEMENT RADIATION THERAPY FIELDS: CPT | Performed by: RADIOLOGY

## 2018-04-03 PROCEDURE — 77386 ZZC IMRT TREATMENT DELIVERY, COMPLEX: CPT | Performed by: RADIOLOGY

## 2018-04-03 NOTE — PROGRESS NOTES
This is a recent snapshot of the patient's [facility] medical record.  For current drug dose and complete information and questions, call [phone] or In Basket pool, [facility] home infusion ([id number])  CSN Number:  [id number]

## 2018-04-04 ENCOUNTER — TELEPHONE (OUTPATIENT)
Dept: PEDIATRICS | Facility: CLINIC | Age: 43
End: 2018-04-04

## 2018-04-04 ENCOUNTER — ONCOLOGY VISIT (OUTPATIENT)
Dept: ONCOLOGY | Facility: CLINIC | Age: 43
End: 2018-04-04
Payer: COMMERCIAL

## 2018-04-04 ENCOUNTER — APPOINTMENT (OUTPATIENT)
Dept: RADIATION ONCOLOGY | Facility: CLINIC | Age: 43
End: 2018-04-04
Payer: COMMERCIAL

## 2018-04-04 VITALS — SYSTOLIC BLOOD PRESSURE: 99 MMHG | HEART RATE: 72 BPM | DIASTOLIC BLOOD PRESSURE: 66 MMHG

## 2018-04-04 DIAGNOSIS — C02.9 TONGUE CANCER (H): Primary | ICD-10-CM

## 2018-04-04 DIAGNOSIS — C06.9 SQUAMOUS CELL CARCINOMA OF ORAL CAVITY (H): ICD-10-CM

## 2018-04-04 DIAGNOSIS — R07.0 THROAT PAIN: Primary | ICD-10-CM

## 2018-04-04 DIAGNOSIS — C10.9 SQUAMOUS CELL CARCINOMA OF OROPHARYNX (H): ICD-10-CM

## 2018-04-04 DIAGNOSIS — C01 MALIGNANT NEOPLASM OF BASE OF TONGUE (H): ICD-10-CM

## 2018-04-04 LAB
ALBUMIN SERPL-MCNC: 3.6 G/DL (ref 3.4–5)
ALP SERPL-CCNC: 56 U/L (ref 40–150)
ALT SERPL W P-5'-P-CCNC: 14 U/L (ref 0–50)
ANION GAP SERPL CALCULATED.3IONS-SCNC: 4 MMOL/L (ref 3–14)
AST SERPL W P-5'-P-CCNC: 11 U/L (ref 0–45)
BASOPHILS # BLD AUTO: 0 10E9/L (ref 0–0.2)
BASOPHILS NFR BLD AUTO: 1 %
BILIRUB SERPL-MCNC: 0.1 MG/DL (ref 0.2–1.3)
BUN SERPL-MCNC: 9 MG/DL (ref 7–30)
CALCIUM SERPL-MCNC: 9 MG/DL (ref 8.5–10.1)
CHLORIDE SERPL-SCNC: 101 MMOL/L (ref 94–109)
CO2 SERPL-SCNC: 31 MMOL/L (ref 20–32)
CREAT SERPL-MCNC: 0.55 MG/DL (ref 0.52–1.04)
DIFFERENTIAL METHOD BLD: ABNORMAL
EOSINOPHIL # BLD AUTO: 0.1 10E9/L (ref 0–0.7)
EOSINOPHIL NFR BLD AUTO: 4 %
ERYTHROCYTE [DISTWIDTH] IN BLOOD BY AUTOMATED COUNT: 20.8 % (ref 10–15)
GFR SERPL CREATININE-BSD FRML MDRD: >90 ML/MIN/1.7M2
GLUCOSE SERPL-MCNC: 88 MG/DL (ref 70–99)
HCT VFR BLD AUTO: 31.6 % (ref 35–47)
HGB BLD-MCNC: 9.9 G/DL (ref 11.7–15.7)
IMM GRANULOCYTES # BLD: 0 10E9/L (ref 0–0.4)
IMM GRANULOCYTES NFR BLD: 0 %
LYMPHOCYTES # BLD AUTO: 0.4 10E9/L (ref 0.8–5.3)
LYMPHOCYTES NFR BLD AUTO: 19.7 %
MAGNESIUM SERPL-MCNC: 2 MG/DL (ref 1.6–2.3)
MCH RBC QN AUTO: 29.7 PG (ref 26.5–33)
MCHC RBC AUTO-ENTMCNC: 31.3 G/DL (ref 31.5–36.5)
MCV RBC AUTO: 95 FL (ref 78–100)
MONOCYTES # BLD AUTO: 0.4 10E9/L (ref 0–1.3)
MONOCYTES NFR BLD AUTO: 17.7 %
NEUTROPHILS # BLD AUTO: 1.1 10E9/L (ref 1.6–8.3)
NEUTROPHILS NFR BLD AUTO: 57.6 %
PLATELET # BLD AUTO: 317 10E9/L (ref 150–450)
POTASSIUM SERPL-SCNC: 4.5 MMOL/L (ref 3.4–5.3)
PROT SERPL-MCNC: 7.1 G/DL (ref 6.8–8.8)
RBC # BLD AUTO: 3.33 10E12/L (ref 3.8–5.2)
SODIUM SERPL-SCNC: 136 MMOL/L (ref 133–144)
WBC # BLD AUTO: 2 10E9/L (ref 4–11)

## 2018-04-04 PROCEDURE — 77014 ZZHC CT GUIDE FOR PLACEMENT RADIATION THERAPY FIELDS: CPT | Performed by: RADIOLOGY

## 2018-04-04 PROCEDURE — 83735 ASSAY OF MAGNESIUM: CPT | Performed by: NURSE PRACTITIONER

## 2018-04-04 PROCEDURE — 99205 OFFICE O/P NEW HI 60 MIN: CPT | Performed by: HOSPITALIST

## 2018-04-04 PROCEDURE — 77427 RADIATION TX MANAGEMENT X5: CPT | Performed by: RADIOLOGY

## 2018-04-04 PROCEDURE — 77336 RADIATION PHYSICS CONSULT: CPT | Performed by: RADIOLOGY

## 2018-04-04 PROCEDURE — 85025 COMPLETE CBC W/AUTO DIFF WBC: CPT | Performed by: NURSE PRACTITIONER

## 2018-04-04 PROCEDURE — 36415 COLL VENOUS BLD VENIPUNCTURE: CPT | Performed by: NURSE PRACTITIONER

## 2018-04-04 PROCEDURE — 77386 ZZC IMRT TREATMENT DELIVERY, COMPLEX: CPT | Performed by: RADIOLOGY

## 2018-04-04 PROCEDURE — 80053 COMPREHEN METABOLIC PANEL: CPT | Performed by: NURSE PRACTITIONER

## 2018-04-04 RX ORDER — SUCRALFATE ORAL 1 G/10ML
1 SUSPENSION ORAL 4 TIMES DAILY PRN
Qty: 1200 ML | Refills: 1 | Status: SHIPPED | OUTPATIENT
Start: 2018-04-04 | End: 2018-06-05

## 2018-04-04 RX ORDER — DEXAMETHASONE 4 MG/1
8 TABLET ORAL
Qty: 6 TABLET | Refills: 1 | Status: SHIPPED | OUTPATIENT
Start: 2018-04-04 | End: 2018-06-05

## 2018-04-04 NOTE — PATIENT INSTRUCTIONS
Patient Instructions      Expand All Collapse All    Thank you for coming into the Palliative Care Clinic today.     1. Carafate (sucralfate) 1gm/10ml solution: take 1gm up to four times daily as needed for pain with swallowing    2. You will get a call from the Speech and Swallow therapy office to schedule an appointment    3. I will discuss further options to treat your throat pain with the radiation team.    Please do not make any changes to your medication doses or schedules without calling us in advance.     Return to clinic in 2 months for a follow up.     You can reach the Palliative Care Team during business hours at the following number:    - [phone]    To reach the Palliative Care Provider on-call After-hours or on holidays and weekends, call: [phone].  Please note that we are not able to provide pain medication refills on evenings or weekends.

## 2018-04-04 NOTE — PROGRESS NOTES
Oncology Follow Up Visit: April 5, 2018    Oncologist: Dr Benedicto Nieto  PCP: Quang Wall  ENT: Dr Marga Arana    Diagnosis: Squamous carcinoma of the tongue  Melinda Milligan is a 43 yo female who presented with thrush then noted swelling and pain to right side of tongue and cheek. Biopsy of tongue lesion proved squamous cell carcinoma. Further imaging work up showed mass involving bilateral palatine tonsils, soft palpate with extension to nasopharyngeal and oropharyngeal walls with level IIa lymph node involvement.   * also found to have iron deficiency anemia and was started on Infed dosing.   Treatment:   3/13/2018 gtube placed  3/14/2018 begins chemoradiation with cisplatin    Interval History: Ms. Milligan comes to clinic today for review of symptoms with continued chemoradiation. Pt is due for day 22 cisplatin. She share she is starting to have more pain to the throat and mouth again but continues on with MScontin 15 mg and has oxycodone 5 mg as needed. She also has new prescription for carafate to start now- she is allergic to magic mouthwash. Compazine is working well for control of nausea. She is using Gtube for all nutrition and able to maintain weight- using only water orally for additional fluids and treating dry mouth but soreness prevents other nutrition orally.bowels are normal. She denies skin issues and is using the aquafor bid. She is sleeping well but also complains of progressive weakness with the treatments.    Rest of comprehensive and complete ROS is reviewed and is negative.   Past Medical History:   Diagnosis Date     Allergic rhinitis      Anemia      Hoarseness      Oropharyngeal cancer (H) 02/15/2018     Uncomplicated asthma      Current Outpatient Prescriptions   Medication     sucralfate (CARAFATE) 1 GM/10ML suspension     varenicline (CHANTIX STARTING MONTH KLAUS) 0.5 MG X 11 & 1 MG X 42 tablet     nystatin (MYCOSTATIN) 045601 UNIT/ML suspension     ondansetron (ZOFRAN) 8  "MG tablet     LORazepam (ATIVAN) 0.5 MG tablet     prochlorperazine (COMPAZINE) 10 MG tablet     Potassium Chloride 40 MEQ/15ML (20%) SOLN     polyethylene glycol (MIRALAX/GLYCOLAX) powder     oxyCODONE (ROXICODONE) 5 MG/5ML solution     morphine (MS CONTIN) 30 MG 12 hr tablet     dexamethasone (DECADRON) 4 MG tablet     ACETAMINOPHEN PO     order for DME     Fexofenadine HCl (ALLEGRA ALLERGY PO)     folic acid (FOLVITE) 1 MG tablet     No current facility-administered medications for this visit.      Allergies   Allergen Reactions     Ceftriaxone      Other reaction(s): Unknown Reaction     Chicken-Derived Products (Egg)      Other reaction(s): Vomiting  Other reaction(s): Unknown Reaction     No Clinical Screening - See Comments Hives     MAGIC MOUTHWASH     Physical Exam:BP (!) 87/55  Pulse 73  Temp 98.1  F (36.7  C)  Ht 1.499 m (4' 11\")  Wt 37.4 kg (82 lb 8 oz)  SpO2 100%  BMI 16.66 kg/m2 - weight stable today but blood pressure is low.   ECOG PS- 0  Constitutional: Alert, upbeat and moving well.cooperative. Thin.Appears more reserved today.  ENT: Eyes bright, mouth with some coating soreness to back of throat. Speech is clear  Respiratory: Breathing easy. Lung sounds clear to auscultation- no cough  GI: Abdomen with Gtube site with no signs of redness or infection.  MS: Muscle tone normal- moving easy, extremities normal with no edema.   Skin:  No suspicious lesions- area at neck showing new scattered flesh colored papules ar radiation site. No redness or peeling noted.   Neuro: Sensory grossly WNL, gait normal.   Psych: Mentation appears normal and affect normal with easy smile    Laboratory Results:   Results for orders placed or performed in visit on 04/04/18   CBC with platelets differential   Result Value Ref Range    WBC 2.0 (L) 4.0 - 11.0 10e9/L    RBC Count 3.33 (L) 3.8 - 5.2 10e12/L    Hemoglobin 9.9 (L) 11.7 - 15.7 g/dL    Hematocrit 31.6 (L) 35.0 - 47.0 %    MCV 95 78 - 100 fl    MCH 29.7 26.5 " - 33.0 pg    MCHC 31.3 (L) 31.5 - 36.5 g/dL    RDW 20.8 (H) 10.0 - 15.0 %    Platelet Count 317 150 - 450 10e9/L    Diff Method Automated Method     % Neutrophils 57.6 %    % Lymphocytes 19.7 %    % Monocytes 17.7 %    % Eosinophils 4.0 %    % Basophils 1.0 %    % Immature Granulocytes 0.0 %    Absolute Neutrophil 1.1 (L) 1.6 - 8.3 10e9/L    Absolute Lymphocytes 0.4 (L) 0.8 - 5.3 10e9/L    Absolute Monocytes 0.4 0.0 - 1.3 10e9/L    Absolute Eosinophils 0.1 0.0 - 0.7 10e9/L    Absolute Basophils 0.0 0.0 - 0.2 10e9/L    Abs Immature Granulocytes 0.0 0 - 0.4 10e9/L   Comprehensive metabolic panel   Result Value Ref Range    Sodium 136 133 - 144 mmol/L    Potassium 4.5 3.4 - 5.3 mmol/L    Chloride 101 94 - 109 mmol/L    Carbon Dioxide 31 20 - 32 mmol/L    Anion Gap 4 3 - 14 mmol/L    Glucose 88 70 - 99 mg/dL    Urea Nitrogen 9 7 - 30 mg/dL    Creatinine 0.55 0.52 - 1.04 mg/dL    GFR Estimate >90 >60 mL/min/1.7m2    GFR Estimate If Black >90 >60 mL/min/1.7m2    Calcium 9.0 8.5 - 10.1 mg/dL    Bilirubin Total 0.1 (L) 0.2 - 1.3 mg/dL    Albumin 3.6 3.4 - 5.0 g/dL    Protein Total 7.1 6.8 - 8.8 g/dL    Alkaline Phosphatase 56 40 - 150 U/L    ALT 14 0 - 50 U/L    AST 11 0 - 45 U/L   Magnesium   Result Value Ref Range    Magnesium 2.0 1.6 - 2.3 mg/dL     Assessment and Plan:   Squamous cell carcinoma of the tongue- Started chemoradiation with cisplatin on 3/14. She continues on radiation therapy and today is due to continue with day 22 cisplatin but is neutropenic. We will hold treatment today and delay for 1 week. She is hypotensive and will provide 1 liter NS today rather than cisplatin.   She will return in 1 week for day 22 cisplatin infusion with visit prior- continue with CBC and CMP review weekly.   Nutrition with noted weight loss- Has G tube and is using isosource as directed by dietician. She is able to maintain weight with the feedings but encouraged additional fluids - orally or through Gtube.   Pt continues using  compazine each morning to prevent nausea.   Cancer related pain-Pain is to right side of face, jaw , back of throat and neck. She is using MS Contin 15 mg bid and oxycodone only as needed- 1-2 x daily  Had appointment with Dr Ceron this week and was given carafate for help with throat issues though has not started use.  Mucositis- using the salt/soda rinses several times daily and Biotin. She was given Nystatin last week but not using.  Constipation- Pt using of dulcolax or miralax. She is to be taking her additional fluids per dietician as well.   Tobacco use- still smoking 2-3 cigarettes daily- has not set quit date. Offered assistance for smoking cessation. States they have nicotine patches at home if needed.   This was a 25 min visit with > 50% in counseling and coordinating care including education and management of concerns.    Elba Fry,CNP

## 2018-04-04 NOTE — LETTER
4/4/2018         RE: Melinda Milligan  [address] [facility] [address]        Dear Colleague,    Thank you for referring your patient, Melinda Milligan, to the [facility]. Please see a copy of my visit note below.    Palliative Care Outpatient Clinic Consultation Note    Patient:  Melinda Milligan    This note was written using voice recognition. I did proof read, but might have missed some things. Please contact me for major errors.    Chief Complaint:   Melinda Milligan 42 year old female who is presenting to the palliative medicine clinic today at the request of Dr Nieto for a palliative care consultation secondary to SCC of the tongue.   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  This seems to have increased since radiation therapy was started.  She has been taking tylenol with some relief and takes MS Contin twice daily and oxycodone in the evening. She broke out in hives after using the magic mouthwash.  She also has mouth sores, which are not painful at this time.  She is using nystatin for suspected thrush.  She also gets bilateral jaw/hip pain, mostly after speaking more than usual and/or at the end of the day.  This responds well to the oxycodone and Tylenol.    She does not have issues with constipation.    Patient's Disease Understanding: adequate    Coping:  She states she has been doing well. She feels very supported by family and friends.   Spirituality or Church are not an important part of her life.     Social History  Living Situation: with her  and 12y/o son  Children: 12y/o son and 20y/o daughter. Her daughter is away in college. Both are aware of her cancer and  doing well. Her son has seen a counselor in school.     Support System: family and friends    Financial Concerns: denies    Social History   Substance Use Topics     Smoking status: Current Every Day Smoker     Packs/day: 1.00     Years: 23.00     Types: Cigarettes     Start date: 1/1/1993     Smokeless tobacco: Never Used      Comment: Patient reports currently smoking 3 cigarettes per day - updated 3/6/2018     Alcohol use No       Family History  Family History   Problem Relation Age of Onset     Substance Abuse Father      Dementia Maternal Grandmother      DIABETES Maternal Grandmother      Asthma Brother      Prostate Cancer Maternal Grandfather      Advance Care Planning:  Advance Directive:    Has not completed this yet.     She agree to discuss and complete this at her next visit. She doesn't want any treatment limitations at this time    Medications, current, pertinent:  MS Contin 30m g q12  Oxycodone 10mg prn   Tylenol prn    Allergies   Allergen Reactions     Ceftriaxone      Other reaction(s): Unknown Reaction     Chicken-Derived Products (Egg)      Other reaction(s): Vomiting  Other reaction(s): Unknown Reaction     No Clinical Screening - See Comments Hives     MAGIC MOUTHWASH     Past Medical History:   Diagnosis Date     Allergic rhinitis      Anemia      Hoarseness      Oropharyngeal cancer (H) 02/15/2018     Uncomplicated asthma      Past Surgical History:   Procedure Laterality Date     BIOPSY  02/15/2018    Left Tongue - [facility]     LAPAROSCOPIC ASSISTED INSERTION TUBE GASTROTOMY N/A 3/14/2018    Procedure: LAPAROSCOPIC ASSISTED INSERTION TUBE GASTROSTOMY;  Percutaneous Endoscopic Gastrostomy Tube Insertion, Esophagogastroduodenoscopy;  Surgeon: Edna Martinez MD;  Location:  OR       REVIEW OF SYSTEMS:   ROS: 10 point ROS neg other than the symptoms noted above in the HPI    Physical Exam:  There were no vitals taken for this visit.  weight 83lbs  CONSTIT: awake, appears comfortable,  very thin  EENT: MMM, sore at right edge of tongue. Multiple confluent sores around pharynx and base of tongue with white exsudate.  EOMI, no icterus  NECK: mild erythema over right aspect of neck  RESP: reg, nl effort  GI: G-tube in place  MSK:moves x4, nl gait  SKIN:  warm, no rash, no obvious lesions  NEURO: alert, oriented x3  PSYCH: bright affect, memory and thought process intact        Data Reviewed:  LABS: GFR >90  Albumin 3.6  WBC 2    IMAGING:   CT neck :     Images personally reviewed: Hypermetabolic mass and bilateral palatine tonsils, extending to the soft palate, posterior nasopharyngeal wall and oropharyngeal wall    : ok    Impressions:  Palliative Performance Score:  90  Decision Making Capacity:  intact        Recommendations & Counselin-year-old female with recently diagnosed squamous cell carcinoma of the tongue    Pain: Multifactorial due to malignancy and likely radiation side effects.  She gets some relief with systemic therapy with morphine, oxycodone and Tylenol.  She has not been able to tolerate Magic mouthwash and is now looking for options to treat her odynophagia.    Trial of Carafate 4 times daily as needed    We discussed option of modified mouthwash (lidocaine, Benadryl, Carafate) with radiation oncology    Advance Care Planning: Patient will look at the honoring choices for him and discuss this with her .  We will complete the form at her next visit.    Return to clinic in 2 months    Deneen Ceron MD  Palliative Medicine  Pager [phone]        Again, thank you for allowing me to participate in the care of your patient.        Sincerely,        Deneen Ceron MD

## 2018-04-04 NOTE — MR AVS SNAPSHOT
After Visit Summary   4/4/2018    Melinda Milligan    MRN: [id number]           Patient Information     Date Of Birth          1975        Visit Information        Provider Department      4/4/2018 1:15 PM Deneen Ceron MD [facility]        Today's Diagnoses     Throat pain    -  1    Malignant neoplasm of base of tongue (H)          Care Instructions    Patient Instructions      Expand All Collapse All    Thank you for coming into the Palliative Care Clinic today.     1. Carafate (sucralfate) 1gm/10ml solution: take 1gm up to four times daily as needed for pain with swallowing    2. You will get a call from the Speech and Swallow therapy office to schedule an appointment    3. I will discuss further options to treat your throat pain with the radiation team.    Please do not make any changes to your medication doses or schedules without calling us in advance.     Return to clinic in 2 months for a follow up.     You can reach the Palliative Care Team during business hours at the following number:    - [phone]    To reach the Palliative Care Provider on-call After-hours or on holidays and weekends, call: [phone].  Please note that we are not able to provide pain medication refills on evenings or weekends.                     Follow-ups after your visit        Your next 10 appointments already scheduled     Apr 05, 2018  8:15 AM CDT   Return Visit with NANCY Cai CNP   [facility])    [address] [place] [address]   [phone]            Apr 05, 2018  8:45 AM CDT   Level 6 with BAY 10 INFUSION   [facility])    [id number] [place] [address]   [phone]            Apr 05, 2018  2:45 PM CDT   TREATMENT with RADIATION THERAPIST   [facility] ([facility])    [address] [place] [address]  [facility] [address]   [phone]            Apr 06, 2018 12:45 PM CDT   TREATMENT with RADIATION THERAPIST   [facility] ([facility])    [address] [facility] [address]   [phone]            Apr 09, 2018 12:45 PM CDT   TREATMENT with RADIATION THERAPIST   [facility] ([facility])    [address] [facility] [address]   [phone]            Apr 09, 2018  1:15 PM CDT   on treatment visit with Albert Ambrosio MD   [facility] ([facility])    [address] [facility] [address]   [phone]            Apr 10, 2018 12:45 PM CDT   TREATMENT with RADIATION THERAPIST   [facility] ([facility])    [address] [facility] [address]   [phone]            Apr 10, 2018  1:00 PM CDT   Return Visit with Juli Monique RD   [facility] ([facility])    [address] [facility] [address]   [phone]            Apr 11, 2018 12:45 PM CDT   TREATMENT with RADIATION THERAPIST   [facility] ([facility])    [address] [facility] [address]   [phone]            Apr 12, 2018 12:45 PM CDT   TREATMENT with RADIATION THERAPIST   [facility] ([facility])    [address] [facility] [address]   [phone]              Who to contact     If you have questions or need follow up information about today's clinic visit or your schedule please contact [facility] directly at [phone].  Normal or non-critical lab and imaging results will be communicated to you by MyChart, letter or phone within 4 business days after the clinic has received the results. If you do not hear from us within 7 days, please contact the clinic through MyChart or phone. If you have  a critical or abnormal lab result, we will notify you by phone as soon as possible.  Submit refill requests through [employer] or call your pharmacy and they will forward the refill request to us. Please allow 3 business days for your refill to be completed.          Additional Information About Your Visit        [employer] Information     [employer] is an electronic gateway that provides easy, online access to your medical records. With [employer], you can request a clinic appointment, read your test results, renew a prescription or communicate with your care team.     To sign up for [employer] visit the website at [email or web address]   You will be asked to enter the access code listed below, as well as some personal information. Please follow the directions to create your username and password.     Your access code is: [email or web address]  Expires: 2018  7:30 AM     Your access code will  in 90 days. If you need help or a new code, please contact your [facility] or call [phone] for assistance.        Care EveryWhere ID     This is your Care EveryWhere ID. This could be used by other organizations to access your [place] medical records  [id number]        Your Vitals Were     Pulse                   72            Blood Pressure from Last 3 Encounters:   18 99/66   18 109/71   18 110/63    Weight from Last 3 Encounters:   18 37.9 kg (83 lb 9.6 oz)   18 38.6 kg (85 lb 3.2 oz)   18 37.6 kg (83 lb)              Today, you had the following     No orders found for display         Today's Medication Changes          These changes are accurate as of 18  3:19 PM.  If you have any questions, ask your nurse or doctor.               Start taking these medicines.        Dose/Directions    sucralfate 1 GM/10ML suspension   Commonly known as:  CARAFATE   Used for:  Throat pain   Started by:  Deneen Ceron MD        Dose:  1 g   Take 10 mLs (1  g) by mouth 4 times daily as needed   Quantity:  1200 mL   Refills:  1            Where to get your medicines      These medications were sent to [facility] - [place], [place] - [address]  [address], [facility] [address]     Phone:  [phone]     sucralfate 1 GM/10ML suspension                Primary Care Provider Office Phone # Fax #    Quang Wall PA-C [phone]       [facility] [address] [facility] [address]  [facility] [address]        Equal Access to Services     [facility]: Hadii aad ku hadasho Soomaali, waaxda luqadaha, qaybta kaalmada adeegyada, waxay idiin hayaan adeeg zoey keller . So [facility] [phone].    ATENCIÓN: Si habla español, tiene a hawley disposición servicios gratuitos de asistencia lingüística. [facility] [phone].    We comply with applicable federal civil rights laws and [place] laws. We do not discriminate on the basis of race, color, national origin, age, disability, sex, sexual orientation, or gender identity.            Thank you!     Thank you for choosing [facility]  for your care. Our goal is always to provide you with excellent care. Hearing back from our patients is one way we can continue to improve our services. Please take a few minutes to complete the written survey that you may receive in the mail after your visit with us. Thank you!             Your Updated Medication List - Protect others around you: Learn how to safely use, store and throw away your medicines at www.disposemymeds.org.          This list is accurate as of 4/4/18  3:19 PM.  Always use your most recent med list.                   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)       morphine 30 MG 12 hr tablet    MS CONTIN    60 tablet    Take 1 tablet (30 mg) by mouth every 12 hours maximum 2 tablet(s) per day    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), Cancer related pain       nystatin 346045 UNIT/ML suspension    MYCOSTATIN    280 mL    Take 5 mLs (500,000 Units) by mouth 4 times daily    Thrush       ondansetron 8 MG tablet    ZOFRAN    21 tablet    Take 1 tablet (8 mg) by mouth every 12 hours as needed for nausea    Squamous cell carcinoma of oropharynx (H)       order for DME     90 Can    Sig:  Isosource 1.5 calories:  Instill 3.5 cartons per day via PEG tube by syringe or gravity flow    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)       oxyCODONE 5 MG/5ML solution    ROXICODONE    300 mL    Take 10 mLs (10 mg) by mouth every 4 hours as needed for breakthrough pain or moderate to severe pain    Cancer related pain, 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)       polyethylene glycol powder    MIRALAX/GLYCOLAX    225 g    Take 17 g (1 capful) by mouth daily    Drug-induced constipation, Cancer related pain, 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)       Potassium Chloride 40 MEQ/15ML (20%) Soln     1 Bottle    7.5 mLs (20 mEq) by Oral or G tube route daily    Squamous cell carcinoma of oropharynx (H), Hypokalemia       prochlorperazine 10 MG tablet    COMPAZINE    30 tablet    Take 1 tablet (10 mg) by mouth every 6 hours as needed (Nausea/Vomiting)    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    Squamous cell carcinoma of oropharynx (H)

## 2018-04-04 NOTE — TELEPHONE ENCOUNTER
Medication: Dexamethasone 4mg     Last Fill: 03/14/18       Qty:6     Last Rx Date: 04/04/08     Last MD Visit: 04/04/18

## 2018-04-04 NOTE — PROGRESS NOTES
Palliative Care Outpatient Clinic Consultation Note    Patient:  Melinda Milligan    This note was written using voice recognition. I did proof read, but might have missed some things. Please contact me for major errors.    Chief Complaint:   Melinda Milligan 42 year old female who is presenting to the palliative medicine clinic today at the request of Dr Nieto for a palliative care consultation secondary to SCC of the tongue.   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  This seems to have increased since radiation therapy was started.  She has been taking tylenol with some relief and takes MS Contin twice daily and oxycodone in the evening. She broke out in hives after using the magic mouthwash.  She also has mouth sores, which are not painful at this time.  She is using nystatin for suspected thrush.  She also gets bilateral jaw/hip pain, mostly after speaking more than usual and/or at the end of the day.  This responds well to the oxycodone and Tylenol.    She does not have issues with constipation.    Patient's Disease Understanding: adequate    Coping:  She states she has been doing well. She feels very supported by family and friends.   Spirituality or L[employer] day are not an important part of her life.     Social History  Living Situation: with her  and 14y/o son  Children: 14y/o son and 20y/o daughter. Her daughter is away in college. Both are aware of her cancer and doing well. Her son has seen a counselor in school.     Support System: family and friends    Financial Concerns: denies    Social History   Substance Use Topics     Smoking status: Current Every Day Smoker     Packs/day: 1.00     Years: 23.00     Types:  Cigarettes     Start date: 1/1/1993     Smokeless tobacco: Never Used      Comment: Patient reports currently smoking 3 cigarettes per day - updated 3/6/2018     Alcohol use No       Family History  Family History   Problem Relation Age of Onset     Substance Abuse Father      Dementia Maternal Grandmother      DIABETES Maternal Grandmother      Asthma Brother      Prostate Cancer Maternal Grandfather      Advance Care Planning:  Advance Directive:    Has not completed this yet.     She agree to discuss and complete this at her next visit. She doesn't want any treatment limitations at this time    Medications, current, pertinent:  MS Contin 30m g q12  Oxycodone 10mg prn   Tylenol prn    Allergies   Allergen Reactions     Ceftriaxone      Other reaction(s): Unknown Reaction     Chicken-Derived Products (Egg)      Other reaction(s): Vomiting  Other reaction(s): Unknown Reaction     No Clinical Screening - See Comments Hives     MAGIC MOUTHWASH     Past Medical History:   Diagnosis Date     Allergic rhinitis      Anemia      Hoarseness      Oropharyngeal cancer (H) 02/15/2018     Uncomplicated asthma      Past Surgical History:   Procedure Laterality Date     BIOPSY  02/15/2018    Left Tongue - [facility]     LAPAROSCOPIC ASSISTED INSERTION TUBE GASTROTOMY N/A 3/14/2018    Procedure: LAPAROSCOPIC ASSISTED INSERTION TUBE GASTROSTOMY;  Percutaneous Endoscopic Gastrostomy Tube Insertion, Esophagogastroduodenoscopy;  Surgeon: Edna Martinez MD;  Location: [facility]       REVIEW OF SYSTEMS:   ROS: 10 point ROS neg other than the symptoms noted above in the HPI    Physical Exam:  There were no vitals taken for this visit.  weight 83lbs  CONSTIT: awake, appears comfortable, very thin  EENT: MMM, sore at right edge of tongue. Multiple confluent sores around pharynx and base of tongue with white exsudate.  EOMI, no icterus  NECK: mild erythema over right aspect of neck  RESP: reg, nl effort  GI: G-tube in place  MSK:moves x4, nl  gait  SKIN:  warm, no rash, no obvious lesions  NEURO: alert, oriented x3  PSYCH: bright affect, memory and thought process intact        Data Reviewed:  LABS: GFR >90  Albumin 3.6  WBC 2    IMAGING:   CT neck :     Images personally reviewed: Hypermetabolic mass and bilateral palatine tonsils, extending to the soft palate, posterior nasopharyngeal wall and oropharyngeal wall    : ok    Impressions:  Palliative Performance Score:  90  Decision Making Capacity:  intact        Recommendations & Counselin-year-old female with recently diagnosed squamous cell carcinoma of the tongue    Pain: Multifactorial due to malignancy and likely radiation side effects.  She gets some relief with systemic therapy with morphine, oxycodone and Tylenol.  She has not been able to tolerate Magic mouthwash and is now looking for options to treat her odynophagia.    Trial of Carafate 4 times daily as needed    We discussed option of modified mouthwash (lidocaine, Benadryl, Carafate) with radiation oncology    Advance Care Planning: Patient will look at the honoring choices for him and discuss this with her .  We will complete the form at her next visit.    Return to clinic in 2 months    Deneen Ceron MD  Palliative Medicine  Pager [phone]

## 2018-04-04 NOTE — NURSING NOTE
"Oncology Rooming Note    April 4, 2018 1:17 PM   Melinda Milligan is a 42 year old female who presents for:    Chief Complaint   Patient presents with     Oncology Clinic Visit     Initial Vitals: BP 99/66  Pulse 72 Estimated body mass index is 16.88 kg/(m^2) as calculated from the following:    Height as of 3/30/18: 1.499 m (4' 11.02\").    Weight as of 4/2/18: 37.9 kg (83 lb 9.6 oz). There is no height or weight on file to calculate BSA.  Data Unavailable Comment: Data Unavailable   No LMP recorded.  Allergies reviewed: Yes  Medications reviewed: Yes    Medications: Medication refills not needed today.  Pharmacy name entered into [employer]: [facility] - [place], [place] - [address]      5 minutes for nursing intake (face to face time)     Sri Artis LPN              "

## 2018-04-05 ENCOUNTER — ONCOLOGY VISIT (OUTPATIENT)
Dept: ONCOLOGY | Facility: CLINIC | Age: 43
End: 2018-04-05
Payer: COMMERCIAL

## 2018-04-05 ENCOUNTER — HOME INFUSION (PRE-WILLOW HOME INFUSION) (OUTPATIENT)
Dept: PHARMACY | Facility: CLINIC | Age: 43
End: 2018-04-05

## 2018-04-05 ENCOUNTER — INFUSION THERAPY VISIT (OUTPATIENT)
Dept: INFUSION THERAPY | Facility: CLINIC | Age: 43
End: 2018-04-05
Payer: COMMERCIAL

## 2018-04-05 ENCOUNTER — APPOINTMENT (OUTPATIENT)
Dept: RADIATION ONCOLOGY | Facility: CLINIC | Age: 43
End: 2018-04-05
Payer: COMMERCIAL

## 2018-04-05 VITALS — DIASTOLIC BLOOD PRESSURE: 64 MMHG | SYSTOLIC BLOOD PRESSURE: 89 MMHG | HEART RATE: 68 BPM

## 2018-04-05 VITALS
HEART RATE: 73 BPM | OXYGEN SATURATION: 100 % | TEMPERATURE: 98.1 F | DIASTOLIC BLOOD PRESSURE: 55 MMHG | WEIGHT: 82.5 LBS | BODY MASS INDEX: 16.63 KG/M2 | SYSTOLIC BLOOD PRESSURE: 87 MMHG | HEIGHT: 59 IN

## 2018-04-05 DIAGNOSIS — T45.1X5A CHEMOTHERAPY-INDUCED NEUTROPENIA (H): Primary | ICD-10-CM

## 2018-04-05 DIAGNOSIS — Z72.0 TOBACCO USE: ICD-10-CM

## 2018-04-05 DIAGNOSIS — E87.1 HYPONATREMIA: Primary | ICD-10-CM

## 2018-04-05 DIAGNOSIS — K59.03 DRUG-INDUCED CONSTIPATION: ICD-10-CM

## 2018-04-05 DIAGNOSIS — C02.9 TONGUE CANCER (H): ICD-10-CM

## 2018-04-05 DIAGNOSIS — C10.9 SQUAMOUS CELL CARCINOMA OF OROPHARYNX (H): ICD-10-CM

## 2018-04-05 DIAGNOSIS — K12.30 MUCOSITIS: ICD-10-CM

## 2018-04-05 DIAGNOSIS — D70.1 CHEMOTHERAPY-INDUCED NEUTROPENIA (H): Primary | ICD-10-CM

## 2018-04-05 DIAGNOSIS — Z93.1 FEEDING BY G-TUBE (H): ICD-10-CM

## 2018-04-05 DIAGNOSIS — G89.3 CANCER RELATED PAIN: ICD-10-CM

## 2018-04-05 DIAGNOSIS — C06.9 SQUAMOUS CELL CARCINOMA OF ORAL CAVITY (H): ICD-10-CM

## 2018-04-05 PROCEDURE — 77014 ZZHC CT GUIDE FOR PLACEMENT RADIATION THERAPY FIELDS: CPT | Performed by: RADIOLOGY

## 2018-04-05 PROCEDURE — 99207 ZZC NO CHARGE LOS: CPT

## 2018-04-05 PROCEDURE — 99214 OFFICE O/P EST MOD 30 MIN: CPT | Mod: 25 | Performed by: NURSE PRACTITIONER

## 2018-04-05 PROCEDURE — 77386 ZZC IMRT TREATMENT DELIVERY, COMPLEX: CPT | Performed by: RADIOLOGY

## 2018-04-05 PROCEDURE — 96360 HYDRATION IV INFUSION INIT: CPT | Performed by: NURSE PRACTITIONER

## 2018-04-05 PROCEDURE — 96361 HYDRATE IV INFUSION ADD-ON: CPT | Mod: 59 | Performed by: NURSE PRACTITIONER

## 2018-04-05 RX ORDER — PROCHLORPERAZINE MALEATE 10 MG
10 TABLET ORAL EVERY 6 HOURS PRN
Qty: 30 TABLET | Refills: 1 | Status: SHIPPED | OUTPATIENT
Start: 2018-04-05 | End: 2018-06-05

## 2018-04-05 RX ADMIN — Medication 1000 ML: at 09:30

## 2018-04-05 ASSESSMENT — PAIN SCALES - GENERAL: PAINLEVEL: EXTREME PAIN (9)

## 2018-04-05 NOTE — LETTER
4/5/2018         RE: Melinda Milligan  [address] [facility] [address]        Dear Colleague,    Thank you for referring your patient, Melinda Milligan, to the [facility]. Please see a copy of my visit note below.    Oncology Follow Up Visit: April 5, 2018    Oncologist: Dr Benedicto Nieto  PCP: Quang Wall  ENT: Dr Marga Arana    Diagnosis: Squamous carcinoma of the tongue  Melinda Milligan is a 41 yo female who presented with thrush then noted swelling and pain to right side of tongue and cheek. Biopsy of tongue lesion proved squamous cell carcinoma. Further imaging work up showed mass involving bilateral palatine tonsils, soft palpate with extension to nasopharyngeal and oropharyngeal walls with level IIa lymph node involvement.   * also found to have iron deficiency anemia and was started on Infed dosing.   Treatment:   3/13/2018 gtube placed  3/14/2018 begins chemoradiation with cisplatin    Interval History: Ms. Milligan comes to clinic today for review of symptoms with continued chemoradiation. Pt is due for day 22 cisplatin. She share she is starting to have more pain to the throat and mouth again but continues on with MScontin 15 mg and has oxycodone 5 mg as needed. She also has new prescription for carafate to start now- she is allergic to magic mouthwash. Compazine is working well for control of nausea. She is using Gtube for all nutrition and able to maintain weight- using only water orally for additional fluids and treating dry mouth but soreness prevents other nutrition orally.bowels are normal. She denies skin issues and is using the aquafor bid. She is sleeping well but also complains of progressive weakness with the treatments.    Rest of comprehensive and complete ROS is reviewed and is negative.   Past Medical History:   Diagnosis Date     Allergic rhinitis      Anemia      Hoarseness      Oropharyngeal cancer (H) 02/15/2018     Uncomplicated asthma   "    Current Outpatient Prescriptions   Medication     sucralfate (CARAFATE) 1 GM/10ML suspension     varenicline (CHANTIX STARTING MONTH PAK) 0.5 MG X 11 & 1 MG X 42 tablet     nystatin (MYCOSTATIN) 038829 UNIT/ML suspension     ondansetron (ZOFRAN) 8 MG tablet     LORazepam (ATIVAN) 0.5 MG tablet     prochlorperazine (COMPAZINE) 10 MG tablet     Potassium Chloride 40 MEQ/15ML (20%) SOLN     polyethylene glycol (MIRALAX/GLYCOLAX) powder     oxyCODONE (ROXICODONE) 5 MG/5ML solution     morphine (MS CONTIN) 30 MG 12 hr tablet     dexamethasone (DECADRON) 4 MG tablet     ACETAMINOPHEN PO     order for DME     Fexofenadine HCl (ALLEGRA ALLERGY PO)     folic acid (FOLVITE) 1 MG tablet     No current facility-administered medications for this visit.      Allergies   Allergen Reactions     Ceftriaxone      Other reaction(s): Unknown Reaction     Chicken-Derived Products (Egg)      Other reaction(s): Vomiting  Other reaction(s): Unknown Reaction     No Clinical Screening - See Comments Hives     MAGIC MOUTHWASH     Physical Exam:BP (!) 87/55  Pulse 73  Temp 98.1  F (36.7  C)  Ht 1.499 m (4' 11\")  Wt 37.4 kg (82 lb 8 oz)  SpO2 100%  BMI 16.66 kg/m2 - weight stable today but blood pressure is low.   ECOG PS- 0  Constitutional: Alert, upbeat and moving well.cooperative. Thin.Appears more reserved today.  ENT: Eyes bright, mouth with some coating soreness to back of throat. Speech is clear  Respiratory: Breathing easy. Lung sounds clear to auscultation- no cough  GI: Abdomen with Gtube site with no signs of redness or infection.  MS: Muscle tone normal- moving easy, extremities normal with no edema.   Skin:  No suspicious lesions- area at neck showing new scattered flesh colored papules ar radiation site. No redness or peeling noted.   Neuro: Sensory grossly WNL, gait normal.   Psych: Mentation appears normal and affect normal with easy smile    Laboratory Results:   Results for orders placed or performed in visit on " 04/04/18   CBC with platelets differential   Result Value Ref Range    WBC 2.0 (L) 4.0 - 11.0 10e9/L    RBC Count 3.33 (L) 3.8 - 5.2 10e12/L    Hemoglobin 9.9 (L) 11.7 - 15.7 g/dL    Hematocrit 31.6 (L) 35.0 - 47.0 %    MCV 95 78 - 100 fl    MCH 29.7 26.5 - 33.0 pg    MCHC 31.3 (L) 31.5 - 36.5 g/dL    RDW 20.8 (H) 10.0 - 15.0 %    Platelet Count 317 150 - 450 10e9/L    Diff Method Automated Method     % Neutrophils 57.6 %    % Lymphocytes 19.7 %    % Monocytes 17.7 %    % Eosinophils 4.0 %    % Basophils 1.0 %    % Immature Granulocytes 0.0 %    Absolute Neutrophil 1.1 (L) 1.6 - 8.3 10e9/L    Absolute Lymphocytes 0.4 (L) 0.8 - 5.3 10e9/L    Absolute Monocytes 0.4 0.0 - 1.3 10e9/L    Absolute Eosinophils 0.1 0.0 - 0.7 10e9/L    Absolute Basophils 0.0 0.0 - 0.2 10e9/L    Abs Immature Granulocytes 0.0 0 - 0.4 10e9/L   Comprehensive metabolic panel   Result Value Ref Range    Sodium 136 133 - 144 mmol/L    Potassium 4.5 3.4 - 5.3 mmol/L    Chloride 101 94 - 109 mmol/L    Carbon Dioxide 31 20 - 32 mmol/L    Anion Gap 4 3 - 14 mmol/L    Glucose 88 70 - 99 mg/dL    Urea Nitrogen 9 7 - 30 mg/dL    Creatinine 0.55 0.52 - 1.04 mg/dL    GFR Estimate >90 >60 mL/min/1.7m2    GFR Estimate If Black >90 >60 mL/min/1.7m2    Calcium 9.0 8.5 - 10.1 mg/dL    Bilirubin Total 0.1 (L) 0.2 - 1.3 mg/dL    Albumin 3.6 3.4 - 5.0 g/dL    Protein Total 7.1 6.8 - 8.8 g/dL    Alkaline Phosphatase 56 40 - 150 U/L    ALT 14 0 - 50 U/L    AST 11 0 - 45 U/L   Magnesium   Result Value Ref Range    Magnesium 2.0 1.6 - 2.3 mg/dL     Assessment and Plan:   Squamous cell carcinoma of the tongue- Started chemoradiation with cisplatin on 3/14. She continues on radiation therapy and today is due to continue with day 22 cisplatin but is neutropenic. We will hold treatment today and delay for 1 week. She is hypotensive and will provide 1 liter NS today rather than cisplatin.   She will return in 1 week for day 22 cisplatin infusion with visit prior- continue  with CBC and CMP review weekly.   Nutrition with noted weight loss- Has G tube and is using isosource as directed by dietician. She is able to maintain weight with the feedings but encouraged additional fluids - orally or through Gtube.   Pt continues using compazine each morning to prevent nausea.   Cancer related pain-Pain is to right side of face, jaw , back of throat and neck. She is using MS Contin 15 mg bid and oxycodone only as needed- 1-2 x daily  Had appointment with Dr Ceron this week and was given carafate for help with throat issues though has not started use.  Mucositis- using the salt/soda rinses several times daily and Biotin. She was given Nystatin last week but not using.  Constipation- Pt using of dulcolax or miralax. She is to be taking her additional fluids per dietician as well.   Tobacco use- still smoking 2-3 cigarettes daily- has not set quit date. Offered assistance for smoking cessation. States they have nicotine patches at home if needed.   This was a 25 min visit with > 50% in counseling and coordinating care including education and management of concerns.    Elba Fry CNP      Again, thank you for allowing me to participate in the care of your patient.        Sincerely,        Elba Fry, DELANO, APRN CNP

## 2018-04-05 NOTE — PROGRESS NOTES
Infusion Nursing Note:  Melinda Milligan presents today for IVF.    Patient seen by provider today: Yes: Elba Fry NP   present during visit today: Not Applicable.    Note: Treatment deferred 1 week. She note from Elba Fry NP..    Intravenous Access:  Peripheral IV placed.    Treatment Conditions:  Lab Results   Component Value Date    HGB 9.9 04/04/2018     Lab Results   Component Value Date    WBC 2.0 04/04/2018      Lab Results   Component Value Date    ANEU 1.1 04/04/2018     Lab Results   Component Value Date     04/04/2018      Results reviewed, labs did NOT meet treatment parameters: ANC 1.1.      Post Infusion Assessment:  Patient tolerated infusion without incident.  No evidence of extravasations.  Access discontinued per protocol.    Discharge Plan:   Message in to scheduling to set up next appointment for 4/13/18.  Patient discharged in stable condition accompanied by: self and .  Departure Mode: Ambulatory.    Anila Mcbride RN

## 2018-04-05 NOTE — MR AVS SNAPSHOT
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)        Feeding by G-tube (H)        Mucositis        Cancer related pain        Drug-induced constipation        Tobacco use           Follow-ups after your visit        Your next 10 appointments already scheduled     Apr 11, 2018  7:45 AM CDT   Return Visit with Benedicto Nieto MD   [facility])    [address] [facility] [address]   [phone]            Apr 11, 2018  8:30 AM CDT   Level 6 with BAY 3 INFUSION   [facility])    [address] [facility] [address]   [phone]            Apr 11, 2018  4:45 PM CDT   TREATMENT with RADIATION THERAPIST   [facility]    [address] [facility] [address]   [phone]            Apr 12, 2018 12:45 PM CDT   TREATMENT with RADIATION THERAPIST   [facility]    [address] [facility] [address]   [phone]            Apr 13, 2018 10:00 AM CDT   (Arrive by 9:45 AM)   PET ONCOLOGY WHOLE BODY with MGPET1   [facility])    [address] [place] [address]   [phone]           Tell your doctor:   If there is any chance you may be pregnant or if you are breastfeeding.   If you have problems lying in small spaces (claustrophobia). If you do, your doctor may give you medicine to help you relax. If you have  diabetes:   Have your exam early in the morning. Your blood glucose will go up as the day goes by.   Your glucose level must be 180 or less at the start of the exam. Please take any medicines you need to ensure this blood glucose level.   If you are taking insulin in the morning take with breakfast by 6 am and schedule procedure between 12 and 2:15 pm.   If you are taking insulin at night take nightly dose, fast overnight, schedule procedure before 10 am.   If you take insulin both morning and night take morning dose by 6am and schedule procedure between 12 and 2:15 pm.   24 hours before your scan: Don t do any heavy exercise. (No jogging, aerobics or other workouts.) Exercise will make your pictures less accurate.  At least 7 hours before your scan, or the evening before if you have an early appointment: Eat a low carb, high protein meal (Lean meats, seafood, beans, soy, low-fat dairy, eggs, nuts & seeds). 6 hours before your scan:   Stop all food and liquids (except water).   Do not chew gum or suck on mints.   If you need to take medicine with food, you may take it with a few crackers.  Please call your Imaging Department at your exam site with any questions.            Apr 13, 2018  1:30 PM CDT   TREATMENT with RADIATION THERAPIST   [facility])    [address] [place] [address]   [phone]            Apr 13, 2018  2:45 PM CDT   Return Visit with NANCY Cai CNP   [facility])    [address] [place] [address]   [phone]            Apr 16, 2018 12:45 PM CDT   TREATMENT with RADIATION THERAPIST   [facility] ([facility])    [address] [facility] [address]   [phone]            Apr 16, 2018  1:15 PM CDT   on treatment visit with Albert Ambrosio MD   [facility] ([facility])     [address] [place] [id number] [phone]            2018 12:45 PM CDT   TREATMENT with RADIATION THERAPIST   [facility])    [address] [place] [address]   [phone]              Who to contact     If you have questions or need follow up information about today's clinic visit or your schedule please contact [facility] directly at [phone].  Normal or non-critical lab and imaging results will be communicated to you by [employer] Direct[employer], letter or phone within 4 business days after the clinic has received the results. If you do not hear from us within 7 days, please contact the clinic through [employer] or phone. If you have a critical or abnormal lab result, we will notify you by phone as soon as possible.  Submit refill requests through Intentio or call your pharmacy and they will forward the refill request to us. Please allow 3 business days for your refill to be completed.          Additional Information About Your Visit        Intentio Information     Intentio is an electronic gateway that provides easy, online access to your medical records. With Intentio, you can request a clinic appointment, read your test results, renew a prescription or communicate with your care team.     To sign up for Intentio visit the website at [email or web address]   You will be asked to enter the access code listed below, as well as some personal information. Please follow the directions to create your username and password.     Your access code is: [email or web address]  Expires: 2018  7:30 AM     Your access code will  in 90 days. If you need help or a new code, please contact your [facility] or call [phone] for assistance.        Care EveryWhere ID     This is your Care EveryWhere ID. This could be used by other organizations to access your [facility] medical records  [id number]       "  Your Vitals Were     Pulse Temperature Height Pulse Oximetry BMI (Body Mass Index)       73 98.1  F (36.7  C) 1.499 m (4' 11\") 100% 16.66 kg/m2        Blood Pressure from Last 3 Encounters:   04/06/18 107/68   04/05/18 (!) 89/64   04/05/18 (!) 87/55    Weight from Last 3 Encounters:   04/09/18 38.1 kg (84 lb)   04/06/18 38.4 kg (84 lb 9.6 oz)   04/05/18 37.4 kg (82 lb 8 oz)              Today, you had the following     No orders found for display         Where to get your medicines      These medications were sent to [facility] - [place], [place] - [address]  [address], [facility] [address]     Phone:  [phone]     dexamethasone 4 MG tablet    prochlorperazine 10 MG tablet          Primary Care Provider Office Phone # Fax #    Quang Wall PA-C [phone]       [facility] [address] [facility] [address]  [facility] [address]        Equal Access to Services     JAVIER MORALES AH: Hadii aad ku hadasho Soomaali, waaxda luqadaha, qaybta kaalmada adeegyada, waxay sierrain hayeverettn kyle keller ah. So [facility] [phone].    ATENCIÓN: Si habla español, tiene a hawley disposición servicios gratuitos de asistencia lingüística. [facility] [phone].    We comply with applicable federal civil rights laws and [place] laws. We do not discriminate on the basis of race, color, national origin, age, disability, sex, sexual orientation, or gender identity.            Thank you!     Thank you for choosing [facility]  for your care. Our goal is always to provide you with excellent care. Hearing back from our patients is one way we can continue to improve our services. Please take a few minutes to complete the written survey that you may receive in the mail after your visit with us. Thank you!             Your Updated Medication List - Protect others around you: Learn how to safely use, store and throw away your medicines at " www.disposemymeds.org.          This list is accurate as of 4/5/18 11:59 PM.  Always use your most recent med list.                   Brand Name Dispense Instructions for use Diagnosis    ACETAMINOPHEN PO      Take 1,000 mg by mouth        ALLEGRA ALLERGY PO      Take by mouth as needed for allergies        dexamethasone 4 MG tablet    DECADRON    6 tablet    Take 2 tablets (8 mg) by mouth daily (with breakfast) Start the day after chemotherapy.    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), Cancer related pain       nystatin 693748 UNIT/ML suspension    MYCOSTATIN    280 mL    Take 5 mLs (500,000 Units) by mouth 4 times daily    Thrush       ondansetron 8 MG tablet    ZOFRAN    21 tablet    Take 1 tablet (8 mg) by mouth every 12 hours as needed for nausea    Squamous cell carcinoma of oropharynx (H)       order for DME     90 Can    Sig:  Isosource 1.5 calories:  Instill 3.5 cartons per day via PEG tube by syringe or gravity flow    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)       oxyCODONE 5 MG/5ML solution    ROXICODONE    300 mL    Take 10 mLs (10 mg) by mouth every 4 hours as needed for breakthrough pain or moderate to severe pain    Cancer related pain, 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)       polyethylene glycol powder    MIRALAX/GLYCOLAX    225 g    Take 17 g (1 capful) by mouth daily    Drug-induced constipation, Cancer related pain, Squamous cell carcinoma of  oral cavity (H)       Potassium Chloride 40 MEQ/15ML (20%) Soln     1 Bottle    7.5 mLs (20 mEq) by Oral or G tube route daily    Squamous cell carcinoma of oropharynx (H), Hypokalemia       prochlorperazine 10 MG tablet    COMPAZINE    30 tablet    Take 1 tablet (10 mg) by mouth every 6 hours as needed (Nausea/Vomiting)    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    Squamous cell carcinoma of oropharynx (H)

## 2018-04-05 NOTE — NURSING NOTE
"Oncology Rooming Note    April 5, 2018 8:27 AM   Melinda Milligan is a 42 year old female who presents for:    Chief Complaint   Patient presents with     Oncology Clinic Visit     follow up prior to treatment     Initial Vitals: BP (!) 87/55  Pulse 73  Temp 98.1  F (36.7  C)  Ht 1.499 m (4' 11\")  Wt 37.4 kg (82 lb 8 oz)  SpO2 100%  BMI 16.66 kg/m2 Estimated body mass index is 16.66 kg/(m^2) as calculated from the following:    Height as of this encounter: 1.499 m (4' 11\").    Weight as of this encounter: 37.4 kg (82 lb 8 oz). Body surface area is 1.25 meters squared.  Extreme Pain (9) Comment: 9 with out medicine 5 with medicine, tylenol   No LMP recorded.  Allergies reviewed: Yes  Medications reviewed: Yes    Medications: MEDICATION REFILLS NEEDED TODAY. Provider was notified.  Pharmacy name entered into [employer] [facility] [employer]: [facility] - [place], [place] - [address])        5 minutes for nursing intake (face to face time)     Sri Artis LPN              "

## 2018-04-05 NOTE — MR AVS SNAPSHOT
After Visit Summary   4/5/2018    Melinda Milligan    MRN: [id number]           Patient Information     Date Of Birth          1975        Visit Information        Provider Department      4/5/2018 8:45 AM BAY 10 INFUSION [facility]        Today's Diagnoses     Hyponatremia    -  1    Squamous cell carcinoma of oropharynx (H)           Follow-ups after your visit        Your next 10 appointments already scheduled     Apr 05, 2018  2:45 PM CDT   TREATMENT with RADIATION THERAPIST   [facility] ([facility])    [address] [facility] [address]   [phone]            Apr 06, 2018 12:45 PM CDT   TREATMENT with RADIATION THERAPIST   [facility] ([facility])    [address] [facility] [address]   [phone]            Apr 09, 2018 12:45 PM CDT   TREATMENT with RADIATION THERAPIST   [facility] ([facility])    [address] [facility] [address]   [phone]            Apr 09, 2018  1:15 PM CDT   on treatment visit with Albert Ambrosio MD   [facility] ([facility])    [address] [facility] [address]   [phone]            Apr 10, 2018 12:45 PM CDT   TREATMENT with RADIATION THERAPIST   [facility] ([facility])    [address] [facility] [address]   [phone]            Apr 10, 2018  1:00 PM CDT   Return Visit with Juli Monique RD   [facility] ([facility])    [address] [facility] [address]   [phone]            Apr 11, 2018 12:45 PM CDT   TREATMENT with RADIATION THERAPIST   [facility] ([facility])    [address] [facility] [address]   [phone]            Apr 12, 2018 12:45 PM CDT   TREATMENT with RADIATION  THERAPIST   [facility]    [address] [place] [id number] [phone]            2018  1:30 PM CDT   TREATMENT with RADIATION THERAPIST   [facility] ([facility])    [address] [facility] [address] [phone]            2018  2:45 PM CDT   Return Visit with NANCY Cai CNP   [facility] ([facility])    [address] [facility] [address] [phone]              Who to contact     If you have questions or need follow up information about today's clinic visit or your schedule please contact [facility] directly at [phone].  Normal or non-critical lab and imaging results will be communicated to you by MyChart, letter or phone within 4 business days after the clinic has received the results. If you do not hear from us within 7 days, please contact the clinic through MyChart or phone. If you have a critical or abnormal lab result, we will notify you by phone as soon as possible.  Submit refill requests through CBA PHARMA or call your pharmacy and they will forward the refill request to us. Please allow 3 business days for your refill to be completed.          Additional Information About Your Visit        Food52hart Information     CBA PHARMA is an electronic gateway that provides easy, online access to your medical records. With [employer]AR[employer], you can request a clinic appointment, read your test results, renew a prescription or communicate with your care team.     To sign up for C[employer] PHARMA visit the website at [email or web address]   You will be asked to enter the access code listed below, as well as some personal information. Please follow the directions to create your username and password.     Your access code is: [email or web address]  Expires: 2018  7:30 AM     Your access code will  in 90 days. If you need  help or a new code, please contact your [facility] or call [phone] for assistance.        Care EveryWhere ID     This is your Care EveryWhere ID. This could be used by other organizations to access your [place] medical records  [id number]        Your Vitals Were     Pulse                   68            Blood Pressure from Last 3 Encounters:   04/05/18 (!) 89/64   04/05/18 (!) 87/55   04/04/18 99/66    Weight from Last 3 Encounters:   04/05/18 37.4 kg (82 lb 8 oz)   04/02/18 37.9 kg (83 lb 9.6 oz)   03/30/18 38.6 kg (85 lb 3.2 oz)              Today, you had the following     No orders found for display         Where to get your medicines      These medications were sent to [facility] - [place], [place] - [address]  [address], [facility] [address]     Phone:  [phone]     dexamethasone 4 MG tablet    prochlorperazine 10 MG tablet          Primary Care Provider Office Phone # Fax #    Quang Wall PA-C [phone]       [facility] [address] [facility] [address]  [facility] [address]        Equal Access to Services     JAVIER MORALES : Hadii aad ku hadasho Soomaali, waaxda luqadaha, qaybta kaalmada adeegyada, waxay idiin hayaan adeeg kharash lamargarita . So [facility] [phone].    ATENCIÓN: Si habla español, tiene a hawley disposición servicios gratuitos de asistencia lingüística. madai al [phone].    We comply with applicable federal civil rights laws and [place] laws. We do not discriminate on the basis of race, color, national origin, age, disability, sex, sexual orientation, or gender identity.            Thank you!     Thank you for choosing [facility]  for your care. Our goal is always to provide you with excellent care. Hearing back from our patients is one way we can continue to improve our services. Please take a few minutes to complete the written survey that you may receive in  the mail after your visit with us. Thank you!             Your Updated Medication List - Protect others around you: Learn how to safely use, store and throw away your medicines at www.disposemymeds.org.          This list is accurate as of 4/5/18 12:56 PM.  Always use your most recent med list.                   Brand Name Dispense Instructions for use Diagnosis    ACETAMINOPHEN PO      Take 1,000 mg by mouth        ALLEGRA ALLERGY PO      Take by mouth as needed for allergies        dexamethasone 4 MG tablet    DECADRON    6 tablet    Take 2 tablets (8 mg) by mouth daily (with breakfast) Start the day after chemotherapy.    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), Cancer related pain       nystatin 051775 UNIT/ML suspension    MYCOSTATIN    280 mL    Take 5 mLs (500,000 Units) by mouth 4 times daily    Thrush       ondansetron 8 MG tablet    ZOFRAN    21 tablet    Take 1 tablet (8 mg) by mouth every 12 hours as needed for nausea    Squamous cell carcinoma of oropharynx (H)       order for DME     90 Can    Sig:  Isosource 1.5 calories:  Instill 3.5 cartons per day via PEG tube by syringe or gravity flow    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)       oxyCODONE 5 MG/5ML solution    ROXICODONE    300 mL    Take 10 mLs (10 mg) by mouth every 4 hours as needed for breakthrough pain or moderate to severe pain    Cancer related pain, 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)        polyethylene glycol powder    MIRALAX/GLYCOLAX    225 g    Take 17 g (1 capful) by mouth daily    Drug-induced constipation, Cancer related pain, 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)       Potassium Chloride 40 MEQ/15ML (20%) Soln     1 Bottle    7.5 mLs (20 mEq) by Oral or G tube route daily    Squamous cell carcinoma of oropharynx (H), Hypokalemia       prochlorperazine 10 MG tablet    COMPAZINE    30 tablet    Take 1 tablet (10 mg) by mouth every 6 hours as needed (Nausea/Vomiting)    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    Squamous cell carcinoma of oropharynx (H)

## 2018-04-06 ENCOUNTER — APPOINTMENT (OUTPATIENT)
Dept: INFUSION THERAPY | Facility: CLINIC | Age: 43
End: 2018-04-06
Payer: COMMERCIAL

## 2018-04-06 ENCOUNTER — OFFICE VISIT (OUTPATIENT)
Dept: RADIATION ONCOLOGY | Facility: CLINIC | Age: 43
End: 2018-04-06
Payer: COMMERCIAL

## 2018-04-06 ENCOUNTER — APPOINTMENT (OUTPATIENT)
Dept: RADIATION ONCOLOGY | Facility: CLINIC | Age: 43
End: 2018-04-06
Payer: COMMERCIAL

## 2018-04-06 VITALS
BODY MASS INDEX: 17.09 KG/M2 | DIASTOLIC BLOOD PRESSURE: 68 MMHG | HEART RATE: 78 BPM | TEMPERATURE: 98.6 F | RESPIRATION RATE: 16 BRPM | WEIGHT: 84.6 LBS | SYSTOLIC BLOOD PRESSURE: 107 MMHG

## 2018-04-06 DIAGNOSIS — C10.9 SQUAMOUS CELL CARCINOMA OF OROPHARYNX (H): Primary | ICD-10-CM

## 2018-04-06 PROCEDURE — 77014 ZZHC CT GUIDE FOR PLACEMENT RADIATION THERAPY FIELDS: CPT | Performed by: RADIOLOGY

## 2018-04-06 PROCEDURE — 99207 ZZC DROP WITH A PROCEDURE: CPT | Performed by: RADIOLOGY

## 2018-04-06 PROCEDURE — 77386 ZZC IMRT TREATMENT DELIVERY, COMPLEX: CPT | Performed by: RADIOLOGY

## 2018-04-06 RX ORDER — DIPHENHYDRAMINE HYDROCHLORIDE AND LIDOCAINE HYDROCHLORIDE AND ALUMINUM HYDROXIDE AND MAGNESIUM HYDRO
5-10 KIT EVERY 6 HOURS PRN
Qty: 237 ML | Refills: 1 | Status: SHIPPED | OUTPATIENT
Start: 2018-04-06 | End: 2018-04-23

## 2018-04-06 ASSESSMENT — PAIN SCALES - GENERAL: PAINLEVEL: MODERATE PAIN (5)

## 2018-04-06 NOTE — MR AVS SNAPSHOT
After Visit Summary   4/6/2018    Melinda Milligan    MRN: [id number]           Patient Information     Date Of Birth          1975        Visit Information        Provider Department      4/6/2018 1:30 PM Albert Ambrosio MD [facility]        Today's Diagnoses     Squamous cell carcinoma of oropharynx (H)    -  1       Follow-ups after your visit        Your next 10 appointments already scheduled     Apr 06, 2018  2:30 PM CDT   Level 2 with BAY 9 INFUSION   [facility] ([facility])    [address] [facility] [address]   [phone]            Apr 09, 2018 12:45 PM CDT   TREATMENT with RADIATION THERAPIST   [facility] ([facility])    [address] [facility] [address]   [phone]            Apr 09, 2018  1:15 PM CDT   on treatment visit with Albert Ambrosio MD   [facility] ([facility])    [address] [facility] [address]   [phone]            Apr 10, 2018 12:45 PM CDT   TREATMENT with RADIATION THERAPIST   [facility] ([facility])    [address] [facility] [address]   [phone]            Apr 10, 2018  1:00 PM CDT   Return Visit with Juli Monique RD   [facility] ([facility])    [address] [facility] [address]   [phone]            Apr 11, 2018 12:45 PM CDT   TREATMENT with RADIATION THERAPIST   [facility] ([facility])    [address] [facility] [address]   [phone]            Apr 12, 2018 12:45 PM CDT   TREATMENT with RADIATION THERAPIST   [facility] ([facility])    [address] [facility] [address]   [phone]            Apr 13, 2018 10:00 AM CDT   (Arrive by 9:45 AM)   PET ONCOLOGY WHOLE BODY with  MGPET1   [facility] ([facility])    [address] [place] [phone] [phone]           Tell your doctor:   If there is any chance you may be pregnant or if you are breastfeeding.   If you have problems lying in small spaces (claustrophobia). If you do, your doctor may give you medicine to help you relax. If you have diabetes:   Have your exam early in the morning. Your blood glucose will go up as the day goes by.   Your glucose level must be 180 or less at the start of the exam. Please take any medicines you need to ensure this blood glucose level.   If you are taking insulin in the morning take with breakfast by 6 am and schedule procedure between 12 and 2:15 pm.   If you are taking insulin at night take nightly dose, fast overnight, schedule procedure before 10 am.   If you take insulin both morning and night take morning dose by 6am and schedule procedure between 12 and 2:15 pm.   24 hours before your scan: Don t do any heavy exercise. (No jogging, aerobics or other workouts.) Exercise will make your pictures less accurate.  At least 7 hours before your scan, or the evening before if you have an early appointment: Eat a low carb, high protein meal (Lean meats, seafood, beans, soy, low-fat dairy, eggs, nuts & seeds). 6 hours before your scan:   Stop all food and liquids (except water).   Do not chew gum or suck on mints.   If you need to take medicine with food, you may take it with a few crackers.  Please call your Imaging Department at your exam site with any questions.            Apr 13, 2018  1:30 PM CDT   TREATMENT with RADIATION THERAPIST   [facility] ([facility])    [address] [place] [id number] [phone]            Apr 13, 2018  2:45 PM CDT   Return Visit with NANCY Cai CNP   [facility] ([facility])    [address] [place] [address]  [id number] [phone]              Who to contact     If you have questions or need follow up information about today's clinic visit or your schedule please contact [facility] directly at [phone].  Normal or non-critical lab and imaging results will be communicated to you by MyChart, letter or phone within 4 business days after the clinic has received the results. If you do not hear from us within 7 days, please contact the clinic through MyChart or phone. If you have a critical or abnormal lab result, we will notify you by phone as soon as possible.  Submit refill requests through Phorm or call your pharmacy and they will forward the refill request to us. Please allow 3 business days for your refill to be completed.          Additional Information About Your Visit        Phorm Information     Phorm is an electronic gateway that provides easy, online access to your medical records. With Phorm, you can request a clinic appointment, read your test results, renew a prescription or communicate with your care team.     To sign up for Phorm visit the website at [email or web address]   You will be asked to enter the access code listed below, as well as some personal information. Please follow the directions to create your username and password.     Your access code is: [email or web address]  Expires: 2018  7:30 AM     Your access code will  in 90 days. If you need help or a new code, please contact your [facility] or call [phone] for assistance.        Care EveryWhere ID     This is your Care EveryWhere ID. This could be used by other organizations to access your [place] medical records  [id number]        Your Vitals Were     Pulse Temperature Respirations BMI (Body Mass Index)          78 98.6  F (37  C) (Oral) 16 17.09 kg/m2         Blood Pressure from Last 3 Encounters:   18 107/68   18 (!) 89/64   18 (!) 87/55     Weight from Last 3 Encounters:   04/06/18 84 lb 9.6 oz   04/05/18 82 lb 8 oz   04/02/18 83 lb 9.6 oz              Today, you had the following     No orders found for display         Today's Medication Changes          These changes are accurate as of 4/6/18  2:18 PM.  If you have any questions, ask your nurse or doctor.               Start taking these medicines.        Dose/Directions    magic mouthwash suspension (diphenhydrAMINE, lidocaine, aluminum-magnesium & simethicone) Susp compounding kit   Used for:  Squamous cell carcinoma of oropharynx (H)   Started by:  Albert Ambrosio MD        Dose:  5-10 mL   Swish and swallow 5-10 mLs in mouth every 6 hours as needed for mouth sores   Quantity:  237 mL   Refills:  1            Where to get your medicines      These medications were sent to [facility] - [place], [place] - [address]  [address], [facility] [address]     Phone:  [phone]     magic mouthwash suspension (diphenhydrAMINE, lidocaine, aluminum-magnesium & simethicone) Susp compounding kit                Primary Care Provider Office Phone # Fax #    Quang Wall PA-C [phone]       [facility] [address] [facility] [address]  [facility] [address]        Equal Access to Services     JAVIER MORALES AH: Hadii aad ku hadasho Soomaali, waaxda luqadaha, qaybta kaalmada adeegyada, whitney hewitt hayaan kyle khchelsea pace. So [facility] [phone].    ATENCIÓN: Si habla español, tiene a hawley disposición servicios gratuitos de asistencia lingüística. Juanjose al [phone].    We comply with applicable federal civil rights laws and [place] laws. We do not discriminate on the basis of race, color, national origin, age, disability, sex, sexual orientation, or gender identity.            Thank you!     Thank you for choosing [facility]  for your care. Our goal is always to provide you with excellent care. Hearing back from our  patients is one way we can continue to improve our services. Please take a few minutes to complete the written survey that you may receive in the mail after your visit with us. Thank you!             Your Updated Medication List - Protect others around you: Learn how to safely use, store and throw away your medicines at www.disposemymeds.org.          This list is accurate as of 4/6/18  2:18 PM.  Always use your most recent med list.                   Brand Name Dispense Instructions for use Diagnosis    ACETAMINOPHEN PO      Take 1,000 mg by mouth        ALLEGRA ALLERGY PO      Take by mouth as needed for allergies        dexamethasone 4 MG tablet    DECADRON    6 tablet    Take 2 tablets (8 mg) by mouth daily (with breakfast) Start the day after chemotherapy.    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)       magic mouthwash suspension (diphenhydrAMINE, lidocaine, aluminum-magnesium & simethicone) Susp compounding kit     237 mL    Swish and swallow 5-10 mLs in mouth every 6 hours as needed for mouth sores    Squamous cell carcinoma of oropharynx (H)       morphine 30 MG 12 hr tablet    MS CONTIN    60 tablet    Take 1 tablet (30 mg) by mouth every 12 hours maximum 2 tablet(s) per day    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), Cancer related pain       nystatin 026818 UNIT/ML suspension    MYCOSTATIN    280 mL    Take 5 mLs (500,000 Units) by mouth 4 times daily    Thrush       ondansetron 8 MG tablet    ZOFRAN    21 tablet    Take 1 tablet (8 mg) by mouth every 12 hours as needed for nausea    Squamous cell carcinoma of oropharynx (H)       order for  DME     90 Can    Sig:  Isosource 1.5 calories:  Instill 3.5 cartons per day via PEG tube by syringe or gravity flow    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)       oxyCODONE 5 MG/5ML solution    ROXICODONE    300 mL    Take 10 mLs (10 mg) by mouth every 4 hours as needed for breakthrough pain or moderate to severe pain    Cancer related pain, 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)       polyethylene glycol powder    MIRALAX/GLYCOLAX    225 g    Take 17 g (1 capful) by mouth daily    Drug-induced constipation, Cancer related pain, 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)       Potassium Chloride 40 MEQ/15ML (20%) Soln     1 Bottle    7.5 mLs (20 mEq) by Oral or G tube route daily    Squamous cell carcinoma of oropharynx (H), Hypokalemia       prochlorperazine 10 MG tablet    COMPAZINE    30 tablet    Take 1 tablet (10 mg) by mouth every 6 hours as needed (Nausea/Vomiting)    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    Squamous cell carcinoma of oropharynx (H)

## 2018-04-06 NOTE — PROGRESS NOTES
[facility] PHYSICIANS  SPECIALIZING IN BREAKTHROUGHS  Radiation Oncology    On Treatment Visit Note      Melinda Milligan      Date: 2018   MRN: [id number]   : 1975         Reason for Visit:  On Radiation Treatment Visit     Treatment Summary to Date   head + neck  3400/7000 cGy  17/35 fx          Subjective:   Neutropenic at [facility] 1.1 yesterday so chemo held till next week. Was also hypotensive so 1L NS given. Hasn't started chantix yet. Felt nauseous and threw up and was dizzy this morning. Vitals stable in clinic, including orthostatics. Said she has been eating liquid diet and tube feeds, actually gained a few lbs. No signs of infection.    Objective:   /68 (BP Location: Left arm, Patient Position: Supine, Cuff Size: Adult Small)  Pulse 78  Temp 98.6  F (37  C) (Oral)  Resp 16  Wt 84 lb 9.6 oz  BMI 17.09 kg/m2    Labs:  CBC RESULTS:   Recent Labs   Lab Test  18   1253   WBC  2.0*   RBC  3.33*   HGB  9.9*   HCT  31.6*   MCV  95   MCH  29.7   MCHC  31.3*   RDW  20.8*   PLT  317     ELECTROLYTES:  Recent Labs   Lab Test  18   1253   NA  136   POTASSIUM  4.5   CHLORIDE  101   ARLENE  9.0   CO2  31   BUN  9   CR  0.55   GLC  88       Assessment:    Tolerating radiation therapy well.  All questions and concerns addressed.    Plan:   1. Continue current therapy.  Unclear why she felt more nauseous and weak/dizzy today; may be component of anemia/cytopenias, and dizziness may be from volume loss anastasia after vomiting, but vitals and orthostatics stable now. Does not appear infected. Recommended trying to take 1/2 dose of MS contin, as perhaps she is overmedicated; will try magic mouthwash again to help with pain. Instructed to take a small dose first to see if she has a reaction like last time, but true allergy to magic mouthwash containing benadryl is very unusual; she also started other medications at the same time when this happened last so perhaps she had a reaction to  another med.  2. Will reassess on Monday. Advised to go to call us or go to ER if she has severe worsening of symptoms.  3. I informed her that so far her tumor is responding clinically, and she was pleased to hear this.      Mosaiq chart and setup information reviewed  MVCT/IGRT images checked                Albert Ambrosio MD

## 2018-04-06 NOTE — NURSING NOTE
"Received telephone call from patient's , Noe reporting that patient has not been feeling well today.  He reports that she has been vomiting this morning and feeling \"whoozy\".  Patient on speaker phone with , reports vomiting x3 today starting around 1130 this morning.  She reports she woke up with a generalized headache, at 0700 she took Tylenol and MS Contin and at 0830 she took Compazine and Oxycodone.  She denies diarrhea or constipation and reports last bowel movement was last evening.  She denies fevers or chills.  She reports that she administered a can of Isosource through her feeding tube this morning at 0730.  Patient scheduled for chemotherapy this week with Elba Fry NP however deferred x 1 week and received IV fluids for hydration yesterday, 4/5/2018 due to hypotension.  Patient also recently received prescription for Chantix from Dr. Ambrosio and patient and  both report that patient has not started taking this medication at this time.  Informed  that this RN would review with Dr. Ambrosio and Elba Fry NP and return call with update.    Reviewed with care team, recommendation to receive radiation treatment if patient is able, recommend clinic evaluation.  This RN returned call to patient and  and agreeable with clinic evaluation and will get ready and proceed to clinic.    This RN met with patient and  and patient evaluated.  Patient denies nausea during current visit, reports taking Zofran prior to leaving the house to come into clinic.  Orthostatic BP assessed, lying /68 HR 78, sitting /69 HR 72, standing /62 HR 78.  Patient afebrile in clinic.  Continues with headache, reports \"it feels the same as it has been\", denies blurred or double vision.  Reports feeling slightly unsteady.  Patient reports she has been taking MS Contin 30 mg po every 12 hours, Oxycodone 10 mg po two times per day and Tylenol 1,000 mg po every six hours for pain " relief.  Patient agreeable and comfortable with plan to receive radiation treatment today, radiation therapists notified and patient was brought back to radiation treatment room for daily treatment, patient tolerated.    Dr. Ambrosio notified of assessment by this RN and plan to proceed with radiation treatment today, Dr. Ambrosio further evaluated patient.    Vandana Clifton, RN BSN OCN

## 2018-04-06 NOTE — PROGRESS NOTES
This is a recent snapshot of the patient's [facility] medical record.  For current drug dose and complete information and questions, call [phone] or In Basket pool, [facility] home infusion ([id number])  CSN Number:  [id number]

## 2018-04-09 ENCOUNTER — OFFICE VISIT (OUTPATIENT)
Dept: RADIATION ONCOLOGY | Facility: CLINIC | Age: 43
End: 2018-04-09
Payer: COMMERCIAL

## 2018-04-09 ENCOUNTER — TELEPHONE (OUTPATIENT)
Dept: ONCOLOGY | Facility: CLINIC | Age: 43
End: 2018-04-09

## 2018-04-09 ENCOUNTER — APPOINTMENT (OUTPATIENT)
Dept: RADIATION ONCOLOGY | Facility: CLINIC | Age: 43
End: 2018-04-09
Payer: COMMERCIAL

## 2018-04-09 VITALS — WEIGHT: 84 LBS | BODY MASS INDEX: 16.97 KG/M2

## 2018-04-09 DIAGNOSIS — C10.9 SQUAMOUS CELL CARCINOMA OF OROPHARYNX (H): Primary | ICD-10-CM

## 2018-04-09 PROCEDURE — 99207 ZZC DROP WITH A PROCEDURE: CPT | Performed by: RADIOLOGY

## 2018-04-09 PROCEDURE — 77014 ZZHC CT GUIDE FOR PLACEMENT RADIATION THERAPY FIELDS: CPT | Performed by: RADIOLOGY

## 2018-04-09 PROCEDURE — 77386 ZZC IMRT TREATMENT DELIVERY, COMPLEX: CPT | Performed by: RADIOLOGY

## 2018-04-09 ASSESSMENT — PAIN SCALES - GENERAL: PAINLEVEL: MODERATE PAIN (4)

## 2018-04-09 NOTE — TELEPHONE ENCOUNTER
I called patient to notify her about her appointments tomorrow for lab and Dr Nieto and the chemo scheduled on Wednesday.     Thanks,   Abeba

## 2018-04-09 NOTE — PROGRESS NOTES
[facility] PHYSICIANS  SPECIALIZING IN BREAKTHROUGHS  Radiation Oncology    On Treatment Visit Note      Melinda Milligan      Date: 2018   MRN: [id number]   : 1975  Diagnosis: oropharyngeal cancer      Reason for Visit:  On Radiation Treatment Visit     Treatment Summary to Date  Treatment Site: head and neck + lymph nodes Current Dose: 3240/7000 cGy Fractions:       Chemotherapy  Chemo concurrent with radx?: Yes  Oncologist: Dr. Nieto  Drug Name/Frequency 1: Cisplatin    Subjective:   Feels better today, no more dizziness. Nausea controlled. Tolerating magic mouthwash. Started chantix and tolerating well thus far. Pain 4/10 in throat stable, using narcotics.    Nursing ROS:   Nutrition Alteration  Diet Type: Gastrostomy Tube Feedings  Nutrition Note: Isosource 3 cans daily. patient reports also having soups   Skin  Skin Intervention: patient using Aquaphor      ENT and Mouth Exam  Mucositis - Current: 0 - None   Mucositis - Internal Severity: 0 - None   Esophagitis: 0 - None  ENT/Mouth Note: patient using baking soda and salt rinse, using Biotene products, reports worsening throat pain. patient reports having mouth sores and denies any blood  Cardiovascular  Respiratory effort: 1 - Normal - without distress  Gastrointestinal  Nausea: 0 - None  GI Note: taking Compazine and Ativan po two times daily with MS Contin     Psychosocial  Pyschosocial Note: slight fatigue  Pain Assessment  0-10 Pain Scale: 4  Pain Treatment: MS Contin 30 mg po two times daily, Tylenol po as needed, Oxycodone po as needed      Objective:   Wt 84 lb  BMI 16.97 kg/m2  NAD  Hyperpigmented skin in bilat neck  Patchy mucositis in oropharynx    Labs:  CBC RESULTS:   Recent Labs   Lab Test  18   1253   WBC  2.0*   RBC  3.33*   HGB  9.9*   HCT  31.6*   MCV  95   MCH  29.7   MCHC  31.3*   RDW  20.8*   PLT  317     ELECTROLYTES:  Recent Labs   Lab Test  18   1253   NA  136   POTASSIUM  4.5   CHLORIDE  101    ARLENE  9.0   CO2  31   BUN  9   CR  0.55   GLC  88       Assessment:    Tolerating radiation therapy well.  All questions and concerns addressed.    Plan:   1. Continue current therapy.  resim and PET this week. Will also need to see med onc for labs and chemo this week.      Mosaiq chart and setup information reviewed  MVCT/IGRT images checked    Medication Review  Med list reviewed with patient?: Yes           Albert Ambrosio MD

## 2018-04-09 NOTE — MR AVS SNAPSHOT
After Visit Summary   4/9/2018    Melinda Milligan    MRN: [id number]           Patient Information     Date Of Birth          1975        Visit Information        Provider Department      4/9/2018 1:15 PM Albert Ambrosio MD [facility]        Today's Diagnoses     Squamous cell carcinoma of oropharynx (H)    -  1      Care Instructions    [facility] RN with questions or concerns following today's appointment: [phone].    Thank you!            Follow-ups after your visit        Your next 10 appointments already scheduled     Apr 10, 2018 12:45 PM CDT   TREATMENT with RADIATION THERAPIST   [facility]    [address] [place] [address]   [phone]            Apr 10, 2018  1:00 PM CDT   Return Visit with Juli Monique RD   [facility])    [address] [place] [address]   [phone]            Apr 11, 2018 12:45 PM CDT   TREATMENT with RADIATION THERAPIST   [facility] ([facility])    [address] [place] [address]   [phone]            Apr 12, 2018 12:45 PM CDT   TREATMENT with RADIATION THERAPIST   [facility] ([facility])    [address] [place] [address]   [phone]            Apr 13, 2018 10:00 AM CDT   (Arrive by 9:45 AM)   PET ONCOLOGY WHOLE BODY with MGPET1   [facility])    [address] [place] [address]   [phone]           Tell your doctor:   If there is any chance you may be pregnant or if you are breastfeeding.   If you have problems lying in small spaces (claustrophobia). If you do, your doctor may give you medicine to help you relax. If you have diabetes:   Have your exam early in the morning. Your blood  glucose will go up as the day goes by.   Your glucose level must be 180 or less at the start of the exam. Please take any medicines you need to ensure this blood glucose level.   If you are taking insulin in the morning take with breakfast by 6 am and schedule procedure between 12 and 2:15 pm.   If you are taking insulin at night take nightly dose, fast overnight, schedule procedure before 10 am.   If you take insulin both morning and night take morning dose by 6am and schedule procedure between 12 and 2:15 pm.   24 hours before your scan: Don t do any heavy exercise. (No jogging, aerobics or other workouts.) Exercise will make your pictures less accurate.  At least 7 hours before your scan, or the evening before if you have an early appointment: Eat a low carb, high protein meal (Lean meats, seafood, beans, soy, low-fat dairy, eggs, nuts & seeds). 6 hours before your scan:   Stop all food and liquids (except water).   Do not chew gum or suck on mints.   If you need to take medicine with food, you may take it with a few crackers.  Please call your Imaging Department at your exam site with any questions.            Apr 13, 2018  1:30 PM CDT   TREATMENT with RADIATION THERAPIST   [facility])    [address] [facility] [address] [phone]            Apr 13, 2018  2:45 PM CDT   Return Visit with NANCY Cai CNP   [facility])    [address] [facility] [address] [phone]            Apr 16, 2018 12:45 PM CDT   TREATMENT with RADIATION THERAPIST   [facility])    [address] [facility] [address] [phone]            Apr 16, 2018  1:15 PM CDT   on treatment visit with Albert Ambrosio MD   [facility])    [address] [facility] [address] [phone]             2018 12:45 PM CDT   TREATMENT with RADIATION THERAPIST   [facility] ([facility])    [address] [place] [address] [phone]              Who to contact     If you have questions or need follow up information about today's clinic visit or your schedule please contact [facility] directly at [phone].  Normal or non-critical lab and imaging results will be communicated to you by Audio Networkhart, letter or phone within 4 business days after the clinic has received the results. If you do not hear from us within 7 days, please contact the clinic through Audio Networkhart or phone. If you have a critical or abnormal lab result, we will notify you by phone as soon as possible.  Submit refill requests through Evi or call your pharmacy and they will forward the refill request to us. Please allow 3 business days for your refill to be completed.          Additional Information About Your Visit        Evi Information     Evi is an electronic gateway that provides easy, online access to your medical records. With Evi, you can request a clinic appointment, read your test results, renew a prescription or communicate with your care team.     To sign up for Evi visit the website at [email or web address]   You will be asked to enter the access code listed below, as well as some personal information. Please follow the directions to create your username and password.     Your access code is: [email or web address]  Expires: 2018  7:30 AM     Your access code will  in 90 days. If you need help or a new code, please contact your [facility] or call [phone] for assistance.        Care EveryWhere ID     This is your Care EveryWhere ID. This could be used by other organizations to access your [facility] medical records  [id number]        Your Vitals Were     BMI (Body Mass Index)                    16.97 kg/m2            Blood Pressure from Last 3 Encounters:   04/06/18 107/68   04/05/18 (!) 89/64   04/05/18 (!) 87/55    Weight from Last 3 Encounters:   04/09/18 84 lb   04/06/18 84 lb 9.6 oz   04/05/18 82 lb 8 oz              Today, you had the following     No orders found for display       Primary Care Provider Office Phone # Fax #    Quang Wall PA-C [phone]       [facility] [address] [facility] [address]  [facility] [address]        Equal Access to Services     [facility] Center[facility] : Hadii aad ku hadasho Soomaali, waaxda luqadaha, qaybta kaalmada adeegyada, waxmemo esquivelin hayaan adeeg zoey keller . So [facility] [phone].    ATENCIÓN: Si habla español, tiene a hawley disposición servicios gratuitos de asistencia lingüística. Llame al [phone].    We comply with applicable federal civil rights laws and [place] laws. We do not discriminate on the basis of race, color, national origin, age, disability, sex, sexual orientation, or gender identity.            Thank you!     Thank you for choosing [facility]  for your care. Our goal is always to provide you with excellent care. Hearing back from our patients is one way we can continue to improve our services. Please take a few minutes to complete the written survey that you may receive in the mail after your visit with us. Thank you!             Your Updated Medication List - Protect others around you: Learn how to safely use, store and throw away your medicines at www.disposemymeds.org.          This list is accurate as of 4/9/18  1:25 PM.  Always use your most recent med list.                   Brand Name Dispense Instructions for use Diagnosis    ACETAMINOPHEN PO      Take 1,000 mg by mouth        ALLEGRA ALLERGY PO      Take by mouth as needed for allergies        dexamethasone 4 MG tablet    DECADRON    6 tablet    Take 2 tablets (8 mg) by mouth daily (with breakfast) Start the day after chemotherapy.    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)       magic mouthwash suspension (diphenhydrAMINE, lidocaine, aluminum-magnesium & simethicone) Susp compounding kit     237 mL    Swish and swallow 5-10 mLs in mouth every 6 hours as needed for mouth sores    Squamous cell carcinoma of oropharynx (H)       morphine 30 MG 12 hr tablet    MS CONTIN    60 tablet    Take 1 tablet (30 mg) by mouth every 12 hours maximum 2 tablet(s) per day    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), Cancer related pain       nystatin 550428 UNIT/ML suspension    MYCOSTATIN    280 mL    Take 5 mLs (500,000 Units) by mouth 4 times daily    Thrush       ondansetron 8 MG tablet    ZOFRAN    21 tablet    Take 1 tablet (8 mg) by mouth every 12 hours as needed for nausea    Squamous cell carcinoma of oropharynx (H)       order for DME     90 Can    Sig:  Isosource 1.5 calories:  Instill 3.5 cartons per day via PEG tube by syringe or gravity flow    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)       oxyCODONE 5 MG/5ML solution    ROXICODONE    300 mL    Take 10 mLs (10 mg) by mouth every 4 hours as needed for breakthrough pain or moderate to severe pain    Cancer related pain, 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)       polyethylene glycol powder    MIRALAX/GLYCOLAX    225 g    Take 17 g (1 capful) by mouth daily    Drug-induced constipation, Cancer related pain, 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)       Potassium Chloride 40 MEQ/15ML (20%) Soln     1 Bottle    7.5 mLs (20 mEq) by Oral or G tube route daily    Squamous cell carcinoma of oropharynx (H), Hypokalemia       prochlorperazine 10 MG tablet     COMPAZINE    30 tablet    Take 1 tablet (10 mg) by mouth every 6 hours as needed (Nausea/Vomiting)    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    Squamous cell carcinoma of oropharynx (H)

## 2018-04-09 NOTE — PATIENT INSTRUCTIONS
Please contact [facility] RN with questions or concerns following today's appointment: [phone].    Thank you!

## 2018-04-10 ENCOUNTER — APPOINTMENT (OUTPATIENT)
Dept: RADIATION ONCOLOGY | Facility: CLINIC | Age: 43
End: 2018-04-10
Payer: COMMERCIAL

## 2018-04-10 ENCOUNTER — ONCOLOGY VISIT (OUTPATIENT)
Dept: ONCOLOGY | Facility: CLINIC | Age: 43
End: 2018-04-10
Payer: COMMERCIAL

## 2018-04-10 DIAGNOSIS — C06.9 SQUAMOUS CELL CARCINOMA OF ORAL CAVITY (H): Primary | ICD-10-CM

## 2018-04-10 DIAGNOSIS — C10.9 SQUAMOUS CELL CARCINOMA OF OROPHARYNX (H): ICD-10-CM

## 2018-04-10 DIAGNOSIS — C02.9 TONGUE CANCER (H): Primary | ICD-10-CM

## 2018-04-10 DIAGNOSIS — C06.9 SQUAMOUS CELL CARCINOMA OF ORAL CAVITY (H): ICD-10-CM

## 2018-04-10 DIAGNOSIS — C02.9 TONGUE CANCER (H): ICD-10-CM

## 2018-04-10 LAB
ALBUMIN SERPL-MCNC: 3.6 G/DL (ref 3.4–5)
ALP SERPL-CCNC: 41 U/L (ref 40–150)
ALT SERPL W P-5'-P-CCNC: 16 U/L (ref 0–50)
ANION GAP SERPL CALCULATED.3IONS-SCNC: 4 MMOL/L (ref 3–14)
AST SERPL W P-5'-P-CCNC: 11 U/L (ref 0–45)
BASOPHILS # BLD AUTO: 0 10E9/L (ref 0–0.2)
BASOPHILS NFR BLD AUTO: 1.2 %
BILIRUB SERPL-MCNC: 0.2 MG/DL (ref 0.2–1.3)
BUN SERPL-MCNC: 7 MG/DL (ref 7–30)
CALCIUM SERPL-MCNC: 8.9 MG/DL (ref 8.5–10.1)
CHLORIDE SERPL-SCNC: 100 MMOL/L (ref 94–109)
CO2 SERPL-SCNC: 31 MMOL/L (ref 20–32)
CREAT SERPL-MCNC: 0.55 MG/DL (ref 0.52–1.04)
DIFFERENTIAL METHOD BLD: ABNORMAL
EOSINOPHIL # BLD AUTO: 0.1 10E9/L (ref 0–0.7)
EOSINOPHIL NFR BLD AUTO: 3.6 %
ERYTHROCYTE [DISTWIDTH] IN BLOOD BY AUTOMATED COUNT: 21.8 % (ref 10–15)
GFR SERPL CREATININE-BSD FRML MDRD: >90 ML/MIN/1.7M2
GLUCOSE SERPL-MCNC: 78 MG/DL (ref 70–99)
HCT VFR BLD AUTO: 32.7 % (ref 35–47)
HGB BLD-MCNC: 10.2 G/DL (ref 11.7–15.7)
IMM GRANULOCYTES # BLD: 0 10E9/L (ref 0–0.4)
IMM GRANULOCYTES NFR BLD: 0.8 %
LYMPHOCYTES # BLD AUTO: 0.3 10E9/L (ref 0.8–5.3)
LYMPHOCYTES NFR BLD AUTO: 10.5 %
MAGNESIUM SERPL-MCNC: 1.9 MG/DL (ref 1.6–2.3)
MCH RBC QN AUTO: 29.8 PG (ref 26.5–33)
MCHC RBC AUTO-ENTMCNC: 31.2 G/DL (ref 31.5–36.5)
MCV RBC AUTO: 96 FL (ref 78–100)
MONOCYTES # BLD AUTO: 0.3 10E9/L (ref 0–1.3)
MONOCYTES NFR BLD AUTO: 13 %
NEUTROPHILS # BLD AUTO: 1.8 10E9/L (ref 1.6–8.3)
NEUTROPHILS NFR BLD AUTO: 70.9 %
PLATELET # BLD AUTO: 329 10E9/L (ref 150–450)
POTASSIUM SERPL-SCNC: 4.3 MMOL/L (ref 3.4–5.3)
PROT SERPL-MCNC: 6.7 G/DL (ref 6.8–8.8)
RBC # BLD AUTO: 3.42 10E12/L (ref 3.8–5.2)
SODIUM SERPL-SCNC: 135 MMOL/L (ref 133–144)
WBC # BLD AUTO: 2.5 10E9/L (ref 4–11)

## 2018-04-10 PROCEDURE — 77014 ZZHC CT GUIDE FOR PLACEMENT RADIATION THERAPY FIELDS: CPT | Performed by: RADIOLOGY

## 2018-04-10 PROCEDURE — 36415 COLL VENOUS BLD VENIPUNCTURE: CPT | Performed by: INTERNAL MEDICINE

## 2018-04-10 PROCEDURE — 85025 COMPLETE CBC W/AUTO DIFF WBC: CPT | Performed by: INTERNAL MEDICINE

## 2018-04-10 PROCEDURE — 80053 COMPREHEN METABOLIC PANEL: CPT | Performed by: INTERNAL MEDICINE

## 2018-04-10 PROCEDURE — 77386 ZZC IMRT TREATMENT DELIVERY, COMPLEX: CPT | Performed by: RADIOLOGY

## 2018-04-10 PROCEDURE — 99207 ZZC NO CHARGE LOS: CPT | Performed by: DIETITIAN, REGISTERED

## 2018-04-10 PROCEDURE — 83735 ASSAY OF MAGNESIUM: CPT | Performed by: INTERNAL MEDICINE

## 2018-04-10 NOTE — LETTER
4/10/2018         RE: Melinda Milligan  [address] [facility] [address]        Dear Colleague,    Thank you for referring your patient, Melinda Milligan, to the [facility]. Please see a copy of my visit note below.    CLINICAL NUTRITION SERVICES - BRIEF NOTE   EVALUATION OF PREVIOUS PLAN OF CARE:   No charge  Referring Physician:  Hebert  Current diet: soft foods, mostly liquids  Current appetite/intake: Poor  PEG Tube: Yes - 100% dependent  Chemotherapy: Cisplatin   Radiation: Yes on week #4     Monitoring from previous assessment:   -Food/beverage intake  - taking only bites of foods that she makes for her family (mashed potatoes, casseroles).  Taking sips of water and diet coke throughout the day.  She c/o of odynophagia and burning sensation when she swallows.  She admits to not always using her medication.   -Enteral Nutrition/fluids intake  - Reliant on G tube for 100% of nutrition and most or her fluids. She has been taking 3.5 cartons of Isosource 1.5 mariel daily (1 carton TID with 1/2 carton at 4th feeding). She reports good tolerance.   Her BM have been stable, going 1 every 2-3 days which is normal for her.  She denies questions or concerns at this time.   -Weight trends - stable at 84 lbs, up 2 lb x past 1 week  Wt Readings from Last 6 Encounters:   04/09/18 38.1 kg (84 lb)   04/06/18 38.4 kg (84 lb 9.6 oz)   04/05/18 37.4 kg (82 lb 8 oz)   04/02/18 37.9 kg (83 lb 9.6 oz)   03/30/18 38.6 kg (85 lb 3.2 oz)   03/26/18 37.6 kg (83 lb)     Previous Goals:   1. EN to meet 100% of nutrition needs - goal met  2. Weight stability/weight gain towards 100 lbs as able - goal not met  Evaluation: Not met   Previous Nutrition Diagnosis:   Inadequate oral intake related to dysphagia as evidenced by pt dependent upon EN and ONS to meet >75% of nutrition needs.    Evaluation: No change   NEW FINDINGS:   No new findings   CURRENT NUTRITION DIAGNOSIS   Inadequate oral intake related to  dysphagia as evidenced by pt dependent upon EN and ONS to meet >75% of nutrition needs.    INTERVENTIONS   Recommendations / Nutrition Prescription   1) 3.5 cartons of Isosource 1.5 mariel daily; increase to 4 cartons as desired for weight gain  2) 1 L water in addition to EN (via PO and/or FT)   Implementation  EN Composition, EN Schedule, Feeding Tube Flush - reviewed nutrition needs, feeding type, duration and fluid flushes.  Encouraged pt to continue to work towards goal feedings of 4 cartons/day.    General/healthful diet and Medical Food Supplement - encouraged pt to continue to focus on calorie dense, soft foods and liquids    Goals   1. Aim for 3.5-4 cartons of Isosource 1.5 mariel daily  2. Weight gain towards 90 lbs as able     Follow up/Monitoring:   -Food/beverage intake  -Enteral Nutrition/fluids intake  -Weight trends         Again, thank you for allowing me to participate in the care of your patient.        Sincerely,        Juli Monique RD

## 2018-04-10 NOTE — MR AVS SNAPSHOT
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)    -  1    Tongue cancer (H)           Follow-ups after your visit        Your next 10 appointments already scheduled     Apr 11, 2018  7:45 AM CDT   Return Visit with Benedicto Nieto MD   [facility])    [id number] [place] [address]   [phone]            Apr 11, 2018  8:30 AM CDT   Level 6 with BAY 3 INFUSION   [facility])    [address] [place] [address]   [phone]            Apr 11, 2018  4:45 PM CDT   TREATMENT with RADIATION THERAPIST   [facility])    [address] [place] [address]   [phone]            Apr 12, 2018 12:45 PM CDT   TREATMENT with RADIATION THERAPIST   [facility])    [address] [place] [address]   [phone]            Apr 13, 2018 10:00 AM CDT   (Arrive by 9:45 AM)   PET ONCOLOGY WHOLE BODY with MGPET1   [facility])    [address] [place] [address]   [phone]           Tell your doctor:   If there is any chance you may be pregnant or if you are breastfeeding.   If you have problems lying in small spaces (claustrophobia). If you do, your doctor may give you medicine to help you relax. If you have diabetes:   Have your exam early in the morning. Your blood glucose will go up as the day goes by.   Your glucose level must be 180 or less at the start of the exam. Please take any medicines you need to ensure this  blood glucose level.   If you are taking insulin in the morning take with breakfast by 6 am and schedule procedure between 12 and 2:15 pm.   If you are taking insulin at night take nightly dose, fast overnight, schedule procedure before 10 am.   If you take insulin both morning and night take morning dose by 6am and schedule procedure between 12 and 2:15 pm.   24 hours before your scan: Don t do any heavy exercise. (No jogging, aerobics or other workouts.) Exercise will make your pictures less accurate.  At least 7 hours before your scan, or the evening before if you have an early appointment: Eat a low carb, high protein meal (Lean meats, seafood, beans, soy, low-fat dairy, eggs, nuts & seeds). 6 hours before your scan:   Stop all food and liquids (except water).   Do not chew gum or suck on mints.   If you need to take medicine with food, you may take it with a few crackers.  Please call your Imaging Department at your exam site with any questions.            Apr 13, 2018  1:30 PM CDT   TREAT[facility] with RADIATION THERAPIST   [facility]    [address] [facility] [address]   [phone]            Apr 13, 2018  2:45 PM CDT   Return Visit with NANCY Cai CNP   [facility])    [address] [facility] [address]   [phone]            Apr 16, 2018 12:45 PM CDT   TREATMENT with RADIATION THERAPIST   [facility]    [address] [facility] [address]   [phone]            Apr 16, 2018  1:15 PM CDT   on treatment visit with Albert Ambrosio MD   [facility])    [address] [facility] [address]   [phone]            Apr 17, 2018 12:45 PM CDT   TREATMENT with RADIATION THERAPIST   [facility]    [address]  [address]  [facility] [address] [phone]              Who to contact     If you have questions or need follow up information about today's clinic visit or your schedule please contact [facility] directly at [phone].  Normal or non-critical lab and imaging results will be communicated to you by MyChart, letter or phone within 4 business days after the clinic has received the results. If you do not hear from us within 7 days, please contact the clinic through MyChart or phone. If you have a critical or abnormal lab result, we will notify you by phone as soon as possible.  Submit refill requests through [employer] or call your pharmacy and they will forward the refill request to us. Please allow 3 business days for your refill to be completed.          Additional Information About Your Visit        Sh[employer]Spotter Information     [employer]Spotter is an electronic gateway that provides easy, online access to your medical records. With [employer]Spotter, you can request a clinic appointment, read your test results, renew a prescription or communicate with your care team.     To sign up for [employer]ter visit the website at [email or web address]   You will be asked to enter the access code listed below, as well as some personal information. Please follow the directions to create your username and password.     Your access code is: [email or web address]  Expires: 2018  7:30 AM     Your access code will  in 90 days. If you need help or a new code, please contact your [facility] or call [phone] for assistance.        Care EveryWhere ID     This is your Care EveryWhere ID. This could be used by other organizations to access your [facility] medical records  [id number]         Blood Pressure from Last 3 Encounters:   18 107/68   18 (!) 89/64   18 (!) 87/55    Weight from Last 3 Encounters:   18 38.1 kg (84 lb)   18 38.4 kg (84 lb 9.6 oz)   18 37.4 kg (82 lb  8 oz)              Today, you had the following     No orders found for display       Primary Care Provider Office Phone # Fax #    Quang Wall PA-C [phone]       [facility] [address] [facility]  [facility] [address]        Equal Access to Services     JAVIER MORALES : Hadii aad ku hadasho Soomaali, waaxda luqadaha, qaybta kaalmada adeegyada, waxay idiin hayaan adegerardo higginshugolucretia lamargarita . So [facility] [place] [phone].    ATENCIÓN: Si habla español, tiene a hawley disposición servicios gratuitos de asistencia lingüística. Juanjose al [phone].    We comply with applicable federal civil rights laws and [place] laws. We do not discriminate on the basis of race, color, national origin, age, disability, sex, sexual orientation, or gender identity.            Thank you!     Thank you for choosing [facility]  for your care. Our goal is always to provide you with excellent care. Hearing back from our patients is one way we can continue to improve our services. Please take a few minutes to complete the written survey that you may receive in the mail after your visit with us. Thank you!             Your Updated Medication List - Protect others around you: Learn how to safely use, store and throw away your medicines at www.disposemymeds.org.          This list is accurate as of 4/10/18  1:22 PM.  Always use your most recent med list.                   Brand Name Dispense Instructions for use Diagnosis    ACETAMINOPHEN PO      Take 1,000 mg by mouth        ALLEGRA ALLERGY PO      Take by mouth as needed for allergies        dexamethasone 4 MG tablet    DECADRON    6 tablet    Take 2 tablets (8 mg) by mouth daily (with breakfast) Start the day after chemotherapy.    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)       magic mouthwash suspension (diphenhydrAMINE, lidocaine, aluminum-magnesium & simethicone) Susp compounding kit     237 mL    Swish and swallow 5-10 mLs in mouth every 6 hours as needed for mouth sores    Squamous cell carcinoma of oropharynx (H)       morphine 30 MG 12 hr tablet    MS CONTIN    60 tablet    Take 1 tablet (30 mg) by mouth every 12 hours maximum 2 tablet(s) per day    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), Cancer related pain       nystatin 532722 UNIT/ML suspension    MYCOSTATIN    280 mL    Take 5 mLs (500,000 Units) by mouth 4 times daily    Thrush       ondansetron 8 MG tablet    ZOFRAN    21 tablet    Take 1 tablet (8 mg) by mouth every 12 hours as needed for nausea    Squamous cell carcinoma of oropharynx (H)       order for DME     90 Can    Sig:  Isosource 1.5 calories:  Instill 3.5 cartons per day via PEG tube by syringe or gravity flow    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)       oxyCODONE 5 MG/5ML solution    ROXICODONE    300 mL    Take 10 mLs (10 mg) by mouth every 4 hours as needed for breakthrough pain or moderate to severe pain    Cancer related pain, 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)       polyethylene glycol powder    MIRALAX/GLYCOLAX    225 g    Take 17 g (1 capful) by mouth daily    Drug-induced constipation, Cancer related pain, 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)       Potassium Chloride 40 MEQ/15ML (20%) Soln     1 Bottle    7.5 mLs (20 mEq) by Oral or G tube route daily    Squamous cell carcinoma of oropharynx (H), Hypokalemia       prochlorperazine 10 MG tablet    COMPAZINE    30 tablet    Take 1 tablet (10 mg) by mouth every 6 hours as needed (Nausea/Vomiting)    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    Squamous cell carcinoma of oropharynx (H)

## 2018-04-10 NOTE — PROGRESS NOTES
CLINICAL NUTRITION SERVICES - BRIEF NOTE   EVALUATION OF PREVIOUS PLAN OF CARE:   No charge  Referring Physician: Hebert  Current diet: soft foods, mostly liquids  Current appetite/intake: Poor  PEG Tube: Yes - 100% dependent  Chemotherapy: Cisplatin   Radiation: Yes on week #4     Monitoring from previous assessment:   -Food/beverage intake  - taking only bites of foods that she makes for her family (mashed potatoes, casseroles).  Taking sips of water and diet coke throughout the day.  She c/o of odynophagia and burning sensation when she swallows.  She admits to not always using her medication.   -Enteral Nutrition/fluids intake  - Reliant on G tube for 100% of nutrition and most or her fluids. She has been taking 3.5 cartons of Isosource 1.5 mariel daily (1 carton TID with 1/2 carton at 4th feeding). She reports good tolerance.   Her BM have been stable, going 1 every 2-3 days which is normal for her.  She denies questions or concerns at this time.   -Weight trends - stable at 84 lbs, up 2 lb x past 1 week  Wt Readings from Last 6 Encounters:   04/09/18 38.1 kg (84 lb)   04/06/18 38.4 kg (84 lb 9.6 oz)   04/05/18 37.4 kg (82 lb 8 oz)   04/02/18 37.9 kg (83 lb 9.6 oz)   03/30/18 38.6 kg (85 lb 3.2 oz)   03/26/18 37.6 kg (83 lb)     Previous Goals:   1. EN to meet 100% of nutrition needs - goal met  2. Weight stability/weight gain towards 100 lbs as able - goal not met  Evaluation: Not met   Previous Nutrition Diagnosis:   Inadequate oral intake related to dysphagia as evidenced by pt dependent upon EN and ONS to meet >75% of nutrition needs.    Evaluation: No change   NEW FINDINGS:   No new findings   CURRENT NUTRITION DIAGNOSIS   Inadequate oral intake related to dysphagia as evidenced by pt dependent upon EN and ONS to meet >75% of nutrition needs.    INTERVENTIONS   Recommendations / Nutrition Prescription   1) 3.5 cartons of Isosource 1.5 mariel daily; increase to 4 cartons as desired for weight gain  2) 1 L water in  addition to EN (via PO and/or FT)   Implementation  EN Composition, EN Schedule, Feeding Tube Flush - reviewed nutrition needs, feeding type, duration and fluid flushes.  Encouraged pt to continue to work towards goal feedings of 4 cartons/day.    General/healthful diet and Medical Food Supplement - encouraged pt to continue to focus on calorie dense, soft foods and liquids    Goals   1. Aim for 3.5-4 cartons of Isosource 1.5 mariel daily  2. Weight gain towards 90 lbs as able     Follow up/Monitoring:   -Food/beverage intake  -Enteral Nutrition/fluids intake  -Weight trends

## 2018-04-11 ENCOUNTER — APPOINTMENT (OUTPATIENT)
Dept: RADIATION ONCOLOGY | Facility: CLINIC | Age: 43
End: 2018-04-11
Payer: COMMERCIAL

## 2018-04-11 ENCOUNTER — INFUSION THERAPY VISIT (OUTPATIENT)
Dept: INFUSION THERAPY | Facility: CLINIC | Age: 43
End: 2018-04-11
Payer: COMMERCIAL

## 2018-04-11 ENCOUNTER — ONCOLOGY VISIT (OUTPATIENT)
Dept: ONCOLOGY | Facility: CLINIC | Age: 43
End: 2018-04-11
Payer: COMMERCIAL

## 2018-04-11 VITALS
HEART RATE: 72 BPM | WEIGHT: 82.3 LBS | TEMPERATURE: 98.3 F | BODY MASS INDEX: 16.62 KG/M2 | OXYGEN SATURATION: 98 % | RESPIRATION RATE: 16 BRPM | SYSTOLIC BLOOD PRESSURE: 93 MMHG | DIASTOLIC BLOOD PRESSURE: 58 MMHG

## 2018-04-11 DIAGNOSIS — C10.9 SQUAMOUS CELL CARCINOMA OF OROPHARYNX (H): ICD-10-CM

## 2018-04-11 DIAGNOSIS — C06.9 SQUAMOUS CELL CARCINOMA OF ORAL CAVITY (H): ICD-10-CM

## 2018-04-11 DIAGNOSIS — C02.9 TONGUE CANCER (H): Primary | ICD-10-CM

## 2018-04-11 PROCEDURE — 99207 ZZC NO CHARGE LOS: CPT

## 2018-04-11 PROCEDURE — 77427 RADIATION TX MANAGEMENT X5: CPT | Performed by: RADIOLOGY

## 2018-04-11 PROCEDURE — 96367 TX/PROPH/DG ADDL SEQ IV INF: CPT | Performed by: INTERNAL MEDICINE

## 2018-04-11 PROCEDURE — 77336 RADIATION PHYSICS CONSULT: CPT | Performed by: RADIOLOGY

## 2018-04-11 PROCEDURE — 96413 CHEMO IV INFUSION 1 HR: CPT | Performed by: INTERNAL MEDICINE

## 2018-04-11 PROCEDURE — 77386 ZZC IMRT TREATMENT DELIVERY, COMPLEX: CPT | Performed by: RADIOLOGY

## 2018-04-11 PROCEDURE — 99214 OFFICE O/P EST MOD 30 MIN: CPT | Mod: 25 | Performed by: INTERNAL MEDICINE

## 2018-04-11 PROCEDURE — 96375 TX/PRO/DX INJ NEW DRUG ADDON: CPT | Performed by: INTERNAL MEDICINE

## 2018-04-11 PROCEDURE — 77014 ZZHC CT GUIDE FOR PLACEMENT RADIATION THERAPY FIELDS: CPT | Performed by: RADIOLOGY

## 2018-04-11 PROCEDURE — 96415 CHEMO IV INFUSION ADDL HR: CPT | Performed by: INTERNAL MEDICINE

## 2018-04-11 RX ORDER — LORAZEPAM 2 MG/ML
0.5 INJECTION INTRAMUSCULAR EVERY 4 HOURS PRN
Status: CANCELLED
Start: 2018-04-11

## 2018-04-11 RX ORDER — MEPERIDINE HYDROCHLORIDE 50 MG/ML
25 INJECTION INTRAMUSCULAR; INTRAVENOUS; SUBCUTANEOUS EVERY 30 MIN PRN
Status: CANCELLED | OUTPATIENT
Start: 2018-04-11

## 2018-04-11 RX ORDER — SODIUM CHLORIDE 9 MG/ML
1000 INJECTION, SOLUTION INTRAVENOUS CONTINUOUS PRN
Status: CANCELLED
Start: 2018-04-11

## 2018-04-11 RX ORDER — DIPHENHYDRAMINE HYDROCHLORIDE 50 MG/ML
50 INJECTION INTRAMUSCULAR; INTRAVENOUS
Status: CANCELLED
Start: 2018-04-11

## 2018-04-11 RX ORDER — EPINEPHRINE 0.3 MG/.3ML
0.3 INJECTION SUBCUTANEOUS EVERY 5 MIN PRN
Status: CANCELLED | OUTPATIENT
Start: 2018-04-11

## 2018-04-11 RX ORDER — METHYLPREDNISOLONE SODIUM SUCCINATE 125 MG/2ML
125 INJECTION, POWDER, LYOPHILIZED, FOR SOLUTION INTRAMUSCULAR; INTRAVENOUS
Status: CANCELLED
Start: 2018-04-11

## 2018-04-11 RX ORDER — ALBUTEROL SULFATE 90 UG/1
1-2 AEROSOL, METERED RESPIRATORY (INHALATION)
Status: CANCELLED
Start: 2018-04-11

## 2018-04-11 RX ORDER — EPINEPHRINE 1 MG/ML
0.3 INJECTION, SOLUTION INTRAMUSCULAR; SUBCUTANEOUS EVERY 5 MIN PRN
Status: CANCELLED | OUTPATIENT
Start: 2018-04-11

## 2018-04-11 RX ORDER — PALONOSETRON 0.05 MG/ML
0.25 INJECTION, SOLUTION INTRAVENOUS ONCE
Status: COMPLETED | OUTPATIENT
Start: 2018-04-11 | End: 2018-04-11

## 2018-04-11 RX ORDER — ALBUTEROL SULFATE 0.83 MG/ML
2.5 SOLUTION RESPIRATORY (INHALATION)
Status: CANCELLED | OUTPATIENT
Start: 2018-04-11

## 2018-04-11 RX ORDER — PALONOSETRON 0.05 MG/ML
0.25 INJECTION, SOLUTION INTRAVENOUS ONCE
Status: CANCELLED
Start: 2018-04-11

## 2018-04-11 RX ADMIN — Medication 1000 ML: at 09:00

## 2018-04-11 RX ADMIN — PALONOSETRON 0.25 MG: 0.05 INJECTION, SOLUTION INTRAVENOUS at 10:11

## 2018-04-11 ASSESSMENT — PAIN SCALES - GENERAL: PAINLEVEL: EXTREME PAIN (8)

## 2018-04-11 NOTE — LETTER
"    4/11/2018         RE: Melinda Milligan  [address] [facility] [address]        Dear Colleague,    Thank you for referring your patient, Melinda Milligan, to the [facility]. Please see a copy of my visit note below.    Oncology Rooming Note    April 11, 2018 7:48 AM   Melinda Milligan is a 42 year old female who presents for:    Chief Complaint   Patient presents with     Oncology Clinic Visit     Initial Vitals: There were no vitals taken for this visit. Estimated body mass index is 16.97 kg/(m^2) as calculated from the following:    Height as of 4/5/18: 1.499 m (4' 11\").    Weight as of 4/9/18: 38.1 kg (84 lb). There is no height or weight on file to calculate BSA.  Data Unavailable Comment: Data Unavailable   No LMP recorded.  Allergies reviewed: Yes  Medications reviewed: Yes    Medications: Medication refills not needed today.  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        Primary Physician: No Ref-Primary, Physician     History Of Present Illness:    Please see previous note for details. I have copied and updated from prior note.  Ms. 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 She initially presented with thrush several months ago which was not improved after treatment and more recently she was noted to have worsening swelling and pain on the right side of the tongue and cheek and she was referred to ENT for a biopsy of the tongue lesion which was consistent with squamous cell cancer. P 16 negative.  As part of her workup she had a PET scan and neck CT which showed   1. Hypermetabolic mass involving " the bilateral palatine tonsils, anteriorly extending to and involving the soft palate, superiorly extending to and involving the right posterior lateral nasopharyngeal wall and inferiorly extending to the right lateral oropharyngeal wall representing primary squamosal cancer.  2. Right and left metastatic FDG avid level IIa lymph nodes.    Treatment:   3/13/2018 gtube placed  3/14/2018 Started chemoradiation with high dose cisplatin    She tells me that for the most part she tolerated chemotherapy well with mild nausea which was helped with Compazine. She had 1 episode of vomiting. Denies diarrhea or constipation. She felt fatigued but still remains functional and continues to do light household chores. Her main complaint has been pain in the mouth and throat and neck for which she is taking morphine twice a day and as needed oxycodone. She takes oxycodone on average once a day when the pain is really bad but otherwise she is taking Tylenol as needed. She is also using mouthwash. Denies infections. No new swellings. No change in hearing. No tinnitus. No neuropathy. She is able to swallow liquids but is mainly dependent on tube feeds for the nutrition. Overall her weight has been stable to mildly decreased. She is following with nutritionist.    Previously she was getting IV iron through her GYN for heavy periods but she got 2 out of 4 planned doses of intravenous iron. She forgot about the last 2 doses. Since his starting chemotherapy she has not had a menstrual period.       ECOG 1    ROS:  Rest of the comprehensive review of the system was unremarkable    I reviewed other history in Epic as below      Past Medical/Surgical History:  Past Medical History:   Diagnosis Date     Allergic rhinitis      Anemia      Hoarseness      Oropharyngeal cancer (H) 02/15/2018     Uncomplicated asthma     iron deficiency anemia due to heavy menses  Past Surgical History:   Procedure Laterality Date     BIOPSY  02/15/2018    Left  Tongue - [facility]     LAPAROSCOPIC ASSISTED INSERTION TUBE GASTROTOMY N/A 3/14/2018    Procedure: LAPAROSCOPIC ASSISTED INSERTION TUBE GASTROSTOMY;  Percutaneous Endoscopic Gastrostomy Tube Insertion, Esophagogastroduodenoscopy;  Surgeon: Edna Martinez MD;  Location: [facility] OR     Cancer History:   As above    Allergies:  Allergies as of 04/11/2018 - Alfred as Reviewed 04/11/2018   Allergen Reaction Noted     Ceftriaxone  08/25/2012     Chicken-derived products (egg)  08/25/2012     Current Medications:  Current Outpatient Prescriptions   Medication Sig Dispense Refill     magic mouthwash (FIRST-MOUTHWASH BLM) suspension Swish and swallow 5-10 mLs in mouth every 6 hours as needed for mouth sores 237 mL 1     prochlorperazine (COMPAZINE) 10 MG tablet Take 1 tablet (10 mg) by mouth every 6 hours as needed (Nausea/Vomiting) 30 tablet 1     sucralfate (CARAFATE) 1 GM/10ML suspension Take 10 mLs (1 g) by mouth 4 times daily as needed 1200 mL 1     dexamethasone (DECADRON) 4 MG tablet Take 2 tablets (8 mg) by mouth daily (with breakfast) Start the day after chemotherapy. 6 tablet 1     varenicline (CHANTIX STARTING MONTH KLAUS) 0.5 MG X 11 & 1 MG X 42 tablet Take 0.5 mg tab daily for 3 days, then 0.5 mg tab twice daily for 4 days, then 1 mg twice daily. 53 tablet 0     ondansetron (ZOFRAN) 8 MG tablet Take 1 tablet (8 mg) by mouth every 12 hours as needed for nausea 21 tablet 1     LORazepam (ATIVAN) 0.5 MG tablet Take 1 tablet (0.5 mg) by mouth daily as needed (Anxiety with radiation) 30 tablet 1     Potassium Chloride 40 MEQ/15ML (20%) SOLN 7.5 mLs (20 mEq) by Oral or G tube route daily 1 Bottle 1     polyethylene glycol (MIRALAX/GLYCOLAX) powder Take 17 g (1 capful) by mouth daily 225 g 1     oxyCODONE (ROXICODONE) 5 MG/5ML solution Take 10 mLs (10 mg) by mouth every 4 hours as needed for breakthrough pain or moderate to severe pain 300 mL 0     morphine (MS CONTIN) 30 MG 12 hr tablet Take 1 tablet (30 mg) by mouth every  12 hours maximum 2 tablet(s) per day 60 tablet 0     ACETAMINOPHEN PO Take 1,000 mg by mouth       order for DME Sig:  Isosource 1.5 calories:  Instill 3.5 cartons per day via PEG tube by syringe or gravity flow 90 Can 3     Fexofenadine HCl (ALLEGRA ALLERGY PO) Take by mouth as needed for allergies       folic acid (FOLVITE) 1 MG tablet Take 1 mg by mouth       nystatin (MYCOSTATIN) 892984 UNIT/ML suspension Take 5 mLs (500,000 Units) by mouth 4 times daily (Patient taking differently: Take 500,000 Units by mouth 2 times daily ) 280 mL 0      Family History:  Family History   Problem Relation Age of Onset     Substance Abuse Father      Dementia Maternal Grandmother      DIABETES Maternal Grandmother      Asthma Brother      Prostate Cancer Maternal Grandfather      Social History:  Social History     Social History     Marital status:      Spouse name: N/A     Number of children: N/A     Years of education: N/A     Occupational History     Not on file.     Social History Main Topics     Smoking status: Current Every Day Smoker     Packs/day: 1.00     Years: 23.00     Types: Cigarettes     Start date: 1/1/1993     Smokeless tobacco: Never Used      Comment: Patient reports currently smoking 3 cigarettes per day - updated 3/6/2018     Alcohol use No     Drug use: No     Sexual activity: Yes     Partners: Male     Birth control/ protection: Pull-out method, Condom     Other Topics Concern     Not on file     Social History Narrative    she used to smoke 1 pack a day but has cut down to 3 cigarettes a day and is in process of quitting. Denies any use of alcohol. Lives with her . 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  Physical Exam:  BP 93/58 (BP Location: Right arm)  Pulse 72  Temp 98.3  F (36.8  C) (Oral)  Resp 16  Wt 37.3 kg (82 lb 4.8 oz)  SpO2 98%  BMI 16.62 kg/m2  CONSTITUTIONAL: No apparent distress  EYES: PERRLA, without pallor or jaundice  ENT/MOUTH: Ears  unremarkable. There is evidence of mucositis in the mouth. She is unable to open the mouth and properly so it is difficult to examine the back of the mouth properly  CVS: s1s2 normal  RESPIRATORY: Chest is clear  GI: Abdomen is benign. G-tube is intact and the site is clean  NEURO: She is alert and oriented ×3  INTEGUMENT: no concerning skin rashes   LYMPHATIC: I could probably feel bilateral anterior cervical lymph nodes but they are very small. No other palpable lymph nodes  MUSCULOSKELETAL: Unremarkable. No bony tenderness.   EXTREMITIES: no pedal edema  PSYCH: Mentation, mood and affect are appropriate      Laboratory/Imaging Studies    Labs also reviewed in care everywhere  Results for orders placed or performed in visit on 04/10/18   CBC with platelets differential   Result Value Ref Range    WBC 2.5 (L) 4.0 - 11.0 10e9/L    RBC Count 3.42 (L) 3.8 - 5.2 10e12/L    Hemoglobin 10.2 (L) 11.7 - 15.7 g/dL    Hematocrit 32.7 (L) 35.0 - 47.0 %    MCV 96 78 - 100 fl    MCH 29.8 26.5 - 33.0 pg    MCHC 31.2 (L) 31.5 - 36.5 g/dL    RDW 21.8 (H) 10.0 - 15.0 %    Platelet Count 329 150 - 450 10e9/L    Diff Method Automated Method     % Neutrophils 70.9 %    % Lymphocytes 10.5 %    % Monocytes 13.0 %    % Eosinophils 3.6 %    % Basophils 1.2 %    % Immature Granulocytes 0.8 %    Absolute Neutrophil 1.8 1.6 - 8.3 10e9/L    Absolute Lymphocytes 0.3 (L) 0.8 - 5.3 10e9/L    Absolute Monocytes 0.3 0.0 - 1.3 10e9/L    Absolute Eosinophils 0.1 0.0 - 0.7 10e9/L    Absolute Basophils 0.0 0.0 - 0.2 10e9/L    Abs Immature Granulocytes 0.0 0 - 0.4 10e9/L   Comprehensive metabolic panel   Result Value Ref Range    Sodium 135 133 - 144 mmol/L    Potassium 4.3 3.4 - 5.3 mmol/L    Chloride 100 94 - 109 mmol/L    Carbon Dioxide 31 20 - 32 mmol/L    Anion Gap 4 3 - 14 mmol/L    Glucose 78 70 - 99 mg/dL    Urea Nitrogen 7 7 - 30 mg/dL    Creatinine 0.55 0.52 - 1.04 mg/dL    GFR Estimate >90 >60 mL/min/1.7m2    GFR Estimate If Black >90 >60  mL/min/1.7m2    Calcium 8.9 8.5 - 10.1 mg/dL    Bilirubin Total 0.2 0.2 - 1.3 mg/dL    Albumin 3.6 3.4 - 5.0 g/dL    Protein Total 6.7 (L) 6.8 - 8.8 g/dL    Alkaline Phosphatase 41 40 - 150 U/L    ALT 16 0 - 50 U/L    AST 11 0 - 45 U/L   Magnesium   Result Value Ref Range    Magnesium 1.9 1.6 - 2.3 mg/dL     ASSESSMENT/PLAN:    Stage IV a [id number] 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  She started concurrent chemotherapy with high-dose cisplatin alongside radiation on 3/14/2018. Cycle #2 of chemotherapy was delayed by one week because of neutropenia.     We will proceed with cycle #2 of chemotherapy today. In 3 weeks we will plan to give her cycle #3. We discussed that in significant number of patients we are unable to give the third dose of chemotherapy due to various reasons and at this time we are not really certain how much additional benefit the third dose adds but we will plan on giving it to her if possible.    About 3 months after completing the therapy she will get repeat imaging with a PET scan    Nausea. This is mild and she will continue to take as needed Compazine and other anti-emetics.    Pain/mucositis. Continue using morphine sulfate. We discussed that she can increase the frequency of oxycodone to help with the pain. She understands that. She will continue to use Magic mouthwash.    Nutrition. She will continue to have close follow-up with nutritionist. I advised her to take high-energy protein drinks and shakes so as to maintain and improve weight.    Iron deficiency anemia secondary to menorrhagia. Since his starting chemotherapy she has not had a menstrual period. She got 2 out of 4 planned doses of intravenous iron. Her hemoglobin is 10.2. We will observe for now and repeat her labs frequently. After completing the treatment we will repeat her iron studies as well    Smoking. I again discussed with her importance of smoking cessation. She was given a prescription of Chantix  previously and I advised her to use it.    She had baseline audiogram done prior to start of cisplatin. Currently she is not noticing any changes in her hearing or tinnitus. We can consider repeating audiogram after completion of the treatment.    She will be seen frequently in the clinic. I will have her return to clinic in one and 2 weeks to see nurse practitioner and then I will see her back in 3 weeks      All of her and her 's questions were answered to her satisfaction and she is agreeable and comfortable with the plan       Benedicto Nieto        Again, thank you for allowing me to participate in the care of your patient.        Sincerely,        Benedicto Nieto MD

## 2018-04-11 NOTE — PROGRESS NOTES
Infusion Nursing Note:  Melinda Milligan presents today for Cisplatin.    Patient seen by provider today: Yes: Dr. Nieto   present during visit today: Not Applicable.    Note: N/A.    Intravenous Access:  Peripheral IV placed.    Treatment Conditions:  Lab Results   Component Value Date    HGB 10.2 04/10/2018     Lab Results   Component Value Date    WBC 2.5 04/10/2018      Lab Results   Component Value Date    ANEU 1.8 04/10/2018     Lab Results   Component Value Date     04/10/2018      Lab Results   Component Value Date     04/10/2018                   Lab Results   Component Value Date    POTASSIUM 4.3 04/10/2018           Lab Results   Component Value Date    MAG 1.9 04/10/2018            Lab Results   Component Value Date    CR 0.55 04/10/2018                   Lab Results   Component Value Date    ARLENE 8.9 04/10/2018                Lab Results   Component Value Date    BILITOTAL 0.2 04/10/2018           Lab Results   Component Value Date    ALBUMIN 3.6 04/10/2018                    Lab Results   Component Value Date    ALT 16 04/10/2018           Lab Results   Component Value Date    AST 11 04/10/2018       Results reviewed, labs MET treatment parameters, ok to proceed with treatment.      Post Infusion Assessment:  Patient tolerated infusion without incident.  Site patent and intact, free from redness, edema or discomfort.  No evidence of extravasations.  Access discontinued per protocol.    Discharge Plan:   Discharge instructions reviewed with: Patient.  Patient and/or family verbalized understanding of discharge instructions and all questions answered.  Patient discharged in stable condition accompanied by: .  Departure Mode: Ambulatory.    Anila Junior RN

## 2018-04-11 NOTE — PATIENT INSTRUCTIONS
Chemo today    Cont radiation    In 1 and 2 weeks, RTC with labs and see Elba and possible IVF    In 3 weeks, labs, see me and chemo

## 2018-04-11 NOTE — PROGRESS NOTES
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        Primary Physician: No Ref-Primary, Physician     History Of Present Illness:    Please see previous note for details. I have copied and updated from prior note.  Ms. 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 She initially presented with thrush several months ago which was not improved after treatment and more recently she was noted to have worsening swelling and pain on the right side of the tongue and cheek and she was referred to ENT for a biopsy of the tongue lesion which was consistent with squamous cell cancer. P 16 negative.  As part of her workup she had a PET scan and neck CT which showed   1. Hypermetabolic mass involving the bilateral palatine tonsils, anteriorly extending to and involving the soft palate, superiorly extending to and involving the right posterior lateral nasopharyngeal wall and inferiorly extending to the right lateral oropharyngeal wall representing primary squamosal cancer.  2. Right and left metastatic FDG avid level IIa lymph nodes.    Treatment:   3/13/2018 gtube placed  3/14/2018 Started chemoradiation with high dose cisplatin    She tells me that for the most part she tolerated chemotherapy well with mild nausea which was helped with Compazine. She had 1 episode of vomiting. Denies diarrhea or constipation. She felt fatigued but still remains functional and continues to do light household chores. Her main complaint has been pain in the mouth and throat and neck for which she is taking morphine twice a day and as needed oxycodone. She takes oxycodone on average once a day when the pain is really bad but otherwise she is taking Tylenol as needed. She is also using mouthwash. Denies infections. No new swellings. No change in hearing. No tinnitus. No neuropathy. She is  able to swallow liquids but is mainly dependent on tube feeds for the nutrition. Overall her weight has been stable to mildly decreased. She is following with nutritionist.    Previously she was getting IV iron through her GYN for heavy periods but she got 2 out of 4 planned doses of intravenous iron. She forgot about the last 2 doses. Since his starting chemotherapy she has not had a menstrual period.       ECOG 1    ROS:  Rest of the comprehensive review of the system was unremarkable    I reviewed other history in Epic as below      Past Medical/Surgical History:  Past Medical History:   Diagnosis Date     Allergic rhinitis      Anemia      Hoarseness      Oropharyngeal cancer (H) 02/15/2018     Uncomplicated asthma     iron deficiency anemia due to heavy menses  Past Surgical History:   Procedure Laterality Date     BIOPSY  02/15/2018    Left Tongue - [facility]     LAPAROSCOPIC ASSISTED INSERTION TUBE GASTROTOMY N/A 3/14/2018    Procedure: LAPAROSCOPIC ASSISTED INSERTION TUBE GASTROSTOMY;  Percutaneous Endoscopic Gastrostomy Tube Insertion, Esophagogastroduodenoscopy;  Surgeon: Edna Martinez MD;  Location: [facility] OR     Cancer History:   As above    Allergies:  Allergies as of 04/11/2018 - Alfred as Reviewed 04/11/2018   Allergen Reaction Noted     Ceftriaxone  08/25/2012     Chicken-derived products (egg)  08/25/2012     Current Medications:  Current Outpatient Prescriptions   Medication Sig Dispense Refill     magic mouthwash (FIRST-MOUTHWASH BLM) suspension Swish and swallow 5-10 mLs in mouth every 6 hours as needed for mouth sores 237 mL 1     prochlorperazine (COMPAZINE) 10 MG tablet Take 1 tablet (10 mg) by mouth every 6 hours as needed (Nausea/Vomiting) 30 tablet 1     sucralfate (CARAFATE) 1 GM/10ML suspension Take 10 mLs (1 g) by mouth 4 times daily as needed 1200 mL 1     dexamethasone (DECADRON) 4 MG tablet Take 2 tablets (8 mg) by mouth daily (with breakfast) Start the day after chemotherapy. 6 tablet  1     varenicline (CHANTIX STARTING MONTH PAK) 0.5 MG X 11 & 1 MG X 42 tablet Take 0.5 mg tab daily for 3 days, then 0.5 mg tab twice daily for 4 days, then 1 mg twice daily. 53 tablet 0     ondansetron (ZOFRAN) 8 MG tablet Take 1 tablet (8 mg) by mouth every 12 hours as needed for nausea 21 tablet 1     LORazepam (ATIVAN) 0.5 MG tablet Take 1 tablet (0.5 mg) by mouth daily as needed (Anxiety with radiation) 30 tablet 1     Potassium Chloride 40 MEQ/15ML (20%) SOLN 7.5 mLs (20 mEq) by Oral or G tube route daily 1 Bottle 1     polyethylene glycol (MIRALAX/GLYCOLAX) powder Take 17 g (1 capful) by mouth daily 225 g 1     oxyCODONE (ROXICODONE) 5 MG/5ML solution Take 10 mLs (10 mg) by mouth every 4 hours as needed for breakthrough pain or moderate to severe pain 300 mL 0     morphine (MS CONTIN) 30 MG 12 hr tablet Take 1 tablet (30 mg) by mouth every 12 hours maximum 2 tablet(s) per day 60 tablet 0     ACETAMINOPHEN PO Take 1,000 mg by mouth       order for DME Sig:  Isosource 1.5 calories:  Instill 3.5 cartons per day via PEG tube by syringe or gravity flow 90 Can 3     Fexofenadine HCl (ALLEGRA ALLERGY PO) Take by mouth as needed for allergies       folic acid (FOLVITE) 1 MG tablet Take 1 mg by mouth       nystatin (MYCOSTATIN) 444630 UNIT/ML suspension Take 5 mLs (500,000 Units) by mouth 4 times daily (Patient taking differently: Take 500,000 Units by mouth 2 times daily ) 280 mL 0      Family History:  Family History   Problem Relation Age of Onset     Substance Abuse Father      Dementia Maternal Grandmother      DIABETES Maternal Grandmother      Asthma Brother      Prostate Cancer Maternal Grandfather      Social History:  Social History     Social History     Marital status:      Spouse name: N/A     Number of children: N/A     Years of education: N/A     Occupational History     Not on file.     Social History Main Topics     Smoking status: Current Every Day Smoker     Packs/day: 1.00     Years: 23.00      Types: Cigarettes     Start date: 1/1/1993     Smokeless tobacco: Never Used      Comment: Patient reports currently smoking 3 cigarettes per day - updated 3/6/2018     Alcohol use No     Drug use: No     Sexual activity: Yes     Partners: Male     Birth control/ protection: Pull-out method, Condom     Other Topics Concern     Not on file     Social History Narrative    she used to smoke 1 pack a day but has cut down to 3 cigarettes a day and is in process of quitting. Denies any use of alcohol. Lives with her . 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  Physical Exam:  BP 93/58 (BP Location: Right arm)  Pulse 72  Temp 98.3  F (36.8  C) (Oral)  Resp 16  Wt 37.3 kg (82 lb 4.8 oz)  SpO2 98%  BMI 16.62 kg/m2  CONSTITUTIONAL: No apparent distress  EYES: PERRLA, without pallor or jaundice  ENT/MOUTH: Ears unremarkable. There is evidence of mucositis in the mouth. She is unable to open the mouth and properly so it is difficult to examine the back of the mouth properly  CVS: s1s2 normal  RESPIRATORY: Chest is clear  GI: Abdomen is benign. G-tube is intact and the site is clean  NEURO: She is alert and oriented ×3  INTEGUMENT: no concerning skin rashes   LYMPHATIC: I could probably feel bilateral anterior cervical lymph nodes but they are very small. No other palpable lymph nodes  MUSCULOSKELETAL: Unremarkable. No bony tenderness.   EXTREMITIES: no pedal edema  PSYCH: Mentation, mood and affect are appropriate      Laboratory/Imaging Studies    Labs also reviewed in care everywhere  Results for orders placed or performed in visit on 04/10/18   CBC with platelets differential   Result Value Ref Range    WBC 2.5 (L) 4.0 - 11.0 10e9/L    RBC Count 3.42 (L) 3.8 - 5.2 10e12/L    Hemoglobin 10.2 (L) 11.7 - 15.7 g/dL    Hematocrit 32.7 (L) 35.0 - 47.0 %    MCV 96 78 - 100 fl    MCH 29.8 26.5 - 33.0 pg    MCHC 31.2 (L) 31.5 - 36.5 g/dL    RDW 21.8 (H) 10.0 - 15.0 %    Platelet Count 329 150  - 450 10e9/L    Diff Method Automated Method     % Neutrophils 70.9 %    % Lymphocytes 10.5 %    % Monocytes 13.0 %    % Eosinophils 3.6 %    % Basophils 1.2 %    % Immature Granulocytes 0.8 %    Absolute Neutrophil 1.8 1.6 - 8.3 10e9/L    Absolute Lymphocytes 0.3 (L) 0.8 - 5.3 10e9/L    Absolute Monocytes 0.3 0.0 - 1.3 10e9/L    Absolute Eosinophils 0.1 0.0 - 0.7 10e9/L    Absolute Basophils 0.0 0.0 - 0.2 10e9/L    Abs Immature Granulocytes 0.0 0 - 0.4 10e9/L   Comprehensive metabolic panel   Result Value Ref Range    Sodium 135 133 - 144 mmol/L    Potassium 4.3 3.4 - 5.3 mmol/L    Chloride 100 94 - 109 mmol/L    Carbon Dioxide 31 20 - 32 mmol/L    Anion Gap 4 3 - 14 mmol/L    Glucose 78 70 - 99 mg/dL    Urea Nitrogen 7 7 - 30 mg/dL    Creatinine 0.55 0.52 - 1.04 mg/dL    GFR Estimate >90 >60 mL/min/1.7m2    GFR Estimate If Black >90 >60 mL/min/1.7m2    Calcium 8.9 8.5 - 10.1 mg/dL    Bilirubin Total 0.2 0.2 - 1.3 mg/dL    Albumin 3.6 3.4 - 5.0 g/dL    Protein Total 6.7 (L) 6.8 - 8.8 g/dL    Alkaline Phosphatase 41 40 - 150 U/L    ALT 16 0 - 50 U/L    AST 11 0 - 45 U/L   Magnesium   Result Value Ref Range    Magnesium 1.9 1.6 - 2.3 mg/dL     ASSESSMENT/PLAN:    Stage IV a [id number] 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  She started concurrent chemotherapy with high-dose cisplatin alongside radiation on 3/14/2018. Cycle #2 of chemotherapy was delayed by one week because of neutropenia.     We will proceed with cycle #2 of chemotherapy today. In 3 weeks we will plan to give her cycle #3. We discussed that in significant number of patients we are unable to give the third dose of chemotherapy due to various reasons and at this time we are not really certain how much additional benefit the third dose adds but we will plan on giving it to her if possible.    About 3 months after completing the therapy she will get repeat imaging with a PET scan    Nausea. This is mild and she will continue to take as needed  Compazine and other anti-emetics.    Pain/mucositis. Continue using morphine sulfate. We discussed that she can increase the frequency of oxycodone to help with the pain. She understands that. She will continue to use Magic mouthwash.    Nutrition. She will continue to have close follow-up with nutritionist. I advised her to take high-energy protein drinks and shakes so as to maintain and improve weight.    Iron deficiency anemia secondary to menorrhagia. Since his starting chemotherapy she has not had a menstrual period. She got 2 out of 4 planned doses of intravenous iron. Her hemoglobin is 10.2. We will observe for now and repeat her labs frequently. After completing the treatment we will repeat her iron studies as well    Smoking. I again discussed with her importance of smoking cessation. She was given a prescription of Chantix previously and I advised her to use it.    She had baseline audiogram done prior to start of cisplatin. Currently she is not noticing any changes in her hearing or tinnitus. We can consider repeating audiogram after completion of the treatment.    She will be seen frequently in the clinic. I will have her return to clinic in one and 2 weeks to see nurse practitioner and then I will see her back in 3 weeks      All of her and her 's questions were answered to her satisfaction and she is agreeable and comfortable with the plan       Benedicto Nieto

## 2018-04-11 NOTE — MR AVS SNAPSHOT
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)           Follow-ups after your visit        Your next 10 appointments already scheduled     Apr 11, 2018  4:45 PM CDT   TREATMENT with RADIATION THERAPIST   [facility])    [id number] [place] [address]   [phone]            Apr 12, 2018 12:45 PM CDT   TREATMENT with RADIATION THERAPIST   [facility])    [address] [place] [address]   [phone]            Apr 13, 2018 10:00 AM CDT   (Arrive by 9:45 AM)   PET ONCOLOGY WHOLE BODY with MGPET1   [facility])    [address] [place] [address]   [phone]           Tell your doctor:   If there is any chance you may be pregnant or if you are breastfeeding.   If you have problems lying in small spaces (claustrophobia). If you do, your doctor may give you medicine to help you relax. If you have diabetes:   Have your exam early in the morning. Your blood glucose will go up as the day goes by.   Your glucose level must be 180 or less at the start of the exam. Please take any medicines you need to ensure this blood glucose level.   If you are taking insulin in the morning take with breakfast by 6 am and schedule procedure between 12 and 2:15 pm.   If you are taking insulin at night take nightly dose, fast overnight, schedule procedure before 10 am.   If you take insulin both morning and night take morning dose by 6am and schedule procedure between 12 and 2:15 pm.    24 hours before your scan: Don t do any heavy exercise. (No jogging, aerobics or other workouts.) Exercise will make your pictures less accurate.  At least 7 hours before your scan, or the evening before if you have an early appointment: Eat a low carb, high protein meal (Lean meats, seafood, beans, soy, low-fat dairy, eggs, nuts & seeds). 6 hours before your scan:   Stop all food and liquids (except water).   Do not chew gum or suck on mints.   If you need to take medicine with food, you may take it with a few crackers.  Please call your Imaging Department at your exam site with any questions.            Apr 13, 2018  1:30 PM CDT   TREATMENT with RADIATION THERAPIST   [facility])    [address] [facility] [address]   [phone]            Apr 16, 2018 12:45 PM CDT   TREATMENT with RADIATION THERAPIST   [facility])    [address] [facility] [address]   [phone]            Apr 16, 2018  1:15 PM CDT   on treatment visit with Albert Ambrosio MD   [facility])    [address] [facility] [address]   [phone]            Apr 17, 2018 12:45 PM CDT   TREATMENT with RADIATION THERAPIST   [facility] ([facility])    [address] [facility] [address]   [phone]            Apr 18, 2018 12:45 PM CDT   TREATMENT with RADIATION THERAPIST   [facility] ([facility])    [address] [facility] [address]   [phone]            Apr 18, 2018  1:15 PM CDT   Return Visit with NANCY Cai CNP   [facility])    [address] [facility] [address]   [phone]            Apr 18, 2018  2:30 PM CDT   Level 2 with SHAHNAZ CUENCA   [facility]  [facility]    [address] [place] [address] [phone]              Future tests that were ordered for you today     Open Standing Orders        Priority Remaining Interval Expires Ordered    Basic metabolic panel Routine 2/2 1 week 2019    Magnesium Routine 2/2 1 week 2019            Who to contact     If you have questions or need follow up information about today's clinic visit or your schedule please contact [facility] directly at [phone].  Normal or non-critical lab and imaging results will be communicated to you by [employer], letter or phone within 4 business days after the clinic has received the results. If you do not hear from us within 7 days, please contact the clinic through [employer] or phone. If you have a critical or abnormal lab result, we will notify you by phone as soon as possible.  Submit refill requests through [employer] or call your pharmacy and they will forward the refill request to us. Please allow 3 business days for your refill to be completed.          Additional Information About Your Visit        [employer] Information     [employer] is an electronic gateway that provides easy, online access to your medical records. With [employer], you can request a clinic appointment, read your test results, renew a prescription or communicate with your care team.     To sign up for [employer] visit the website at [email or web address]   You will be asked to enter the access code listed below, as well as some personal information. Please follow the directions to create your username and password.     Your access code is: [email or web address]  Expires: 2018  7:30 AM     Your access code will  in 90 days. If you need help or a new code, please contact your [facility] or call [phone] for assistance.        Care EveryWhere ID     This is your Care EveryWhere ID. This could be used by other  organizations to access your [facility] medical records  [id number]         Blood Pressure from Last 3 Encounters:   04/11/18 93/58   04/06/18 107/68   04/05/18 (!) 89/64    Weight from Last 3 Encounters:   04/11/18 37.3 kg (82 lb 4.8 oz)   04/09/18 38.1 kg (84 lb)   04/06/18 38.4 kg (84 lb 9.6 oz)              Today, you had the following     No orders found for display         Today's Medication Changes          These changes are accurate as of 4/11/18  3:25 PM.  If you have any questions, ask your nurse or doctor.               These medicines have changed or have updated prescriptions.        Dose/Directions    nystatin 278576 UNIT/ML suspension   Commonly known as:  MYCOSTATIN   This may have changed:  when to take this   Used for:  Thrush        Dose:  920530 Units   Take 5 mLs (500,000 Units) by mouth 4 times daily   Quantity:  280 mL   Refills:  0                Primary Care Provider Office Phone # Fax #    Quang Wall PA-C [phone]       [facility] [address] [facility] [address]  [facility] [address]        Equal Access to Services     JAVIER MORALES AH: Hadii aad ku hadasho Soomaali, waaxda luqadaha, qaybta kaalmada adeegyada, waxay sierrain hayeverettn kyle keller . So [facility] [phone].    ATENCIÓN: Si habla español, tiene a hawley disposición servicios gratuitos de asistencia lingüística. Llame al [phone].    We comply with applicable federal civil rights laws and [place] laws. We do not discriminate on the basis of race, color, national origin, age, disability, sex, sexual orientation, or gender identity.            Thank you!     Thank you for choosing [facility]  for your care. Our goal is always to provide you with excellent care. Hearing back from our patients is one way we can continue to improve our services. Please take a few minutes to complete the written survey that you may receive in the mail after your visit with us. Thank you!             Your Updated  Medication List - Protect others around you: Learn how to safely use, store and throw away your medicines at www.disposemymeds.org.          This list is accurate as of 4/11/18  3:25 PM.  Always use your most recent med list.                   Brand Name Dispense Instructions for use Diagnosis    ACETAMINOPHEN PO      Take 1,000 mg by mouth        ALLEGRA ALLERGY PO      Take by mouth as needed for allergies        dexamethasone 4 MG tablet    DECADRON    6 tablet    Take 2 tablets (8 mg) by mouth daily (with breakfast) Start the day after chemotherapy.    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)       magic mouthwash suspension (diphenhydrAMINE, lidocaine, aluminum-magnesium & simethicone) Susp compounding kit     237 mL    Swish and swallow 5-10 mLs in mouth every 6 hours as needed for mouth sores    Squamous cell carcinoma of oropharynx (H)       morphine 30 MG 12 hr tablet    MS CONTIN    60 tablet    Take 1 tablet (30 mg) by mouth every 12 hours maximum 2 tablet(s) per day    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), Cancer related pain       nystatin 700231 UNIT/ML suspension    MYCOSTATIN    280 mL    Take 5 mLs (500,000 Units) by mouth 4 times daily    Thrush       ondansetron 8 MG tablet    ZOFRAN    21 tablet    Take 1 tablet (8 mg) by mouth every 12 hours as needed for nausea    Squamous cell carcinoma of oropharynx (H)       order for DME     90 Can    Sig:  Isosource 1.5 calories:  Instill 3.5 cartons per day via PEG tube by syringe or gravity flow    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)       oxyCODONE 5 MG/5ML solution    ROXICODONE     300 mL    Take 10 mLs (10 mg) by mouth every 4 hours as needed for breakthrough pain or moderate to severe pain    Cancer related pain, 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)       polyethylene glycol powder    MIRALAX/GLYCOLAX    225 g    Take 17 g (1 capful) by mouth daily    Drug-induced constipation, Cancer related pain, 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)       Potassium Chloride 40 MEQ/15ML (20%) Soln     1 Bottle    7.5 mLs (20 mEq) by Oral or G tube route daily    Squamous cell carcinoma of oropharynx (H), Hypokalemia       prochlorperazine 10 MG tablet    COMPAZINE    30 tablet    Take 1 tablet (10 mg) by mouth every 6 hours as needed (Nausea/Vomiting)    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    Squamous cell carcinoma of oropharynx (H)

## 2018-04-11 NOTE — MR AVS SNAPSHOT
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)          Care Instructions    Chemo today    Cont radiation    In 1 and 2 weeks, RTC with labs and see Elba and possible IVF    In 3 weeks, labs, see me and chemo            Follow-ups after your visit        Your next 10 appointments already scheduled     Apr 11, 2018  4:45 PM CDT   TREATMENT with RADIATION THERAPIST   [facility]    [address] [place] [address]   [phone]            Apr 12, 2018 12:45 PM CDT   TREATMENT with RADIATION THERAPIST   [facility] ([facility])    [address] [place] [address]   [phone]            Apr 13, 2018 10:00 AM CDT   (Arrive by 9:45 AM)   PET ONCOLOGY WHOLE BODY with MGPET1   [facility] ([facility])    [address] [place] [address]   [phone]           Tell your doctor:   If there is any chance you may be pregnant or if you are breastfeeding.   If you have problems lying in small spaces (claustrophobia). If you do, your doctor may give you medicine to help you relax. If you have diabetes:   Have your exam early in the morning. Your blood glucose will go up as the day goes by.   Your glucose level must be 180 or less at the start of the exam. Please take any medicines you need to ensure this blood glucose level.   If you are taking insulin in the morning take with breakfast by 6 am and schedule procedure between 12 and 2:15 pm.   If you are taking insulin at night take nightly  dose, fast overnight, schedule procedure before 10 am.   If you take insulin both morning and night take morning dose by 6am and schedule procedure between 12 and 2:15 pm.   24 hours before your scan: Don t do any heavy exercise. (No jogging, aerobics or other workouts.) Exercise will make your pictures less accurate.  At least 7 hours before your scan, or the evening before if you have an early appointment: Eat a low carb, high protein meal (Lean meats, seafood, beans, soy, low-fat dairy, eggs, nuts & seeds). 6 hours before your scan:   Stop all food and liquids (except water).   Do not chew gum or suck on mints.   If you need to take medicine with food, you may take it with a few crackers.  Please call your Imaging Department at your exam site with any questions.            Apr 13, 2018  1:30 PM CDT   TREATMENT with RADIATION THERAPIST   [facility]    [address] [facility] [address]   [phone]            Apr 13, 2018  2:45 PM CDT   Return Visit with NANCY Cai CNP   [facility])    [address] [facility] [address]   [phone]            Apr 16, 2018 12:45 PM CDT   TREATMENT with RADIATION THERAPIST   [facility]    [address] [facility] [address]   [phone]            Apr 16, 2018  1:15 PM CDT   on treatment visit with Albert Ambrosio MD   [facility])    [address] [facility] [address]   [phone]            Apr 17, 2018 12:45 PM CDT   TREATMENT with RADIATION THERAPIST   [facility]    [address] [facility] [address]   [phone]            Apr 18, 2018 12:45 PM CDT   TREATMENT with RADIATION THERAPIST   [facility] ([facility])    [address] [place] [address] [phone]            2018  1:15 PM CDT   Return Visit with NANCY Cai CNP   [facility] ([facility])    [address] [place] [id number] [phone]              Future tests that were ordered for you today     Open Standing Orders        Priority Remaining Interval Expires Ordered    Basic metabolic panel Routine 2/2 1 week 2019    Magnesium Routine 2/2 1 week 2019            Who to contact     If you have questions or need follow up information about today's clinic visit or your schedule please contact [facility] directly at [phone].  Normal or non-critical lab and imaging results will be communicated to you by MyChart, letter or phone within 4 business days after the clinic has received the results. If you do not hear from us within 7 days, please contact the clinic through MyChart or phone. If you have a critical or abnormal lab result, we will notify you by phone as soon as possible.  Submit refill requests through Lumatic or call your pharmacy and they will forward the refill request to us. Please allow 3 business days for your refill to be completed.          Additional Information About Your Visit        Lumatic Information     Lumatic is an electronic gateway that provides easy, online access to your medical records. With Lumatic, you can request a clinic appointment, read your test results, renew a prescription or communicate with your care team.     To sign up for Lumatic visit the website at [email or web address]   You will be asked to enter the access code listed below, as well as some personal information. Please follow the directions to create your username and password.     Your access code is: [email or web address]  Expires: 2018  7:30 AM     Your access code will  in 90 days. If you need help or a new code, please  contact your [facility] or call [phone] for assistance.        Care EveryWhere ID     This is your Care EveryWhere ID. This could be used by other organizations to access your [place] medical records  [id number]        Your Vitals Were     Pulse Temperature Respirations Pulse Oximetry BMI (Body Mass Index)       72 98.3  F (36.8  C) (Oral) 16 98% 16.62 kg/m2        Blood Pressure from Last 3 Encounters:   04/11/18 93/58   04/06/18 107/68   04/05/18 (!) 89/64    Weight from Last 3 Encounters:   04/11/18 37.3 kg (82 lb 4.8 oz)   04/09/18 38.1 kg (84 lb)   04/06/18 38.4 kg (84 lb 9.6 oz)                 Today's Medication Changes          These changes are accurate as of 4/11/18  9:31 AM.  If you have any questions, ask your nurse or doctor.               These medicines have changed or have updated prescriptions.        Dose/Directions    nystatin [id number] UNIT/ML suspension   Commonly known as:  MYCOSTATIN   This may have changed:  when to take this   Used for:  Thrush        Dose:  [id number] Units   Take 5 mLs (500,000 Units) by mouth 4 times daily   Quantity:  280 mL   Refills:  0                Primary Care Provider Office Phone # Fax #    Quang Wall PA-C [phone]       [facility] [address] [facility] [address]  [facility] [address]        Equal Access to Services     JAVIER MORALES AH: Hadii aad ku hadasho Soomaali, waaxda luqadaha, qaybta kaalmada adeegyada, whitney pace. So [facility] [phone].    ATENCIÓN: Si habla español, tiene a hawley disposición servicios gratuitos de asistencia lingüística. Llame al [phone].    We comply with applicable federal civil rights laws and [place] laws. We do not discriminate on the basis of race, color, national origin, age, disability, sex, sexual orientation, or gender identity.            Thank you!     Thank you for choosing [facility]  for your care. Our goal is always to  provide you with excellent care. Hearing back from our patients is one way we can continue to improve our services. Please take a few minutes to complete the written survey that you may receive in the mail after your visit with us. Thank you!             Your Updated Medication List - Protect others around you: Learn how to safely use, store and throw away your medicines at www.disposemymeds.org.          This list is accurate as of 4/11/18  9:31 AM.  Always use your most recent med list.                   Brand Name Dispense Instructions for use Diagnosis    ACETAMINOPHEN PO      Take 1,000 mg by mouth        ALLEGRA ALLERGY PO      Take by mouth as needed for allergies        dexamethasone 4 MG tablet    DECADRON    6 tablet    Take 2 tablets (8 mg) by mouth daily (with breakfast) Start the day after chemotherapy.    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)       magic mouthwash suspension (diphenhydrAMINE, lidocaine, aluminum-magnesium & simethicone) Susp compounding kit     237 mL    Swish and swallow 5-10 mLs in mouth every 6 hours as needed for mouth sores    Squamous cell carcinoma of oropharynx (H)       morphine 30 MG 12 hr tablet    MS CONTIN    60 tablet    Take 1 tablet (30 mg) by mouth every 12 hours maximum 2 tablet(s) per day    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), Cancer related pain       nystatin [id number] UNIT/ML suspension    MYCOSTATIN    280 mL    Take 5 mLs (500,000 Units) by mouth 4 times daily    Thrush       ondansetron 8 MG tablet    ZOFRAN    21 tablet    Take 1 tablet (8 mg) by mouth every 12 hours as needed for nausea     Squamous cell carcinoma of oropharynx (H)       order for DME     90 Can    Sig:  Isosource 1.5 calories:  Instill 3.5 cartons per day via PEG tube by syringe or gravity flow    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)       oxyCODONE 5 MG/5ML solution    ROXICODONE    300 mL    Take 10 mLs (10 mg) by mouth every 4 hours as needed for breakthrough pain or moderate to severe pain    Cancer related pain, 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)       polyethylene glycol powder    MIRALAX/GLYCOLAX    225 g    Take 17 g (1 capful) by mouth daily    Drug-induced constipation, Cancer related pain, 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)       Potassium Chloride 40 MEQ/15ML (20%) Soln     1 Bottle    7.5 mLs (20 mEq) by Oral or G tube route daily    Squamous cell carcinoma of oropharynx (H), Hypokalemia       prochlorperazine 10 MG tablet    COMPAZINE    30 tablet    Take 1 tablet (10 mg) by mouth every 6 hours as needed (Nausea/Vomiting)    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    Squamous cell carcinoma of oropharynx (H)

## 2018-04-11 NOTE — PROGRESS NOTES
"Oncology Rooming Note    April 11, 2018 7:48 AM   Melinda Milligan is a 42 year old female who presents for:    Chief Complaint   Patient presents with     Oncology Clinic Visit     Initial Vitals: There were no vitals taken for this visit. Estimated body mass index is 16.97 kg/(m^2) as calculated from the following:    Height as of 4/5/18: 1.499 m (4' 11\").    Weight as of 4/9/18: 38.1 kg (84 lb). There is no height or weight on file to calculate BSA.  Data Unavailable Comment: Data Unavailable   No LMP recorded.  Allergies reviewed: Yes  Medications reviewed: Yes    Medications: Medication refills not needed today.  Pharmacy name entered into Xi3: [facility] - [place], [place] - [address]    Clinical concerns: No new concerns    Darby Lewis RN              "

## 2018-04-12 ENCOUNTER — APPOINTMENT (OUTPATIENT)
Dept: RADIATION ONCOLOGY | Facility: CLINIC | Age: 43
End: 2018-04-12
Payer: COMMERCIAL

## 2018-04-12 PROCEDURE — 77386 ZZC IMRT TREATMENT DELIVERY, COMPLEX: CPT | Performed by: RADIOLOGY

## 2018-04-12 PROCEDURE — 77014 ZZHC CT GUIDE FOR PLACEMENT RADIATION THERAPY FIELDS: CPT | Performed by: RADIOLOGY

## 2018-04-12 PROCEDURE — 77334 RADIATION TREATMENT AID(S): CPT | Mod: 59 | Performed by: RADIOLOGY

## 2018-04-13 ENCOUNTER — RADIANT APPOINTMENT (OUTPATIENT)
Dept: PET IMAGING | Facility: CLINIC | Age: 43
End: 2018-04-13
Attending: RADIOLOGY
Payer: COMMERCIAL

## 2018-04-13 ENCOUNTER — RADIANT APPOINTMENT (OUTPATIENT)
Dept: PET IMAGING | Facility: CLINIC | Age: 43
End: 2018-04-13
Payer: COMMERCIAL

## 2018-04-13 ENCOUNTER — APPOINTMENT (OUTPATIENT)
Dept: RADIATION ONCOLOGY | Facility: CLINIC | Age: 43
End: 2018-04-13
Payer: COMMERCIAL

## 2018-04-13 DIAGNOSIS — C10.9 SQUAMOUS CELL CARCINOMA OF OROPHARYNX (H): Primary | ICD-10-CM

## 2018-04-13 DIAGNOSIS — C10.9 SQUAMOUS CELL CARCINOMA OF OROPHARYNX (H): ICD-10-CM

## 2018-04-13 PROCEDURE — 77386 ZZC IMRT TREATMENT DELIVERY, COMPLEX: CPT | Performed by: RADIOLOGY

## 2018-04-13 PROCEDURE — 78816 PET IMAGE W/CT FULL BODY: CPT | Mod: 59 | Performed by: RADIOLOGY

## 2018-04-13 PROCEDURE — 77014 ZZHC CT GUIDE FOR PLACEMENT RADIATION THERAPY FIELDS: CPT | Performed by: RADIOLOGY

## 2018-04-13 PROCEDURE — A9552 F18 FDG: HCPCS | Performed by: RADIOLOGY

## 2018-04-13 PROCEDURE — 71260 CT THORAX DX C+: CPT | Mod: 51

## 2018-04-13 PROCEDURE — 74177 CT ABD & PELVIS W/CONTRAST: CPT

## 2018-04-13 PROCEDURE — 70491 CT SOFT TISSUE NECK W/DYE: CPT | Performed by: RADIOLOGY

## 2018-04-13 RX ORDER — IOPAMIDOL 755 MG/ML
1-135 INJECTION, SOLUTION INTRAVASCULAR ONCE
Status: COMPLETED | OUTPATIENT
Start: 2018-04-13 | End: 2018-04-13

## 2018-04-13 RX ADMIN — IOPAMIDOL 100 ML: 755 INJECTION, SOLUTION INTRAVASCULAR at 10:46

## 2018-04-16 ENCOUNTER — TELEPHONE (OUTPATIENT)
Dept: ONCOLOGY | Facility: CLINIC | Age: 43
End: 2018-04-16

## 2018-04-16 ENCOUNTER — APPOINTMENT (OUTPATIENT)
Dept: RADIATION ONCOLOGY | Facility: CLINIC | Age: 43
End: 2018-04-16
Payer: COMMERCIAL

## 2018-04-16 ENCOUNTER — INFUSION THERAPY VISIT (OUTPATIENT)
Dept: INFUSION THERAPY | Facility: CLINIC | Age: 43
End: 2018-04-16
Payer: COMMERCIAL

## 2018-04-16 ENCOUNTER — TRANSFERRED RECORDS (OUTPATIENT)
Dept: HEALTH INFORMATION MANAGEMENT | Facility: CLINIC | Age: 43
End: 2018-04-16

## 2018-04-16 ENCOUNTER — HOME INFUSION (PRE-WILLOW HOME INFUSION) (OUTPATIENT)
Dept: PHARMACY | Facility: CLINIC | Age: 43
End: 2018-04-16

## 2018-04-16 ENCOUNTER — OFFICE VISIT (OUTPATIENT)
Dept: RADIATION ONCOLOGY | Facility: CLINIC | Age: 43
End: 2018-04-16
Payer: COMMERCIAL

## 2018-04-16 VITALS
DIASTOLIC BLOOD PRESSURE: 69 MMHG | HEART RATE: 69 BPM | OXYGEN SATURATION: 98 % | HEIGHT: 59 IN | WEIGHT: 81 LBS | TEMPERATURE: 98.6 F | SYSTOLIC BLOOD PRESSURE: 96 MMHG | BODY MASS INDEX: 16.33 KG/M2

## 2018-04-16 DIAGNOSIS — C10.9 SQUAMOUS CELL CARCINOMA OF OROPHARYNX (H): ICD-10-CM

## 2018-04-16 DIAGNOSIS — R11.0 NAUSEA: ICD-10-CM

## 2018-04-16 DIAGNOSIS — E87.1 HYPONATREMIA: Primary | ICD-10-CM

## 2018-04-16 DIAGNOSIS — C10.9 SQUAMOUS CELL CARCINOMA OF OROPHARYNX (H): Primary | ICD-10-CM

## 2018-04-16 LAB
CREAT SERPL-MCNC: 0.55 MG/DL (ref 0.55–1.02)
GFR SERPL CREATININE-BSD FRML MDRD: >60 ML/MIN
GLUCOSE SERPL-MCNC: 78 MG/DL (ref 70–110)
POTASSIUM SERPL-SCNC: 3.8 MMOL/L (ref 3.5–5.1)

## 2018-04-16 PROCEDURE — 77386 ZZC IMRT TREATMENT DELIVERY, COMPLEX: CPT | Performed by: RADIOLOGY

## 2018-04-16 PROCEDURE — 96361 HYDRATE IV INFUSION ADD-ON: CPT | Mod: 59 | Performed by: INTERNAL MEDICINE

## 2018-04-16 PROCEDURE — 96374 THER/PROPH/DIAG INJ IV PUSH: CPT | Performed by: INTERNAL MEDICINE

## 2018-04-16 PROCEDURE — 99207 ZZC NO CHARGE NURSE ONLY: CPT

## 2018-04-16 PROCEDURE — 99207 ZZC DROP WITH A PROCEDURE: CPT | Performed by: RADIOLOGY

## 2018-04-16 PROCEDURE — 77014 ZZHC CT GUIDE FOR PLACEMENT RADIATION THERAPY FIELDS: CPT | Performed by: RADIOLOGY

## 2018-04-16 RX ORDER — ONDANSETRON 2 MG/ML
8 INJECTION INTRAMUSCULAR; INTRAVENOUS ONCE
Status: CANCELLED
Start: 2018-04-16 | End: 2018-04-16

## 2018-04-16 RX ORDER — ONDANSETRON 2 MG/ML
8 INJECTION INTRAMUSCULAR; INTRAVENOUS ONCE
Status: COMPLETED | OUTPATIENT
Start: 2018-04-16 | End: 2018-04-16

## 2018-04-16 RX ADMIN — Medication 1000 ML: at 14:00

## 2018-04-16 RX ADMIN — ONDANSETRON 8 MG: 2 INJECTION INTRAMUSCULAR; INTRAVENOUS at 14:23

## 2018-04-16 ASSESSMENT — PAIN SCALES - GENERAL: PAINLEVEL: MODERATE PAIN (4)

## 2018-04-16 NOTE — TELEPHONE ENCOUNTER
Received call from patient's ,Noe. Reports patient started vomiting on Saturday. Just a couple times Saturday but many times Sunday and 4 times already this morning. She is having trouble keeping anything she eats or drinks down. She has been taking Compazine and Zofran on a scheduled basis. Patient has ativan for anxiety with radiation. Instructed to try taking that now. She is scheduled for radiation tx at 1245 today. She will also be seeing Dr Ambrosio today. Advised Noe to plan on staying possible IV fluids after provider visit today.   Yarelis Spring  RN, BSN, OCN

## 2018-04-16 NOTE — MR AVS SNAPSHOT
After Visit Summary   4/16/2018    Melinda Milligan    MRN: [id number]           Patient Information     Date Of Birth          1975        Visit Information        Provider Department      4/16/2018 1:45 PM BAY 6 INFUSION [facility]        Today's Diagnoses     Hyponatremia    -  1    Squamous cell carcinoma of oropharynx (H)        Nausea           Follow-ups after your visit        Your next 10 appointments already scheduled     Apr 17, 2018 12:45 PM CDT   TREATMENT with RADIATION THERAPIST   [facility])    [address] [facility] [address] [phone]            Apr 18, 2018 12:15 PM CDT   LAB with LAB ONC [facility])    [address] [facility] [address] [phone]           Please do not eat 10-12 hours before your appointment if you are coming in fasting for labs on lipids, cholesterol, or glucose (sugar). This does not apply to pregnant women. Water, hot tea and black coffee (with nothing added) are okay. Do not drink other fluids, diet soda or chew gum.            Apr 18, 2018 12:45 PM CDT   TREATMENT with RADIATION THERAPIST   [facility])    [address] [facility] [address] [phone]            Apr 18, 2018  1:15 PM CDT   Return Visit with NANCY Cai [facility])    [address] [facility] [address] [phone]            Apr 18, 2018  2:30 PM CDT   Level 2 with BAY 6 INFUSION   [facility]    [address] [facility] [address] [phone]            Apr 19, 2018 12:45 PM CDT   TREATMENT with RADIATION THERAPIST   [facility] ([facility])    [address] [facility] [address]    [phone]            Apr 20, 2018 12:45 PM CDT   TREATMENT with RADIATION THERAPIST   [facility]    [address] [facility] [address] [phone]            Apr 23, 2018 12:45 PM CDT   TREATMENT with RADIATION THERAPIST   [facility]    [address] [facility] [address] [phone]            Apr 23, 2018  1:15 PM CDT   on treatment visit with Albert Ambrosio MD   [facility]    [address] [facility] [address] [phone]            Apr 24, 2018 12:45 PM CDT   TREATMENT with RADIATION THERAPIST   [facility]    [address] [place] [address]   [phone]              Who to contact     If you have questions or need follow up information about today's clinic visit or your schedule please contact [facility] directly at [phone].  Normal or non-critical lab and imaging results will be communicated to you by MyChart, letter or phone within 4 business days after the clinic has received the results. If you do not hear from us within 7 days, please contact the clinic through Shoozyhart or phone. If you have a critical or abnormal lab result, we will notify you by phone as soon as possible.  Submit refill requests through Cyzone or call your pharmacy and they will forward the refill request to us. Please allow 3 business days for your refill to be completed.          Additional Information About Your Visit        Cyzone Information     Cyzone is an electronic gateway that provides easy, online access to your medical records. With Cyzone, you can request a clinic appointment, read your test results, renew a prescription or communicate with your care team.     To sign up for Cyzone visit the website at [email or web address]   You  will be asked to enter the access code listed below, as well as some personal information. Please follow the directions to create your username and password.     Your access code is: [email or web address]  Expires: 2018  7:30 AM     Your access code will  in 90 days. If you need help or a new code, please contact your [facility] or call [phone] for assistance.        Care EveryWhere ID     This is your Care EveryWhere ID. This could be used by other organizations to access your [place] medical records  [id number]         Blood Pressure from Last 3 Encounters:   18 96/69   18 93/58   18 107/68    Weight from Last 3 Encounters:   18 36.7 kg (81 lb)   18 37.3 kg (82 lb 4.8 oz)   18 38.1 kg (84 lb)              Today, you had the following     No orders found for display         Today's Medication Changes          These changes are accurate as of 18  3:45 PM.  If you have any questions, ask your nurse or doctor.               These medicines have changed or have updated prescriptions.        Dose/Directions    nystatin [id number] UNIT/ML suspension   Commonly known as:  MYCOSTATIN   This may have changed:  when to take this   Used for:  Thrush        Dose:  [id number] Units   Take 5 mLs (500,000 Units) by mouth 4 times daily   Quantity:  280 mL   Refills:  0                Primary Care Provider Office Phone # Fax #    Quang Wall PA-C [phone]       [facility] [address] [facility] [address]  [facility] [address]        Equal Access to Services     [facility] AH: Hadii aad ku hadasho Soomaali, waaxda luqadaha, qaybta kaalmada adeegyada, whitney keller . So [facility] [phone].    ATENCIÓN: Si habla español, tiene a hawley disposición servicios gratuitos de asistencia lingüística. Llame al [phone].    We comply with applicable federal civil rights laws and [place] laws. We do not discriminate on the  basis of race, color, national origin, age, disability, sex, sexual orientation, or gender identity.            Thank you!     Thank you for choosing [facility]  for your care. Our goal is always to provide you with excellent care. Hearing back from our patients is one way we can continue to improve our services. Please take a few minutes to complete the written survey that you may receive in the mail after your visit with us. Thank you!             Your Updated Medication List - Protect others around you: Learn how to safely use, store and throw away your medicines at www.disposemymeds.org.          This list is accurate as of 4/16/18  3:45 PM.  Always use your most recent med list.                   Brand Name Dispense Instructions for use Diagnosis    ACETAMINOPHEN PO      Take 1,000 mg by mouth        ALLEGRA ALLERGY PO      Take by mouth as needed for allergies        dexamethasone 4 MG tablet    DECADRON    6 tablet    Take 2 tablets (8 mg) by mouth daily (with breakfast) Start the day after chemotherapy.    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)       magic mouthwash suspension (diphenhydrAMINE, lidocaine, aluminum-magnesium & simethicone) Susp compounding kit     237 mL    Swish and swallow 5-10 mLs in mouth every 6 hours as needed for mouth sores    Squamous cell carcinoma of oropharynx (H)       morphine 30 MG 12 hr tablet    MS CONTIN    60 tablet    Take 1 tablet (30 mg) by mouth every 12 hours maximum 2 tablet(s) per day    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), Cancer related pain       nystatin [id number] UNIT/ML  suspension    MYCOSTATIN    280 mL    Take 5 mLs (500,000 Units) by mouth 4 times daily    Thrush       ondansetron 8 MG tablet    ZOFRAN    21 tablet    Take 1 tablet (8 mg) by mouth every 12 hours as needed for nausea    Squamous cell carcinoma of oropharynx (H)       order for DME     90 Can    Sig:  Isosource 1.5 calories:  Instill 3.5 cartons per day via PEG tube by syringe or gravity flow    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)       oxyCODONE 5 MG/5ML solution    ROXICODONE    300 mL    Take 10 mLs (10 mg) by mouth every 4 hours as needed for breakthrough pain or moderate to severe pain    Cancer related pain, 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)       polyethylene glycol powder    MIRALAX/GLYCOLAX    225 g    Take 17 g (1 capful) by mouth daily    Drug-induced constipation, Cancer related pain, 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)       Potassium Chloride 40 MEQ/15ML (20%) Soln     1 Bottle    7.5 mLs (20 mEq) by Oral or G tube route daily    Squamous cell carcinoma of oropharynx (H), Hypokalemia       prochlorperazine 10 MG tablet    COMPAZINE    30 tablet    Take 1 tablet (10 mg) by mouth every 6 hours as needed (Nausea/Vomiting)    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    Squamous cell carcinoma of oropharynx (H)

## 2018-04-16 NOTE — PROGRESS NOTES
Infusion Nursing Note:  Melinda Milligan presents today for fluids/zofran.    Patient seen by provider today: No   present during visit today: Not Applicable.    Note:She has had poor intake and emesis for 2 days. Not able to tolerate anything PO or per tube. Discussed with pt and  to take antinausea meds. Recommended compazine at home, ativan under tongue (if not tolerated PO). Gave time to take zofran again.  Also suggested spitting mucus out rather than swallowing, reviewed use of magic mouthwash vs. Nystatin.  Encourage to continue to try eat and use tube.     Intravenous Access:  Peripheral IV placed.    Treatment Conditions:  Not Applicable.      Post Infusion Assessment:  Patient tolerated infusion without incident.  Site patent and intact, free from redness, edema or discomfort.  No evidence of extravasations.  Access discontinued per protocol.    Discharge Plan:   Patient and/or family verbalized understanding of discharge instructions and all questions answered.    Rae Holcomb RN

## 2018-04-16 NOTE — MR AVS SNAPSHOT
After Visit Summary   4/16/2018    Melinda Milligan    MRN: [id number]           Patient Information     Date Of Birth          1975        Visit Information        Provider Department      4/16/2018 1:15 PM Albert Ambrosio MD [facility]        Today's Diagnoses     Squamous cell carcinoma of oropharynx (H)    -  1      Care Instructions    Please contact [facility] RN with questions or concerns following today's appointment: [phone].    Thank you!            Follow-ups after your visit        Your next 10 appointments already scheduled     Apr 17, 2018 12:45 PM CDT   TREATMENT with RADIATION THERAPIST   [facility] ([facility])    [address] [place] [address] [id number] [phone]            Apr 18, 2018 12:15 PM CDT   LAB with LAB ONC [facility])    [address] [place] [address] [id number] [phone]           Please do not eat 10-12 hours before your appointment if you are coming in fasting for labs on lipids, cholesterol, or glucose (sugar). This does not apply to pregnant women. Water, hot tea and black coffee (with nothing added) are okay. Do not drink other fluids, diet soda or chew gum.            Apr 18, 2018 12:45 PM CDT   TREATMENT with RADIATION THERAPIST   [facility])    [address] [place] [id number] [phone]            Apr 18, 2018  1:15 PM CDT   Return Visit with NANCY Cai CNP   [facility])    [address] [place] [id number] [phone]            Apr 18, 2018  2:30 PM CDT   Level 2 with BAY 6 INFUSION   [facility])    [address] [facility] [phone]            Apr 19, 2018 12:45 PM CDT   TREATMENT  with RADIATION THERAPIST   [facility] ([facility])    [address] [facility] [address]   [phone]            Apr 20, 2018 12:45 PM CDT   TREATMENT with RADIATION THERAPIST   [facility] ([facility])    [address] [facility] [address]   [phone]            Apr 23, 2018 12:45 PM CDT   TREATMENT with RADIATION THERAPIST   [facility] ([facility])    [address] [place] [phone]   [phone]            Apr 23, 2018  1:15 PM CDT   on treatment visit with Albert Ambrosio MD   [facility] ([facility])    [address] [address] [address]   [phone]            Apr 24, 2018 12:45 PM CDT   TREATMENT with RADIATION THERAPIST   [facility] ([facility])    [address] [place] [address]   [phone]              Who to contact     If you have questions or need follow up information about today's clinic visit or your schedule please contact [facility] directly at [phone].  Normal or non-critical lab and imaging results will be communicated to you by MyChart, letter or phone within 4 business days after the clinic has received the results. If you do not hear from us within 7 days, please contact the clinic through MyChart or phone. If you have a critical or abnormal lab result, we will notify you by phone as soon as possible.  Submit refill requests through [employer] or call your pharmacy and they will forward the refill request to us. Please allow 3 business days for your refill to be completed.          Additional Information About Your Visit        [employer] Information     [employer] is an electronic gateway that provides easy, online access to your medical records. With [employer], you can request a clinic appointment, read your test results,  "renew a prescription or communicate with your care team.     To sign up for MyChart visit the website at [email or web address]   You will be asked to enter the access code listed below, as well as some personal information. Please follow the directions to create your username and password.     Your access code is: [email or web address]  Expires: 2018  7:30 AM     Your access code will  in 90 days. If you need help or a new code, please contact your [facility] or call [phone] for assistance.        Care EveryWhere ID     This is your Care EveryWhere ID. This could be used by other organizations to access your [employer] medical records  [id number]        Your Vitals Were     Pulse Temperature Height Pulse Oximetry BMI (Body Mass Index)       69 98.6  F (37  C) (Oral) 4' 11\" 98% 16.36 kg/m2        Blood Pressure from Last 3 Encounters:   18 96/69   18 93/58   18 107/68    Weight from Last 3 Encounters:   18 81 lb   18 82 lb 4.8 oz   18 84 lb              Today, you had the following     No orders found for display         Today's Medication Changes          These changes are accurate as of 18  3:16 PM.  If you have any questions, ask your nurse or doctor.               These medicines have changed or have updated prescriptions.        Dose/Directions    nystatin 718697 UNIT/ML suspension   Commonly known as:  MYCOSTATIN   This may have changed:  when to take this   Used for:  Thrush        Dose:  [id number] Units   Take 5 mLs (500,000 Units) by mouth 4 times daily   Quantity:  280 mL   Refills:  0                Primary Care Provider Office Phone # Fax #    Quang Wall PA-C [phone]       [facility] [address] [facility] [address]  [facility] [address]        Equal Access to Services     JAVIER MORALES AH: Rogelio adhikario Soomaali, waaxda luqadaha, qaybta kaalmada adeegyawally, whitney pace. " So [facility] [phone].    ATENCIÓN: Si rhiannon harvey, tiene a hawley disposición servicios gratuitos de asistencia lingüística. Juanjose diana [phone].    We comply with applicable federal civil rights laws and [place] laws. We do not discriminate on the basis of race, color, national origin, age, disability, sex, sexual orientation, or gender identity.            Thank you!     Thank you for choosing [facility]  for your care. Our goal is always to provide you with excellent care. Hearing back from our patients is one way we can continue to improve our services. Please take a few minutes to complete the written survey that you may receive in the mail after your visit with us. Thank you!             Your Updated Medication List - Protect others around you: Learn how to safely use, store and throw away your medicines at www.disposemymeds.org.          This list is accurate as of 4/16/18  3:16 PM.  Always use your most recent med list.                   Brand Name Dispense Instructions for use Diagnosis    ACETAMINOPHEN PO      Take 1,000 mg by mouth        ALLEGRA ALLERGY PO      Take by mouth as needed for allergies        dexamethasone 4 MG tablet    DECADRON    6 tablet    Take 2 tablets (8 mg) by mouth daily (with breakfast) Start the day after chemotherapy.    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)       magic mouthwash suspension (diphenhydrAMINE, lidocaine, aluminum-magnesium & simethicone) Susp compounding kit     237 mL    Swish and swallow 5-10 mLs in mouth every 6 hours as needed for mouth sores    Squamous cell carcinoma of  oropharynx (H)       morphine 30 MG 12 hr tablet    MS CONTIN    60 tablet    Take 1 tablet (30 mg) by mouth every 12 hours maximum 2 tablet(s) per day    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), Cancer related pain       nystatin [id number] UNIT/ML suspension    MYCOSTATIN    280 mL    Take 5 mLs (500,000 Units) by mouth 4 times daily    Thrush       ondansetron 8 MG tablet    ZOFRAN    21 tablet    Take 1 tablet (8 mg) by mouth every 12 hours as needed for nausea    Squamous cell carcinoma of oropharynx (H)       order for DME     90 Can    Sig:  Isosource 1.5 calories:  Instill 3.5 cartons per day via PEG tube by syringe or gravity flow    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)       oxyCODONE 5 MG/5ML solution    ROXICODONE    300 mL    Take 10 mLs (10 mg) by mouth every 4 hours as needed for breakthrough pain or moderate to severe pain    Cancer related pain, 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)       polyethylene glycol powder    MIRALAX/GLYCOLAX    225 g    Take 17 g (1 capful) by mouth daily    Drug-induced constipation, Cancer related pain, 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)       Potassium Chloride 40 MEQ/15ML (20%) Soln     1 Bottle    7.5 mLs (20 mEq) by Oral or G tube route daily    Squamous cell carcinoma of oropharynx (H), Hypokalemia       prochlorperazine 10 MG tablet    COMPAZINE    30 tablet    Take 1 tablet (10 mg) by mouth every 6 hours as needed (Nausea/Vomiting)    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    Squamous cell carcinoma of oropharynx (H)

## 2018-04-16 NOTE — PATIENT INSTRUCTIONS
Please contact [facility] RN with questions or concerns following today's appointment: [phone].    Thank you!

## 2018-04-16 NOTE — PROGRESS NOTES
[facility] PHYS[facility]IANS  SPECIALIZING IN BREAKTHROUGHS  Radiation Oncology    On Treatment Visit Note      Melinda Milligan      Date: 2018   MRN: [id number]   : 1975  Diagnosis: oropharyngeal cancer      Reason for Visit:  On Radiation Treatment Visit     Treatment Summary to Date  Treatment Site: head and neck + lymph nodes Current Dose: 4140/7000 cGy Fractions:       Chemotherapy  Chemo concurrent with radx?: Yes  Oncologist: Dr. Nieto  Drug Name/Frequency 1: Cisplatin    Subjective:   Significant nausea since chemo last week; taking zofran and compazine. Also took ativan today. Vomited 4x over last day. Not able to keep any fluids or tube feeds down. Pain in throat is stable.    Nursing ROS:   Nutrition Alteration  Diet Type: Gastrostomy Tube Feedings  Nutrition Note: patient reports isosource 2 cans on , 4/15 inwhich she vomited right after. patient reports no isosource 18. patient reports attempting to eat a toast and was not able to keep it down.  Skin  Skin Intervention: patient using Aquaphor      ENT and Mouth Exam  Mucositis - Current: 0 - None   Mucositis - Internal Severity: 0 - None   Esophagitis: 0 - None  ENT/Mouth Note: patient using baking soda and salt rinse, using magic mouthwash, using Biotene products, reports mild throat pain with burning while swallowing . patient reports having mouth sores and denies any blood  Cardiovascular  Respiratory effort: 1 - Normal - without distress  Gastrointestinal  Nausea: 2 - Three to four 4 episodes of nausea/24  Vomitin - Two to four episodes in 24 hours  GI Note: patient reports vomiting 4 times today. ptient reports vomit started out clear in color and last was yellowish. patient denies any blood. patient reports takin compazine and lorazepam     Psychosocial  Pyschosocial Note: patient reports increasing fatigue  Pain Assessment  0-10 Pain Scale: 4  Pain Descriptors: Sore  Pain Treatment: MS Contin 30 mg po two  "times daily, Tylenol po as needed, Oxycodone po as needed      Objective:   BP 96/69 (BP Location: Left arm, Patient Position: Sitting, Cuff Size: Child)  Pulse 69  Temp 98.6  F (37  C) (Oral)  Ht 4' 11\"  Wt 81 lb  SpO2 98%  BMI 16.36 kg/m2  Appears fatigued  Patchy mucositis in oropharynx bilat  No significant residual tongue lesion  Hyperpigmented neck skin bilat, but no desquamation    Labs:  CBC RESULTS:   Recent Labs   Lab Test  04/10/18   1317   WBC  2.5*   RBC  3.42*   HGB  10.2*   HCT  32.7*   MCV  96   MCH  29.8   MCHC  31.2*   RDW  21.8*   PLT  329     ELECTROLYTES:  Recent Labs   Lab Test  04/10/18   1317   NA  135   POTASSIUM  4.3   CHLORIDE  100   ARLENE  8.9   CO2  31   BUN  7   CR  0.55   GLC  78       Assessment:    Tolerating radiation therapy well.  All questions and concerns addressed.    Plan:   1. Continue current therapy.  Will have IVF today; will discuss adding IV nausea meds w/ med onc. Otherwise may increase zofran to q6h prn, and may take ativan prn nausea as well. Labs Wednesday.  2. PET shows interval response to treatment.      Mosaiq chart and setup information reviewed  MVCT/IGRT images checked    Medication Review  Med list reviewed with patient?: Yes           Albert Ambrosio MD      "

## 2018-04-17 ENCOUNTER — APPOINTMENT (OUTPATIENT)
Dept: RADIATION ONCOLOGY | Facility: CLINIC | Age: 43
End: 2018-04-17
Payer: COMMERCIAL

## 2018-04-17 PROCEDURE — 77386 ZZC IMRT TREATMENT DELIVERY, COMPLEX: CPT | Performed by: RADIOLOGY

## 2018-04-17 PROCEDURE — 77014 ZZHC CT GUIDE FOR PLACEMENT RADIATION THERAPY FIELDS: CPT | Performed by: RADIOLOGY

## 2018-04-18 ENCOUNTER — APPOINTMENT (OUTPATIENT)
Dept: RADIATION ONCOLOGY | Facility: CLINIC | Age: 43
End: 2018-04-18
Payer: COMMERCIAL

## 2018-04-18 ENCOUNTER — ONCOLOGY VISIT (OUTPATIENT)
Dept: ONCOLOGY | Facility: CLINIC | Age: 43
End: 2018-04-18
Payer: COMMERCIAL

## 2018-04-18 ENCOUNTER — INFUSION THERAPY VISIT (OUTPATIENT)
Dept: INFUSION THERAPY | Facility: CLINIC | Age: 43
End: 2018-04-18
Payer: COMMERCIAL

## 2018-04-18 VITALS
WEIGHT: 82.3 LBS | TEMPERATURE: 98.7 F | SYSTOLIC BLOOD PRESSURE: 99 MMHG | BODY MASS INDEX: 16.62 KG/M2 | OXYGEN SATURATION: 99 % | DIASTOLIC BLOOD PRESSURE: 64 MMHG | HEART RATE: 60 BPM | RESPIRATION RATE: 16 BRPM

## 2018-04-18 DIAGNOSIS — K12.30 MUCOSITIS: ICD-10-CM

## 2018-04-18 DIAGNOSIS — R11.0 NAUSEA: Primary | ICD-10-CM

## 2018-04-18 DIAGNOSIS — C02.9 TONGUE CANCER (H): ICD-10-CM

## 2018-04-18 DIAGNOSIS — G89.3 CANCER RELATED PAIN: ICD-10-CM

## 2018-04-18 DIAGNOSIS — C10.9 SQUAMOUS CELL CARCINOMA OF OROPHARYNX (H): ICD-10-CM

## 2018-04-18 DIAGNOSIS — C06.9 SQUAMOUS CELL CARCINOMA OF ORAL CAVITY (H): ICD-10-CM

## 2018-04-18 DIAGNOSIS — K59.00 CONSTIPATION, UNSPECIFIED CONSTIPATION TYPE: ICD-10-CM

## 2018-04-18 DIAGNOSIS — E87.1 HYPONATREMIA: ICD-10-CM

## 2018-04-18 DIAGNOSIS — Z72.0 TOBACCO USE: ICD-10-CM

## 2018-04-18 LAB
ANION GAP SERPL CALCULATED.3IONS-SCNC: 6 MMOL/L (ref 3–14)
BUN SERPL-MCNC: 10 MG/DL (ref 7–30)
CALCIUM SERPL-MCNC: 8.4 MG/DL (ref 8.5–10.1)
CHLORIDE SERPL-SCNC: 99 MMOL/L (ref 94–109)
CO2 SERPL-SCNC: 30 MMOL/L (ref 20–32)
CREAT SERPL-MCNC: 0.5 MG/DL (ref 0.52–1.04)
GFR SERPL CREATININE-BSD FRML MDRD: >90 ML/MIN/1.7M2
GLUCOSE SERPL-MCNC: 103 MG/DL (ref 70–99)
MAGNESIUM SERPL-MCNC: 1.8 MG/DL (ref 1.6–2.3)
POTASSIUM SERPL-SCNC: 3.5 MMOL/L (ref 3.4–5.3)
SODIUM SERPL-SCNC: 135 MMOL/L (ref 133–144)

## 2018-04-18 PROCEDURE — 77427 RADIATION TX MANAGEMENT X5: CPT | Performed by: RADIOLOGY

## 2018-04-18 PROCEDURE — 77014 ZZHC CT GUIDE FOR PLACEMENT RADIATION THERAPY FIELDS: CPT | Performed by: RADIOLOGY

## 2018-04-18 PROCEDURE — 36415 COLL VENOUS BLD VENIPUNCTURE: CPT | Performed by: INTERNAL MEDICINE

## 2018-04-18 PROCEDURE — 96360 HYDRATION IV INFUSION INIT: CPT | Performed by: NURSE PRACTITIONER

## 2018-04-18 PROCEDURE — 77336 RADIATION PHYSICS CONSULT: CPT | Performed by: RADIOLOGY

## 2018-04-18 PROCEDURE — 83735 ASSAY OF MAGNESIUM: CPT | Performed by: INTERNAL MEDICINE

## 2018-04-18 PROCEDURE — 99207 ZZC NO CHARGE LOS: CPT

## 2018-04-18 PROCEDURE — 80048 BASIC METABOLIC PNL TOTAL CA: CPT | Performed by: INTERNAL MEDICINE

## 2018-04-18 PROCEDURE — 77386 ZZC IMRT TREATMENT DELIVERY, COMPLEX: CPT | Performed by: RADIOLOGY

## 2018-04-18 PROCEDURE — 99214 OFFICE O/P EST MOD 30 MIN: CPT | Mod: 25 | Performed by: NURSE PRACTITIONER

## 2018-04-18 RX ORDER — MORPHINE SULFATE 30 MG/1
30 TABLET, FILM COATED, EXTENDED RELEASE ORAL EVERY 12 HOURS
Qty: 60 TABLET | Refills: 0 | Status: SHIPPED | OUTPATIENT
Start: 2018-04-18 | End: 2018-06-05

## 2018-04-18 RX ORDER — OXYCODONE HCL 5 MG/5 ML
10 SOLUTION, ORAL ORAL EVERY 4 HOURS PRN
Qty: 300 ML | Refills: 0 | Status: SHIPPED | OUTPATIENT
Start: 2018-04-18 | End: 2018-06-05

## 2018-04-18 RX ORDER — ONDANSETRON 2 MG/ML
8 INJECTION INTRAMUSCULAR; INTRAVENOUS ONCE
Status: CANCELLED
Start: 2018-04-18 | End: 2018-04-18

## 2018-04-18 RX ADMIN — Medication 1000 ML: at 14:14

## 2018-04-18 ASSESSMENT — PAIN SCALES - GENERAL: PAINLEVEL: MILD PAIN (3)

## 2018-04-18 NOTE — LETTER
4/18/2018         RE: Melinda Milligan  [address] [facility] [address]        Dear Colleague,    Thank you for referring your patient, Melinda Milligan, to the [facility]. Please see a copy of my visit note below.    Oncology Follow Up Visit: April 18, 2018    Oncologist: Dr Benedicto Nieto  PCP: Quang Wall  ENT: Dr Marga Arana    Diagnosis: Squamous carcinoma of the tongue  Melinda Milligan is a 43 yo female who presented with thrush then noted swelling and pain to right side of tongue and cheek. Biopsy of tongue lesion proved squamous cell carcinoma. Further imaging work up showed mass involving bilateral palatine tonsils, soft palpate with extension to nasopharyngeal and oropharyngeal walls with level IIa lymph node involvement.   * also found to have iron deficiency anemia and was started on Infed dosing.   Treatment:   3/13/2018 gtube placed  3/14/2018 begins chemoradiation with cisplatin    Interval History: Ms. Milligan comes to clinic today for review of symptoms with continued chemoradiation. Pt continues with daily radiation and last completed day 22 cisplatin on 4/11.Pt had difficult week post cycle 2 and needed fluids with antiemetic on Monday return- she was unable to keep fluids down even with Gtube feedings. She states today she is feeling much better but continues with morning compazine with zofran as needed - has taken today. Dietician has asked her to increase to 4 cans of isosource daily but state she is only able to complete 3.5 cans per Gtube. Pain is to right side of face, jaw , back of throat and neck- is stable with current pain medications of MS contin 15 mg bid with oxycodone 5 mg 1-2 x daily and tylenol as needed- able to swallow water only. Thick saliva is irritating for her but continues with rinses several times daily.Pain ranked 3/10 at present. Bowels are normal. She does complain of weakness  But no neuropathy, ringing of the ears, or  hearing loss.    Rest of comprehensive and complete ROS is reviewed and is negative.   Past Medical History:   Diagnosis Date     Allergic rhinitis      Anemia      Hoarseness      Oropharyngeal cancer (H) 02/15/2018     Uncomplicated asthma      Current Outpatient Prescriptions   Medication     ACETAMINOPHEN PO     dexamethasone (DECADRON) 4 MG tablet     Fexofenadine HCl (ALLEGRA ALLERGY PO)     folic acid (FOLVITE) 1 MG tablet     LORazepam (ATIVAN) 0.5 MG tablet     magic mouthwash (FIRST-MOUTHWASH BLM) suspension     morphine (MS CONTIN) 30 MG 12 hr tablet     nystatin (MYCOSTATIN) 701766 UNIT/ML suspension     ondansetron (ZOFRAN) 8 MG tablet     order for DME     oxyCODONE (ROXICODONE) 5 MG/5ML solution     polyethylene glycol (MIRALAX/GLYCOLAX) powder     Potassium Chloride 40 MEQ/15ML (20%) SOLN     prochlorperazine (COMPAZINE) 10 MG tablet     sucralfate (CARAFATE) 1 GM/10ML suspension     varenicline (CHANTIX STARTING MONTH PAK) 0.5 MG X 11 & 1 MG X 42 tablet     No current facility-administered medications for this visit.      Allergies   Allergen Reactions     Ceftriaxone      Other reaction(s): Unknown Reaction     Chicken-Derived Products (Egg)      Other reaction(s): Vomiting  Other reaction(s): Unknown Reaction     Physical Exam:BP 99/64  Pulse 60  Temp 98.7  F (37.1  C) (Oral)  Resp 16  Wt 37.3 kg (82 lb 4.8 oz)  SpO2 99%  BMI 16.62 kg/m2   ECOG PS- 0-1  Constitutional: Alert, moving well.Cooperative. Thin. More energy again today  ENT: Eyes bright, mouth with coating soreness to back of throat. Speech is clear  Respiratory: Breathing easy. Lung sounds clear to auscultation- no cough  GI: Abdomen with Gtube site with no signs of redness or infection.  MS: Muscle tone normal- moving easy, extremities normal with no edema.   Skin:  No suspicious lesions- area at neck showing new scattered flesh colored papules ar radiation site. No redness or peeling noted.   Neuro: Sensory grossly WNL, gait  normal.   Psych: Mentation appears normal and affect normal with easy smile    Laboratory Results:   Results for orders placed or performed in visit on 04/13/18   CT Soft Tissue Neck w Contrast    Narrative    PET CT fusion examination 4/13/2018 3:05 PM  1. Neck CT with contrast  2. PET study of the neck  3. PET CT fusion study of the neck    Provided History:  Squamous cell carcinoma of the oropharynx.  Additional information obtained from EMR: Stage IV T3 N2c N0 squamous  cell cancer of the tongue. P 16 negative. On 3/14/2018 she started  concurrent chemoradiotherapy with high-dose cisplatin alongside  radiation. Cycle 2 of chemotherapy was delayed by 1 week due to  neutropenia.    Comparison: PET/CT 2/23/2018.    Technique: Please refer to the accompanying whole body PET-CT for  report of the dose and whole body PET-CT findings.  Regarding the neck, axial images were obtained after nonionic  intravenous contrast administration, with sagittal and coronal  reconstructions performed. Neck CT images were reviewed in bone, soft  tissue, and lung windows, with review of the fused PET-CT images as  well in multiple planes.    Findings:    There is marked interval decrease in the extent and intensity of FDG  uptake involving the primary mucosal space lesion at the level of the  nasopharynx and oropharynx. On today's examination there is minimal  mucosal space FDG avidity along the right and left lateral  oropharyngeal spaces, right glossotonsillar sulcus and minimally  extending to the soft palate. The maximum SUV of these residual areas  of FDG uptake measures up to 7.7, previously 26.2.    The previously seen FDG avid bilateral level 2 cervical lymph nodes  are no longer hypermetabolic on the current exam and they are not  enlarged.    Evaluation of the mucosal space demonstrates no abnormality or  abnormal metabolic uptake on PET CT in the glottis. The parotid and  submandibular glands are smaller in size reflecting  post radiation  changes. The thyroid gland appears normal.    There is no evident cervical lymphadenopathy, and the cervical lymph  nodes are within normal limits by size criteria. No abnormal metabolic  uptake on PET CT.     Limited evaluation of the cervical vertebral column demonstrates no  evident spinal canal stenosis. The visualized paranasal sinuses and  mastoid air cells are clear. The major vascular structures in the neck  are patent.    The visualized lung apices appear clear.      Impression    Impression: In this patient with stage IV squamous cell carcinoma of  the oropharynx undergoing chemoradiation there is evidence of response  to therapy:  1a. Interval marked decrease in the extent and intensity of FDG uptake  involving the primary mucosal space lesion at the level of the  nasopharynx and oropharynx. There is minimal residual mucosal space  FDG avidity along the right and left lateral oropharyngeal spaces,  right glossotonsillar sulcus and minimally extending to the soft  palate.   1b. Interval resolution of bilateral cervical lymphadenopathy.     2. Please refer to the whole body PET CT performed as a separate  report, for the findings of the remainder of the body.    I have personally reviewed the examination and initial interpretation  and I agree with the findings.    AAMIR KENYON MD     Assessment and Plan:   Squamous cell carcinoma of the tongue- Started chemoradiation with cisplatin on 3/14. She continues on radiation therapy and completed day 22 cisplatin on 4/9 after 1 week delay. She had increased nausea with weight loss and dehydration noted 4/16. IV hydration and antiemetics brought symptoms under better control and pt state she is now feeling better with more energy again.   She will return in 1 week for review with this provider- continue with CBC and CMP review weekly.   Nausea- Now improved 9 days post chemotherapy infusion. Pt will continue with daily compazine each morning and  repeat or use zofran for further nausea. Gtube feedings are now back to normal - encouraged her to move up to 4 cans of formula daily with recent weightloss as requested by dietician.  Cancer related pain-. She is using MS Contin 15 mg bid and oxycodone only as needed- 1-2 x daily  Had appointment with Dr Ceron this week and was given carafate for help with throat issues though has not started use.  Mucositis- using the salt/soda rinses several times daily and Biotin, PRN.refusing magic mouthwash.  Constipation- Pt using of dulcolax or Miralax and additional water per Gtube with good results.  Tobacco use- has quit smoking- no longer using the chantix as felt this was irritating stomach but states drive fort the cigarettes is gone for now.   This was a 25 min visit with > 50% in counseling and coordinating care including education and management of concerns.    Elba Fry CNP      Again, thank you for allowing me to participate in the care of your patient.        Sincerely,        Elba Fry, NP, APRN CNP

## 2018-04-18 NOTE — MR AVS SNAPSHOT
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 This does not apply to pregnant women. Water, hot tea and black coffee (with nothing added) are okay. Do not drink other fluids, diet soda or chew gum.            Apr 25, 2018 10:15 AM CDT   Return Visit with NANCY Cai CNP   [facility] ([facility])    [address] [facility] [address]   [phone]            Apr 25, 2018 11:00 AM CDT   Level 2 with BAY 6 INFUSION   [facility] ([facility])    [address] [facility] [address]   [phone]            Apr 25, 2018  3:30 PM CDT   TREATMENT with RADIATION THERAPIST   [facility])    [address] [facility] [address]   [phone]            Apr 26, 2018 12:45 PM CDT   TREATMENT with RADIATION THERAPIST   [facility] ([facility])    [address] [facility] [address]   [phone]            Apr 27, 2018 12:45 PM CDT   TREATMENT with RADIATION THERAPIST   JOSE ANGEL  [facility] ([facility])    [address] [address]   [phone]            Apr 30, 2018 12:45 PM CDT   TREATMENT with RADIATION THERAPIST   [facility] ([facility])    [id number] [facility] [address]   [phone]            Apr 30, 2018  1:15 PM CDT   on treatment visit with Albert Ambrosio MD   [facility] ([facility])    [address] [place] [address]   [phone]            May 01, 2018 12:45 PM CDT   TREATMENT with RADIATION THERAPIST   [facility] ([facility])    [address] [place] [address]   [phone]            May 02, 2018  2:45 PM CDT   TREATMENT with RADIATION THERAPIST   [facility])    [address] [place] [address]   [phone]              Who to contact     If you have questions or need follow up information about today's clinic visit or your schedule please contact [facility] directly at [phone].  Normal or non-critical lab and imaging results will be communicated to you by MyChart, letter or phone within 4 business days after the clinic has received the results. If you do not hear from us within 7 days, please contact the clinic through MyChart or phone. If you have a critical or abnormal lab result, we will notify you by phone as soon as possible.  Submit refill requests through [employer] or call your pharmacy and they will forward the refill request to us. Please allow 3 business days for your refill to be completed.          Additional Information About Your Visit        [employer] Information     [employer] is an electronic gateway that provides easy, online access to your medical records. With [employer], you can request a clinic appointment, read your test results, renew a prescription or  communicate with your care team.     To sign up for [employer] visit the website at [email or web address]   You will be asked to enter the access code listed below, as well as some personal information. Please follow the directions to create your username and password.     Your access code is: [email or web address]  Expires: 2018  7:30 AM     Your access code will  in 90 days. If you need help or a new code, please contact your [facility] or call [phone] for assistance.        Care EveryWhere ID     This is your Care EveryWhere ID. This could be used by other organizations to access your [facility] medical records  [id number]        Your Vitals Were     Pulse Temperature Respirations Pulse Oximetry BMI (Body Mass Index)       60 98.7  F (37.1  C) (Oral) 16 99% 16.62 kg/m2        Blood Pressure from Last 3 Encounters:   18 99/64   18 96/69   18 93/58    Weight from Last 3 Encounters:   18 36.3 kg (80 lb)   18 37.3 kg (82 lb 4.8 oz)   18 36.7 kg (81 lb)              Today, you had the following     No orders found for display         Where to get your medicines      Some of these will need a paper prescription and others can be bought over the counter.  Ask your nurse if you have questions.     Bring a paper prescription for each of these medications     morphine 30 MG 12 hr tablet    oxyCODONE 5 MG/5ML solution         Information about OPIOIDS     PRESCRIPTION OPIOIDS: WHAT YOU NEED TO KNOW   You have a prescription for an opioid (narcotic) pain medicine. Opioids can cause addiction. If you have a history of chemical dependency of any type, you are at a higher risk of becoming addicted to opioids. Only take this medicine after all other options have been tried. Take it for as short a time and as few doses as possible.     Do not:    Drive. If you drive while taking these medicines, you could be arrested for driving under the influence  (DUI).    Operate heavy machinery    Do any other dangerous activities while taking these medicines.     Drink any alcohol while taking these medicines.      Take with any other medicines that contain acetaminophen. Read all labels carefully. Look for the word  acetaminophen  or  Tylenol.  Ask your pharmacist if you have questions or are unsure.    Store your pills in a secure place, locked if possible. We will not replace any lost or stolen medicine. If you don t finish your medicine, please throw away (dispose) as directed by your pharmacist. The [place] Pollution [employer] Agency has more information about safe disposal: https://www.People to Remember[facility].[place].us/living-green/managing-unwanted-medications    All opioids tend to cause constipation. Drink plenty of water and eat foods that have a lot of fiber, such as fruits, vegetables, prune juice, apple juice and high-fiber cereal. Take a laxative (Miralax, milk of magnesia, Colace, Senna) if you don t move your bowels at least every other day.          Primary Care Provider Office Phone # Fax #    Quang Wall PA-C [phone]       [facility] [place] [address] [facility] [address]  [facility] [address]        Equal Access to Services     JAVIER MORALES : Hadii aad ku hadasho Soomaali, waaxda luqadaha, qaybta kaalmada adeegyada, whitney pace. So [facility] [phone].    ATENCIÓN: Si habla español, tiene a hawley disposición servicios gratuitos de asistencia lingüística. Juanjose al [phone].    We comply with applicable federal civil rights laws and [place] laws. We do not discriminate on the basis of race, color, national origin, age, disability, sex, sexual orientation, or gender identity.            Thank you!     Thank you for choosing [facility]  for your care. Our goal is always to provide you with excellent care. Hearing back from our patients is one way we can continue to improve our services. Please take a few minutes  to complete the written survey that you may receive in the mail after your visit with us. Thank you!             Your Updated Medication List - Protect others around you: Learn how to safely use, store and throw away your medicines at www.disposemymeds.org.          This list is accurate as of 4/18/18 11:59 PM.  Always use your most recent med list.                   Brand Name Dispense Instructions for use Diagnosis    ACETAMINOPHEN PO      Take 1,000 mg by mouth        ALLEGRA ALLERGY PO      Take by mouth as needed for allergies        dexamethasone 4 MG tablet    DECADRON    6 tablet    Take 2 tablets (8 mg) by mouth daily (with breakfast) Start the day after chemotherapy.    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), Cancer related pain       nystatin 622673 UNIT/ML suspension    MYCOSTATIN    280 mL    Take 5 mLs (500,000 Units) by mouth 4 times daily    Thrush       ondansetron 8 MG tablet    ZOFRAN    21 tablet    Take 1 tablet (8 mg) by mouth every 12 hours as needed for nausea    Squamous cell carcinoma of oropharynx (H)       order for DME     90 Can    Sig:  Isosource 1.5 calories:  Instill 3.5 cartons per day via PEG tube by syringe or gravity flow    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)       oxyCODONE 5 MG/5ML solution    ROXICODONE    300 mL    Take 10 mLs (10 mg) by mouth every 4 hours as needed for breakthrough pain or moderate to severe pain    Cancer  related pain, 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)       polyethylene glycol powder    MIRALAX/GLYCOLAX    225 g    Take 17 g (1 capful) by mouth daily    Drug-induced constipation, Cancer related pain, 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)       Potassium Chloride 40 MEQ/15ML (20%) Soln     1 Bottle    7.5 mLs (20 mEq) by Oral or G tube route daily    Squamous cell carcinoma of oropharynx (H), Hypokalemia       prochlorperazine 10 MG tablet    COMPAZINE    30 tablet    Take 1 tablet (10 mg) by mouth every 6 hours as needed (Nausea/Vomiting)    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    Squamous cell carcinoma of oropharynx (H)

## 2018-04-18 NOTE — PROGRESS NOTES
Oncology Follow Up Visit: April 18, 2018    Oncologist: Dr Benedicto Nieto  PCP: Quang Wall  ENT: Dr Marga Arana    Diagnosis: Squamous carcinoma of the tongue  Melinda Milligan is a 43 yo female who presented with thrush then noted swelling and pain to right side of tongue and cheek. Biopsy of tongue lesion proved squamous cell carcinoma. Further imaging work up showed mass involving bilateral palatine tonsils, soft palpate with extension to nasopharyngeal and oropharyngeal walls with level IIa lymph node involvement.   * also found to have iron deficiency anemia and was started on Infed dosing.   Treatment:   3/13/2018 gtube placed  3/14/2018 begins chemoradiation with cisplatin    Interval History: Ms. Milligan comes to clinic today for review of symptoms with continued chemoradiation. Pt continues with daily radiation and last completed day 22 cisplatin on 4/11.Pt had difficult week post cycle 2 and needed fluids with antiemetic on Monday return- she was unable to keep fluids down even with Gtube feedings. She states today she is feeling much better but continues with morning compazine with zofran as needed - has taken today. Dietician has asked her to increase to 4 cans of isosource daily but state she is only able to complete 3.5 cans per Gtube. Pain is to right side of face, jaw , back of throat and neck- is stable with current pain medications of MS contin 15 mg bid with oxycodone 5 mg 1-2 x daily and tylenol as needed- able to swallow water only. Thick saliva is irritating for her but continues with rinses several times daily.Pain ranked 3/10 at present. Bowels are normal. She does complain of weakness  But no neuropathy, ringing of the ears, or hearing loss.    Rest of comprehensive and complete ROS is reviewed and is negative.   Past Medical History:   Diagnosis Date     Allergic rhinitis      Anemia      Hoarseness      Oropharyngeal cancer (H) 02/15/2018     Uncomplicated asthma       Current Outpatient Prescriptions   Medication     ACETAMINOPHEN PO     dexamethasone (DECADRON) 4 MG tablet     Fexofenadine HCl (ALLEGRA ALLERGY PO)     folic acid (FOLVITE) 1 MG tablet     LORazepam (ATIVAN) 0.5 MG tablet     magic mouthwash (FIRST-MOUTHWASH BLM) suspension     morphine (MS CONTIN) 30 MG 12 hr tablet     nystatin (MYCOSTATIN) 511699 UNIT/ML suspension     ondansetron (ZOFRAN) 8 MG tablet     order for DME     oxyCODONE (ROXICODONE) 5 MG/5ML solution     polyethylene glycol (MIRALAX/GLYCOLAX) powder     Potassium Chloride 40 MEQ/15ML (20%) SOLN     prochlorperazine (COMPAZINE) 10 MG tablet     sucralfate (CARAFATE) 1 GM/10ML suspension     varenicline (CHANTIX STARTING MONTH PAK) 0.5 MG X 11 & 1 MG X 42 tablet     No current facility-administered medications for this visit.      Allergies   Allergen Reactions     Ceftriaxone      Other reaction(s): Unknown Reaction     Chicken-Derived Products (Egg)      Other reaction(s): Vomiting  Other reaction(s): Unknown Reaction     Physical Exam:BP 99/64  Pulse 60  Temp 98.7  F (37.1  C) (Oral)  Resp 16  Wt 37.3 kg (82 lb 4.8 oz)  SpO2 99%  BMI 16.62 kg/m2   ECOG PS- 0-1  Constitutional: Alert, moving well.Cooperative. Thin. More energy again today  ENT: Eyes bright, mouth with coating soreness to back of throat. Speech is clear  Respiratory: Breathing easy. Lung sounds clear to auscultation- no cough  GI: Abdomen with Gtube site with no signs of redness or infection.  MS: Muscle tone normal- moving easy, extremities normal with no edema.   Skin:  No suspicious lesions- area at neck showing new scattered flesh colored papules ar radiation site. No redness or peeling noted.   Neuro: Sensory grossly WNL, gait normal.   Psych: Mentation appears normal and affect normal with easy smile    Laboratory Results:   Results for orders placed or performed in visit on 04/13/18   CT Soft Tissue Neck w Contrast    Narrative    PET CT fusion examination 4/13/2018  3:05 PM  1. Neck CT with contrast  2. PET study of the neck  3. PET CT fusion study of the neck    Provided History:  Squamous cell carcinoma of the oropharynx.  Additional information obtained from EMR: Stage IV T3 N2c N0 squamous  cell cancer of the tongue. P 16 negative. On 3/14/2018 she started  concurrent chemoradiotherapy with high-dose cisplatin alongside  radiation. Cycle 2 of chemotherapy was delayed by 1 week due to  neutropenia.    Comparison: PET/CT 2/23/2018.    Technique: Please refer to the accompanying whole body PET-CT for  report of the dose and whole body PET-CT findings.  Regarding the neck, axial images were obtained after nonionic  intravenous contrast administration, with sagittal and coronal  reconstructions performed. Neck CT images were reviewed in bone, soft  tissue, and lung windows, with review of the fused PET-CT images as  well in multiple planes.    Findings:    There is marked interval decrease in the extent and intensity of FDG  uptake involving the primary mucosal space lesion at the level of the  nasopharynx and oropharynx. On today's examination there is minimal  mucosal space FDG avidity along the right and left lateral  oropharyngeal spaces, right glossotonsillar sulcus and minimally  extending to the soft palate. The maximum SUV of these residual areas  of FDG uptake measures up to 7.7, previously 26.2.    The previously seen FDG avid bilateral level 2 cervical lymph nodes  are no longer hypermetabolic on the current exam and they are not  enlarged.    Evaluation of the mucosal space demonstrates no abnormality or  abnormal metabolic uptake on PET CT in the glottis. The parotid and  submandibular glands are smaller in size reflecting post radiation  changes. The thyroid gland appears normal.    There is no evident cervical lymphadenopathy, and the cervical lymph  nodes are within normal limits by size criteria. No abnormal metabolic  uptake on PET CT.     Limited evaluation of  the cervical vertebral column demonstrates no  evident spinal canal stenosis. The visualized paranasal sinuses and  mastoid air cells are clear. The major vascular structures in the neck  are patent.    The visualized lung apices appear clear.      Impression    Impression: In this patient with stage IV squamous cell carcinoma of  the oropharynx undergoing chemoradiation there is evidence of response  to therapy:  1a. Interval marked decrease in the extent and intensity of FDG uptake  involving the primary mucosal space lesion at the level of the  nasopharynx and oropharynx. There is minimal residual mucosal space  FDG avidity along the right and left lateral oropharyngeal spaces,  right glossotonsillar sulcus and minimally extending to the soft  palate.   1b. Interval resolution of bilateral cervical lymphadenopathy.     2. Please refer to the whole body PET CT performed as a separate  report, for the findings of the remainder of the body.    I have personally reviewed the examination and initial interpretation  and I agree with the findings.    AAMIR KENYON MD     Assessment and Plan:   Squamous cell carcinoma of the tongue- Started chemoradiation with cisplatin on 3/14. She continues on radiation therapy and completed day 22 cisplatin on 4/9 after 1 week delay. She had increased nausea with weight loss and dehydration noted 4/16. IV hydration and antiemetics brought symptoms under better control and pt state she is now feeling better with more energy again.   She will return in 1 week for review with this provider- continue with CBC and CMP review weekly.   Nausea- Now improved 9 days post chemotherapy infusion. Pt will continue with daily compazine each morning and repeat or use zofran for further nausea. Gtube feedings are now back to normal - encouraged her to move up to 4 cans of formula daily with recent weightloss as requested by dietician.  Cancer related pain-. She is using MS Contin 15 mg bid and  oxycodone only as needed- 1-2 x daily  Had appointment with Dr Ceron this week and was given carafate for help with throat issues though has not started use.  Mucositis- using the salt/soda rinses several times daily and Biotin, PRN.refusing magic mouthwash.  Constipation- Pt using of dulcolax or Miralax and additional water per Gtube with good results.  Tobacco use- has quit smoking- no longer using the chantix as felt this was irritating stomach but states drive fort the cigarettes is gone for now.   This was a 25 min visit with > 50% in counseling and coordinating care including education and management of concerns.    Elba Fry,CNP

## 2018-04-18 NOTE — NURSING NOTE
"Oncology Rooming Note    April 18, 2018 1:21 PM   Melinda Milligan is a 42 year old female who presents for:    Chief Complaint   Patient presents with     Oncology Clinic Visit     Initial Vitals: BP 99/64  Pulse 60  Resp 16  Wt 37.3 kg (82 lb 4.8 oz)  SpO2 99%  BMI 16.62 kg/m2 Estimated body mass index is 16.62 kg/(m^2) as calculated from the following:    Height as of 4/16/18: 1.499 m (4' 11\").    Weight as of this encounter: 37.3 kg (82 lb 4.8 oz). Body surface area is 1.25 meters squared.  Mild Pain (3) Comment: throat/neck   No LMP recorded.  Allergies reviewed: Yes  Medications reviewed: Yes    Medications: MEDICATION REFILLS NEEDED TODAY. Provider was notified.  Pharmacy name entered into [email or web address]: [facility] - [place], [place] - [address]    Clinical concerns: none NP was notified.    8 minutes for nursing intake (face to face time)     MAY COLORADO RN              "

## 2018-04-18 NOTE — PROGRESS NOTES
This is a recent snapshot of the patient's [facility] medical record.  For current drug dose and complete information and questions, call [phone] or In Basket pool, fv home infusion ([id number])  CSN Number:  [id number]

## 2018-04-18 NOTE — PROGRESS NOTES
This is a recent snapshot of the patient's [facility] medical record.  For current drug dose and complete information and questions, call [phone] or In Basket pool, fv home infusion ([id number])  CSN Number:  [id number]

## 2018-04-18 NOTE — PROGRESS NOTES
Infusion Nursing Note:  Melinda Milligan presents today for IVF.    Patient seen by provider today: Yes: Elba Fry NP   present during visit today: Not Applicable.    Note: N/A.    Intravenous Access:  Peripheral IV placed.    Treatment Conditions:  Not Applicable.    Post Infusion Assessment:  Patient tolerated infusion without incident.  Blood return noted pre and post infusion.  Site patent and intact, free from redness, edema or discomfort.  Access discontinued per protocol.    Discharge Plan:   Patient will return 4/25/18 for next appointment.   Patient discharged in stable condition accompanied by: mother.  Departure Mode: Ambulatory.    Anastasiya Jc RN

## 2018-04-18 NOTE — MR AVS SNAPSHOT
After Visit Summary   4/18/2018    Melinda Milligan    MRN: [id number]           Patient Information     Date Of Birth          1975        Visit Information        Provider Department      4/18/2018 2:30 PM BAY 6 INFUSION [facility]        Today's Diagnoses     Nausea    -  1    Hyponatremia        Squamous cell carcinoma of oropharynx (H)           Follow-ups after your visit        Your next 10 appointments already scheduled     Apr 19, 2018 12:45 PM CDT   TREATMENT with RADIATION THERAPIST   [facility] ([facility]    [address] [facility] [place] [address]   [phone]            Apr 20, 2018 12:45 PM CDT   TREATMENT with RADIATION THERAPIST   [facility] ([facility])    [address] [facility] [address]   [phone]            Apr 23, 2018 12:45 PM CDT   TREATMENT with RADIATION THERAPIST   [facility] ([facility])    [address] [facility] [address]   [phone]            Apr 23, 2018  1:15 PM CDT   on treatment visit with Albert Ambrosio MD   [facility] ([facility])    [address] [facility] [address]   [phone]            Apr 24, 2018 12:45 PM CDT   TREATMENT with RADIATION THERAPIST   [facility] ([facility])    [address] [facility] [place] [address]   [phone]            Apr 25, 2018  9:45 AM CDT   LAB with LAB ONC [facility] ([facility])    [address] [facility] [address]   [phone]           Please do not eat 10-12 hours before your appointment if you are coming in fasting for labs on lipids, cholesterol, or glucose (sugar). This does not apply to pregnant women. Water, hot tea and black coffee (with nothing added) are okay. Do not drink other fluids, diet  soda or chew gum.            Apr 25, 2018 10:15 AM CDT   Return Visit with NANCY Cai CNP   [facility]    [address] [place] [address]   [phone]            Apr 25, 2018 11:00 AM CDT   Level 2 with BAY 6 INFUSION   [facility] ([facility])    [address] [place] [address]   [phone]            Apr 25, 2018  3:30 PM CDT   TREATMENT with RADIATION THERAPIST   [facility] ([facility])    [address] [place] [address]   [phone]            Apr 26, 2018 12:45 PM CDT   TREATMENT with RADIATION THERAPIST   [facility] ([facility])    [address] [place] [address]   [phone]              Who to contact     If you have questions or need follow up information about today's clinic visit or your schedule please contact [facility] directly at [phone].  Normal or non-critical lab and imaging results will be communicated to you by MyChart, letter or phone within 4 business days after the clinic has received the results. If you do not hear from us within 7 days, please contact the clinic through [employer] Alarish[employer] or phone. If you have a critical or abnormal lab result, we will notify you by phone as soon as possible.  Submit refill requests through Geekatoo or call your pharmacy and they will forward the refill request to us. Please allow 3 business days for your refill to be completed.          Additional Information About Your Visit        Geekatoo Information     Geekatoo is an electronic gateway that provides easy, online access to your medical records. With Geekatoo, you can request a clinic appointment, read your test results, renew a prescription or communicate with your care team.     To sign up for Geekatoo visit the website at [email or web address]   You will  be asked to enter the access code listed below, as well as some personal information. Please follow the directions to create your username and password.     Your access code is: [email or web address]  Expires: 2018  7:30 AM     Your access code will  in 90 days. If you need help or a new code, please contact your [facility] or call [phone] for assistance.        Care EveryWhere ID     This is your Care EveryWhere ID. This could be used by other organizations to access your [place] medical records  [id number]         Blood Pressure from Last 3 Encounters:   18 99/64   18 96/69   18 93/58    Weight from Last 3 Encounters:   18 37.3 kg (82 lb 4.8 oz)   18 36.7 kg (81 lb)   18 37.3 kg (82 lb 4.8 oz)              Today, you had the following     No orders found for display         Where to get your medicines      Some of these will need a paper prescription and others can be bought over the counter.  Ask your nurse if you have questions.     Bring a paper prescription for each of these medications     morphine 30 MG 12 hr tablet    oxyCODONE 5 MG/5ML solution          Primary Care Provider Office Phone # Fax #    Quang Wall PA-C [phone]       [facility] [address] [facility] [address]  [facility] [address]        Equal Access to Services     JAVIER MORALES AH: Hadii aad ku hadasho Soomaali, waaxda luqadaha, qaybta kaalmada adeegyada, whitney pace. So [facility] [phone].    ATENCIÓN: Si habla español, tiene a hawley disposición servicios gratuitos de asistencia lingüística. Llame al [phone].    We comply with applicable federal civil rights laws and [place] laws. We do not discriminate on the basis of race, color, national origin, age, disability, sex, sexual orientation, or gender identity.            Thank you!     Thank you for choosing [facility]  for your care. Our goal is  always to provide you with excellent care. Hearing back from our patients is one way we can continue to improve our services. Please take a few minutes to complete the written survey that you may receive in the mail after your visit with us. Thank you!             Your Updated Medication List - Protect others around you: Learn how to safely use, store and throw away your medicines at www.disposemymeds.org.          This list is accurate as of 4/18/18  4:23 PM.  Always use your most recent med list.                   Brand Name Dispense Instructions for use Diagnosis    ACETAMINOPHEN PO      Take 1,000 mg by mouth        ALLEGRA ALLERGY PO      Take by mouth as needed for allergies        dexamethasone 4 MG tablet    DECADRON    6 tablet    Take 2 tablets (8 mg) by mouth daily (with breakfast) Start the day after chemotherapy.    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)       magic mouthwash suspension (diphenhydrAMINE, lidocaine, aluminum-magnesium & simethicone) Susp compounding kit     237 mL    Swish and swallow 5-10 mLs in mouth every 6 hours as needed for mouth sores    Squamous cell carcinoma of oropharynx (H)       morphine 30 MG 12 hr tablet    MS CONTIN    60 tablet    Take 1 tablet (30 mg) by mouth every 12 hours maximum 2 tablet(s) per day    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), Cancer related pain       nystatin 486246 UNIT/ML suspension    MYCOSTATIN    280 mL    Take 5 mLs (500,000 Units) by mouth 4 times daily    Thrush       ondansetron 8 MG tablet    ZOFRAN    21 tablet    Take 1 tablet (8 mg) by mouth every 12 hours as needed for  nausea    Squamous cell carcinoma of oropharynx (H)       order for DME     90 Can    Sig:  Isosource 1.5 calories:  Instill 3.5 cartons per day via PEG tube by syringe or gravity flow    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)       oxyCODONE 5 MG/5ML solution    ROXICODONE    300 mL    Take 10 mLs (10 mg) by mouth every 4 hours as needed for breakthrough pain or moderate to severe pain    Cancer related pain, 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)       polyethylene glycol powder    MIRALAX/GLYCOLAX    225 g    Take 17 g (1 capful) by mouth daily    Drug-induced constipation, Cancer related pain, 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)       Potassium Chloride 40 MEQ/15ML (20%) Soln     1 Bottle    7.5 mLs (20 mEq) by Oral or G tube route daily    Squamous cell carcinoma of oropharynx (H), Hypokalemia       prochlorperazine 10 MG tablet    COMPAZINE    30 tablet    Take 1 tablet (10 mg) by mouth every 6 hours as needed (Nausea/Vomiting)    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    Squamous cell carcinoma of oropharynx (H)

## 2018-04-19 ENCOUNTER — APPOINTMENT (OUTPATIENT)
Dept: RADIATION ONCOLOGY | Facility: CLINIC | Age: 43
End: 2018-04-19
Payer: COMMERCIAL

## 2018-04-19 PROCEDURE — 77014 ZZHC CT GUIDE FOR PLACEMENT RADIATION THERAPY FIELDS: CPT | Performed by: RADIOLOGY

## 2018-04-19 PROCEDURE — 77386 ZZC IMRT TREATMENT DELIVERY, COMPLEX: CPT | Performed by: RADIOLOGY

## 2018-04-20 ENCOUNTER — HOME INFUSION (PRE-WILLOW HOME INFUSION) (OUTPATIENT)
Dept: PHARMACY | Facility: CLINIC | Age: 43
End: 2018-04-20

## 2018-04-20 ENCOUNTER — APPOINTMENT (OUTPATIENT)
Dept: RADIATION ONCOLOGY | Facility: CLINIC | Age: 43
End: 2018-04-20
Payer: COMMERCIAL

## 2018-04-20 PROCEDURE — 77386 ZZC IMRT TREATMENT DELIVERY, COMPLEX: CPT | Performed by: RADIOLOGY

## 2018-04-20 PROCEDURE — 77014 ZZHC CT GUIDE FOR PLACEMENT RADIATION THERAPY FIELDS: CPT | Performed by: RADIOLOGY

## 2018-04-23 ENCOUNTER — OFFICE VISIT (OUTPATIENT)
Dept: RADIATION ONCOLOGY | Facility: CLINIC | Age: 43
End: 2018-04-23
Payer: COMMERCIAL

## 2018-04-23 ENCOUNTER — APPOINTMENT (OUTPATIENT)
Dept: RADIATION ONCOLOGY | Facility: CLINIC | Age: 43
End: 2018-04-23
Payer: COMMERCIAL

## 2018-04-23 VITALS — HEART RATE: 66 BPM | OXYGEN SATURATION: 100 % | WEIGHT: 80 LBS | BODY MASS INDEX: 16.16 KG/M2

## 2018-04-23 DIAGNOSIS — C10.9 SQUAMOUS CELL CARCINOMA OF OROPHARYNX (H): ICD-10-CM

## 2018-04-23 PROCEDURE — 77301 RADIOTHERAPY DOSE PLAN IMRT: CPT | Performed by: RADIOLOGY

## 2018-04-23 PROCEDURE — 99207 ZZC DROP WITH A PROCEDURE: CPT | Performed by: RADIOLOGY

## 2018-04-23 PROCEDURE — 77300 RADIATION THERAPY DOSE PLAN: CPT | Performed by: RADIOLOGY

## 2018-04-23 PROCEDURE — 77386 ZZC IMRT TREATMENT DELIVERY, COMPLEX: CPT | Performed by: RADIOLOGY

## 2018-04-23 PROCEDURE — 77338 DESIGN MLC DEVICE FOR IMRT: CPT | Mod: 59 | Performed by: RADIOLOGY

## 2018-04-23 RX ORDER — DIPHENHYDRAMINE HYDROCHLORIDE AND LIDOCAINE HYDROCHLORIDE AND ALUMINUM HYDROXIDE AND MAGNESIUM HYDRO
5-10 KIT EVERY 6 HOURS PRN
Qty: 237 ML | Refills: 1 | Status: SHIPPED | OUTPATIENT
Start: 2018-04-23 | End: 2018-06-27

## 2018-04-23 ASSESSMENT — PAIN SCALES - GENERAL: PAINLEVEL: EXTREME PAIN (8)

## 2018-04-23 NOTE — MR AVS SNAPSHOT
After Visit Summary   4/23/2018    Melinda Milligan    MRN: [id number]           Patient Information     Date Of Birth          1975        Visit Information        Provider Department      4/23/2018 1:15 PM Albert Ambrosio MD [facility]        Today's Diagnoses     Squamous cell carcinoma of oropharynx (H)          Care Instructions    Please contact [facility] RN with questions or concerns following today's appointment: [phone].    Thank you!            Follow-ups after your visit        Your next 10 appointments already scheduled     Apr 24, 2018 12:45 PM CDT   TREATMENT with RADIATION THERAPIST   [facility] ([facility])    [address] [place] [address] [phone]            Apr 25, 2018  9:45 AM CDT   LAB with LAB ONC [facility])    [address] [place] [address] [phone]           Please do not eat 10-12 hours before your appointment if you are coming in fasting for labs on lipids, cholesterol, or glucose (sugar). This does not apply to pregnant women. Water, hot tea and black coffee (with nothing added) are okay. Do not drink other fluids, diet soda or chew gum.            Apr 25, 2018 10:15 AM CDT   Return Visit with NANCY Cai C[facility]   [facility]    [address] [place] [address] [phone]            Apr 25, 2018 11:00 AM CDT   Level 2 with BAY 6 INFUSION   [facility]    [address] [place] [address] [phone]            Apr 25, 2018  3:30 PM CDT   TREATMENT with RADIATION THERAPIST   [facility] ([facility])    [address] [place] [address] [phone]            Apr 26, 2018 12:45 PM CDT   TREATMENT with  RADIATION THERAPIST   [facility] ([facility])    [address] [facility] [address]   [phone]            Apr 27, 2018 12:45 PM CDT   TREATMENT with RADIATION THERAPIST   [facility] ([facility])    [address] [facility] [address]   [phone]            Apr 30, 2018 12:45 PM CDT   TREATMENT with RADIATION THERAPIST   [facility] ([facility])    [address] [place] [address]   [phone]            Apr 30, 2018  1:15 PM CDT   on treatment visit with Albert Ambrosio MD   [facility] ([facility])    [address] [facility] [address]   [phone]            May 01, 2018 12:45 PM CDT   TREATMENT with RADIATION THERAPIST   [facility] ([facility])    [address] [place] [address]   [phone]              Who to contact     If you have questions or need follow up information about today's clinic visit or your schedule please contact [facility] directly at [phone].  Normal or non-critical lab and imaging results will be communicated to you by MyChart, letter or phone within 4 business days after the clinic has received the results. If you do not hear from us within 7 days, please contact the clinic through MyChart or phone. If you have a critical or abnormal lab result, we will notify you by phone as soon as possible.  Submit refill requests through [employer] or call your pharmacy and they will forward the refill request to us. Please allow 3 business days for your refill to be completed.          Additional Information About Your Visit        Eleven James Information     [employer] is an electronic gateway that provides easy, online access to your medical records. With [employer], you can request a clinic appointment, read your test results, renew a  prescription or communicate with your care team.     To sign up for Homejoyt visit the website at [email or web address]   You will be asked to enter the access code listed below, as well as some personal information. Please follow the directions to create your username and password.     Your access code is: [email or web address]  Expires: 2018  7:30 AM     Your access code will  in 90 days. If you need help or a new code, please contact your [facility] or call [phone] for assistance.        Care EveryWhere ID     This is your Care EveryWhere ID. This could be used by other organizations to access your [place] medical records  [id number]        Your Vitals Were     Pulse Pulse Oximetry BMI (Body Mass Index)             66 100% 16.16 kg/m2          Blood Pressure from Last 3 Encounters:   18 99/64   18 96/69   18 93/58    Weight from Last 3 Encounters:   18 80 lb   18 82 lb 4.8 oz   18 81 lb              Today, you had the following     No orders found for display         Where to get your medicines      These medications were sent to [facility] - [place], [place] - [address]  [address], [facility] [address]     Phone:  [phone]     magic mouthwash suspension (diphenhydrAMINE, lidocaine, aluminum-magnesium & simethicone) Susp compounding kit          Primary Care Provider Office Phone # Fax #    Quang Wall PA-C [phone]       [facility] [address]  [facility] [address]        Equal Access to Services     JAVIER MORALES AH: Hadii aad ku zeynepo Solinusali, waaxda luqadaha, qaybta kaalmada adeegyada, whitney pace. So [facility] [phone].    ATENCIÓN: Si habla español, tiene a hawley disposición servicios gratuitos de asistencia lingüística. Llame al [phone].    We comply with applicable federal civil rights laws and  [place] laws. We do not discriminate on the basis of race, color, national origin, age, disability, sex, sexual orientation, or gender identity.            Thank you!     Thank you for choosing [facility]  for your care. Our goal is always to provide you with excellent care. Hearing back from our patients is one way we can continue to improve our services. Please take a few minutes to complete the written survey that you may receive in the mail after your visit with us. Thank you!             Your Updated Medication List - Protect others around you: Learn how to safely use, store and throw away your medicines at www.disposemymeds.org.          This list is accurate as of 4/23/18  1:28 PM.  Always use your most recent med list.                   Brand Name Dispense Instructions for use Diagnosis    ACETAMINOPHEN PO      Take 1,000 mg by mouth        ALLEGRA ALLERGY PO      Take by mouth as needed for allergies        dexamethasone 4 MG tablet    DECADRON    6 tablet    Take 2 tablets (8 mg) by mouth daily (with breakfast) Start the day after chemotherapy.    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)       magic mouthwash suspension (diphenhydrAMINE, lidocaine, aluminum-magnesium & simethicone) Susp compounding kit     237 mL    Swish and swallow 5-10 mLs in mouth every 6 hours as needed for mouth sores    Squamous cell carcinoma of oropharynx (H)       morphine 30 MG 12 hr tablet    MS CONTIN    60 tablet    Take 1 tablet (30 mg) by mouth every 12 hours maximum 2 tablet(s) per day    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), Cancer  related pain       nystatin 489658 UNIT/ML suspension    MYCOSTATIN    280 mL    Take 5 mLs (500,000 Units) by mouth 4 times daily    Thrush       ondansetron 8 MG tablet    ZOFRAN    21 tablet    Take 1 tablet (8 mg) by mouth every 12 hours as needed for nausea    Squamous cell carcinoma of oropharynx (H)       order for DME     90 Can    Sig:  Isosource 1.5 calories:  Instill 3.5 cartons per day via PEG tube by syringe or gravity flow    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)       oxyCODONE 5 MG/5ML solution    ROXICODONE    300 mL    Take 10 mLs (10 mg) by mouth every 4 hours as needed for breakthrough pain or moderate to severe pain    Cancer related pain, 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)       polyethylene glycol powder    MIRALAX/GLYCOLAX    225 g    Take 17 g (1 capful) by mouth daily    Drug-induced constipation, Cancer related pain, 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)       Potassium Chloride 40 MEQ/15ML (20%) Soln     1 Bottle    7.5 mLs (20 mEq) by Oral or G tube route daily    Squamous cell carcinoma of oropharynx (H), Hypokalemia       prochlorperazine 10 MG tablet    COMPAZINE    30 tablet    Take 1 tablet (10 mg) by mouth every 6 hours as needed (Nausea/Vomiting)    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    Squamous cell carcinoma of oropharynx (H)

## 2018-04-23 NOTE — PROGRESS NOTES
This is a recent snapshot of the patient's [facility] medical record.  For current drug dose and complete information and questions, call [phone] or In Basket pool, [facility] home infusion ([id number])  CSN Number:  [id number]

## 2018-04-23 NOTE — PROGRESS NOTES
[facility] PHYSICIANS  SPECIALIZING IN BREAKTHROUGHS  Radiation Oncology    On Treatment Visit Note      Melinda Milligan      Date: 2018   MRN: [id number]   : 1975  Diagnosis: oropharyngeal cancer      Reason for Visit:  On Radiation Treatment Visit     Treatment Summary to Date  Treatment Site: head and neck + lymph nodes Current Dose: 5040/7000 cGy Fractions:       Chemotherapy  Chemo concurrent with radx?: Yes  Oncologist: Dr. Nieto  Drug Name/Frequency 1: Cisplatin    Subjective:   Feels much better than last week, no more nausea. Having 4-8 out of 10 pain in throat, anastasia when swallowing, but not otherwise and has stopped needing narcotics. Starting to have dry desquamation in neck, R>L.    Nursing ROS:   Nutrition Alteration  Diet Type: Gastrostomy Tube Feedings  Nutrition Note: patient reports 3 to 4 cans of isosource daily.  Skin  Skin Intervention: patient using Aquaphor      ENT and Mouth Exam  Mucositis - Current: 0 - None   Mucositis - Internal Severity: 0 - None   Esophagitis: 0 - None  ENT/Mouth Note: patient reports 4/10 pain in mouth and 8/10 pain with swallowing. patient reports doing very little swllowing with eating. patient reports sore on tonuge.patient reports using SSR and magic mouthwash  Cardiovascular  Respiratory effort: 1 - Normal - without distress  Gastrointestinal  Nausea: 0 - None  Vomitin - No vomiting (ons)  GI Note: patient denies any vomiting since 18      Psychosocial  Pyschosocial Note: patient reports increasing fatigue  Pain Assessment  0-10 Pain Scale: 8  Pain Descriptors: Sore  Pain Treatment: MS Contin 30 mg po as needed, Tylenol po as needed, Oxycodone po as needed. patient using magic mouthwash  Pain Note: patient reports 4/10 pain in mouth and 8/10 pain in throat while swallowing      Objective:   Pulse 66  Wt 80 lb  SpO2 100%  BMI 16.16 kg/m2  NAD  Dry desquamation in bilat level II, R>L  Patchy mucositis in opx and oral  cavity, in R tongue    Labs:  CBC RESULTS:   Recent Labs   Lab Test  04/10/18   1317   WBC  2.5*   RBC  3.42*   HGB  10.2*   HCT  32.7*   MCV  96   MCH  29.8   MCHC  31.2*   RDW  21.8*   PLT  329     ELECTROLYTES:  Recent Labs   Lab Test  04/18/18   1219   NA  135   POTASSIUM  3.5   CHLORIDE  99   ARLENE  8.4*   CO2  30   BUN  10   CR  0.50*   GLC  103*       Assessment:    Tolerating radiation therapy well.  All questions and concerns addressed.    Plan:   1. Continue current therapy.  Refilled magic mouthwash. Narcotics prn pain. aquaphor on skin, and advised to let us know if worsens. Rec 4 cans isosource, as weight is trending down. Will also discuss with nutrition.      Mosaiq chart and setup information reviewed  MVCT/IGRT images checked    Medication Review  Med list reviewed with patient?: Yes           Albert Ambrosio MD

## 2018-04-23 NOTE — PATIENT INSTRUCTIONS
Please contact [facility] RN with questions or concerns following today's appointment: [phone].    Thank you!

## 2018-04-24 ENCOUNTER — ONCOLOGY VISIT (OUTPATIENT)
Dept: ONCOLOGY | Facility: CLINIC | Age: 43
End: 2018-04-24
Payer: COMMERCIAL

## 2018-04-24 ENCOUNTER — TELEPHONE (OUTPATIENT)
Dept: RADIATION ONCOLOGY | Facility: CLINIC | Age: 43
End: 2018-04-24

## 2018-04-24 ENCOUNTER — APPOINTMENT (OUTPATIENT)
Dept: RADIATION ONCOLOGY | Facility: CLINIC | Age: 43
End: 2018-04-24
Payer: COMMERCIAL

## 2018-04-24 DIAGNOSIS — C06.9 SQUAMOUS CELL CARCINOMA OF ORAL CAVITY (H): Primary | ICD-10-CM

## 2018-04-24 PROCEDURE — 77014 ZZHC CT GUIDE FOR PLACEMENT RADIATION THERAPY FIELDS: CPT | Performed by: RADIOLOGY

## 2018-04-24 PROCEDURE — 77386 ZZC IMRT TREATMENT DELIVERY, COMPLEX: CPT | Performed by: RADIOLOGY

## 2018-04-24 PROCEDURE — 99207 ZZC NO CHARGE LOS: CPT | Performed by: DIETITIAN, REGISTERED

## 2018-04-24 NOTE — TELEPHONE ENCOUNTER
Spoke with patients  Noe about changing 4/27/18 radiation time from 1245 to 1545. Noe agreed to change and would discuss with patient. Noe stated he would contact this writer if time did not work    Roge ZAMORANO

## 2018-04-24 NOTE — PROGRESS NOTES
CLINICAL NUTRITION SERVICES - REASSESSMENT NOTE   EVALUATION OF PREVIOUS PLAN OF CARE:   Referring Physician: Hebert  Current diet: taking NPO  PEG Tube: using for 100% of nutrition at this time  Chemotherapy: Cisplatin   Radiation: 29/33 fractions completed     Monitoring from previous assessment:   -Food/beverage intake - taking only sips of water  -Enteral Nutrition/fluids intake - taking 3 1/2 cartons of formula daily.  She reports that she was not able to take any formula last week for ~3 days 2/2 nausea and vomiting.  As soon as she fed 1 carton, she would vomit.  She feels as though this was related to chemo infusion.    She reports that her intake has been much better, consistently taking 3.5 cartons daily via gravity feedings with absence of nausea/vomiting.  She has been infusing 1 1/2 cartons in ~90 minutes.    She reports that she has a hard time fitting in 3 feedings.  She spreads her feedings out by 4 hours.  She does not want to increase infusion rate 2/2 fear of N/V and bloating.  She has tried syringe feedings but did not tolerate.   Current regimen providing ~1300kcal and 60g protein (100% of needs).  If she continues to lose weight with consistent infusion of 3.5 cartons, strongly encouraged her to aim for 4 cartons/day.   -Weight trends  - down ~4 lbs x past 2 weeks.   Wt Readings from Last 5 Encounters:   04/23/18 36.3 kg (80 lb)   04/18/18 37.3 kg (82 lb 4.8 oz)   04/16/18 36.7 kg (81 lb)   04/11/18 37.3 kg (82 lb 4.8 oz)   04/09/18 38.1 kg (84 lb)     Previous Goals:   1. Aim for 3.5-4 cartons of Isosource 1.5 mariel daily  2. Weight gain towards 90 lbs as able   Evaluation: Not met   Previous Nutrition Diagnosis:   Inadequate oral intake related to dysphagia as evidenced by pt dependent upon EN and ONS to meet >75% of nutrition needs.    Evaluation: No change   NEW FINDINGS:   4 lb wt loss   CURRENT NUTRITION DIAGNOSIS   Inadequate enteral nutrition infusion related to nausea/vomiting as evidenced  by 4 lb wt loss x past 2 weeks.    INTERVENTIONS   Recommendations / Nutrition Prescription   1. 4 cartons of Isosource 1.5 formula daily   Implementation  EN Composition and EN Schedule - reviewed current regimen.  Encouraged pt to take 1 1/2 cartons BID and an additional 1/2-1 cartons as 3rd feeding.  (3.5-4 cartons/day).   PO intake - strongly encouraged pt to continue to strive for PO intake to aid in swallow function   Goals   1.  4 cartons Isosource 1.5 mariel daily  2. Weight maintenance/weight gain towards 83 lbs as able    Follow up/Monitoring:   -Food intake  -Enteral Nutrition intake  -Weight trends

## 2018-04-24 NOTE — MR AVS SNAPSHOT
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 This does not apply to pregnant women. Water, hot tea and black coffee (with nothing added) are okay. Do not drink other fluids, diet soda or chew gum.            Apr 25, 2018 10:15 AM CDT   Return Visit with NANCY Cai CNP   [facility])    [address] [place] [address]   [phone]            Apr 25, 2018 11:00 AM CDT   Level 2 with BAY 6 INFUSION   [facility])    [address] [facility] [address]   [phone]            Apr 25, 2018  3:30 PM CDT   TREATMENT with RADIATION THERAPIST   [facility])    [address] [facility] [address]   [phone]            Apr 26, 2018 12:45 PM CDT   TREATMENT with RADIATION THERAPIST   [facility] ([facility])    [address] [facility] [address]   [phone]            Apr 27, 2018  3:45 PM CDT   TREATMENT with RADIATION THERAPIST   [facility] ([facility])    [address] [facility] [address]   [phone]             Apr 30, 2018 12:45 PM CDT   TREATMENT with RADIATION THERAPIST   [facility])    [address] [place] [address]   [phone]            Apr 30, 2018  1:15 PM CDT   on treatment visit with Albert Ambrosio MD   [facility]    [address] [place] [address]   [phone]            May 01, 2018 12:45 PM CDT   TREATMENT with RADIATION THERAPIST   [facility])    [address] [facility] [address]   [phone]            May 02, 2018  2:45 PM CDT   TREATMENT with RADIATION THERAPIST   [facility] ([facility])    [address] [place] [address]   [phone]              Who to contact     If you have questions or need follow up information about today's clinic visit or your schedule please contact [facility] directly at [phone].  Normal or non-critical lab and imaging results will be communicated to you by MyChart, letter or phone within 4 business days after the clinic has received the results. If you do not hear from us within 7 days, please contact the clinic through [employer] or phone. If you have a critical or abnormal lab result, we will notify you by phone as soon as possible.  Submit refill requests through [employer] or call your pharmacy and they will forward the refill request to us. Please allow 3 business days for your refill to be completed.          Additional Information About Your Visit        [employer] Information     [employer] is an electronic gateway that provides easy, online access to your medical records. With [employer], you can request a clinic appointment, read your test results, renew a prescription or communicate with your care team.     To sign up for [employer] visit the website at [email or web address]   You will be asked to enter  the access code listed below, as well as some personal information. Please follow the directions to create your username and password.     Your access code is: [email or web address]  Expires: 2018  7:30 AM     Your access code will  in 90 days. If you need help or a new code, please contact your [facility] or call [phone] for assistance.        Care EveryWhere ID     This is your Care EveryWhere ID. This could be used by other organizations to access your [facility] medical records  [id number]         Blood Pressure from Last 3 Encounters:   18 99/64   18 96/69   18 93/58    Weight from Last 3 Encounters:   18 36.3 kg (80 lb)   18 37.3 kg (82 lb 4.8 oz)   18 36.7 kg (81 lb)              Today, you had the following     No orders found for display       Primary Care Provider Office Phone # Fax #    Quang Wall PA-C [phone]       [facility] [address] [facility] [address]  [facility] [address]        Equal Access to Services     RADHA MORALES : Hadii aad ku hadasho Soomaali, waaxda luqadaha, qaybta kaalmada adeegyada, waxay idiin hayaan adeeg kharalucretia lamargarita . So [facility] [phone].    ATENCIÓN: Si habla español, tiene a hawley disposición servicios gratuitos de asistencia lingüística. Juanjose al [phone].    We comply with applicable federal civil rights laws and [place] laws. We do not discriminate on the basis of race, color, national origin, age, disability, sex, sexual orientation, or gender identity.            Thank you!     Thank you for choosing [facility]  for your care. Our goal is always to provide you with excellent care. Hearing back from our patients is one way we can continue to improve our services. Please take a few minutes to complete the written survey that you may receive in the mail after your visit with us. Thank you!             Your Updated Medication List - Protect others around you:  Learn how to safely use, store and throw away your medicines at www.disposemymeds.org.          This list is accurate as of 4/24/18 11:59 PM.  Always use your most recent med list.                   Brand Name Dispense Instructions for use Diagnosis    ACETAMINOPHEN PO      Take 1,000 mg by mouth        ALLEGRA ALLERGY PO      Take by mouth as needed for allergies        dexamethasone 4 MG tablet    DECADRON    6 tablet    Take 2 tablets (8 mg) by mouth daily (with breakfast) Start the day after chemotherapy.    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)       magic mouthwash suspension (diphenhydrAMINE, lidocaine, aluminum-magnesium & simethicone) Susp compounding kit     237 mL    Swish and swallow 5-10 mLs in mouth every 6 hours as needed for mouth sores    Squamous cell carcinoma of oropharynx (H)       morphine 30 MG 12 hr tablet    MS CONTIN    60 tablet    Take 1 tablet (30 mg) by mouth every 12 hours maximum 2 tablet(s) per day    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), Cancer related pain       nystatin 475322 UNIT/ML suspension    MYCOSTATIN    280 mL    Take 5 mLs (500,000 Units) by mouth 4 times daily    Thrush       ondansetron 8 MG tablet    ZOFRAN    21 tablet    Take 1 tablet (8 mg) by mouth every 12 hours as needed for nausea    Squamous cell carcinoma of oropharynx (H)       order for DME     90 Can    Sig:  Isosource 1.5 calories:  Instill 3.5 cartons per day via PEG tube by syringe or gravity flow    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)       oxyCODONE 5 MG/5ML solution    ROXICODONE    300 mL    Take 10 mLs (10 mg) by mouth every  4 hours as needed for breakthrough pain or moderate to severe pain    Cancer related pain, 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)       polyethylene glycol powder    MIRALAX/GLYCOLAX    225 g    Take 17 g (1 capful) by mouth daily    Drug-induced constipation, Cancer related pain, 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)       Potassium Chloride 40 MEQ/15ML (20%) Soln     1 Bottle    7.5 mLs (20 mEq) by Oral or G tube route daily    Squamous cell carcinoma of oropharynx (H), Hypokalemia       prochlorperazine 10 MG tablet    COMPAZINE    30 tablet    Take 1 tablet (10 mg) by mouth every 6 hours as needed (Nausea/Vomiting)    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    Squamous cell carcinoma of oropharynx (H)

## 2018-04-24 NOTE — LETTER
4/24/2018         RE: Melinda Milligan  [address]  [place] [address]        Dear Colleague,    Thank you for referring your patient, Melinda Milligan, to the [facility]. Please see a copy of my visit note below.    CLINICAL NUTRITION SERVICES - REASSESSMENT NOTE   EVALUATION OF PREVIOUS PLAN OF CARE:   Referring Physician:  Hebert  Current diet: taking NPO  PEG Tube: using for 100% of nutrition at this time  Chemotherapy: Cisplatin   Radiation: 29/33 fractions completed     Monitoring from previous assessment:   -Food/beverage intake - taking only sips of water  -Enteral Nutrition/fluids intake - taking 3 1/2 cartons of formula daily.  She reports that she was not able to take any formula last week for ~3 days 2/2 nausea and vomiting.  As soon as she fed 1 carton, she would vomit.  She feels as though this was related to chemo infusion.    She reports that her intake has been much better, consistently taking 3.5 cartons daily via gravity feedings with absence of nausea/vomiting.  She has been infusing 1 1/2 cartons in ~90 minutes.    She reports that she has a hard time fitting in 3 feedings.  She spreads her feedings out by 4 hours.  She does not want to increase infusion rate 2/2 fear of N/V and bloating.  She has tried syringe feedings but did not tolerate.   Current regimen providing ~1300kcal and 60g protein (100% of needs).  If she continues to lose weight with consistent infusion of 3.5 cartons, strongly encouraged her to aim for 4 cartons/day.   -Weight trends  - down ~4 lbs x past 2 weeks.   Wt Readings from Last 5 Encounters:   04/23/18 36.3 kg (80 lb)   04/18/18 37.3 kg (82 lb 4.8 oz)   04/16/18 36.7 kg (81 lb)   04/11/18 37.3 kg (82 lb 4.8 oz)   04/09/18 38.1 kg (84 lb)     Previous Goals:   1. Aim for 3.5-4 cartons of Isosource 1.5 mariel daily  2. Weight gain towards 90 lbs as able   Evaluation: Not met   Previous Nutrition Diagnosis:   Inadequate oral intake related to  dysphagia as evidenced by pt dependent upon EN and ONS to meet >75% of nutrition needs.    Evaluation: No change   NEW FINDINGS:   4 lb wt loss   CURRENT NUTRITION DIAGNOSIS   Inadequate enteral nutrition infusion related to nausea/vomiting as evidenced by 4 lb wt loss x past 2 weeks.    INTERVENTIONS   Recommendations / Nutrition Prescription   1. 4 cartons of Isosource 1.5 formula daily   Implementation  EN Composition and EN Schedule - reviewed current regimen.  Encouraged pt to take 1 1/2 cartons BID and an additional 1/2-1 cartons as 3rd feeding.  (3.5-4 cartons/day).   PO intake - strongly encouraged pt to continue to strive for PO intake to aid in swallow function   Goals   1.  4 cartons Isosource 1.5 mariel daily  2. Weight maintenance/weight gain towards 83 lbs as able    Follow up/Monitoring:   -Food intake  -Enteral Nutrition intake  -Weight trends         Again, thank you for allowing me to participate in the care of your patient.        Sincerely,        Juli Monique RD

## 2018-04-25 ENCOUNTER — INFUSION THERAPY VISIT (OUTPATIENT)
Dept: INFUSION THERAPY | Facility: CLINIC | Age: 43
End: 2018-04-25
Payer: COMMERCIAL

## 2018-04-25 ENCOUNTER — APPOINTMENT (OUTPATIENT)
Dept: RADIATION ONCOLOGY | Facility: CLINIC | Age: 43
End: 2018-04-25
Payer: COMMERCIAL

## 2018-04-25 ENCOUNTER — ONCOLOGY VISIT (OUTPATIENT)
Dept: ONCOLOGY | Facility: CLINIC | Age: 43
End: 2018-04-25
Payer: COMMERCIAL

## 2018-04-25 VITALS
OXYGEN SATURATION: 99 % | TEMPERATURE: 98.5 F | HEART RATE: 79 BPM | HEIGHT: 59 IN | SYSTOLIC BLOOD PRESSURE: 79 MMHG | BODY MASS INDEX: 16.04 KG/M2 | DIASTOLIC BLOOD PRESSURE: 52 MMHG | WEIGHT: 79.56 LBS

## 2018-04-25 VITALS — HEART RATE: 69 BPM | DIASTOLIC BLOOD PRESSURE: 71 MMHG | SYSTOLIC BLOOD PRESSURE: 103 MMHG

## 2018-04-25 DIAGNOSIS — Z72.0 TOBACCO USE: ICD-10-CM

## 2018-04-25 DIAGNOSIS — C10.9 SQUAMOUS CELL CARCINOMA OF OROPHARYNX (H): ICD-10-CM

## 2018-04-25 DIAGNOSIS — R63.4 LOSS OF WEIGHT: ICD-10-CM

## 2018-04-25 DIAGNOSIS — R11.0 NAUSEA: Primary | ICD-10-CM

## 2018-04-25 DIAGNOSIS — E87.1 HYPONATREMIA: ICD-10-CM

## 2018-04-25 DIAGNOSIS — G89.3 CANCER RELATED PAIN: ICD-10-CM

## 2018-04-25 DIAGNOSIS — C02.9 TONGUE CANCER (H): ICD-10-CM

## 2018-04-25 DIAGNOSIS — I95.9 HYPOTENSION, UNSPECIFIED HYPOTENSION TYPE: ICD-10-CM

## 2018-04-25 DIAGNOSIS — K12.30 MUCOSITIS: ICD-10-CM

## 2018-04-25 DIAGNOSIS — C10.9 SQUAMOUS CELL CARCINOMA OF OROPHARYNX (H): Primary | ICD-10-CM

## 2018-04-25 LAB
ANION GAP SERPL CALCULATED.3IONS-SCNC: 7 MMOL/L (ref 3–14)
BUN SERPL-MCNC: 13 MG/DL (ref 7–30)
CALCIUM SERPL-MCNC: 9.4 MG/DL (ref 8.5–10.1)
CHLORIDE SERPL-SCNC: 98 MMOL/L (ref 94–109)
CO2 SERPL-SCNC: 28 MMOL/L (ref 20–32)
CREAT SERPL-MCNC: 0.5 MG/DL (ref 0.52–1.04)
GFR SERPL CREATININE-BSD FRML MDRD: >90 ML/MIN/1.7M2
GLUCOSE SERPL-MCNC: 89 MG/DL (ref 70–99)
MAGNESIUM SERPL-MCNC: 2.1 MG/DL (ref 1.6–2.3)
POTASSIUM SERPL-SCNC: 4.3 MMOL/L (ref 3.4–5.3)
SODIUM SERPL-SCNC: 133 MMOL/L (ref 133–144)

## 2018-04-25 PROCEDURE — 77386 ZZC IMRT TREATMENT DELIVERY, COMPLEX: CPT | Performed by: RADIOLOGY

## 2018-04-25 PROCEDURE — 83735 ASSAY OF MAGNESIUM: CPT | Performed by: INTERNAL MEDICINE

## 2018-04-25 PROCEDURE — 77427 RADIATION TX MANAGEMENT X5: CPT | Performed by: RADIOLOGY

## 2018-04-25 PROCEDURE — 80048 BASIC METABOLIC PNL TOTAL CA: CPT | Performed by: INTERNAL MEDICINE

## 2018-04-25 PROCEDURE — 77014 ZZHC CT GUIDE FOR PLACEMENT RADIATION THERAPY FIELDS: CPT | Performed by: RADIOLOGY

## 2018-04-25 PROCEDURE — 99214 OFFICE O/P EST MOD 30 MIN: CPT | Mod: 25 | Performed by: NURSE PRACTITIONER

## 2018-04-25 PROCEDURE — 77336 RADIATION PHYSICS CONSULT: CPT | Performed by: RADIOLOGY

## 2018-04-25 PROCEDURE — 99207 ZZC NO CHARGE NURSE ONLY: CPT

## 2018-04-25 PROCEDURE — 36415 COLL VENOUS BLD VENIPUNCTURE: CPT | Performed by: INTERNAL MEDICINE

## 2018-04-25 PROCEDURE — 96360 HYDRATION IV INFUSION INIT: CPT | Performed by: NURSE PRACTITIONER

## 2018-04-25 RX ORDER — ONDANSETRON 2 MG/ML
8 INJECTION INTRAMUSCULAR; INTRAVENOUS ONCE
Status: CANCELLED
Start: 2018-04-25 | End: 2018-04-25

## 2018-04-25 RX ADMIN — Medication 1000 ML: at 10:36

## 2018-04-25 ASSESSMENT — PAIN SCALES - GENERAL: PAINLEVEL: MODERATE PAIN (5)

## 2018-04-25 NOTE — NURSING NOTE
"Oncology Rooming Note    April 25, 2018 9:57 AM   Melinda Milligan is a 42 year old female who presents for:    Chief Complaint   Patient presents with     Oncology Clinic Visit     follow up     Initial Vitals: BP (!) 79/52  Pulse 79  Temp 98.5  F (36.9  C)  Ht 1.499 m (4' 11\")  Wt 36.1 kg (79 lb 9 oz)  SpO2 99%  BMI 16.07 kg/m2 Estimated body mass index is 16.07 kg/(m^2) as calculated from the following:    Height as of this encounter: 1.499 m (4' 11\").    Weight as of this encounter: 36.1 kg (79 lb 9 oz). Body surface area is 1.23 meters squared.  Moderate Pain (5) Comment: mouth and throat - magic mouth wash   No LMP recorded.  Allergies reviewed: Yes  Medications reviewed: Yes    Medications: Medication refills not needed today.  Pharmacy name entered into [employer]: [facility] - [place], [place] - [address]        5 minutes for nursing intake (face to face time)     Sri Artis LPN              "

## 2018-04-25 NOTE — MR AVS SNAPSHOT
After Visit Summary   4/25/2018    Melinda Milligan    MRN: [id number]           Patient Information     Date Of Birth          1975        Visit Information        Provider Department      4/25/2018 10:15 AM Elba Fry APRN CNP [facility]        Today's Diagnoses     Squamous cell carcinoma of oropharynx (H)    -  1    Hypotension, unspecified hypotension type        Loss of weight        Cancer related pain        Mucositis        Tobacco use           Follow-ups after your visit        Your next 10 appointments already scheduled     Apr 25, 2018  3:30 PM CDT   TREATMENT with RADIATION THERAPIST   [facility] ([facility])    [address] [place] [address]   [phone]            Apr 26, 2018 12:45 PM CDT   TREATMENT with RADIATION THERAPIST   [facility] ([facility])    [address] [facility] [address]   [phone]            Apr 27, 2018 10:30 AM CDT   TREATMENT with RADIATION THERAPIST   [facility] ([facility])    [address] [place] [address]   [phone]            Apr 30, 2018 12:45 PM CDT   TREATMENT with RADIATION THERAPIST   [facility] ([facility])    [address] [facility] [address]   [phone]            Apr 30, 2018  1:15 PM CDT   on treatment visit with Albert Ambrosio MD   [facility])    [address] [facility] [address]   [phone]            May 01, 2018 12:45 PM CDT   TREATMENT with RADIATION THERAPIST   [facility] ([facility])    [address] [facility] [address]   [phone]            May 02, 2018  8:15 AM CDT   LAB with LAB ONC [facility] ([facility])    [address] [place] [address]  [facility] [address]   [phone]           Please do not eat 10-12 hours before your appointment if you are coming in fasting for labs on lipids, cholesterol, or glucose (sugar). This does not apply to pregnant women. Water, hot tea and black coffee (with nothing added) are okay. Do not drink other fluids, diet soda or chew gum.            May 02, 2018  8:45 AM CDT   Return Visit with Benedicto Nieto MD   [facility] ([facility])    [address] [place] [address]   [phone]            May 02, 2018  9:15 AM CDT   Level 6 with BAY 10 INFUSION   [facility] ([facility])    [address] [place] [address] [phone]            May 02, 2018  2:45 PM CDT   TREATMENT with RADIATION THERAPIST   [facility])    [address] [place] [address] [phone]              Who to contact     If you have questions or need follow up information about today's clinic visit or your schedule please contact [facility] directly at [phone].  Normal or non-critical lab and imaging results will be communicated to you by Cellectishart, letter or phone within 4 business days after the clinic has received the results. If you do not hear from us within 7 days, please contact the clinic through [facility] or phone. If you have a critical or abnormal lab result, we will notify you by phone as soon as possible.  Submit refill requests through [facility] or call your pharmacy and they will forward the refill request to us. Please allow 3 business days for your refill to be completed.          Additional Information About Your Visit        [facility] Information     [facility] is an electronic gateway that provides easy, online access to your medical records. With [facility], you can request a clinic appointment, read your test results, renew a prescription or communicate with  "your care team.     To sign up for MyChart visit the website at [email or web address]   You will be asked to enter the access code listed below, as well as some personal information. Please follow the directions to create your username and password.     Your access code is: [email or web address]  Expires: 2018  7:30 AM     Your access code will  in 90 days. If you need help or a new code, please contact your [facility] or call [phone] for assistance.        Care EveryWhere ID     This is your Care EveryWhere ID. This could be used by other organizations to access your [facility] medical records  [id number]        Your Vitals Were     Pulse Temperature Height Pulse Oximetry BMI (Body Mass Index)       79 98.5  F (36.9  C) 1.499 m (4' 11\") 99% 16.07 kg/m2        Blood Pressure from Last 3 Encounters:   18 103/71   18 (!) 79/52   18 99/64    Weight from Last 3 Encounters:   18 36.1 kg (79 lb 9 oz)   18 36.3 kg (80 lb)   18 37.3 kg (82 lb 4.8 oz)              Today, you had the following     No orders found for display       Primary Care Provider Office Phone # Fax #    Quang Wall PA-C [phone]       [facility] [address] [facility] [address]  [facility] [address]        Equal Access to Services     JAVIER MORALES AH: Hadii aad ku hadasho Soomaali, waaxda luqadaha, qaybta kaalmada adeegyada, waxay idiin haybess keller . So [facility] [phone].    ATENCIÓN: Si habla español, tiene a hawley disposición servicios gratuitos de asistencia lingüística. Llame al [phone].    We comply with applicable federal civil rights laws and [place] laws. We do not discriminate on the basis of race, color, national origin, age, disability, sex, sexual orientation, or gender identity.            Thank you!     Thank you for choosing [facility]  for your care. Our goal is always to provide you with excellent care. " Hearing back from our patients is one way we can continue to improve our services. Please take a few minutes to complete the written survey that you may receive in the mail after your visit with us. Thank you!             Your Updated Medication List - Protect others around you: Learn how to safely use, store and throw away your medicines at www.disposemymeds.org.          This list is accurate as of 4/25/18 12:45 PM.  Always use your most recent med list.                   Brand Name Dispense Instructions for use Diagnosis    ACETAMINOPHEN PO      Take 1,000 mg by mouth        ALLEGRA ALLERGY PO      Take by mouth as needed for allergies        dexamethasone 4 MG tablet    DECADRON    6 tablet    Take 2 tablets (8 mg) by mouth daily (with breakfast) Start the day after chemotherapy.    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)       magic mouthwash suspension (diphenhydrAMINE, lidocaine, aluminum-magnesium & simethicone) Susp compounding kit     237 mL    Swish and swallow 5-10 mLs in mouth every 6 hours as needed for mouth sores    Squamous cell carcinoma of oropharynx (H)       morphine 30 MG 12 hr tablet    MS CONTIN    60 tablet    Take 1 tablet (30 mg) by mouth every 12 hours maximum 2 tablet(s) per day    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), Cancer related pain       nystatin 506705 UNIT/ML suspension    MYCOSTATIN    280 mL    Take 5 mLs (500,000 Units) by mouth 4 times daily    Thrush       ondansetron 8 MG tablet    ZOFRAN    21 tablet    Take 1 tablet (8 mg) by mouth every 12 hours as needed for nausea    Squamous cell carcinoma of  oropharynx (H)       order for DME     90 Can    Sig:  Isosource 1.5 calories:  Instill 3.5 cartons per day via PEG tube by syringe or gravity flow    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)       oxyCODONE 5 MG/5ML solution    ROXICODONE    300 mL    Take 10 mLs (10 mg) by mouth every 4 hours as needed for breakthrough pain or moderate to severe pain    Cancer related pain, 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)       polyethylene glycol powder    MIRALAX/GLYCOLAX    225 g    Take 17 g (1 capful) by mouth daily    Drug-induced constipation, Cancer related pain, 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)       Potassium Chloride 40 MEQ/15ML (20%) Soln     1 Bottle    7.5 mLs (20 mEq) by Oral or G tube route daily    Squamous cell carcinoma of oropharynx (H), Hypokalemia       prochlorperazine 10 MG tablet    COMPAZINE    30 tablet    Take 1 tablet (10 mg) by mouth every 6 hours as needed (Nausea/Vomiting)    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    Squamous cell carcinoma of oropharynx (H)

## 2018-04-25 NOTE — PROGRESS NOTES
Infusion Nursing Note:  Melinda Milligan presents today for IVF.    Patient seen by provider today: Yes: Dr Ambrosio   present during visit today: Not Applicable.    Note: Denies nausea/vomiting/diarrhea.    Intravenous Access:  Peripheral IV placed.    Post Infusion Assessment:  Patient tolerated infusion without incident.  Blood return noted pre and post infusion.  Site patent and intact, free from redness, edema or discomfort.  No evidence of extravasations.  Access discontinued per protocol.    Discharge Plan:   Patient discharged in stable condition accompanied by: self.  Departure Mode: Ambulatory.  Radiation today 12:45pm. Daily radiation continues. Next cisplatin 5/2/18.

## 2018-04-25 NOTE — PROGRESS NOTES
Oncology Follow Up Visit: April 25, 2018     Oncologist: Dr Benedicto Nieto  PCP: Quang Wall  ENT: Dr Marga Arana    Diagnosis: Squamous carcinoma of the tongue  Melinda Milligan is a 41 yo female who presented with thrush then noted swelling and pain to right side of tongue and cheek. Biopsy of tongue lesion proved squamous cell carcinoma. Further imaging work up showed mass involving bilateral palatine tonsils, soft palpate with extension to nasopharyngeal and oropharyngeal walls with level IIa lymph node involvement.   * also found to have iron deficiency anemia and was started on Infed dosing.   Treatment:   3/13/2018 gtube placed  3/14/2018 begins chemoradiation with cisplatin    Interval History: Ms. Milligan comes to clinic today for review of symptoms with continued chemoradiation. Pt is now in week 5 of chemoradiation and states she is feeling well with pain being controlled with the MS Contin 30 mg bid and using oxycodone just prn but 0-1 x daily( pain ranked 5/10 right now. Feels magic mouthwash is important for help with throat - not using the carafate. Still able to take water by mouth and is usign 4 cans of isosource daily and stats she feels very full after the feedings but no vomiting and is taking all feedings. She did talk with dietician yesterday and is continued on 4 cans per day. She feels she has good energy and continues to be active.  Using aquafor bid but is seeing some peeling and tenderness at radiation site.  Denies weakness, SOB, fevers, or trouble sleeping.Bowels are normal and she is having regular voidings of clear yellow urine Admits to 1 cigarette daily but feels she will stop this soon-  is also trying to quit.   Rest of comprehensive and complete ROS is reviewed and is negative.   Past Medical History:   Diagnosis Date     Allergic rhinitis      Anemia      Hoarseness      Oropharyngeal cancer (H) 02/15/2018     Uncomplicated asthma      Current Outpatient  "Prescriptions   Medication     ACETAMINOPHEN PO     dexamethasone (DECADRON) 4 MG tablet     Fexofenadine HCl (ALLEGRA ALLERGY PO)     folic acid (FOLVITE) 1 MG tablet     LORazepam (ATIVAN) 0.5 MG tablet     magic mouthwash (FIRST-MOUTHWASH BLM) suspension     morphine (MS CONTIN) 30 MG 12 hr tablet     nystatin (MYCOSTATIN) 563891 UNIT/ML suspension     ondansetron (ZOFRAN) 8 MG tablet     order for DME     oxyCODONE (ROXICODONE) 5 MG/5ML solution     polyethylene glycol (MIRALAX/GLYCOLAX) powder     Potassium Chloride 40 MEQ/15ML (20%) SOLN     prochlorperazine (COMPAZINE) 10 MG tablet     sucralfate (CARAFATE) 1 GM/10ML suspension     varenicline (CHANTIX STARTING MONTH PAK) 0.5 MG X 11 & 1 MG X 42 tablet     No current facility-administered medications for this visit.      Allergies   Allergen Reactions     Ceftriaxone      Other reaction(s): Unknown Reaction     Chicken-Derived Products (Egg)      Other reaction(s): Vomiting  Other reaction(s): Unknown Reaction     Physical Exam:BP (!) 79/52  Pulse 79  Temp 98.5  F (36.9  C)  Ht 1.499 m (4' 11\")  Wt 36.1 kg (79 lb 9 oz)  SpO2 99%  BMI 16.07 kg/m2   ECOG PS- 0  Constitutional: Alert, good energy. Cooperative. Thin.  ENT: Eyes bright, mouth with coating to back of throat with underlying redness- no change from last week Speech is clear  Respiratory: Breathing easy. Lung sounds clear to auscultation- no cough  GI: Abdomen with Gtube site with no signs of redness or infection.  MS: Muscle tone normal- moving easy, extremities normal with no edema.   Skin:  Neck with peeling and redness to radiation sites.No bleeding noted   Neuro: Sensory grossly WNL, gait normal.   Psych: Mentation appears normal and affect normal with easy smile    Laboratory Results:   Results for orders placed or performed in visit on 04/25/18   Basic metabolic panel   Result Value Ref Range    Sodium 133 133 - 144 mmol/L    Potassium 4.3 3.4 - 5.3 mmol/L    Chloride 98 94 - 109 mmol/L "    Carbon Dioxide 28 20 - 32 mmol/L    Anion Gap 7 3 - 14 mmol/L    Glucose 89 70 - 99 mg/dL    Urea Nitrogen 13 7 - 30 mg/dL    Creatinine 0.50 (L) 0.52 - 1.04 mg/dL    GFR Estimate >90 >60 mL/min/1.7m2    GFR Estimate If Black >90 >60 mL/min/1.7m2    Calcium 9.4 8.5 - 10.1 mg/dL   Magnesium   Result Value Ref Range    Magnesium 2.1 1.6 - 2.3 mg/dL     Assessment and Plan:   Squamous cell carcinoma of the tongue- Started chemoradiation with cisplatin on 3/14. She continues on radiation therapy and completed day 22 cisplatin on 4/9 after 1 week delay. She is noted to have hypotension and mild weight loss today- she feels she is not having symptoms of hypotension but will provide 1 liter NS IV- no need for electrolyte replacement or nausea medications.   Will see Dr Nieto next week for possible day 43 of chemotherapy with cisplatin and symptom review  Nutrition and weight loss- seeing mild weight loss again today but appears dehydrated. She continues on 4 cans of isosource daily as requested by dietician. She is taking water orally for hydration. Encouraged her to measure intake over next days to assure adequate fluids.   Nausea- no further nausea now 2 weeks out from last infusion  Cancer related pain-. She is using MS Contin 30 mg bid and oxycodone only as needed.  Mucositis- using the salt/soda rinses several times daily and Biotin, but feels magic mouthwash is very helpful today for the soreness.  Tobacco use- no longer using the chantix - has 1 cigarette daily but feels she can stop this soon  This was a 25 min visit with > 50% in counseling and coordinating care including education and management of concerns.    Elba Fry,CNP

## 2018-04-25 NOTE — LETTER
4/25/2018         RE: Melinda Milligan  [address] [facility] [address]        Dear Colleague,    Thank you for referring your patient, Melinda Milligan, to the [facility]. Please see a copy of my visit note below.    Oncology Follow Up Visit: April 25, 2018     Oncologist: Dr Benedicto Nieto  PCP: Quang Wall  ENT: Dr Marga Arana    Diagnosis: Squamous carcinoma of the tongue  Melinda Milligan is a 43 yo female who presented with thrush then noted swelling and pain to right side of tongue and cheek. Biopsy of tongue lesion proved squamous cell carcinoma. Further imaging work up showed mass involving bilateral palatine tonsils, soft palpate with extension to nasopharyngeal and oropharyngeal walls with level IIa lymph node involvement.   * also found to have iron deficiency anemia and was started on Infed dosing.   Treatment:   3/13/2018 gtube placed  3/14/2018 begins chemoradiation with cisplatin    Interval History: Ms. Milligan comes to clinic today for review of symptoms with continued chemoradiation. Pt is now in week 5 of chemoradiation and states she is feeling well with pain being controlled with the MS Contin 30 mg bid and using oxycodone just prn but 0-1 x daily( pain ranked 5/10 right now. Feels magic mouthwash is important for help with throat - not using the carafate. Still able to take water by mouth and is usign 4 cans of isosource daily and stats she feels very full after the feedings but no vomiting and is taking all feedings. She did talk with dietician yesterday and is continued on 4 cans per day. She feels she has good energy and continues to be active.  Using aquafor bid but is seeing some peeling and tenderness at radiation site.  Denies weakness, SOB, fevers, or trouble sleeping.Bowels are normal and she is having regular voidings of clear yellow urine Admits to 1 cigarette daily but feels she will stop this soon-  is also trying to quit.  "  Rest of comprehensive and complete ROS is reviewed and is negative.   Past Medical History:   Diagnosis Date     Allergic rhinitis      Anemia      Hoarseness      Oropharyngeal cancer (H) 02/15/2018     Uncomplicated asthma      Current Outpatient Prescriptions   Medication     ACETAMINOPHEN PO     dexamethasone (DECADRON) 4 MG tablet     Fexofenadine HCl (ALLEGRA ALLERGY PO)     folic acid (FOLVITE) 1 MG tablet     LORazepam (ATIVAN) 0.5 MG tablet     magic mouthwash (FIRST-MOUTHWASH BLM) suspension     morphine (MS CONTIN) 30 MG 12 hr tablet     nystatin (MYCOSTATIN) 906303 UNIT/ML suspension     ondansetron (ZOFRAN) 8 MG tablet     order for DME     oxyCODONE (ROXICODONE) 5 MG/5ML solution     polyethylene glycol (MIRALAX/GLYCOLAX) powder     Potassium Chloride 40 MEQ/15ML (20%) SOLN     prochlorperazine (COMPAZINE) 10 MG tablet     sucralfate (CARAFATE) 1 GM/10ML suspension     varenicline (CHANTIX STARTING MONTH PAK) 0.5 MG X 11 & 1 MG X 42 tablet     No current facility-administered medications for this visit.      Allergies   Allergen Reactions     Ceftriaxone      Other reaction(s): Unknown Reaction     Chicken-Derived Products (Egg)      Other reaction(s): Vomiting  Other reaction(s): Unknown Reaction     Physical Exam:BP (!) 79/52  Pulse 79  Temp 98.5  F (36.9  C)  Ht 1.499 m (4' 11\")  Wt 36.1 kg (79 lb 9 oz)  SpO2 99%  BMI 16.07 kg/m2   ECOG PS- 0  Constitutional: Alert, good energy. Cooperative. Thin.  ENT: Eyes bright, mouth with coating to back of throat with underlying redness- no change from last week Speech is clear  Respiratory: Breathing easy. Lung sounds clear to auscultation- no cough  GI: Abdomen with Gtube site with no signs of redness or infection.  MS: Muscle tone normal- moving easy, extremities normal with no edema.   Skin:  Neck with peeling and redness to radiation sites.No bleeding noted   Neuro: Sensory grossly WNL, gait normal.   Psych: Mentation appears normal and affect " normal with easy smile    Laboratory Results:   Results for orders placed or performed in visit on 04/25/18   Basic metabolic panel   Result Value Ref Range    Sodium 133 133 - 144 mmol/L    Potassium 4.3 3.4 - 5.3 mmol/L    Chloride 98 94 - 109 mmol/L    Carbon Dioxide 28 20 - 32 mmol/L    Anion Gap 7 3 - 14 mmol/L    Glucose 89 70 - 99 mg/dL    Urea Nitrogen 13 7 - 30 mg/dL    Creatinine 0.50 (L) 0.52 - 1.04 mg/dL    GFR Estimate >90 >60 mL/min/1.7m2    GFR Estimate If Black >90 >60 mL/min/1.7m2    Calcium 9.4 8.5 - 10.1 mg/dL   Magnesium   Result Value Ref Range    Magnesium 2.1 1.6 - 2.3 mg/dL     Assessment and Plan:   Squamous cell carcinoma of the tongue- Started chemoradiation with cisplatin on 3/14. She continues on radiation therapy and completed day 22 cisplatin on 4/9 after 1 week delay. She is noted to have hypotension and mild weight loss today- she feels she is not having symptoms of hypotension but will provide 1 liter NS IV- no need for electrolyte replacement or nausea medications.   Will see Dr Nieto next week for possible day 43 of chemotherapy with cisplatin and symptom review  Nutrition and weight loss- seeing mild weight loss again today but appears dehydrated. She continues on 4 cans of isosource daily as requested by dietician. She is taking water orally for hydration. Encouraged her to measure intake over next days to assure adequate fluids.   Nausea- no further nausea now 2 weeks out from last infusion  Cancer related pain-. She is using MS Contin 30 mg bid and oxycodone only as needed.  Mucositis- using the salt/soda rinses several times daily and Biotin, but feels magic mouthwash is very helpful today for the soreness.  Tobacco use- no longer using the chantix - has 1 cigarette daily but feels she can stop this soon  This was a 25 min visit with > 50% in counseling and coordinating care including education and management of concerns.    Elba Fry,CNP      Again, thank you for  allowing me to participate in the care of your patient.        Sincerely,        Elba Fry, DELANO, APRN CNP

## 2018-04-25 NOTE — MR AVS SNAPSHOT
After Visit Summary   4/25/2018    Melinda Milligan    MRN: [id number]           Patient Information     Date Of Birth          1975        Visit Information        Provider Department      4/25/2018 11:00 AM BAY 6 INFUSION [facility]        Today's Diagnoses     Nausea    -  1    Hyponatremia        Squamous cell carcinoma of oropharynx (H)           Follow-ups after your visit        Your next 10 appointments already scheduled     Apr 26, 2018 12:45 PM CDT   TREATMENT with RADIATION THERAPIST   [facility] ([facility])    [address] [facility] [address]   [phone]            Apr 27, 2018 10:30 AM CDT   TREATMENT with RADIATION THERAPIST   [facility] ([facility])    [address] [facility] [address]   [phone]            Apr 30, 2018 12:45 PM CDT   TREATMENT with RADIATION THERAPIST   [facility] ([facility])    [address] [facility] [address]   [phone]            Apr 30, 2018  1:15 PM CDT   on treatment visit with Albert Ambrosio MD   [facility] ([facility])    [address] [facility] [address]   [phone]            May 01, 2018 12:45 PM CDT   TREATMENT with RADIATION THERAPIST   [facility] ([facility])    [address] [facility] [address]   [phone]            May 02, 2018  8:15 AM CDT   LAB with LAB ONC [facility] ([facility])    [address] [facility] [address]   [phone]           Please do not eat 10-12 hours before your appointment if you are coming in fasting for labs on lipids, cholesterol, or glucose (sugar). This does not apply to pregnant women. Water, hot tea and black coffee (with nothing added) are okay. Do not drink other fluids, diet  soda or chew gum.            May 02, 2018  8:45 AM CDT   Return Visit with Benedicto Nieto MD   [facility]    [address] [place] [address] [phone]            May 02, 2018  9:15 AM CDT   Level 6 with BAY 10 INFUSION   [facility] ([facility])    [address] [place] [address]65-[address] [phone]            May 02, 2018  2:45 PM CDT   TREATMENT with RADIATION THERAPIST   [facility] ([facility])    [address] [place] [address]11-[address] [phone]            Jun 11, 2018 10:00 AM CDT   (Arrive by 9:45 AM)   Return Visit with Marga Arana MD   [facility] ([facility])    [address] [facility]  [address] [facility] [address] [phone]              Who to contact     If you have questions or need follow up information about today's clinic visit or your schedule please contact [facility] directly at [phone].  Normal or non-critical lab and imaging results will be communicated to you by [email or web address], letter or phone within 4 business days after the clinic has received the results. If you do not hear from us within 7 days, please contact the clinic through [email or web address] or phone. If you have a critical or abnormal lab result, we will notify you by phone as soon as possible.  Submit refill requests through [employer] or call your pharmacy and they will forward the refill request to us. Please allow 3 business days for your refill to be completed.          Additional Information About Your Visit        [employer] Information     [employer] is an electronic gateway that provides easy, online access to your medical records. With [employer], you can request a clinic appointment, read your test results, renew a prescription or communicate with your care team.     To sign up for [employer] visit the website at  [email or web address]   You will be asked to enter the access code listed below, as well as some personal information. Please follow the directions to create your username and password.     Your access code is: [email or web address]  Expires: 2018  7:30 AM     Your access code will  in 90 days. If you need help or a new code, please contact your [facility] or call [phone] for assistance.        Care EveryWhere ID     This is your Care EveryWhere ID. This could be used by other organizations to access your [place] medical records  [id number]        Your Vitals Were     Pulse                   69            Blood Pressure from Last 3 Encounters:   18 103/71   18 (!) 79/52   18 99/64    Weight from Last 3 Encounters:   18 36.1 kg (79 lb 9 oz)   18 36.3 kg (80 lb)   18 37.3 kg (82 lb 4.8 oz)              Today, you had the following     No orders found for display       Primary Care Provider Office Phone # Fax #    Quang Wall PA-C [phone]       [facility] [address] [facility] [address]  [facility] [place] [address]        Equal Access to Services     JAVIER MORALES : Hadii aad ku hadasho Soomaali, waaxda luqadaha, qaybta kaalmada adeegyada, waxay idiin hayaan adeeg kharash lamargarita . So [facility] [phone].    ATENCIÓN: Si habla español, tiene a hawley disposición servicios gratuitos de asistencia lingüística. Ll[facility] [phone].    We comply with applicable federal civil rights laws and [place] laws. We do not discriminate on the basis of race, color, national origin, age, disability, sex, sexual orientation, or gender identity.            Thank you!     Thank you for choosing [facility]  for your care. Our goal is always to provide you with excellent care. Hearing back from our patients is one way we can continue to improve our services. Please take a few minutes to complete the written survey that you may  receive in the mail after your visit with us. Thank you!             Your Updated Medication List - Protect others around you: Learn how to safely use, store and throw away your medicines at www.disposemymeds.org.          This list is accurate as of 4/25/18  3:44 PM.  Always use your most recent med list.                   Brand Name Dispense Instructions for use Diagnosis    ACETAMINOPHEN PO      Take 1,000 mg by mouth        ALLEGRA ALLERGY PO      Take by mouth as needed for allergies        dexamethasone 4 MG tablet    DECADRON    6 tablet    Take 2 tablets (8 mg) by mouth daily (with breakfast) Start the day after chemotherapy.    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)       magic mouthwash suspension (diphenhydrAMINE, lidocaine, aluminum-magnesium & simethicone) Susp compounding kit     237 mL    Swish and swallow 5-10 mLs in mouth every 6 hours as needed for mouth sores    Squamous cell carcinoma of oropharynx (H)       morphine 30 MG 12 hr tablet    MS CONTIN    60 tablet    Take 1 tablet (30 mg) by mouth every 12 hours maximum 2 tablet(s) per day    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), Cancer related pain       nystatin 872507 UNIT/ML suspension    MYCOSTATIN    280 mL    Take 5 mLs (500,000 Units) by mouth 4 times daily    Thrush       ondansetron 8 MG tablet    ZOFRAN    21 tablet    Take 1 tablet (8 mg) by mouth every 12 hours as needed for nausea    Squamous cell carcinoma of oropharynx (H)       order for DME     90 Can    Sig:  Isosource 1.5 calories:  Instill 3.5 cartons per day via PEG tube by syringe or gravity flow    Squamous  cell carcinoma of oral cavity (H)       oxyCODONE 5 MG/5ML solution    ROXICODONE    300 mL    Take 10 mLs (10 mg) by mouth every 4 hours as needed for breakthrough pain or moderate to severe pain    Cancer related pain, 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)       polyethylene glycol powder    MIRALAX/GLYCOLAX    225 g    Take 17 g (1 capful) by mouth daily    Drug-induced constipation, Cancer related pain, 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)       Potassium Chloride 40 MEQ/15ML (20%) Soln     1 Bottle    7.5 mLs (20 mEq) by Oral or G tube route daily    Squamous cell carcinoma of oropharynx (H), Hypokalemia       prochlorperazine 10 MG tablet    COMPAZINE    30 tablet    Take 1 tablet (10 mg) by mouth every 6 hours as needed (Nausea/Vomiting)    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    Squamous cell carcinoma of oropharynx (H)

## 2018-04-26 ENCOUNTER — APPOINTMENT (OUTPATIENT)
Dept: RADIATION ONCOLOGY | Facility: CLINIC | Age: 43
End: 2018-04-26
Payer: COMMERCIAL

## 2018-04-26 ENCOUNTER — TELEPHONE (OUTPATIENT)
Dept: OTOLARYNGOLOGY | Facility: CLINIC | Age: 43
End: 2018-04-26

## 2018-04-26 PROCEDURE — 77386 ZZC IMRT TREATMENT DELIVERY, COMPLEX: CPT | Performed by: RADIOLOGY

## 2018-04-26 PROCEDURE — G6002 STEREOSCOPIC X-RAY GUIDANCE: HCPCS | Performed by: RADIOLOGY

## 2018-04-27 ENCOUNTER — HOME INFUSION (PRE-WILLOW HOME INFUSION) (OUTPATIENT)
Dept: PHARMACY | Facility: CLINIC | Age: 43
End: 2018-04-27

## 2018-04-27 ENCOUNTER — APPOINTMENT (OUTPATIENT)
Dept: RADIATION ONCOLOGY | Facility: CLINIC | Age: 43
End: 2018-04-27
Payer: COMMERCIAL

## 2018-04-27 PROCEDURE — 77014 ZZHC CT GUIDE FOR PLACEMENT RADIATION THERAPY FIELDS: CPT | Performed by: RADIOLOGY

## 2018-04-27 PROCEDURE — 77386 ZZC IMRT TREATMENT DELIVERY, COMPLEX: CPT | Performed by: RADIOLOGY

## 2018-04-30 ENCOUNTER — APPOINTMENT (OUTPATIENT)
Dept: RADIATION ONCOLOGY | Facility: CLINIC | Age: 43
End: 2018-04-30
Payer: COMMERCIAL

## 2018-04-30 ENCOUNTER — OFFICE VISIT (OUTPATIENT)
Dept: RADIATION ONCOLOGY | Facility: CLINIC | Age: 43
End: 2018-04-30
Payer: COMMERCIAL

## 2018-04-30 VITALS — WEIGHT: 78 LBS | OXYGEN SATURATION: 100 % | BODY MASS INDEX: 15.75 KG/M2 | HEART RATE: 72 BPM

## 2018-04-30 DIAGNOSIS — C10.9 SQUAMOUS CELL CARCINOMA OF OROPHARYNX (H): Primary | ICD-10-CM

## 2018-04-30 PROCEDURE — 77386 ZZC IMRT TREATMENT DELIVERY, COMPLEX: CPT | Performed by: RADIOLOGY

## 2018-04-30 PROCEDURE — 99207 ZZC DROP WITH A PROCEDURE: CPT | Performed by: RADIOLOGY

## 2018-04-30 PROCEDURE — 77014 ZZHC CT GUIDE FOR PLACEMENT RADIATION THERAPY FIELDS: CPT | Performed by: RADIOLOGY

## 2018-04-30 NOTE — PATIENT INSTRUCTIONS
Follow up with Dr Ambrosio on 6/5/18 9:30 am    Please contact [place] [facility] RN with questions or concerns following today's appointment: [phone].    Thank you!

## 2018-04-30 NOTE — PROGRESS NOTES
[facility] PHYSICIANS  SPECIALIZING IN BREAKTHROUGHS  Radiation Oncology    On Treatment Visit Note      Melinda Milligan      Date: 2018   MRN: [id number]   : 1975  Diagnosis: (P) oropharyngeal cancer      Reason for Visit:  On Radiation Treatment Visit     Treatment Summary to Date  Treatment Site: (P) head and neck + lymph nodes Current Dose: (P) 5800/6400 cGy Fractions: (P) 33/35      Chemotherapy  Chemo concurrent with radx?: (P) Yes    Subjective:   Not needing pain meds but still has pain in throat when swallowing. Able to get liquids and soups down. Taking 4 cans isosource/day. Neck skin is healing. Mild nausea, no vomiting. Energy adequate.    Objective:   Pulse 72  Wt 78 lb  SpO2 100%  BMI 15.75 kg/m2  NAD  Patchy mucositis in tongue and throat  Hyperpigmented skin in bilat necks with faint dry desquamation, healing    Labs:  CBC RESULTS:   Recent Labs   Lab Test  04/10/18   1317   WBC  2.5*   RBC  3.42*   HGB  10.2*   HCT  32.7*   MCV  96   MCH  29.8   MCHC  31.2*   RDW  21.8*   PLT  329     ELECTROLYTES:  Recent Labs   Lab Test  18   0942   NA  133   POTASSIUM  4.3   CHLORIDE  98   ARLENE  9.4   CO2  28   BUN  13   CR  0.50*   GLC  89       Assessment:    Tolerating radiation therapy well.  All questions and concerns addressed.    Plan:   1. Continue current therapy.  Will be evaluated for last cycle of chemo this week. Weight still slowly trending down; followed by nutrition. Push PO intake as well as 4 cans isosource daily.      [employer] chart and setup information reviewed  MVCT/IGRT images checked                Albert Ambrosio MD

## 2018-04-30 NOTE — MR AVS SNAPSHOT
After Visit Summary   4/30/2018    Melinda Milligan    MRN: [id number]           Patient Information     Date Of Birth          1975        Visit Information        Provider Department      4/30/2018 1:15 PM Albert Ambrosio MD [facility] [[email or web address]        Today's Diagnoses     Squamous cell carcinoma of oropharynx (H)    -  1      Care Instructions    Follow up with Dr Ambrosio on 6/5/18 9:30 am    [facility] [place] RN with questions or concerns following today's appointment: [phone].    Thank you!            Follow-ups after your visit        Your next 10 appointments already scheduled     May 01, 2018 12:45 PM CDT   TREATMENT with RADIATION THERAPIST   [facility])    [address] [place] [address]2338-[address] [phone]            May 02, 2018  8:15 AM CDT   LAB with LAB ONC [facility])    [address] [place] [address]5400-[address] [phone]           Please do not eat 10-12 hours before your appointment if you are coming in fasting for labs on lipids, cholesterol, or glucose (sugar). This does not apply to pregnant women. Water, hot tea and black coffee (with nothing added) are okay. Do not drink other fluids, diet soda or chew gum.            May 02, 2018  8:45 AM CDT   Return Visit with Benedicto Nieto MD   [facility])    [address] [facility] [address] [phone]            May 02, 2018  9:15 AM CDT   Level 6 with BAY 10 INFUSION   [facility])    [address] [facility] [address] [phone]            May 02, 2018  2:45 PM CDT   TREATMENT with RADIATION THERAPIST   [email or web address] ([facility] [[email or web address]    [id number] [facility] [address] [phone]            Jun 05,  2018  9:30 AM CDT   Return Visit with Albert Ambrosio MD   [facility] ([facility])    [address] [place] [address] [phone]            Jun 11, 2018 10:00 AM CDT   (Arrive by 9:45 AM)   Return Visit with Marga Arana MD   [facility] ([facility])    [address] [facility]  [address] [facility] [address] [phone]            Jun 13, 2018 10:15 AM CDT   Return Visit with Deneen Ceron MD   [facility] ([facility])    [address] [address] [phone]              Who to contact     If you have questions or need follow up information about today's clinic visit or your schedule please contact [facility] directly at [phone].  Normal or non-critical lab and imaging results will be communicated to you by MyChart, letter or phone within 4 business days after the clinic has received the results. If you do not hear from us within 7 days, please contact the clinic through MyChart or phone. If you have a critical or abnormal lab result, we will notify you by phone as soon as possible.  Submit refill requests through [employer] or call your pharmacy and they will forward the refill request to us. Please allow 3 business days for your refill to be completed.          Additional Information About Your Visit        [employer] Information     [employer] is an electronic gateway that provides easy, online access to your medical records. With [employer], you can request a clinic appointment, read your test results, renew a prescription or communicate with your care team.     To sign up for [employer] visit the website at [email or web address]   You will be asked to enter the access code listed below, as well as some personal information. Please follow the directions to create your username and password.     Your access code is:  [email or web address]  Expires: 2018  7:30 AM     Your access code will  in 90 days. If you need help or a new code, please contact your [facility] or call [phone] for assistance.        Care EveryWhere ID     This is your Care EveryWhere ID. This could be used by other organizations to access your [place] medical records  [id number]        Your Vitals Were     Pulse Pulse Oximetry BMI (Body Mass Index)             72 100% 15.75 kg/m2          Blood Pressure from Last 3 Encounters:   18 103/71   18 (!) 79/52   18 99/64    Weight from Last 3 Encounters:   18 78 lb   18 79 lb 9 oz   18 80 lb              Today, you had the following     No orders found for display       Primary Care Provider Office Phone # Fax #    Quang Wall PA-C [phone]       [facility] [address] [facility] [address]  [facility] [address]        Equal Access to Services     [facility]: Hadii aad ku hadasho Soomaali, waaxda luqadaha, qaybta kaalmada adeegyada, waxay idiin hayaan adeeg kharalucretia keller . So [facility] [phone].    ATENCIÓN: Si habla español, tiene a hawley disposición servicios gratuitos de asistencia lingüística. Llame al [phone].    We comply with applicable federal civil rights laws and [place] laws. We do not discriminate on the basis of race, color, national origin, age, disability, sex, sexual orientation, or gender identity.            Thank you!     Thank you for choosing [facility]  for your care. Our goal is always to provide you with excellent care. Hearing back from our patients is one way we can continue to improve our services. Please take a few minutes to complete the written survey that you may receive in the mail after your visit with us. Thank you!             Your Updated Medication List - Protect others around you: Learn how to safely use, store and throw away your medicines at  www.disposemymeds.org.          This list is accurate as of 4/30/18  1:38 PM.  Always use your most recent med list.                   Brand Name Dispense Instructions for use Diagnosis    ACETAMINOPHEN PO      Take 1,000 mg by mouth        ALLEGRA ALLERGY PO      Take by mouth as needed for allergies        dexamethasone 4 MG tablet    DECADRON    6 tablet    Take 2 tablets (8 mg) by mouth daily (with breakfast) Start the day after chemotherapy.    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)       magic mouthwash suspension (diphenhydrAMINE, lidocaine, aluminum-magnesium & simethicone) Susp compounding kit     237 mL    Swish and swallow 5-10 mLs in mouth every 6 hours as needed for mouth sores    Squamous cell carcinoma of oropharynx (H)       morphine 30 MG 12 hr tablet    MS CONTIN    60 tablet    Take 1 tablet (30 mg) by mouth every 12 hours maximum 2 tablet(s) per day    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), Cancer related pain       nystatin [id number] UNIT/ML suspension    MYCOSTATIN    280 mL    Take 5 mLs (500,000 Units) by mouth 4 times daily    Thrush       ondansetron 8 MG tablet    ZOFRAN    21 tablet    Take 1 tablet (8 mg) by mouth every 12 hours as needed for nausea    Squamous cell carcinoma of oropharynx (H)       order for DME     90 Can    Sig:  Isosource 1.5 calories:  Instill 3.5 cartons per day via PEG tube by syringe or gravity flow    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)       oxyCODONE 5 MG/5ML solution    ROXICODONE    300 mL    Take 10 mLs (10 mg) by mouth every 4 hours as needed for breakthrough pain or moderate to severe  pain    Cancer related pain, 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)       polyethylene glycol powder    MIRALAX/GLYCOLAX    225 g    Take 17 g (1 capful) by mouth daily    Drug-induced constipation, Cancer related pain, 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)       Potassium Chloride 40 MEQ/15ML (20%) Soln     1 Bottle    7.5 mLs (20 mEq) by Oral or G tube route daily    Squamous cell carcinoma of oropharynx (H), Hypokalemia       prochlorperazine 10 MG tablet    COMPAZINE    30 tablet    Take 1 tablet (10 mg) by mouth every 6 hours as needed (Nausea/Vomiting)    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    Squamous cell carcinoma of oropharynx (H)

## 2018-05-01 ENCOUNTER — APPOINTMENT (OUTPATIENT)
Dept: RADIATION ONCOLOGY | Facility: CLINIC | Age: 43
End: 2018-05-01
Payer: COMMERCIAL

## 2018-05-01 PROCEDURE — 77014 ZZHC CT GUIDE FOR PLACEMENT RADIATION THERAPY FIELDS: CPT | Performed by: RADIOLOGY

## 2018-05-01 PROCEDURE — 77386 ZZC IMRT TREATMENT DELIVERY, COMPLEX: CPT | Performed by: RADIOLOGY

## 2018-05-01 NOTE — PROGRESS NOTES
This is a recent snapshot of the patient's [facility] medical record.  For current drug dose and complete information and questions, call [phone] or In Basket pool, [facility] home infusion ([id number])  CSN Number:  [id number]

## 2018-05-02 ENCOUNTER — ONCOLOGY VISIT (OUTPATIENT)
Dept: ONCOLOGY | Facility: CLINIC | Age: 43
End: 2018-05-02
Payer: COMMERCIAL

## 2018-05-02 ENCOUNTER — APPOINTMENT (OUTPATIENT)
Dept: RADIATION ONCOLOGY | Facility: CLINIC | Age: 43
End: 2018-05-02
Payer: COMMERCIAL

## 2018-05-02 VITALS
HEART RATE: 72 BPM | DIASTOLIC BLOOD PRESSURE: 61 MMHG | SYSTOLIC BLOOD PRESSURE: 93 MMHG | TEMPERATURE: 98.1 F | OXYGEN SATURATION: 100 % | WEIGHT: 79 LBS | HEIGHT: 59 IN | BODY MASS INDEX: 15.92 KG/M2

## 2018-05-02 DIAGNOSIS — C10.9 SQUAMOUS CELL CARCINOMA OF OROPHARYNX (H): ICD-10-CM

## 2018-05-02 DIAGNOSIS — C06.9 SQUAMOUS CELL CARCINOMA OF ORAL CAVITY (H): ICD-10-CM

## 2018-05-02 DIAGNOSIS — C02.9 TONGUE CANCER (H): Primary | ICD-10-CM

## 2018-05-02 DIAGNOSIS — C06.9 SQUAMOUS CELL CARCINOMA OF ORAL CAVITY (H): Primary | ICD-10-CM

## 2018-05-02 LAB
ALBUMIN SERPL-MCNC: 4 G/DL (ref 3.4–5)
ALP SERPL-CCNC: 43 U/L (ref 40–150)
ALT SERPL W P-5'-P-CCNC: 16 U/L (ref 0–50)
ANION GAP SERPL CALCULATED.3IONS-SCNC: 6 MMOL/L (ref 3–14)
AST SERPL W P-5'-P-CCNC: 12 U/L (ref 0–45)
BILIRUB SERPL-MCNC: 0.2 MG/DL (ref 0.2–1.3)
BUN SERPL-MCNC: 15 MG/DL (ref 7–30)
CALCIUM SERPL-MCNC: 9.5 MG/DL (ref 8.5–10.1)
CHLORIDE SERPL-SCNC: 103 MMOL/L (ref 94–109)
CO2 SERPL-SCNC: 28 MMOL/L (ref 20–32)
CREAT SERPL-MCNC: 0.51 MG/DL (ref 0.52–1.04)
DIFFERENTIAL METHOD BLD: ABNORMAL
EOSINOPHIL # BLD AUTO: 0 10E9/L (ref 0–0.7)
EOSINOPHIL NFR BLD AUTO: 4 %
ERYTHROCYTE [DISTWIDTH] IN BLOOD BY AUTOMATED COUNT: 17.8 % (ref 10–15)
GFR SERPL CREATININE-BSD FRML MDRD: >90 ML/MIN/1.7M2
GLUCOSE SERPL-MCNC: 85 MG/DL (ref 70–99)
HCT VFR BLD AUTO: 33.4 % (ref 35–47)
HGB BLD-MCNC: 10.9 G/DL (ref 11.7–15.7)
LYMPHOCYTES # BLD AUTO: 0.2 10E9/L (ref 0.8–5.3)
LYMPHOCYTES NFR BLD AUTO: 15 %
MAGNESIUM SERPL-MCNC: 2.2 MG/DL (ref 1.6–2.3)
MCH RBC QN AUTO: 32.2 PG (ref 26.5–33)
MCHC RBC AUTO-ENTMCNC: 32.6 G/DL (ref 31.5–36.5)
MCV RBC AUTO: 99 FL (ref 78–100)
MONOCYTES # BLD AUTO: 0.2 10E9/L (ref 0–1.3)
MONOCYTES NFR BLD AUTO: 21 %
NEUTROPHILS # BLD AUTO: 0.7 10E9/L (ref 1.6–8.3)
NEUTROPHILS NFR BLD AUTO: 60 %
PLATELET # BLD AUTO: 242 10E9/L (ref 150–450)
PLATELET # BLD EST: ABNORMAL 10*3/UL
POTASSIUM SERPL-SCNC: 4.3 MMOL/L (ref 3.4–5.3)
PROT SERPL-MCNC: 7.6 G/DL (ref 6.8–8.8)
RBC # BLD AUTO: 3.38 10E12/L (ref 3.8–5.2)
RBC MORPH BLD: NORMAL
SODIUM SERPL-SCNC: 137 MMOL/L (ref 133–144)
WBC # BLD AUTO: 1.1 10E9/L (ref 4–11)

## 2018-05-02 PROCEDURE — 83735 ASSAY OF MAGNESIUM: CPT | Performed by: INTERNAL MEDICINE

## 2018-05-02 PROCEDURE — 36415 COLL VENOUS BLD VENIPUNCTURE: CPT | Performed by: INTERNAL MEDICINE

## 2018-05-02 PROCEDURE — 85025 COMPLETE CBC W/AUTO DIFF WBC: CPT | Performed by: INTERNAL MEDICINE

## 2018-05-02 PROCEDURE — 77386 ZZC IMRT TREATMENT DELIVERY, COMPLEX: CPT | Performed by: RADIOLOGY

## 2018-05-02 PROCEDURE — 99215 OFFICE O/P EST HI 40 MIN: CPT | Performed by: INTERNAL MEDICINE

## 2018-05-02 PROCEDURE — 77427 RADIATION TX MANAGEMENT X5: CPT | Performed by: RADIOLOGY

## 2018-05-02 PROCEDURE — 77336 RADIATION PHYSICS CONSULT: CPT | Performed by: RADIOLOGY

## 2018-05-02 PROCEDURE — 80053 COMPREHEN METABOLIC PANEL: CPT | Performed by: INTERNAL MEDICINE

## 2018-05-02 PROCEDURE — 77014 ZZHC CT GUIDE FOR PLACEMENT RADIATION THERAPY FIELDS: CPT | Performed by: RADIOLOGY

## 2018-05-02 ASSESSMENT — PAIN SCALES - GENERAL: PAINLEVEL: EXTREME PAIN (8)

## 2018-05-02 NOTE — PROGRESS NOTES
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        Primary Physician: No Ref-Primary, Physician     History Of Present Illness:    Please see previous note for details. I have copied and updated from prior note.  Ms. 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 She initially presented with thrush several months ago which was not improved after treatment and more recently she was noted to have worsening swelling and pain on the right side of the tongue and cheek and she was referred to ENT for a biopsy of the tongue lesion which was consistent with squamous cell cancer. P 16 negative.  As part of her workup she had a PET scan and neck CT which showed   1. Hypermetabolic mass involving the bilateral palatine tonsils, anteriorly extending to and involving the soft palate, superiorly extending to and involving the right posterior lateral nasopharyngeal wall and inferiorly extending to the right lateral oropharyngeal wall representing primary squamosal cancer.  2. Right and left metastatic FDG avid level IIa lymph nodes.    Treatment:   3/13/2018 gtube placed  3/14/2018 Started chemoradiation with high dose cisplatin  Cycle #2 of chemotherapy was delayed by one week because of neutropenia. She got it on 4/11/18    She continues to have pain in the mouth and mucositis. Currently she is taking Magic mouthwash and Tylenol. She is not using the morphine. She is feeling fatigued but is still able to do usual household chores. Denies new pain. She is able to swallow water and soap. She is using tube feeds 4 cans a day. Denies nausea vomiting. No diarrhea or constipation. She has lost a couple of pounds over the last few weeks. No infections or fevers. Denies neuropathy. Denies change in hearing. No tinnitus.  She has not had a menstrual period for the last few  months.    Previously she was getting IV iron through her GYN for heavy periods but she got 2 out of 4 planned doses of intravenous iron. She forgot about the last 2 doses.       ECOG 1    ROS:  A comprehensive ROS was otherwise neg      I reviewed other history in Epic as below      Past Medical/Surgical History:  Past Medical History:   Diagnosis Date     Allergic rhinitis      Anemia      Hoarseness      Oropharyngeal cancer (H) 02/15/2018     Uncomplicated asthma     iron deficiency anemia due to heavy menses  Past Surgical History:   Procedure Laterality Date     BIOPSY  02/15/2018    Left Tongue - [facility]     LAPAROSCOPIC ASSISTED INSERTION TUBE GASTROTOMY N/A 3/14/2018    Procedure: LAPAROSCOPIC ASSISTED INSERTION TUBE GASTROSTOMY;  Percutaneous Endoscopic Gastrostomy Tube Insertion, Esophagogastroduodenoscopy;  Surgeon: Edna Martinez MD;  Location: [facility] OR     Cancer History:   As above    Allergies:  Allergies as of 05/02/2018 - Review Complete 05/02/2018   Allergen Reaction Noted     Ceftriaxone  08/25/2012     Chicken-derived products (egg)  08/25/2012     Current Medications:  Current Outpatient Prescriptions   Medication Sig Dispense Refill     ACETAMINOPHEN PO Take 1,000 mg by mouth       LORazepam (ATIVAN) 0.5 MG tablet Take 1 tablet (0.5 mg) by mouth daily as needed (Anxiety with radiation) 30 tablet 1     magic mouthwash (FIRST-MOUTHWASH BLM) suspension Swish and swallow 5-10 mLs in mouth every 6 hours as needed for mouth sores 237 mL 1     morphine (MS CONTIN) 30 MG 12 hr tablet Take 1 tablet (30 mg) by mouth every 12 hours maximum 2 tablet(s) per day 60 tablet 0     ondansetron (ZOFRAN) 8 MG tablet Take 1 tablet (8 mg) by mouth every 12 hours as needed for nausea 21 tablet 1     order for DME Sig:  Isosource 1.5 calories:  Instill 3.5 cartons per day via PEG tube by syringe or gravity flow 90 Can 3     oxyCODONE (ROXICODONE) 5 MG/5ML solution Take 10 mLs (10 mg) by mouth every 4 hours as  needed for breakthrough pain or moderate to severe pain 300 mL 0     Potassium Chloride 40 MEQ/15ML (20%) SOLN 7.5 mLs (20 mEq) by Oral or G tube route daily 1 Bottle 1     prochlorperazine (COMPAZINE) 10 MG tablet Take 1 tablet (10 mg) by mouth every 6 hours as needed (Nausea/Vomiting) 30 tablet 1     dexamethasone (DECADRON) 4 MG tablet Take 2 tablets (8 mg) by mouth daily (with breakfast) Start the day after chemotherapy. (Patient not taking: Reported on 5/2/2018) 6 tablet 1     Fexofenadine HCl (ALLEGRA ALLERGY PO) Take by mouth as needed for allergies       folic acid (FOLVITE) 1 MG tablet Take 1 mg by mouth       nystatin (MYCOSTATIN) 531106 UNIT/ML suspension Take 5 mLs (500,000 Units) by mouth 4 times daily (Patient not taking: Reported on 5/2/2018) 280 mL 0     polyethylene glycol (MIRALAX/GLYCOLAX) powder Take 17 g (1 capful) by mouth daily (Patient not taking: Reported on 5/2/2018) 225 g 1     sucralfate (CARAFATE) 1 GM/10ML suspension Take 10 mLs (1 g) by mouth 4 times daily as needed (Patient not taking: Reported on 5/2/2018) 1200 mL 1     varenicline (CHANTIX STARTING MONTH PAK) 0.5 MG X 11 & 1 MG X 42 tablet Take 0.5 mg tab daily for 3 days, then 0.5 mg tab twice daily for 4 days, then 1 mg twice daily. (Patient not taking: Reported on 5/2/2018) 53 tablet 0      Family History:  Family History   Problem Relation Age of Onset     Substance Abuse Father      Dementia Maternal Grandmother      DIABETES Maternal Grandmother      Asthma Brother      Prostate Cancer Maternal Grandfather      Social History:  Social History     Social History     Marital status:      Spouse name: N/A     Number of children: N/A     Years of education: N/A     Occupational History     Not on file.     Social History Main Topics     Smoking status: Current Some Day Smoker     Packs/day: 1.00     Years: 23.00     Types: Cigarettes     Start date: 1/1/1993     Smokeless tobacco: Never Used      Comment: Patient reports  "currently smoking 3 cigarettes per day - updated 3/6/2018     Alcohol use No     Drug use: No     Sexual activity: Yes     Partners: Male     Birth control/ protection: Pull-out method, Condom     Other Topics Concern     Not on file     Social History Narrative    she used to smoke 1 pack a day but has cut down to 3 cigarettes a day and is in process of quitting. Denies any use of alcohol. Lives with her . 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   We again discussed the importance of smoking cessation. I offered her referral for a smoking cessation program but she is not interested in that. She thinks she will be able to quit herself as now she is only smoking cigarettes occasionally     Physical Exam:  BP 93/61  Pulse 72  Temp 98.1  F (36.7  C)  Ht 1.499 m (4' 11\")  Wt 35.8 kg (79 lb)  SpO2 100%  BMI 15.96 kg/m2  CONSTITUTIONAL: no acute distress  EYES: PERRLA, no palor or icterus.   ENT/MOUTH: It is difficult for her to open the mouth completely. She has evidence of mucositis. Ears look normal   CVS: s1s2 no m r g .   RESPIRATORY: clear to auscultation b/l  GI: soft non tender no hepatosplenomegaly. G-tube site is clean  NEURO: AAOX3  Grossly non focal neuro exam  INTEGUMENT: no obvious rashes  LYMPHATIC: no palpable cervical, supraclavicular, axillary LAD  MUSCULOSKELETAL: Unremarkable. No bony tenderness.   EXTREMITIES: no edema  PSYCH: Mentation, mood and affect are normal. Decision making capacity is intact      Laboratory/Imaging Studies      Results for orders placed or performed in visit on 05/02/18   CBC with platelets differential   Result Value Ref Range    WBC 1.1 (L) 4.0 - 11.0 10e9/L    RBC Count 3.38 (L) 3.8 - 5.2 10e12/L    Hemoglobin 10.9 (L) 11.7 - 15.7 g/dL    Hematocrit 33.4 (L) 35.0 - 47.0 %    MCV 99 78 - 100 fl    MCH 32.2 26.5 - 33.0 pg    MCHC 32.6 31.5 - 36.5 g/dL    RDW 17.8 (H) 10.0 - 15.0 %    Platelet Count 242 150 - 450 10e9/L    % Neutrophils " 60.0 %    % Lymphocytes 15.0 %    % Monocytes 21.0 %    % Eosinophils 4.0 %    Absolute Neutrophil 0.7 (L) 1.6 - 8.3 10e9/L    Absolute Lymphocytes 0.2 (L) 0.8 - 5.3 10e9/L    Absolute Monocytes 0.2 0.0 - 1.3 10e9/L    Absolute Eosinophils 0.0 0.0 - 0.7 10e9/L    RBC Morphology Normal     Platelet Estimate       Automated count confirmed.  Platelet morphology is normal.    Diff Method Manual Differential    Comprehensive metabolic panel   Result Value Ref Range    Sodium 137 133 - 144 mmol/L    Potassium 4.3 3.4 - 5.3 mmol/L    Chloride 103 94 - 109 mmol/L    Carbon Dioxide 28 20 - 32 mmol/L    Anion Gap 6 3 - 14 mmol/L    Glucose 85 70 - 99 mg/dL    Urea Nitrogen 15 7 - 30 mg/dL    Creatinine 0.51 (L) 0.52 - 1.04 mg/dL    GFR Estimate >90 >60 mL/min/1.7m2    GFR Estimate If Black >90 >60 mL/min/1.7m2    Calcium 9.5 8.5 - 10.1 mg/dL    Bilirubin Total 0.2 0.2 - 1.3 mg/dL    Albumin 4.0 3.4 - 5.0 g/dL    Protein Total 7.6 6.8 - 8.8 g/dL    Alkaline Phosphatase 43 40 - 150 U/L    ALT 16 0 - 50 U/L    AST 12 0 - 45 U/L   Magnesium   Result Value Ref Range    Magnesium 2.2 1.6 - 2.3 mg/dL     ASSESSMENT/PLAN:    Stage IV a [id number] 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  She started concurrent chemotherapy with high-dose cisplatin alongside radiation on 3/14/2018. Cycle #2 of chemotherapy was delayed by one week because of neutropenia.   She received C#2 on 4/11/18  She is completing radiation today 5/2/2018   As she has neutropenia we will not give chemotherapy today. As she is completing radiation today I will not give chemotherapy next week and at this time we will stop treatment. We discussed that a lot of people in her condition are unable to get the third dose of chemotherapy and at this time we are not really sure how much additional benefit because the third dose of chemotherapy add although we tried to give it if it is possible.    About 3 months after completing the therapy she will get repeat  imaging with a PET scan  She will need close follow-up with ENT    Leukopenia and neutropenia. Due to chemotherapy. We discussed that if she develops infections or fevers then she should let us know immediately.    Pain/mucositis. She is managing it by using Magic mouthwash and Tylenol. She also has morphine as needed for the pain.       Nutrition. She continues to swallow water and soap but she is dependent on tube feeds to maintain her nutrition. She will follow with nutritionist. Hopefully with time her swallowing would improve and we will be able to take out the feeding tube       Iron deficiency anemia secondary to menorrhagia. Hemoglobin is better. It is 10.9. We will keep on observing it. Previously she got 2 out of 4 planned doses of intravenous iron     Smoking. We again discussed the importance of smoking cessation. I offered her referral for a smoking cessation program but she is not interested in that. She thinks she will be able to quit herself as now she is only smoking cigarettes occasionally       We did not address the following today  She had baseline audiogram done prior to start of cisplatin. Currently she is not noticing any changes in her hearing or tinnitus. We can consider repeating audiogram after completion of the treatment.      I would like to see her back next week to see how she is doing and we will repeat labs at that time    All of her and her 's questions were answered to her satisfaction and she is agreeable and comfortable with the plan       Benedicto Nieto

## 2018-05-02 NOTE — MR AVS SNAPSHOT
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 Stop chemo    See me back 5/11 at 9/15 with labs and possible IVF appointment            Follow-ups after your visit        Your next 10 appointments already scheduled     May 02, 2018  2:45 PM CDT   TREATMENT with RADIATION THERAPIST   [facility][note 3])    [address] [place] [id number]   [phone]            May 11, 2018  8:30 AM CDT   LAB with LAB ONC [facility])    [id number] [place] [id number]   [phone]           Please do not eat 10-12 hours before your appointment if you are coming in fasting for labs on lipids, cholesterol, or glucose (sugar). This does not apply to pregnant women. Water, hot tea and black coffee (with nothing added) are okay. Do not drink other fluids, diet soda or chew gum.            May 11, 2018  9:15 AM CDT   Return Visit with Benedicto Nieto MD   [facility][note 3])    [address] [place] [id number]   [phone]            May 11, 2018 12:00 PM CDT   Level 2 with [address])    [id number] [place] [address]   [phone]            Jun 05, 2018  9:30 AM CDT   Return Visit with Albert Ambrosio MD   [facility][note 3])    [address] [place] [id number]   [phone]            Jun 11, 2018 10:00 AM CDT   (Arrive by 9:45 AM)   Return Visit with Marga Arana MD     [facility] ([facility])    [address]  4th [address]  [facility] [address]   [phone]            Jun 13, 2018 10:15 AM CDT   Return Visit with Deneen Ceron MD   [facility])    [address] [place] [id number] [phone]            Sep 14, 2018 10:30 AM CDT   LAB with LAB ONC [facility] ([facility])    [address] [place] [id number] [phone]           Please do not eat 10-12 hours before your appointment if you are coming in fasting for labs on lipids, cholesterol, or glucose (sugar). This does not apply to pregnant women. Water, hot tea and black coffee (with nothing added) are okay. Do not drink other fluids, diet soda or chew gum.            Sep 14, 2018 11:15 AM CDT   Return Visit with Benedicto Nieto MD   [facility] ([facility])    [address] [place] [id number] [phone]            Sep 14, 2018 12:00 PM CDT   Level 2 with B[facility])    [address] [place] [id number] [phone]              Future tests that were ordered for you today     Open Future Orders        Priority Expected Expires Ordered    *CBC with platelets differential Routine  5/2/2019 5/2/2018    Basic metabolic panel Routine  5/2/2019 5/2/2018            Who to contact     If you have questions or need follow up information about today's clinic visit or your schedule please contact [facility] directly at [phone].  Normal or non-critical lab and imaging results will be communicated to you by MyChart, letter or phone within 4 business days after the clinic has received the results. If you do not hear from us within 7 days, please contact the clinic through MyChart or phone. If you have a  "critical or abnormal lab result, we will notify you by phone as soon as possible.  Submit refill requests through [employer] or call your pharmacy and they will forward the refill request to us. Please allow 3 business days for your refill to be completed.          Additional Information About Your Visit        Comply[employer] Information     [employer] is an electronic gateway that provides easy, online access to your medical records. With [employer], you can request a clinic appointment, read your test results, renew a prescription or communicate with your care team.     To sign up for [employer] visit the website at [email or web address]   You will be asked to enter the access code listed below, as well as some personal information. Please follow the directions to create your username and password.     Your access code is: [email or web address]  Expires: 2018  7:30 AM     Your access code will  in 90 days. If you need help or a new code, please contact your [facility] or call [phone] for assistance.        Care EveryWhere ID     This is your Care EveryWhere ID. This could be used by other organizations to access your [facility] medical records  [id number]        Your Vitals Were     Pulse Temperature Height Pulse Oximetry BMI (Body Mass Index)       72 98.1  F (36.7  C) 1.499 m (4' 11\") 100% 15.96 kg/m2        Blood Pressure from Last 3 Encounters:   18 93/61   18 103/71   18 (!) 79/52    Weight from Last 3 Encounters:   18 35.8 kg (79 lb)   18 35.4 kg (78 lb)   18 36.1 kg (79 lb 9 oz)               Primary Care Provider Office Phone # Fax #    Quang Wall PA-C [phone]       [facility] [address]  [facility] [address]        Equal Access to Services     JAVIER MORALES AH: Hadii aad ku hadasho Soomaali, waaxda luqadaha, qaybta kaalmada adeegyada, waxay marcelina keller ah. So [facility] " [phone].    ATENCIÓN: Si rhiannon harvey, tiene a hawley disposición servicios gratuitos de asistencia lingüística. Juanjose diana [phone].    We comply with applicable federal civil rights laws and [place] laws. We do not discriminate on the basis of race, color, national origin, age, disability, sex, sexual orientation, or gender identity.            Thank you!     Thank you for choosing [facility]  for your care. Our goal is always to provide you with excellent care. Hearing back from our patients is one way we can continue to improve our services. Please take a few minutes to complete the written survey that you may receive in the mail after your visit with us. Thank you!             Your Updated Medication List - Protect others around you: Learn how to safely use, store and throw away your medicines at www.disposemymeds.org.          This list is accurate as of 5/2/18  9:42 AM.  Always use your most recent med list.                   Brand Name Dispense Instructions for use Diagnosis    ACETAMINOPHEN PO      Take 1,000 mg by mouth        ALLEGRA ALLERGY PO      Take by mouth as needed for allergies        dexamethasone 4 MG tablet    DECADRON    6 tablet    Take 2 tablets (8 mg) by mouth daily (with breakfast) Start the day after chemotherapy.    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)       magic mouthwash suspension (diphenhydrAMINE, lidocaine, aluminum-magnesium & simethicone) Susp compounding kit     237 mL    Swish and swallow 5-10 mLs in mouth every 6 hours as needed for mouth sores    Squamous cell carcinoma of oropharynx (H)        morphine 30 MG 12 hr tablet    MS CONTIN    60 tablet    Take 1 tablet (30 mg) by mouth every 12 hours maximum 2 tablet(s) per day    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), Cancer related pain       nystatin 816724 UNIT/ML suspension    MYCOSTATIN    280 mL    Take 5 mLs (500,000 Units) by mouth 4 times daily    Thrush       ondansetron 8 MG tablet    ZOFRAN    21 tablet    Take 1 tablet (8 mg) by mouth every 12 hours as needed for nausea    Squamous cell carcinoma of oropharynx (H)       order for DME     90 Can    Sig:  Isosource 1.5 calories:  Instill 3.5 cartons per day via PEG tube by syringe or gravity flow    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)       oxyCODONE 5 MG/5ML solution    ROXICODONE    300 mL    Take 10 mLs (10 mg) by mouth every 4 hours as needed for breakthrough pain or moderate to severe pain    Cancer related pain, 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)       polyethylene glycol powder    MIRALAX/GLYCOLAX    225 g    Take 17 g (1 capful) by mouth daily    Drug-induced constipation, Cancer related pain, 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)       Potassium Chloride 40 MEQ/15ML (20%) Soln     1 Bottle    7.5 mLs (20 mEq) by Oral or G tube route daily    Squamous cell carcinoma of oropharynx (H), Hypokalemia       prochlorperazine 10 MG tablet    COMPAZINE    30 tablet    Take 1 tablet (10 mg) by mouth every 6 hours as needed (Nausea/Vomiting)    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    Squamous cell carcinoma of oropharynx (H)

## 2018-05-02 NOTE — LETTER
5/2/2018         RE: Melinda Milligan  [address] [facility] [address]        Dear Colleague,    Thank you for referring your patient, Melinda Milligan, to the [facility], [place]. Please see a copy of my visit note below.    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        Primary Physician: No Ref-Primary, Physician     History Of Present Illness:    Please see previous note for details. I have copied and updated from prior note.  Ms. 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 She initially presented with thrush several months ago which was not improved after treatment and more recently she was noted to have worsening swelling and pain on the right side of the tongue and cheek and she was referred to ENT for a biopsy of the tongue lesion which was consistent with squamous cell cancer. P 16 negative.  As part of her workup she had a PET scan and neck CT which showed   1. Hypermetabolic mass involving the bilateral palatine tonsils, anteriorly extending to and involving the soft palate, superiorly extending to and involving the right posterior lateral nasopharyngeal wall and inferiorly extending to the right lateral oropharyngeal wall representing primary squamosal cancer.  2. Right and left metastatic FDG avid level IIa lymph nodes.    Treatment:   3/13/2018 gtube placed  3/14/2018 Started chemoradiation with high dose cisplatin  Cycle #2 of chemotherapy was delayed by one week because of neutropenia. She got it on 4/11/18    She continues to have pain in the mouth and mucositis. Currently she is taking Magic mouthwash and Tylenol. She is not using the morphine. She is feeling fatigued but is still able to do usual household chores. Denies new pain. She is able to swallow water and soap. She is using tube feeds 4 cans a  day. Denies nausea vomiting. No diarrhea or constipation. She has lost a couple of pounds over the last few weeks. No infections or fevers. Denies neuropathy. Denies change in hearing. No tinnitus.  She has not had a menstrual period for the last few months.    Previously she was getting IV iron through her GYN for heavy periods but she got 2 out of 4 planned doses of intravenous iron. She forgot about the last 2 doses.       ECOG 1    ROS:  A comprehensive ROS was otherwise neg      I reviewed other history in Epic as below      Past Medical/Surgical History:  Past Medical History:   Diagnosis Date     Allergic rhinitis      Anemia      Hoarseness      Oropharyngeal cancer (H) 02/15/2018     Uncomplicated asthma     iron deficiency anemia due to heavy menses  Past Surgical History:   Procedure Laterality Date     BIOPSY  02/15/2018    Left Tongue - [facility]     LAPAROSCOPIC ASSISTED INSERTION TUBE GASTROTOMY N/A 3/14/2018    Procedure: LAPAROSCOPIC ASSISTED INSERTION TUBE GASTROSTOMY;  Percutaneous Endoscopic Gastrostomy Tube Insertion, Esophagogastroduodenoscopy;  Surgeon: Edan Martinez MD;  Location: [facility] OR     Cancer History:   As above    Allergies:  Allergies as of 05/02/2018 - Review Complete 05/02/2018   Allergen Reaction Noted     Ceftriaxone  08/25/2012     Chicken-derived products (egg)  08/25/2012     Current Medications:  Current Outpatient Prescriptions   Medication Sig Dispense Refill     ACETAMINOPHEN PO Take 1,000 mg by mouth       LORazepam (ATIVAN) 0.5 MG tablet Take 1 tablet (0.5 mg) by mouth daily as needed (Anxiety with radiation) 30 tablet 1     magic mouthwash (FIRST-MOUTHWASH BLM) suspension Swish and swallow 5-10 mLs in mouth every 6 hours as needed for mouth sores 237 mL 1     morphine (MS CONTIN) 30 MG 12 hr tablet Take 1 tablet (30 mg) by mouth every 12 hours maximum 2 tablet(s) per day 60 tablet 0     ondansetron (ZOFRAN) 8 MG tablet Take 1 tablet (8 mg) by mouth every 12 hours as  needed for nausea 21 tablet 1     order for DME Sig:  Isosource 1.5 calories:  Instill 3.5 cartons per day via PEG tube by syringe or gravity flow 90 Can 3     oxyCODONE (ROXICODONE) 5 MG/5ML solution Take 10 mLs (10 mg) by mouth every 4 hours as needed for breakthrough pain or moderate to severe pain 300 mL 0     Potassium Chloride 40 MEQ/15ML (20%) SOLN 7.5 mLs (20 mEq) by Oral or G tube route daily 1 Bottle 1     prochlorperazine (COMPAZINE) 10 MG tablet Take 1 tablet (10 mg) by mouth every 6 hours as needed (Nausea/Vomiting) 30 tablet 1     dexamethasone (DECADRON) 4 MG tablet Take 2 tablets (8 mg) by mouth daily (with breakfast) Start the day after chemotherapy. (Patient not taking: Reported on 5/2/2018) 6 tablet 1     Fexofenadine HCl (ALLEGRA ALLERGY PO) Take by mouth as needed for allergies       folic acid (FOLVITE) 1 MG tablet Take 1 mg by mouth       nystatin (MYCOSTATIN) 220934 UNIT/ML suspension Take 5 mLs (500,000 Units) by mouth 4 times daily (Patient not taking: Reported on 5/2/2018) 280 mL 0     polyethylene glycol (MIRALAX/GLYCOLAX) powder Take 17 g (1 capful) by mouth daily (Patient not taking: Reported on 5/2/2018) 225 g 1     sucralfate (CARAFATE) 1 GM/10ML suspension Take 10 mLs (1 g) by mouth 4 times daily as needed (Patient not taking: Reported on 5/2/2018) 1200 mL 1     varenicline (CHANTIX STARTING MONTH PAK) 0.5 MG X 11 & 1 MG X 42 tablet Take 0.5 mg tab daily for 3 days, then 0.5 mg tab twice daily for 4 days, then 1 mg twice daily. (Patient not taking: Reported on 5/2/2018) 53 tablet 0      Family History:  Family History   Problem Relation Age of Onset     Substance Abuse Father      Dementia Maternal Grandmother      DIABETES Maternal Grandmother      Asthma Brother      Prostate Cancer Maternal Grandfather      Social History:  Social History     Social History     Marital status:      Spouse name: N/A     Number of children: N/A     Years of education: N/A     Occupational  "History     Not on file.     Social History Main Topics     Smoking status: Current Some Day Smoker     Packs/day: 1.00     Years: 23.00     Types: Cigarettes     Start date: 1/1/1993     Smokeless tobacco: Never Used      Comment: Patient reports currently smoking 3 cigarettes per day - updated 3/6/2018     Alcohol use No     Drug use: No     Sexual activity: Yes     Partners: Male     Birth control/ protection: Pull-out method, Condom     Other Topics Concern     Not on file     Social History Narrative    she used to smoke 1 pack a day but has cut down to 3 cigarettes a day and is in process of quitting. Denies any use of alcohol. Lives with her . 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   We again discussed the importance of smoking cessation. I offered her referral for a smoking cessation program but she is not interested in that. She thinks she will be able to quit herself as now she is only smoking cigarettes occasionally     Physical Exam:  BP 93/61  Pulse 72  Temp 98.1  F (36.7  C)  Ht 1.499 m (4' 11\")  Wt 35.8 kg (79 lb)  SpO2 100%  BMI 15.96 kg/m2  CONSTITUTIONAL: no acute distress  EYES: PERRLA, no palor or icterus.   ENT/MOUTH: It is difficult for her to open the mouth completely. She has evidence of mucositis. Ears look normal   CVS: s1s2 no m r g .   RESPIRATORY: clear to auscultation b/l  GI: soft non tender no hepatosplenomegaly. G-tube site is clean  NEURO: AAOX3  Grossly non focal neuro exam  INTEGUMENT: no obvious rashes  LYMPHATIC: no palpable cervical, supraclavicular, axillary LAD  MUSCULOSKELETAL: Unremarkable. No bony tenderness.   EXTREMITIES: no edema  PSYCH: Mentation, mood and affect are normal. Decision making capacity is intact      Laboratory/Imaging Studies      Results for orders placed or performed in visit on 05/02/18   CBC with platelets differential   Result Value Ref Range    WBC 1.1 (L) 4.0 - 11.0 10e9/L    RBC Count 3.38 (L) 3.8 - 5.2 " 10e12/L    Hemoglobin 10.9 (L) 11.7 - 15.7 g/dL    Hematocrit 33.4 (L) 35.0 - 47.0 %    MCV 99 78 - 100 fl    MCH 32.2 26.5 - 33.0 pg    MCHC 32.6 31.5 - 36.5 g/dL    RDW 17.8 (H) 10.0 - 15.0 %    Platelet Count 242 150 - 450 10e9/L    % Neutrophils 60.0 %    % Lymphocytes 15.0 %    % Monocytes 21.0 %    % Eosinophils 4.0 %    Absolute Neutrophil 0.7 (L) 1.6 - 8.3 10e9/L    Absolute Lymphocytes 0.2 (L) 0.8 - 5.3 10e9/L    Absolute Monocytes 0.2 0.0 - 1.3 10e9/L    Absolute Eosinophils 0.0 0.0 - 0.7 10e9/L    RBC Morphology Normal     Platelet Estimate       Automated count confirmed.  Platelet morphology is normal.    Diff Method Manual Differential    Comprehensive metabolic panel   Result Value Ref Range    Sodium 137 133 - 144 mmol/L    Potassium 4.3 3.4 - 5.3 mmol/L    Chloride 103 94 - 109 mmol/L    Carbon Dioxide 28 20 - 32 mmol/L    Anion Gap 6 3 - 14 mmol/L    Glucose 85 70 - 99 mg/dL    Urea Nitrogen 15 7 - 30 mg/dL    Creatinine 0.51 (L) 0.52 - 1.04 mg/dL    GFR Estimate >90 >60 mL/min/1.7m2    GFR Estimate If Black >90 >60 mL/min/1.7m2    Calcium 9.5 8.5 - 10.1 mg/dL    Bilirubin Total 0.2 0.2 - 1.3 mg/dL    Albumin 4.0 3.4 - 5.0 g/dL    Protein Total 7.6 6.8 - 8.8 g/dL    Alkaline Phosphatase 43 40 - 150 U/L    ALT 16 0 - 50 U/L    AST 12 0 - 45 U/L   Magnesium   Result Value Ref Range    Magnesium 2.2 1.6 - 2.3 mg/dL     ASSESSMENT/PLAN:    Stage IV a [id number] 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  She started concurrent chemotherapy with high-dose cisplatin alongside radiation on 3/14/2018. Cycle #2 of chemotherapy was delayed by one week because of neutropenia.   She received C#2 on 4/11/18  She is completing radiation today 5/2/2018   As she has neutropenia we will not give chemotherapy today. As she is completing radiation today I will not give chemotherapy next week and at this time we will stop treatment. We discussed that a lot of people in her condition are unable to get the third  dose of chemotherapy and at this time we are not really sure how much additional benefit because the third dose of chemotherapy add although we tried to give it if it is possible.    About 3 months after completing the therapy she will get repeat imaging with a PET scan  She will need close follow-up with ENT    Leukopenia and neutropenia. Due to chemotherapy. We discussed that if she develops infections or fevers then she should let us know immediately.    Pain/mucositis. She is managing it by using Magic mouthwash and Tylenol. She also has morphine as needed for the pain.       Nutrition. She continues to swallow water and soap but she is dependent on tube feeds to maintain her nutrition. She will follow with nutritionist. Hopefully with time her swallowing would improve and we will be able to take out the feeding tube       Iron deficiency anemia secondary to menorrhagia. Hemoglobin is better. It is 10.9. We will keep on observing it. Previously she got 2 out of 4 planned doses of intravenous iron     Smoking. We again discussed the importance of smoking cessation. I offered her referral for a smoking cessation program but she is not interested in that. She thinks she will be able to quit herself as now she is only smoking cigarettes occasionally       We did not address the following today  She had baseline audiogram done prior to start of cisplatin. Currently she is not noticing any changes in her hearing or tinnitus. We can consider repeating audiogram after completion of the treatment.      I would like to see her back next week to see how she is doing and we will repeat labs at that time    All of her and her 's questions were answered to her satisfaction and she is agreeable and comfortable with the plan       Benedicto Nieto        Again, thank you for allowing me to participate in the care of your patient.        Sincerely,        Benedicto Nieto MD

## 2018-05-02 NOTE — NURSING NOTE
"Oncology Rooming Note    May 2, 2018 9:03 AM   Melinda Milligan is a 42 year old female who presents for:    Chief Complaint   Patient presents with     Oncology Clinic Visit     follow up prior to treatment     Initial Vitals: BP 93/61  Pulse 72  Temp 98.1  F (36.7  C)  Ht 1.499 m (4' 11\")  Wt 35.8 kg (79 lb)  SpO2 100%  BMI 15.96 kg/m2 Estimated body mass index is 15.96 kg/(m^2) as calculated from the following:    Height as of this encounter: 1.499 m (4' 11\").    Weight as of this encounter: 35.8 kg (79 lb). Body surface area is 1.22 meters squared.  Extreme Pain (8) Comment: mouth sores - magic mouth wash   No LMP recorded.  Allergies reviewed: Yes  Medications reviewed: Yes    Medications: Medication refills not needed today.  Pharm[facility] into [facility] - [place], [place] - [address]        5 minutes for nursing intake (face to face time)     Sri Artis LPN              "

## 2018-05-03 ENCOUNTER — ONCOLOGY VISIT (OUTPATIENT)
Dept: RADIATION ONCOLOGY | Facility: CLINIC | Age: 43
End: 2018-05-03

## 2018-05-04 ENCOUNTER — HOME INFUSION (PRE-WILLOW HOME INFUSION) (OUTPATIENT)
Dept: PHARMACY | Facility: CLINIC | Age: 43
End: 2018-05-04

## 2018-05-04 NOTE — PROCEDURES
Radiotherapy Treatment Summary          Date of Report: May 03, 2018     PATIENT: BOYD JIMENEZ  MEDICAL RECORD NO: [id number]  : 1975     DIAGNOSIS: C10.8 Malignant neoplasm of overlapping sites of oropharynx  INTENT OF RADIOTHERAPY: Cure  PATHOLOGY:   SCCa of R tonsil                                STAGE:   cT3N2c                        Details of the treatments summarized below are found in records kept in the Department of Radiation Oncology at [facility].     Treatment Summary:  Radiation Oncology - Course: 1    Treatment Site  Current Dose Modality From To Elapsed Days Fx.  Low risk    5,400 cGy 06 X  3/15/2018  2018  41 30  Intermediate risk  6000 cGy 06 X 3/15/18 4/25/18 41 30  High risk boost   1,000 cGy 06 X  4/26/2018  2018   6  5     Dose per Fraction:  180/200 cGy      Total Dose:      7000 cGy to gross disease        COMMENTS:  She tolerated CRT with expected toxicity of mucositis and dysphagia managed with PO   narcotics, magic mouthwash, and dry desquamation of the necks managed with aquaphor. She had dry mouth   and loss of taste. She maintained PO intake of liquids and soft foods but depended on a feeding tube for   nutrition. Her 2nd cycle of cisplatin was delayed due to cytopenias, and her 3rd was held for cytopenias as well.   She did have significant N/V and dehydration with her 2nd cycle of chemotherapy. Nausea/vomiting managed   with IVF and prn antiemetics, and she did go to the ED once for severe nausea but was discharged in stable condition.                   (Acute Toxicity Profile by CTC v4.0)     PAIN MANAGEMENT:   PO narcotics prn and magic mouthwash                           FOLLOW UP PLAN:   She will see med onc next week for labs and follow up. She will also follow up with   me and ENT in 1 month, and we will obtain a 3 mo post-treatment PET.                         Staff Physician: Albert Ambrosio M.D.  Physicist: Yu Monsalve MS     CC: MD Roxana Reagan  MD Gerson              Radiation Oncology [id number], [place] [address] Phone: [phone]

## 2018-05-07 NOTE — PROGRESS NOTES
This is a recent snapshot of the patient's [facility] medical record.  For current drug dose and complete information and questions, call [phone] or In Basket pool, [facility] home infusion ([id number])  CSN Number:  [id number]

## 2018-05-09 ENCOUNTER — HOME INFUSION (PRE-WILLOW HOME INFUSION) (OUTPATIENT)
Dept: PHARMACY | Facility: CLINIC | Age: 43
End: 2018-05-09

## 2018-05-11 ENCOUNTER — ONCOLOGY VISIT (OUTPATIENT)
Dept: ONCOLOGY | Facility: CLINIC | Age: 43
End: 2018-05-11
Payer: COMMERCIAL

## 2018-05-11 VITALS
BODY MASS INDEX: 16.39 KG/M2 | OXYGEN SATURATION: 100 % | DIASTOLIC BLOOD PRESSURE: 69 MMHG | TEMPERATURE: 98.4 F | SYSTOLIC BLOOD PRESSURE: 99 MMHG | HEIGHT: 59 IN | HEART RATE: 72 BPM | WEIGHT: 81.31 LBS

## 2018-05-11 DIAGNOSIS — C02.9 TONGUE CANCER (H): Primary | ICD-10-CM

## 2018-05-11 DIAGNOSIS — C10.9 SQUAMOUS CELL CARCINOMA OF OROPHARYNX (H): ICD-10-CM

## 2018-05-11 DIAGNOSIS — C06.9 SQUAMOUS CELL CARCINOMA OF ORAL CAVITY (H): Primary | ICD-10-CM

## 2018-05-11 DIAGNOSIS — C06.9 SQUAMOUS CELL CARCINOMA OF ORAL CAVITY (H): ICD-10-CM

## 2018-05-11 LAB
ANION GAP SERPL CALCULATED.3IONS-SCNC: 7 MMOL/L (ref 3–14)
BASOPHILS # BLD AUTO: 0 10E9/L (ref 0–0.2)
BASOPHILS NFR BLD AUTO: 0.6 %
BUN SERPL-MCNC: 13 MG/DL (ref 7–30)
CALCIUM SERPL-MCNC: 9.2 MG/DL (ref 8.5–10.1)
CHLORIDE SERPL-SCNC: 99 MMOL/L (ref 94–109)
CO2 SERPL-SCNC: 28 MMOL/L (ref 20–32)
CREAT SERPL-MCNC: 0.44 MG/DL (ref 0.52–1.04)
DIFFERENTIAL METHOD BLD: ABNORMAL
EOSINOPHIL # BLD AUTO: 0 10E9/L (ref 0–0.7)
EOSINOPHIL NFR BLD AUTO: 0.9 %
ERYTHROCYTE [DISTWIDTH] IN BLOOD BY AUTOMATED COUNT: 17 % (ref 10–15)
GFR SERPL CREATININE-BSD FRML MDRD: >90 ML/MIN/1.7M2
GLUCOSE SERPL-MCNC: 82 MG/DL (ref 70–99)
HCT VFR BLD AUTO: 32.6 % (ref 35–47)
HGB BLD-MCNC: 10.6 G/DL (ref 11.7–15.7)
IMM GRANULOCYTES # BLD: 0 10E9/L (ref 0–0.4)
IMM GRANULOCYTES NFR BLD: 0.6 %
LYMPHOCYTES # BLD AUTO: 0.3 10E9/L (ref 0.8–5.3)
LYMPHOCYTES NFR BLD AUTO: 7.6 %
MCH RBC QN AUTO: 32.3 PG (ref 26.5–33)
MCHC RBC AUTO-ENTMCNC: 32.5 G/DL (ref 31.5–36.5)
MCV RBC AUTO: 99 FL (ref 78–100)
MONOCYTES # BLD AUTO: 0.5 10E9/L (ref 0–1.3)
MONOCYTES NFR BLD AUTO: 13.2 %
NEUTROPHILS # BLD AUTO: 2.6 10E9/L (ref 1.6–8.3)
NEUTROPHILS NFR BLD AUTO: 77.1 %
PLATELET # BLD AUTO: 237 10E9/L (ref 150–450)
POTASSIUM SERPL-SCNC: 4.3 MMOL/L (ref 3.4–5.3)
RBC # BLD AUTO: 3.28 10E12/L (ref 3.8–5.2)
SODIUM SERPL-SCNC: 134 MMOL/L (ref 133–144)
WBC # BLD AUTO: 3.4 10E9/L (ref 4–11)

## 2018-05-11 PROCEDURE — 99215 OFFICE O/P EST HI 40 MIN: CPT | Performed by: INTERNAL MEDICINE

## 2018-05-11 PROCEDURE — 36415 COLL VENOUS BLD VENIPUNCTURE: CPT | Performed by: INTERNAL MEDICINE

## 2018-05-11 PROCEDURE — 80048 BASIC METABOLIC PNL TOTAL CA: CPT | Performed by: INTERNAL MEDICINE

## 2018-05-11 PROCEDURE — 85025 COMPLETE CBC W/AUTO DIFF WBC: CPT | Performed by: INTERNAL MEDICINE

## 2018-05-11 PROCEDURE — 97803 MED NUTRITION INDIV SUBSEQ: CPT | Performed by: DIETITIAN, REGISTERED

## 2018-05-11 ASSESSMENT — PAIN SCALES - GENERAL: PAINLEVEL: MODERATE PAIN (4)

## 2018-05-11 NOTE — PROGRESS NOTES
CLINICAL NUTRITION SERVICES - REASSESSMENT NOTE   EVALUATION OF PREVIOUS PLAN OF CARE:   Time spent: 15 min  Referring Physician: Hebert  Current diet: Soft foods  Current appetite/intake: Poor  PEG Tube: Yes - dependet  Chemotherapy: Completed Cisplatin   Radiation: Completed     Monitoring from previous assessment:   -Food intake - Melinda has been taking ~600kcal PO.   She has been eating 2 packages of sandwich crackers (cheese and pnb) which have ~200kcal each.  She has also been eating bites of Healthy Choice pasta meals.  She reports that she can taste peanut butter, butter and cheese (easy cheese).  She has been trying to drink 3-4 cups water PO/day.    She reports that sweet foods have been tasting bitter. Dysgeusia is her primary barrier to eating at this time. She also complain of mucositis on the right side of her mouth. She has not been doing her water/soda/salt rinses.   -Enteral Nutrition intake - Melinda continues to take 4 cartons of Isosource 1.5 mariel/day with good tolerance.  She is taking 1 carton QID, spread out by 3-4 hours.  With each carton of formula, she has been running 2 cups water through after her feedings.     She was scheduled for IVF today, however, did not need the as her labs are wnl.  She is very pleased about this as she has been very diligent with her fluids and feedings.    She expresses her desire to continue to try new foods to find taste.   She   -Weight trends - 2 lb wt gain x past 1.5 weeks  Wt Readings from Last 8 Encounters:   05/11/18 36.9 kg (81 lb 5 oz)   05/02/18 35.8 kg (79 lb)   04/30/18 35.4 kg (78 lb)   04/25/18 36.1 kg (79 lb 9 oz)   04/23/18 36.3 kg (80 lb)   04/18/18 37.3 kg (82 lb 4.8 oz)   04/16/18 36.7 kg (81 lb)   04/11/18 37.3 kg (82 lb 4.8 oz)     Previous Goals:   1.  4 cartons Isosource 1.5 mariel daily - met  2. Weight maintenance/weight gain towards 83 lbs as able - not et  Evaluation: Not met   Previous Nutrition Diagnosis:   Inadequate enteral nutrition  infusion related to nausea/vomiting as evidenced by 4 lb wt loss x past 2 weeks.    Evaluation: Improving     CURRENT NUTRITION DIAGNOSIS   Inadequate oral intake related to dysgeusia, mucositis as evidenced by pt dependent upon EN to meet 100% of her nutrition needs.   INTERVENTIONS   Implementation  General/healthful diet - reviewed calorie/protein and fluid needs (1500kcal, 60g pro).  Discussed that with every 400kcal consumed in addition to current PO intake, she can reduce EN by 1 carton.    Dysgeusia - encouraged pt to try:  1. Zinc - 50mg/day  2. Miracle berry  Mucositis - encouraged pt to start rinsing her mouth before/after eating (water/baking soda/salt) and 3-4 times throughout the day.   Vitamins/minerals: advised pt to start takin. Vitamin D3 (2000IU/day)  2. Calcium (750-1000mg/day)    Goals   1. Aim for 1500kcal, 60g protein/day via EN/PO  2. Start weaning off EN in the next 1-2 weeks as able    Follow up/Monitoring:   -Food intake  -Enteral Nutrition intake  -Weight trends     Juli Monique, ZACKERY, LD

## 2018-05-11 NOTE — LETTER
5/11/2018         RE: Melinda Milligan  [address]  [facility] [address]        Dear Colleague,    Thank you for referring your patient, Melinda Milligan, to the [facility]. Please see a copy of my visit note below.    CLINICAL NUTRITION SERVICES - REASSESSMENT NOTE   EVALUATION OF PREVIOUS PLAN OF CARE:   Time spent: 15 min  Referring Physician:  Hebert  Current diet: Soft foods  Current appetite/intake: Poor  PEG Tube: Yes - dependet  Chemotherapy: Completed Cisplatin   Radiation: Completed     Monitoring from previous assessment:   -Food intake - Melinda has been taking ~600kcal PO.   She has been eating 2 packages of sandwich crackers (cheese and pnb) which have ~200kcal each.  She has also been eating bites of Healthy Choice pasta meals.  She reports that she can taste peanut butter, butter and cheese (easy cheese).  She has been trying to drink 3-4 cups water PO/day.    She reports that sweet foods have been tasting bitter. Dysgeusia is her primary barrier to eating at this time. She also complain of mucositis on the right side of her mouth. She has not been doing her water/soda/salt rinses.   -Enteral Nutrition intake - Melinda continues to take 4 cartons of Isosource 1.5 mariel/day with good tolerance.  She is taking 1 carton QID, spread out by 3-4 hours.  With each carton of formula, she has been running 2 cups water through after her feedings.     She was scheduled for IVF today, however, did not need the as her labs are wnl.  She is very pleased about this as she has been very diligent with her fluids and feedings.    She expresses her desire to continue to try new foods to find taste.   She   -Weight trends - 2 lb wt gain x past 1.5 weeks  Wt Readings from Last 8 Encounters:   05/11/18 36.9 kg (81 lb 5 oz)   05/02/18 35.8 kg (79 lb)   04/30/18 35.4 kg (78 lb)   04/25/18 36.1 kg (79 lb 9 oz)   04/23/18 36.3 kg (80 lb)   04/18/18 37.3 kg (82 lb 4.8 oz)   04/16/18 36.7 kg (81 lb)    18 37.3 kg (82 lb 4.8 oz)     Previous Goals:   1.  4 cartons Isosource 1.5 mariel daily - met  2. Weight maintenance/weight gain towards 83 lbs as able - not et  Evaluation: Not met   Previous Nutrition Diagnosis:   Inadequate enteral nutrition infusion related to nausea/vomiting as evidenced by 4 lb wt loss x past 2 weeks.    Evaluation: Improving     CURRENT NUTRITION DIAGNOSIS   Inadequate oral intake related to dysgeusia, mucositis as evidenced by pt dependent upon EN to meet 100% of her nutrition needs.   INTERVENTIONS   Implementation  General/healthful diet - reviewed calorie/protein and fluid needs (1500kcal, 60g pro).  Discussed that with every 400kcal consumed in addition to current PO intake, she can reduce EN by 1 carton.    Dysgeusia - encouraged pt to try:  1. Zinc - 50mg/day  2. Miracle berry  Mucositis - encouraged pt to start rinsing her mouth before/after eating (water/baking soda/salt) and 3-4 times throughout the day.   Vitamins/minerals: advised pt to start takin. Vitamin D3 (2000IU/day)  2. Calcium (750-1000mg/day)    Goals   1. Aim for 1500kcal, 60g protein/day via EN/PO  2. Start weaning off EN in the next 1-2 weeks as able    Follow up/Monitoring:   -Food intake  -Enteral Nutrition intake  -Weight trends     Juli Monique RD, LD          Again, thank you for allowing me to participate in the care of your patient.        Sincerely,        Juli Monique RD

## 2018-05-11 NOTE — LETTER
5/11/2018         RE: Melinda Milligan  [address] [facility] [address]        Dear Colleague,    Thank you for referring your patient, Melinda Milligan, to the [facility]. Please see a copy of my visit note below.    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        Primary Physician: No Ref-Primary, Physician     History Of Present Illness:    Please see previous note for details. I have copied and updated from prior note.  Ms. 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 She initially presented with thrush several months ago which was not improved after treatment and more recently she was noted to have worsening swelling and pain on the right side of the tongue and cheek and she was referred to ENT for a biopsy of the tongue lesion which was consistent with squamous cell cancer. P 16 negative.  As part of her workup she had a PET scan and neck CT which showed   1. Hypermetabolic mass involving the bilateral palatine tonsils, anteriorly extending to and involving the soft palate, superiorly extending to and involving the right posterior lateral nasopharyngeal wall and inferiorly extending to the right lateral oropharyngeal wall representing primary squamosal cancer.  2. Right and left metastatic FDG avid level IIa lymph nodes.    Treatment:   3/13/2018 gtube placed  3/14/2018 Started chemoradiation with high dose cisplatin  Cycle #2 of chemotherapy was delayed by one week because of neutropenia. She got it on 4/11/18  C#3 not given due to cytopenias    She completed radiation 7000 cGy on 5/2/18    She comes in today and tells me that she is doing better now. Her mouth pain and neck pain is better. She usually uses half oxycodone before eating so that she can eat better. Occasionally she is using Tylenol. She still  is using tube feeds with 4 cans a day. She is also drinking more fluids. She is also supplementing with water through the G-tube. Energy is better. Denies infections. No change of hearing. No tinnitus. No neuropathy. No new swellings. No trouble breathing. She continues to smoke about half a pack a day.      Previously she was getting IV iron through her GYN for heavy periods but she got 2 out of 4 planned doses of intravenous iron. She forgot about the last 2 doses.       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   Previously she was getting IV iron through her GYN for heavy periods but she got 2 out of 4 planned doses of intravenous iron. She forgot about the last 2 doses.   Past Surgical History:   Procedure Laterality Date     BIOPSY  02/15/2018    Left Tongue - [facility]     LAPAROSCOPIC ASSISTED INSERTION TUBE GASTROTOMY N/A 3/14/2018    Procedure: LAPAROSCOPIC ASSISTED INSERTION TUBE GASTROSTOMY;  Percutaneous Endoscopic Gastrostomy Tube Insertion, Esophagogastroduodenoscopy;  Surgeon: Edna Martinez MD;  Location: [facility] OR     Cancer History:   As above    Allergies:  Allergies as of 05/11/2018 - Review Complete 05/11/2018   Allergen Reaction Noted     Ceftriaxone  08/25/2012     Chicken-derived products (egg)  08/25/2012     Current Medications:  Current Outpatient Prescriptions   Medication Sig Dispense Refill     ACETAMINOPHEN PO Take 1,000 mg by mouth       dexamethasone (DECADRON) 4 MG tablet Take 2 tablets (8 mg) by mouth daily (with breakfast) Start the day after chemotherapy. 6 tablet 1     Fexofenadine HCl (ALLEGRA ALLERGY PO) Take by mouth as needed for allergies       folic acid (FOLVITE) 1 MG tablet  Take 1 mg by mouth       LORazepam (ATIVAN) 0.5 MG tablet Take 1 tablet (0.5 mg) by mouth daily as needed (Anxiety with radiation) 30 tablet 1     magic mouthwash (FIRST-MOUTHWASH BLM) suspension Swish and swallow 5-10 mLs in mouth every 6 hours as needed for mouth sores 237 mL 1     morphine (MS CONTIN) 30 MG 12 hr tablet Take 1 tablet (30 mg) by mouth every 12 hours maximum 2 tablet(s) per day 60 tablet 0     nystatin (MYCOSTATIN) 205127 UNIT/ML suspension Take 5 mLs (500,000 Units) by mouth 4 times daily 280 mL 0     ondansetron (ZOFRAN) 8 MG tablet Take 1 tablet (8 mg) by mouth every 12 hours as needed for nausea 21 tablet 1     order for DME Sig:  Isosource 1.5 calories:  Instill 3.5 cartons per day via PEG tube by syringe or gravity flow 90 Can 3     oxyCODONE (ROXICODONE) 5 MG/5ML solution Take 10 mLs (10 mg) by mouth every 4 hours as needed for breakthrough pain or moderate to severe pain 300 mL 0     polyethylene glycol (MIRALAX/GLYCOLAX) powder Take 17 g (1 capful) by mouth daily 225 g 1     Potassium Chloride 40 MEQ/15ML (20%) SOLN 7.5 mLs (20 mEq) by Oral or G tube route daily 1 Bottle 1     prochlorperazine (COMPAZINE) 10 MG tablet Take 1 tablet (10 mg) by mouth every 6 hours as needed (Nausea/Vomiting) 30 tablet 1     sucralfate (CARAFATE) 1 GM/10ML suspension Take 10 mLs (1 g) by mouth 4 times daily as needed 1200 mL 1     varenicline (CHANTIX STARTING MONTH PAK) 0.5 MG X 11 & 1 MG X 42 tablet Take 0.5 mg tab daily for 3 days, then 0.5 mg tab twice daily for 4 days, then 1 mg twice daily. 53 tablet 0      Family History:  Family History   Problem Relation Age of Onset     Substance Abuse Father      Dementia Maternal Grandmother      DIABETES Maternal Grandmother      Asthma Brother      Prostate Cancer Maternal Grandfather      Social History:  Social History     Social History     Marital status:      Spouse name: N/A     Number of children: N/A     Years of education: N/A     Occupational  "History     Not on file.     Social History Main Topics     Smoking status: Current Some Day Smoker     Packs/day: 1.00     Years: 23.00     Types: Cigarettes     Start date: 1/1/1993     Smokeless tobacco: Never Used      Comment: Patient reports currently smoking 3 cigarettes per day - updated 3/6/2018     Alcohol use No     Drug use: No     Sexual activity: Yes     Partners: Male     Birth control/ protection: Pull-out method, Condom     Other Topics Concern     Not on file     Social History Narrative    she used to smoke 1 pack a day but now smoking about half a pack a day. We again discussed the importance of completely abstaining from smoking. I again discussed referring her for smoking cessation program but she is not interested      Denies any use of alcohol. Lives with her . 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         Physical Exam:  BP 99/69  Pulse 72  Temp 98.4  F (36.9  C)  Ht 1.499 m (4' 11\")  Wt 36.9 kg (81 lb 5 oz)  SpO2 100%  BMI 16.42 kg/m2  CONSTITUTIONAL: No apparent distress  EYES: PERRLA, without pallor or jaundice  ENT/MOUTH: Ears unremarkable. No oral lesions but mucous membranes seemed dry  CVS: s1s2 normal  RESPIRATORY: Chest is clear  GI: Abdomen is benign. G-tube site is clean and intact  NEURO: He is alert and oriented ×3  INTEGUMENT: no concerning his skin rashes   LYMPHATIC: no palpable lymphadenopathy  MUSCULOSKELETAL: Unremarkable. No bony tenderness.   EXTREMITIES: no pedal edema  PSYCH: Mentation, mood and affect are appropriate        Laboratory/Imaging Studies      Results for orders placed or performed in visit on 05/11/18   *CBC with platelets differential   Result Value Ref Range    WBC 3.4 (L) 4.0 - 11.0 10e9/L    RBC Count 3.28 (L) 3.8 - 5.2 10e12/L    Hemoglobin 10.6 (L) 11.7 - 15.7 g/dL    Hematocrit 32.6 (L) 35.0 - 47.0 %    MCV 99 78 - 100 fl    MCH 32.3 26.5 - 33.0 pg    MCHC 32.5 31.5 - 36.5 g/dL    RDW 17.0 (H) 10.0 - 15.0 % "    Platelet Count 237 150 - 450 10e9/L    Diff Method Automated Method     % Neutrophils 77.1 %    % Lymphocytes 7.6 %    % Monocytes 13.2 %    % Eosinophils 0.9 %    % Basophils 0.6 %    % Immature Granulocytes 0.6 %    Absolute Neutrophil 2.6 1.6 - 8.3 10e9/L    Absolute Lymphocytes 0.3 (L) 0.8 - 5.3 10e9/L    Absolute Monocytes 0.5 0.0 - 1.3 10e9/L    Absolute Eosinophils 0.0 0.0 - 0.7 10e9/L    Absolute Basophils 0.0 0.0 - 0.2 10e9/L    Abs Immature Granulocytes 0.0 0 - 0.4 10e9/L   Basic metabolic panel   Result Value Ref Range    Sodium 134 133 - 144 mmol/L    Potassium 4.3 3.4 - 5.3 mmol/L    Chloride 99 94 - 109 mmol/L    Carbon Dioxide 28 20 - 32 mmol/L    Anion Gap 7 3 - 14 mmol/L    Glucose 82 70 - 99 mg/dL    Urea Nitrogen 13 7 - 30 mg/dL    Creatinine 0.44 (L) 0.52 - 1.04 mg/dL    GFR Estimate >90 >60 mL/min/1.7m2    GFR Estimate If Black >90 >60 mL/min/1.7m2    Calcium 9.2 8.5 - 10.1 mg/dL     ASSESSMENT/PLAN:    Stage IV a [id number] 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  She started concurrent chemotherapy with high-dose cisplatin alongside radiation on 3/14/2018. Cycle #2 of chemotherapy was delayed by one week because of neutropenia.   She received C#2 on 4/11/18  She is completed radiation on 5/2/2018   C#3 which was due on 5/2/18 was not given due to neutropenia    She is doing well. We will repeat PET scan in early August. She will continue to follow with ENT    Mouth pain. This is better. She will continue as needed oxycodone and Tylenol. Hopefully with time she will be able to come off pain medications    Nutrition. She has gained some weight. She is eating better. She is a still using tube feeds with 4 cans daily. I advised her that slowly she should continue to eat more and more and then we can slowly cut down on the tube feeds to see if she can maintain her nutrition solely with p.o. intake and if she is able to do then we can take out the G-tube. She will follow closely with  nutritionist as well.    Smoking. now smoking about half a pack a day. We again discussed the importance of completely abstaining from smoking. I again discussed referring her for smoking cessation program but she is not interested     Leukopenia. Due to chemotherapy. Previously she was neutropenic but this has resolved. She is a still mildly leukopenic.     Anemia. Previously she had iron deficiency anemia due to menorrhagia. Hemoglobin now is as stable. She received 2 out of 4 scheduled doses of intravenous iron but then she forgot about the rest of the 2 doses and she has not received them. We will repeat labs in 3 months.       We did not address the following today  She had baseline audiogram done prior to start of cisplatin. Currently she is not noticing any changes in her hearing or tinnitus. We can consider repeating audiogram if she has any problems with hearing or tinnitus      I will see her back after her PET scan but she knows to contact me if she has any questions or concerns in the interim.    I answered all of her and her 's questions today   They are comfortable with the plan    Benedicto Nieto        Again, thank you for allowing me to participate in the care of your patient.        Sincerely,        Benedicto Nieto MD

## 2018-05-11 NOTE — MR AVS SNAPSHOT
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)    -  1       Follow-ups after your visit        Your next 10 appointments already scheduled     May 11, 2018 12:00 PM CDT   Level 2 with [address] ([facility])    [address] [place] [address]   [phone]            May 11, 2018 12:30 PM CDT   Return Visit with Juli Monique RD   [facility])    [address] [place] [address]   [phone]            Jun 05, 2018  9:30 AM CDT   Return Visit with Albert Ambrosio MD   [facility])    [address] [place] [address]   [phone]            Jun 11, 2018 10:00 AM CDT   (Arrive by 9:45 AM)   Return Visit with Marga Arana MD   [facility])    [address] [place] [id number] [phone]            Jun 13, 2018 10:15 AM CDT   Return Visit with Deneen Ceron MD   [facility])    [address] [place] [address]   [phone]            Aug 03, 2018  8:30 AM CDT   PET ONCOLOGY WHOLE BODY with [id number]   [facility])    [address] [place] [id number] [phone]           Tell your doctor:   If there is any chance you may be pregnant or if you are breastfeeding.   If you have problems lying in small spaces (claustrophobia). If you do, your doctor may give you medicine to help you  relax. If you have diabetes:   Have your exam early in the morning. Your blood glucose will go up as the day goes by.   Your glucose level must be 180 or less at the start of the exam. Please take any medicines you need to ensure this blood glucose level.   If you are taking insulin in the morning take with breakfast by 6 am and schedule procedure between 12 and 2:15 pm.   If you are taking insulin at night take nightly dose, fast overnight, schedule procedure before 10 am.   If you take insulin both morning and night take morning dose by 6am and schedule procedure between 12 and 2:15 pm.   24 hours before your scan: Don t do any heavy exercise. (No jogging, aerobics or other workouts.) Exercise will make your pictures less accurate.  At least 7 hours before your scan, or the evening before if you have an early appointment: Eat a low carb, high protein meal (Lean meats, seafood, beans, soy, low-fat dairy, eggs, nuts & seeds). 6 hours before your scan:   Stop all food and liquids (except water).   Do not chew gum or suck on mints.   If you need to take medicine with food, you may take it with a few crackers.  Please call your Imaging Department at your exam site with any questions.            Aug 07, 2018  1:45 PM CDT   Return Visit with Benedicto Nieto MD   [facility])    [address] [place] [address]   [phone]            Sep 14, 2018 10:30 AM CDT   LAB with LAB ONC [facility])    [address] [place] [phone]   [phone]           Please do not eat 10-12 hours before your appointment if you are coming in fasting for labs on lipids, cholesterol, or glucose (sugar). This does not apply to pregnant women. Water, hot tea and black coffee (with nothing added) are okay. Do not drink other fluids, diet soda or chew gum.            Sep 14, 2018 11:15 AM CDT   Return Visit with Benedicto  OLLIE Nieto MD   [facility]    [address] [place] [address] [phone]            Sep 14, 2018 12:00 PM CDT   Level 2 with BAY 1 INFUSION   [facility] ([facility])    [address] [place] [address] [phone]              Future tests that were ordered for you today     Open Future Orders        Priority Expected Expires Ordered    *CBC with platelets differential Routine 8/3/2018 5/11/2019 5/11/2018    Basic metabolic panel Routine 8/3/2018 5/11/2019 5/11/2018    TSH with free T4 reflex Routine 8/3/2018 5/11/2019 5/11/2018    PET Oncology Whole Body Routine 8/3/2018 5/11/2019 5/11/2018            Who to contact     If you have questions or need follow up information about today's clinic visit or your schedule please contact [facility] directly at [phone].  Normal or non-critical lab and imaging results will be communicated to you by Quad/Graphicshart, letter or phone within 4 business days after the clinic has received the results. If you do not hear from us within 7 days, please contact the clinic through [employer] or phone. If you have a critical or abnormal lab result, we will notify you by phone as soon as possible.  Submit refill requests through Kangou or call your pharmacy and they will forward the refill request to us. Please allow 3 business days for your refill to be completed.          Additional Information About Your Visit        Kangou Information     Kangou is an electronic gateway that provides easy, online access to your medical records. With Kangou, you can request a clinic appointment, read your test results, renew a prescription or communicate with your care team.     To sign up for Kangou visit the website at [email or web address]   You will be asked to enter the access code listed below, as well as some personal information. Please follow the  directions to create your username and password.     Your access code is: [email or web address]  Expires: 2018  7:30 AM     Your access code will  in 90 days. If you need help or a new code, please contact your [facility] or call [phone] for assistance.        Care EveryWhere ID     This is your Care EveryWhere ID. This could be used by other organizations to access your [facility] medical records  [id number]         Blood Pressure from Last 3 Encounters:   18 99/69   18 93/61   18 103/71    Weight from Last 3 Encounters:   18 36.9 kg (81 lb 5 oz)   18 35.8 kg (79 lb)   18 35.4 kg (78 lb)              We Performed the Following     MNT INDIVIDUAL F/U REASSESS, EA 15 MIN        Primary Care Provider Office Phone # Fax #    Quang Wall PA-C [phone]       [facility] [address]  [facility] [address]        Equal Access to Services     JAVIER MORALES : Hadii aad ku hadasho Soomaali, waaxda luqadaha, qaybta kaalmada adeegyada, whitney esquivelin hayaan adegerardo keller . So [facility] [phone].    ATENCIÓN: Si habla español, tiene a hawley disposición servicios gratuitos de asistencia lingüística. Llame al [phone].    We comply with applicable federal civil rights laws and [place] laws. We do not discriminate on the basis of race, color, national origin, age, disability, sex, sexual orientation, or gender identity.            Thank you!     Thank you for choosing [facility]  for your care. Our goal is always to provide you with excellent care. Hearing back from our patients is one way we can continue to improve our services. Please take a few minutes to complete the written survey that you may receive in the mail after your visit with us. Thank you!             Your Updated Medication List - Protect others around you: Learn how to safely use, store and throw away your medicines at  www.disposemymeds.org.          This list is accurate as of 5/11/18 10:18 AM.  Always use your most recent med list.                   Brand Name Dispense Instructions for use Diagnosis    ACETAMINOPHEN PO      Take 1,000 mg by mouth        ALLEGRA ALLERGY PO      Take by mouth as needed for allergies        dexamethasone 4 MG tablet    DECADRON    6 tablet    Take 2 tablets (8 mg) by mouth daily (with breakfast) Start the day after chemotherapy.    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)       magic mouthwash suspension (diphenhydrAMINE, lidocaine, aluminum-magnesium & simethicone) Susp compounding kit     237 mL    Swish and swallow 5-10 mLs in mouth every 6 hours as needed for mouth sores    Squamous cell carcinoma of oropharynx (H)       morphine 30 MG 12 hr tablet    MS CONTIN    60 tablet    Take 1 tablet (30 mg) by mouth every 12 hours maximum 2 tablet(s) per day    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), Cancer related pain       nystatin 660840 UNIT/ML suspension    MYCOSTATIN    280 mL    Take 5 mLs (500,000 Units) by mouth 4 times daily    Thrush       ondansetron 8 MG tablet    ZOFRAN    21 tablet    Take 1 tablet (8 mg) by mouth every 12 hours as needed for nausea    Squamous cell carcinoma of oropharynx (H)       order for DME     90 Can    Sig:  Isosource 1.5 calories:  Instill 3.5 cartons per day via PEG tube by syringe or gravity flow    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)       oxyCODONE 5 MG/5ML solution    ROXICODONE    300 mL    Take 10 mLs (10 mg) by mouth every 4 hours as needed for breakthrough pain or moderate to severe  pain    Cancer related pain, 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)       polyethylene glycol powder    MIRALAX/GLYCOLAX    225 g    Take 17 g (1 capful) by mouth daily    Drug-induced constipation, Cancer related pain, 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)       Potassium Chloride 40 MEQ/15ML (20%) Soln     1 Bottle    7.5 mLs (20 mEq) by Oral or G tube route daily    Squamous cell carcinoma of oropharynx (H), Hypokalemia       prochlorperazine 10 MG tablet    COMPAZINE    30 tablet    Take 1 tablet (10 mg) by mouth every 6 hours as needed (Nausea/Vomiting)    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    Squamous cell carcinoma of oropharynx (H)

## 2018-05-11 NOTE — MR AVS SNAPSHOT
After Visit Summary   5/11/2018    Melinda Milligan    MRN: [id number]           Patient Information     Date Of Birth          1975        Visit Information        Provider Department      5/11/2018 9:15 AM Benedicto Nieto MD [facility]        Today's Diagnoses     Tongue cancer (H)    -  1    Squamous cell carcinoma of oropharynx (H)          Care Instructions    Repeat PET CT and labs early August    See me a few days after that              Follow-ups after your visit        Your next 10 appointments already scheduled     May 11, 2018 12:00 PM CDT   Level 2 with BAY 5 [facility]USION   [facility]    [id number] [place] [address]   [phone]            May 11, 2018 12:30 PM CDT   Return Visit with Juli Monique RD   [facility])    [address] [place] [address]   [phone]            Jun 05, 2018  9:30 AM CDT   Return Visit with Albert Ambrosio MD   [facility])    [id number] [place] [id number] [phone]            Jun 11, 2018 10:00 AM CDT   (Arrive by 9:45 AM)   Return Visit with Marga Arana MD   [facility] ([facility])    [address] [id number] [phone]            Jun 13, 2018 10:15 AM CDT   Return Visit with Deneen Ceron MD   [facility])    [address] [place] [address] [phone]            Aug 03, 2018  8:30 AM CDT   PET ONCOLOGY WHOLE BODY with MGPET1   [facility])    [address] [place] [address] [phone]           Tell your doctor:   If there is any chance you may be pregnant or if you are breastfeeding.   If you have  problems lying in small spaces (claustrophobia). If you do, your doctor may give you medicine to help you relax. If you have diabetes:   Have your exam early in the morning. Your blood glucose will go up as the day goes by.   Your glucose level must be 180 or less at the start of the exam. Please take any medicines you need to ensure this blood glucose level.   If you are taking insulin in the morning take with breakfast by 6 am and schedule procedure between 12 and 2:15 pm.   If you are taking insulin at night take nightly dose, fast overnight, schedule procedure before 10 am.   If you take insulin both morning and night take morning dose by 6am and schedule procedure between 12 and 2:15 pm.   24 hours before your scan: Don t do any heavy exercise. (No jogging, aerobics or other workouts.) Exercise will make your pictures less accurate.  At least 7 hours before your scan, or the evening before if you have an early appointment: Eat a low carb, high protein meal (Lean meats, seafood, beans, soy, low-fat dairy, eggs, nuts & seeds). 6 hours before your scan:   Stop all food and liquids (except water).   Do not chew gum or suck on mints.   If you need to take medicine with food, you may take it with a few crackers.  Please call your Imaging Department at your exam site with any questions.            Aug 07, 2018  1:45 PM CDT   Return Visit with Benedicto Nieto MD   [facility])    [address] [place] [address]   [phone]            Sep 14, 2018 10:30 AM CDT   LAB with LAB ONC Counts include 234 beds at the [facility] ([facility])    [address] [place] [id number] [phone]           Please do not eat 10-12 hours before your appointment if you are coming in fasting for labs on lipids, cholesterol, or glucose (sugar). This does not apply to pregnant women. Water, hot tea and black coffee (with nothing added) are okay. Do not  drink other fluids, diet soda or chew gum.            Sep 14, 2018 11:15 AM CDT   Return Visit with Benedicto Nieto MD   [facility] ([facility])    [address] [place] [address] [phone]            Sep 14, 2018 12:00 PM CDT   Level 2 with BAY 1 INFUSION   [facility] ([facility])    [id number] [facility] [address] [phone]              Future tests that were ordered for you today     Open Future Orders        Priority Expected Expires Ordered    *CBC with platelets differential Routine 8/3/2018 5/11/2019 5/11/2018    Basic metabolic panel Routine 8/3/2018 5/11/2019 5/11/2018    TSH with free T4 reflex Routine 8/3/2018 5/11/2019 5/11/2018    PET Oncology Whole Body Routine 8/3/2018 5/11/2019 5/11/2018            Who to contact     If you have questions or need follow up information about today's clinic visit or your schedule please contact [facility] directly at [phone].  Normal or non-critical lab and imaging results will be communicated to you by [employer], letter or phone within 4 business days after the clinic has received the results. If you do not hear from us within 7 days, please contact the clinic through [employer] or phone. If you have a critical or abnormal lab result, we will notify you by phone as soon as possible.  Submit refill requests through [employer] or call your pharmacy and they will forward the refill request to us. Please allow 3 business days for your refill to be completed.          Additional Information About Your Visit        [employer] Information     [employer] is an electronic gateway that provides easy, online access to your medical records. With [employer], you can request a clinic appointment, read your test results, renew a prescription or communicate with your care team.     To sign up for [employer] visit the website at [email or web address]   You will be  "asked to enter the access code listed below, as well as some personal information. Please follow the directions to create your username and password.     Your access code is: [email or web address]  Expires: 2018  7:30 AM     Your access code will  in 90 days. If you need help or a new code, please contact your [facility] or call [phone] for assistance.        Care EveryWhere ID     This is your Care EveryWhere ID. This could be used by other organizations to access your [facility] medical records  [id number]        Your Vitals Were     Pulse Temperature Height Pulse Oximetry BMI (Body Mass Index)       72 98.4  F (36.9  C) 1.499 m (4' 11\") 100% 16.42 kg/m2        Blood Pressure from Last 3 Encounters:   18 99/69   18 93/61   18 103/71    Weight from Last 3 Encounters:   18 36.9 kg (81 lb 5 oz)   18 35.8 kg (79 lb)   18 35.4 kg (78 lb)               Primary Care Provider Office Phone # Fax #    Quang Wall PA-C [phone]       [facility] [address] [address]  [facility] [address]        Equal Access to Services     JAVIER MORALES AH: Hadii aad ku hadasho Soomaali, waaxda luqadaha, qaybta kaalmada adeegyada, waxay idiin hayaan kyle keller . So [facility] [phone].    ATENCIÓN: Si habla español, tiene a hawley disposición servicios gratuitos de asistencia lingüística. Llame al [phone].    We comply with applicable federal civil rights laws and [place] laws. We do not discriminate on the basis of race, color, national origin, age, disability, sex, sexual orientation, or gender identity.            Thank you!     Thank you for choosing [facility]  for your care. Our goal is always to provide you with excellent care. Hearing back from our patients is one way we can continue to improve our services. Please take a few minutes to complete the written survey that you may receive in the mail after your " visit with us. Thank you!             Your Updated Medication List - Protect others around you: Learn how to safely use, store and throw away your medicines at www.disposemymeds.org.          This list is accurate as of 5/11/18  9:21 AM.  Always use your most recent med list.                   Brand Name Dispense Instructions for use Diagnosis    ACETAMINOPHEN PO      Take 1,000 mg by mouth        ALLEGRA ALLERGY PO      Take by mouth as needed for allergies        dexamethasone 4 MG tablet    DECADRON    6 tablet    Take 2 tablets (8 mg) by mouth daily (with breakfast) Start the day after chemotherapy.    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)       magic mouthwash suspension (diphenhydrAMINE, lidocaine, aluminum-magnesium & simethicone) Susp compounding kit     237 mL    Swish and swallow 5-10 mLs in mouth every 6 hours as needed for mouth sores    Squamous cell carcinoma of oropharynx (H)       morphine 30 MG 12 hr tablet    MS CONTIN    60 tablet    Take 1 tablet (30 mg) by mouth every 12 hours maximum 2 tablet(s) per day    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), Cancer related pain       nystatin 484793 UNIT/ML suspension    MYCOSTATIN    280 mL    Take 5 mLs (500,000 Units) by mouth 4 times daily    Thrush       ondansetron 8 MG tablet    ZOFRAN    21 tablet    Take 1 tablet (8 mg) by mouth every 12 hours as needed for nausea    Squamous cell carcinoma of oropharynx (H)       order for DME     90 Can    Sig:  Isosource 1.5 calories:  Instill 3.5 cartons per day via PEG tube by syringe or gravity flow    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)        oxyCODONE 5 MG/5ML solution    ROXICODONE    300 mL    Take 10 mLs (10 mg) by mouth every 4 hours as needed for breakthrough pain or moderate to severe pain    Cancer related pain, 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)       polyethylene glycol powder    MIRALAX/GLYCOLAX    225 g    Take 17 g (1 capful) by mouth daily    Drug-induced constipation, Cancer related pain, 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)       Potassium Chloride 40 MEQ/15ML (20%) Soln     1 Bottle    7.5 mLs (20 mEq) by Oral or G tube route daily    Squamous cell carcinoma of oropharynx (H), Hypokalemia       prochlorperazine 10 MG tablet    COMPAZINE    30 tablet    Take 1 tablet (10 mg) by mouth every 6 hours as needed (Nausea/Vomiting)    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    Squamous cell carcinoma of oropharynx (H)

## 2018-05-11 NOTE — NURSING NOTE
"Oncology Rooming Note    May 11, 2018 8:49 AM   Melinda Milligan is a 42 year old female who presents for:    Chief Complaint   Patient presents with     Oncology Clinic Visit     follow up     Initial Vitals: BP 99/69  Pulse 72  Temp 98.4  F (36.9  C)  Ht 1.499 m (4' 11\")  Wt 36.9 kg (81 lb 5 oz)  SpO2 100%  BMI 16.42 kg/m2 Estimated body mass index is 16.42 kg/(m^2) as calculated from the following:    Height as of this encounter: 1.499 m (4' 11\").    Weight as of this encounter: 36.9 kg (81 lb 5 oz). Body surface area is 1.24 meters squared.  Moderate Pain (4) Comment: Data Unavailable   No LMP recorded.  Allergies reviewed: Yes  Medications reviewed: Yes    Medications: Medication refills not needed today.  Pharmacy name entered into [employer]: [id number] - [place], [place] - [address])        5 minutes for nursing intake (face to face time)     Sri Artis LPN              "

## 2018-05-11 NOTE — PROGRESS NOTES
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        Primary Physician: No Ref-Primary, Physician     History Of Present Illness:    Please see previous note for details. I have copied and updated from prior note.  Ms. 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 She initially presented with thrush several months ago which was not improved after treatment and more recently she was noted to have worsening swelling and pain on the right side of the tongue and cheek and she was referred to ENT for a biopsy of the tongue lesion which was consistent with squamous cell cancer. P 16 negative.  As part of her workup she had a PET scan and neck CT which showed   1. Hypermetabolic mass involving the bilateral palatine tonsils, anteriorly extending to and involving the soft palate, superiorly extending to and involving the right posterior lateral nasopharyngeal wall and inferiorly extending to the right lateral oropharyngeal wall representing primary squamosal cancer.  2. Right and left metastatic FDG avid level IIa lymph nodes.    Treatment:   3/13/2018 gtube placed  3/14/2018 Started chemoradiation with high dose cisplatin  Cycle #2 of chemotherapy was delayed by one week because of neutropenia. She got it on 4/11/18  C#3 not given due to cytopenias    She completed radiation 7000 cGy on 5/2/18    She comes in today and tells me that she is doing better now. Her mouth pain and neck pain is better. She usually uses half oxycodone before eating so that she can eat better. Occasionally she is using Tylenol. She still is using tube feeds with 4 cans a day. She is also drinking more fluids. She is also supplementing with water through the G-tube. Energy is better. Denies infections. No change of hearing. No tinnitus. No neuropathy. No new swellings. No trouble  breathing. She continues to smoke about half a pack a day.      Previously she was getting IV iron through her GYN for heavy periods but she got 2 out of 4 planned doses of intravenous iron. She forgot about the last 2 doses.       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   Previously she was getting IV iron through her GYN for heavy periods but she got 2 out of 4 planned doses of intravenous iron. She forgot about the last 2 doses.   Past Surgical History:   Procedure Laterality Date     BIOPSY  02/15/2018    Left Tongue - [facility]     LAPAROSCOPIC ASSISTED INSERTION TUBE GASTROTOMY N/A 3/14/2018    Procedure: LAPAROSCOPIC ASSISTED INSERTION TUBE GASTROSTOMY;  Percutaneous Endoscopic Gastrostomy Tube Insertion, Esophagogastroduodenoscopy;  Surgeon: Edna Martinez MD;  Location: [facility] OR     Cancer History:   As above    Allergies:  Allergies as of 05/11/2018 - Review Complete 05/11/2018   Allergen Reaction Noted     Ceftriaxone  08/25/2012     Chicken-derived products (egg)  08/25/2012     Current Medications:  Current Outpatient Prescriptions   Medication Sig Dispense Refill     ACETAMINOPHEN PO Take 1,000 mg by mouth       dexamethasone (DECADRON) 4 MG tablet Take 2 tablets (8 mg) by mouth daily (with breakfast) Start the day after chemotherapy. 6 tablet 1     Fexofenadine HCl (ALLEGRA ALLERGY PO) Take by mouth as needed for allergies       folic acid (FOLVITE) 1 MG tablet Take 1 mg by mouth       LORazepam (ATIVAN) 0.5 MG tablet Take 1 tablet (0.5 mg) by mouth daily as needed (Anxiety with radiation) 30 tablet 1     magic mouthwash (FIRST-MOUTHWASH BLM) suspension Swish and swallow 5-10 mLs in mouth every 6 hours as  needed for mouth sores 237 mL 1     morphine (MS CONTIN) 30 MG 12 hr tablet Take 1 tablet (30 mg) by mouth every 12 hours maximum 2 tablet(s) per day 60 tablet 0     nystatin (MYCOSTATIN) 185901 UNIT/ML suspension Take 5 mLs (500,000 Units) by mouth 4 times daily 280 mL 0     ondansetron (ZOFRAN) 8 MG tablet Take 1 tablet (8 mg) by mouth every 12 hours as needed for nausea 21 tablet 1     order for DME Sig:  Isosource 1.5 calories:  Instill 3.5 cartons per day via PEG tube by syringe or gravity flow 90 Can 3     oxyCODONE (ROXICODONE) 5 MG/5ML solution Take 10 mLs (10 mg) by mouth every 4 hours as needed for breakthrough pain or moderate to severe pain 300 mL 0     polyethylene glycol (MIRALAX/GLYCOLAX) powder Take 17 g (1 capful) by mouth daily 225 g 1     Potassium Chloride 40 MEQ/15ML (20%) SOLN 7.5 mLs (20 mEq) by Oral or G tube route daily 1 Bottle 1     prochlorperazine (COMPAZINE) 10 MG tablet Take 1 tablet (10 mg) by mouth every 6 hours as needed (Nausea/Vomiting) 30 tablet 1     sucralfate (CARAFATE) 1 GM/10ML suspension Take 10 mLs (1 g) by mouth 4 times daily as needed 1200 mL 1     varenicline (CHANTIX STARTING MONTH PAK) 0.5 MG X 11 & 1 MG X 42 tablet Take 0.5 mg tab daily for 3 days, then 0.5 mg tab twice daily for 4 days, then 1 mg twice daily. 53 tablet 0      Family History:  Family History   Problem Relation Age of Onset     Substance Abuse Father      Dementia Maternal Grandmother      DIABETES Maternal Grandmother      Asthma Brother      Prostate Cancer Maternal Grandfather      Social History:  Social History     Social History     Marital status:      Spouse name: N/A     Number of children: N/A     Years of education: N/A     Occupational History     Not on file.     Social History Main Topics     Smoking status: Current Some Day Smoker     Packs/day: 1.00     Years: 23.00     Types: Cigarettes     Start date: 1/1/1993     Smokeless tobacco: Never Used      Comment: Patient reports  "currently smoking 3 cigarettes per day - updated 3/6/2018     Alcohol use No     Drug use: No     Sexual activity: Yes     Partners: Male     Birth control/ protection: Pull-out method, Condom     Other Topics Concern     Not on file     Social History Narrative    she used to smoke 1 pack a day but now smoking about half a pack a day. We again discussed the importance of completely abstaining from smoking. I again discussed referring her for smoking cessation program but she is not interested      Denies any use of alcohol. Lives with her . 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         Physical Exam:  BP 99/69  Pulse 72  Temp 98.4  F (36.9  C)  Ht 1.499 m (4' 11\")  Wt 36.9 kg (81 lb 5 oz)  SpO2 100%  BMI 16.42 kg/m2  CONSTITUTIONAL: No apparent distress  EYES: PERRLA, without pallor or jaundice  ENT/MOUTH: Ears unremarkable. No oral lesions but mucous membranes seemed dry  CVS: s1s2 normal  RESPIRATORY: Chest is clear  GI: Abdomen is benign. G-tube site is clean and intact  NEURO: He is alert and oriented ×3  INTEGUMENT: no concerning his skin rashes   LYMPHATIC: no palpable lymphadenopathy  MUSCULOSKELETAL: Unremarkable. No bony tenderness.   EXTREMITIES: no pedal edema  PSYCH: Mentation, mood and affect are appropriate        Laboratory/Imaging Studies      Results for orders placed or performed in visit on 05/11/18   *CBC with platelets differential   Result Value Ref Range    WBC 3.4 (L) 4.0 - 11.0 10e9/L    RBC Count 3.28 (L) 3.8 - 5.2 10e12/L    Hemoglobin 10.6 (L) 11.7 - 15.7 g/dL    Hematocrit 32.6 (L) 35.0 - 47.0 %    MCV 99 78 - 100 fl    MCH 32.3 26.5 - 33.0 pg    MCHC 32.5 31.5 - 36.5 g/dL    RDW 17.0 (H) 10.0 - 15.0 %    Platelet Count 237 150 - 450 10e9/L    Diff Method Automated Method     % Neutrophils 77.1 %    % Lymphocytes 7.6 %    % Monocytes 13.2 %    % Eosinophils 0.9 %    % Basophils 0.6 %    % Immature Granulocytes 0.6 %    Absolute Neutrophil 2.6 " 1.6 - 8.3 10e9/L    Absolute Lymphocytes 0.3 (L) 0.8 - 5.3 10e9/L    Absolute Monocytes 0.5 0.0 - 1.3 10e9/L    Absolute Eosinophils 0.0 0.0 - 0.7 10e9/L    Absolute Basophils 0.0 0.0 - 0.2 10e9/L    Abs Immature Granulocytes 0.0 0 - 0.4 10e9/L   Basic metabolic panel   Result Value Ref Range    Sodium 134 133 - 144 mmol/L    Potassium 4.3 3.4 - 5.3 mmol/L    Chloride 99 94 - 109 mmol/L    Carbon Dioxide 28 20 - 32 mmol/L    Anion Gap 7 3 - 14 mmol/L    Glucose 82 70 - 99 mg/dL    Urea Nitrogen 13 7 - 30 mg/dL    Creatinine 0.44 (L) 0.52 - 1.04 mg/dL    GFR Estimate >90 >60 mL/min/1.7m2    GFR Estimate If Black >90 >60 mL/min/1.7m2    Calcium 9.2 8.5 - 10.1 mg/dL     ASSESSMENT/PLAN:    Stage IV a [id number] 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  She started concurrent chemotherapy with high-dose cisplatin alongside radiation on 3/14/2018. Cycle #2 of chemotherapy was delayed by one week because of neutropenia.   She received C#2 on 4/11/18  She is completed radiation on 5/2/2018   C#3 which was due on 5/2/18 was not given due to neutropenia    She is doing well. We will repeat PET scan in early August. She will continue to follow with ENT    Mouth pain. This is better. She will continue as needed oxycodone and Tylenol. Hopefully with time she will be able to come off pain medications    Nutrition. She has gained some weight. She is eating better. She is a still using tube feeds with 4 cans daily. I advised her that slowly she should continue to eat more and more and then we can slowly cut down on the tube feeds to see if she can maintain her nutrition solely with p.o. intake and if she is able to do then we can take out the G-tube. She will follow closely with nutritionist as well.    Smoking. now smoking about half a pack a day. We again discussed the importance of completely abstaining from smoking. I again discussed referring her for smoking cessation program but she is not interested     Leukopenia.  Due to chemotherapy. Previously she was neutropenic but this has resolved. She is a still mildly leukopenic.     Anemia. Previously she had iron deficiency anemia due to menorrhagia. Hemoglobin now is as stable. She received 2 out of 4 scheduled doses of intravenous iron but then she forgot about the rest of the 2 doses and she has not received them. We will repeat labs in 3 months.       We did not address the following today  She had baseline audiogram done prior to start of cisplatin. Currently she is not noticing any changes in her hearing or tinnitus. We can consider repeating audiogram if she has any problems with hearing or tinnitus      I will see her back after her PET scan but she knows to contact me if she has any questions or concerns in the interim.    I answered all of her and her 's questions today   They are comfortable with the plan    Benedicto Nieto

## 2018-05-14 NOTE — PROGRESS NOTES
This is a recent snapshot of the patient's [facility] medical record.  For current drug dose and complete information and questions, call [phone] or In [facility] pool, fv home infusion ([id number])  CSN Number:  [id number]

## 2018-05-24 ENCOUNTER — HOME INFUSION (PRE-WILLOW HOME INFUSION) (OUTPATIENT)
Dept: PHARMACY | Facility: CLINIC | Age: 43
End: 2018-05-24

## 2018-05-25 NOTE — PROGRESS NOTES
This is a recent snapshot of the patient's [facility] medical record.  For current drug dose and complete information and questions, call [phone] or In [facility] pool, fv home infusion ([id number])  CSN Number:  [id number]

## 2018-06-01 ENCOUNTER — HOME INFUSION (PRE-WILLOW HOME INFUSION) (OUTPATIENT)
Dept: PHARMACY | Facility: CLINIC | Age: 43
End: 2018-06-01

## 2018-06-01 NOTE — PROGRESS NOTES
RADIATION ONCOLOGY FOLLOW-UP VISIT  DATE: Jun 5, 2018    NAME: Melinda Milligan  MRN: [id number]  DISEASE TREATED: cT3N2c SCCA of the oropharynx, p16 negative    RADIATION THERAPY DELIVERED: 70 Gy in 35 fractions to gross disease and high risk areas, 60 Gy to intermediate risk and 54 Gy to low risk regions.    INTERVAL SINCE COMPLETION OF RT: 1 month since completion on 5/2/18    SUBJECTIVE:  Ms. Milligan is a 42 year old female with cT3N2c SCCA of the oropharynx p16 negative who completed definitive chemoradiation per above. Her presenting symptoms were a tongue lesion and thrush. After biopsy revealed invasive keratinizing SCCa, moderately differentiated and p16 negative, she had a PET and neck CT showing that the mass extended to the bilateral palatine tonsils, posterior soft palate and right posterolateral NPX wall occluding torus, and R OPX. There was also disease involvement of bilateral level II lymph nodes without distant metastases. Her case was discussed at tumor board with consensus agreement for definitive CRT with high dose cisplatin. She completed treatment per above with expected acute toxicities of mucositis and dysphagia, dry desquamation of the neck, dry mouth, nausea, vomiting and dysgeusia. She maintained PO intake of liquids and soft foods but depended on a feeding tube for nutrition.     Since completing treatment she saw Dr. Nieto in follow up 5/11/18 at which time she was noted to be recovering well.  She also saw our dietitian at that time. She presents today for a follow up visit. Her energy level and food intake are better but she does still use her g-tube for nutrition, using 3 cans per day. She has gained 2 lbs since completion of therapy. Her sense of taste is returning, she can taste salty foods and conde and peanut butter. She has noted an occasional punctate area of redness around the site that comes and goes but is not warm or painful. Her weight is up slightly, a few pounds  "since ending treatment. She notices a burning pain all over her mouth when her mouth is dry, not associated with eating. She stopped using narcotics but does use some Tylenol occasionally. Her energy levels are much better. She does notice some fatigue with activity still.    PHYSICAL EXAM:  VITALS: /76 (BP Location: Left arm, Patient Position: Sitting, Cuff Size: Adult Regular)  Pulse 82  Resp 14  Ht 4' 11\"  Wt 82 lb 4.8 oz  SpO2 98%  BMI 16.62 kg/m2  GEN: Appears well, alert, oriented, and in NAD, thin but not cachectic  HEENT: EOMI, normal conjunctiva, MOM. No visible tumor in opx/ox, sequelae of RT in bilat tonsillar fossae and pharynx; absent uvula with grayish linear appearance that may be consistent with post-treatment scar tissue  NECK: Full ROM, no cervical or supraclavicular lymphadenopathy, well healed skin  CV: regular rate, no JVD or LE edema  RESP: Breathing comfortably on room air  ABD: G tube in place, c/d/i with small 2 mm area of redness at insertion site, no induration  SKIN: Normal color and turgor  NEURO: No focal deficits, CN 3-12 grossly intact, normal gait  PSYCH: Appropriate mood and affect    LABS AND IMAGING: Reviewed  Lab Results   Component Value Date    WBC 3.4 (L) 05/11/2018    HGB 10.6 (L) 05/11/2018    HCT 32.6 (L) 05/11/2018    MCV 99 05/11/2018     05/11/2018     Lab Results   Component Value Date     05/11/2018    POTASSIUM 4.3 05/11/2018    CHLORIDE 99 05/11/2018    CO2 28 05/11/2018    GLC 82 05/11/2018     IMPRESSION:   Ms. Milligan is a 42 year old female with cT3N2c SCCA of the oropharynx, p16 negative s/p definitive chemoradiation who continues to recover from acute sequelae of treatment.     PLAN:  1. Follow up with Dr. Arana and Dr. Nieto  2. RTC in 2 months for follow up  3. Post treatment PET in 2 months scheduled for Friday 8/3/18  4. Counseled on management of dry mouth. She was interested in trying medication so we prescribed Evoxac and " counseled on use and side effects.  5. Prescription for Chantix given, as she was interested in trying again.  6. G tube site does not appear infected, but instructed to call and get in touch with surgery if redness worsens or develops pain/fever  7. Encouraged continued aggressive PO intake and getting back to baseline weight. Once no longer needing G tube and maintaining baseline weight PO, tube may be removed.    Ms. Milligan was seen and discussed with staff, Dr. Ambrosio.    Gabriela Posada MD  PGY-2 Radiation Oncology Resident  [facility]  Clinic: [phone]    Attending Physician Attestation:  I saw and evaluated the patient. I reviewed the resident's note and edited it as needed, and I agree with the plan of care as documented.    Albert Ambrosio M.D.  Attending Physician  Radiation Oncology  Clinic Phone: [phone]  Pager #: [phone]

## 2018-06-04 ENCOUNTER — TELEPHONE (OUTPATIENT)
Dept: OTOLARYNGOLOGY | Facility: CLINIC | Age: 43
End: 2018-06-04

## 2018-06-04 ENCOUNTER — TELEPHONE (OUTPATIENT)
Dept: RADIATION ONCOLOGY | Facility: CLINIC | Age: 43
End: 2018-06-04

## 2018-06-04 NOTE — TELEPHONE ENCOUNTER
Spoke with patients  Noe per patients request in medical chart about follow up date, time, and location with Dr Ambrosio. Noe verbalized understanding and had no questions    Roge ZAMORANO

## 2018-06-05 ENCOUNTER — OFFICE VISIT (OUTPATIENT)
Dept: RADIATION ONCOLOGY | Facility: CLINIC | Age: 43
End: 2018-06-05
Payer: COMMERCIAL

## 2018-06-05 VITALS
OXYGEN SATURATION: 98 % | HEART RATE: 82 BPM | DIASTOLIC BLOOD PRESSURE: 76 MMHG | BODY MASS INDEX: 16.59 KG/M2 | RESPIRATION RATE: 14 BRPM | WEIGHT: 82.3 LBS | HEIGHT: 59 IN | SYSTOLIC BLOOD PRESSURE: 116 MMHG

## 2018-06-05 DIAGNOSIS — C10.9 SQUAMOUS CELL CARCINOMA OF OROPHARYNX (H): Primary | ICD-10-CM

## 2018-06-05 DIAGNOSIS — Z72.0 TOBACCO USE: ICD-10-CM

## 2018-06-05 PROCEDURE — 99024 POSTOP FOLLOW-UP VISIT: CPT | Performed by: STUDENT IN AN ORGANIZED HEALTH CARE EDUCATION/TRAINING PROGRAM

## 2018-06-05 RX ORDER — CEVIMELINE HYDROCHLORIDE 30 MG/1
30 CAPSULE ORAL 3 TIMES DAILY
Qty: 90 CAPSULE | Refills: 0 | Status: SHIPPED | OUTPATIENT
Start: 2018-06-05 | End: 2018-11-28

## 2018-06-05 ASSESSMENT — PAIN SCALES - GENERAL: PAINLEVEL: SEVERE PAIN (7)

## 2018-06-05 NOTE — MR AVS SNAPSHOT
After Visit Summary   6/5/2018    Melinda Milligan    MRN: [id number]           Patient Information     Date Of Birth          1975        Visit Information        Provider Department      6/5/2018 9:30 AM Albert Ambrosio MD [facility]        Today's Diagnoses     Squamous cell carcinoma of oropharynx (H)    -  1    Tobacco use          Care Instructions    Follow up with Dr Ambrosio on 8/7/18 2:30 pm after your visit with Dr Nieto    Please contact [facility] RN with questions or concerns following today's appointment: [phone].    Thank you!            Follow-ups after your visit        Your next 10 appointments already scheduled     Jun 13, 2018 10:15 AM CDT   [facility]    [address] [place] [id number] [phone]            Jun 28, 2018  1:45 PM CDT   (Arrive by 1:30 PM)   Laser Visit with Hari Watkins MD   [facility] ([facility])    [address] [address] [phone]            Aug 03, 2018  8:30 AM CDT   PET ONCOLOGY WHOLE BODY [facility]   [facility]    [address] [place] [id number] [phone]           Tell your doctor:   If there is any chance you may be pregnant or if you are breastfeeding.   If you have problems lying in small spaces (claustrophobia). If you do, your doctor may give you medicine to help you relax. If you have diabetes:   Have your exam early in the morning. Your blood glucose will go up as the day goes by.   Your glucose level must be 180 or less at the start of the exam. Please take any medicines you need to ensure this blood glucose level.   If you are taking insulin in the morning take with breakfast by 6 am and schedule procedure between 12 and  2:15 pm.   If you are taking insulin at night take nightly dose, fast overnight, schedule procedure before 10 am.   If you take insulin both morning and night take morning dose by 6am and schedule procedure between 12 and 2:15 pm.   24 hours before your scan: Don t do any heavy exercise. (No jogging, aerobics or other workouts.) Exercise will make your pictures less accurate.  At least 7 hours before your scan, or the evening before if you have an early appointment: Eat a low carb, high protein meal (Lean meats, seafood, beans, soy, low-fat dairy, eggs, nuts & seeds). 6 hours before your scan:   Stop all food and liquids (except water).   Do not chew gum or suck on mints.   If you need to take medicine with food, you may take it with a few crackers.  Please call your Imaging Department at your exam site with any questions.            Aug 07, 2018  1:45 PM CDT   Return Visit with Benedicto Nieto MD   [facility])    [address] [address] [place] [id number]   [phone]            Aug 07, 2018  2:30 PM CDT   Return Visit with Albert Ambrosio MD   [facility])    [address] [facility] [address]   [phone]            Sep 14, 2018 10:30 AM CDT   LAB with [facility] ([facility])    [address] [facility] [address]   [phone]           Please do not eat 10-12 hours before your appointment if you are coming in fasting for labs on lipids, cholesterol, or glucose (sugar). This does not apply to pregnant women. Water, hot tea and black coffee (with nothing added) are okay. Do not drink other fluids, diet soda or chew gum.            Sep 14, 2018 11:15 AM CDT   Return Visit with Benedicto Nieto MD   [facility])    [address] [facility] [address]   [phone]            Sep 14,   12:00 PM CDT   Level 2 with [place] [facility] ([facility])    [address] [address] [place] [address] [phone]              Who to contact     If you have questions or need follow up information about today's clinic visit or your schedule please contact [facility] directly at [phone].  Normal or non-critical lab and imaging results will be communicated to you by MD On-Linehart, letter or phone within 4 business days after the clinic has received the results. If you do not hear from us within 7 days, please contact the clinic through MD On-Linehart or phone. If you have a critical or abnormal lab result, we will notify you by phone as soon as possible.  Submit refill requests through Spreedly or call your pharmacy and they will forward the refill request to us. Please allow 3 business days for your refill to be completed.          Additional Information About Your Visit        Spreedly Information     Spreedly is an electronic gateway that provides easy, online access to your medical records. With Spreedly, you can request a clinic appointment, read your test results, renew a prescription or communicate with your care team.     To sign up for Spreedly visit the website at [email or web address]   You will be asked to enter the access code listed below, as well as some personal information. Please follow the directions to create your username and password.     Your access code is: [email or web address]  Expires: 2018  6:30 AM     Your access code will  in 90 days. If you need help or a new code, please contact your [facility] or call [phone] for assistance.        Care EveryWhere ID     This is your Care EveryWhere ID. This could be used by other organizations to access your [facility] medical records  [id number]        Your Vitals Were     Pulse Respirations Height Pulse Oximetry BMI (Body Mass Index)        "82 14 4' 11\" 98% 16.62 kg/m2        Blood Pressure from Last 3 Encounters:   06/05/18 116/76   05/11/18 99/69   05/02/18 93/61    Weight from Last 3 Encounters:   06/05/18 82 lb 4.8 oz   05/11/18 81 lb 5 oz   05/02/18 79 lb              Today, you had the following     No orders found for display         Today's Medication Changes          These changes are accurate as of 6/5/18 10:02 AM.  If you have any questions, ask your nurse or doctor.               Start taking these medicines.        Dose/Directions    cevimeline 30 MG capsule   Commonly known as:  EVOXAC   Used for:  Squamous cell carcinoma of oropharynx (H)   Started by:  Albert Ambrosio MD        Dose:  30 mg   Take 1 capsule (30 mg) by mouth 3 times daily   Quantity:  90 capsule   Refills:  0         These medicines have changed or have updated prescriptions.        Dose/Directions    * varenicline 0.5 MG X 11 & 1 MG X 42 tablet   Commonly known as:  CHANTIX STARTING MONTH KLAUS   This may have changed:  Another medication with the same name was added. Make sure you understand how and when to take each.   Used for:  Squamous cell carcinoma of oropharynx (H)   Changed by:  Albert Ambrosio MD        Take 0.5 mg tab daily for 3 days, then 0.5 mg tab twice daily for 4 days, then 1 mg twice daily.   Quantity:  53 tablet   Refills:  0       * varenicline 0.5 MG X 11 & 1 MG X 42 tablet   Commonly known as:  CHANTIX STARTING MONTH KLAUS   This may have changed:  You were already taking a medication with the same name, and this prescription was added. Make sure you understand how and when to take each.   Used for:  Tobacco use   Changed by:  Albert Ambrosio MD        Take as instructed   Quantity:  42 tablet   Refills:  0       * Notice:  This list has 2 medication(s) that are the same as other medications prescribed for you. Read the directions carefully, and ask your doctor or other care provider to review them with you.         Where to get your " medicines      These medications were sent to [facility] - [place], [place] - [address]  [address], [place] [address]     Phone:  [phone]     cevimeline 30 MG capsule    varenicline 0.5 MG X 11 & 1 MG X 42 tablet                Primary Care Provider Office Phone # Fax #    Quang Wall PA-C [phone]       [facility] [address] [facility] [address]  [facility] [address]        Equal Access to Services     [facility]: Hadii aad ku hadasho Soomaali, waaxda luqadaha, qaybta kaalmada adeegyada, waxay idiin hayaan adeeg zoey keller . So [facility] [phone].    ATENCIÓN: Si habla español, tiene a hawley disposición servicios gratuitos de asistencia lingüística. [facility] [phone].    We comply with applicable federal civil rights laws and [place] laws. We do not discriminate on the basis of race, color, national origin, age, disability, sex, sexual orientation, or gender identity.            Thank you!     Thank you for choosing [facility]  for your care. Our goal is always to provide you with excellent care. Hearing back from our patients is one way we can continue to improve our services. Please take a few minutes to complete the written survey that you may receive in the mail after your visit with us. Thank you!             Your Updated Medication List - Protect others around you: Learn how to safely use, store and throw away your medicines at www.disposemymeds.org.          This list is accurate as of 6/5/18 10:02 AM.  Always use your most recent med list.                   Brand Name Dispense Instructions for use Diagnosis    ACETAMINOPHEN PO      Take 1,000 mg by mouth        ALLEGRA ALLERGY PO      Take by mouth as needed for allergies        cevimeline 30 MG capsule    EVOXAC    90 capsule    Take 1 capsule (30 mg) by mouth 3 times daily    Squamous cell carcinoma of oropharynx (H)       folic acid  1 MG tablet    FOLVITE     Take 1 mg by mouth        magic mouthwash suspension (diphenhydrAMINE, lidocaine, aluminum-magnesium & simethicone) Susp compounding kit     237 mL    Swish and swallow 5-10 mLs in mouth every 6 hours as needed for mouth sores    Squamous cell carcinoma of oropharynx (H)       order for DME     90 Can    Sig:  Isosource 1.5 calories:  Instill 3.5 cartons per day via PEG tube by syringe or gravity flow    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    Squamous cell carcinoma of oropharynx (H)       * varenicline 0.5 MG X 11 & 1 MG X 42 tablet    CHANTIX STARTING MONTH PAK    42 tablet    Take as instructed    Tobacco use       * Notice:  This list has 2 medication(s) that are the same as other medications prescribed for you. Read the directions carefully, and ask your doctor or other care provider to review them with you.

## 2018-06-05 NOTE — PATIENT INSTRUCTIONS
Follow up with Dr Ambrosio on 8/7/18 2:30 pm after your visit with Dr Nieto    Please contact [facility] RN with questions or concerns following today's appointment: [phone].    Thank you!

## 2018-06-05 NOTE — NURSING NOTE
"FOLLOW-UP VISIT    Patient Name: Melinda Milligan      : 1975     Age: 42 year old        ______________________________________________________________________________     Chief Complaint   Patient presents with     RECHECK     1 month follow up with Dr Ambrosio     /76 (BP Location: Left arm, Patient Position: Sitting, Cuff Size: Adult Regular)  Pulse 82  Resp 14  Ht 4' 11\"  Wt 82 lb 4.8 oz  SpO2 98%  BMI 16.62 kg/m2     Date Radiation Completed: 18    Pain  Denies    Labs  Other Labs: No    Imaging  None      Other Appointments:     MD Name: Pet Scan Appointment Date: 8/3/18   MD Name:Dr Nieto Appointment Date:18   MD Name: Appointment Date:   Other Appointment Notes:     Residual Radiation side effect: Patient reports no pain at current visit and states soreness in her mouth can get up to 7/10 and takes tylenol for relief. Patient reports taste improvement mainly with salty foods. Patient reports no real restrictions with diet. Patient states she makes sure that food is chewed well to help with swallowing. Patient denies any pain with swallowing.  Patient reports still having tube feedings and using 3 cans of Isosource daily. Patient reports dry mouth and drinks fliuds to help. Patient denies any nausea and vomiting.     Additional Instructions:     Nurse face-to-face time: Level 2:  5 min face to face time    Roge ZAMORANO        "

## 2018-06-06 NOTE — PROGRESS NOTES
This is a recent snapshot of the patient's [facility] medical record.  For current drug dose and complete information and questions, call [phone] or In Basket pool, [facility] ([id number])  CSN Number:  [id number]

## 2018-06-07 NOTE — TELEPHONE ENCOUNTER
ERROR SHOULD NOT SEE GODING  CANCELED LEFT PT A VM THAT CLINIC WILL BE CLOSED THIS DAY (06/04/2018)

## 2018-06-13 ENCOUNTER — ONCOLOGY VISIT (OUTPATIENT)
Dept: ONCOLOGY | Facility: CLINIC | Age: 43
End: 2018-06-13
Payer: COMMERCIAL

## 2018-06-13 DIAGNOSIS — C06.9 SQUAMOUS CELL CARCINOMA OF ORAL CAVITY (H): Primary | ICD-10-CM

## 2018-06-13 PROCEDURE — 99215 OFFICE O/P EST HI 40 MIN: CPT | Performed by: HOSPITALIST

## 2018-06-13 NOTE — MR AVS SNAPSHOT
After Visit Summary   6/13/2018    Melinda Milligan    MRN: [id number]           Patient Information     Date Of Birth          1975        Visit Information        Provider Department      6/13/2018 10:15 AM Deneen Ceron MD [facility]        Care Instructions    Patient Instructions      Expand All Collapse All    Thank you for coming into the Palliative Care Clinic today.     1. Please complete a health care directive. You can have it either signed by two witnesses (none of whom can be your designated health care agent) or you can get it notarized here in clinic      Return to clinic in 6 months or as needed for a follow up.     You can reach the Palliative Care Team during business hours at the following number:    - [phone]    To reach the Palliative Care Provider on-call After-hours or on holidays and weekends, call: [phone].  Please note that we are not able to provide pain medication refills on evenings or weekends.                     Follow-ups after your visit        Your next 10 appointments already scheduled     Aug 03, 2018  8:30 AM CDT   PET ONCOL[facility]Y WHOLE BODY [id number]   [facility]    [address] [place] [address] [phone]           Tell your doctor:   If there is any chance you may be pregnant or if you are breastfeeding.   If you have problems lying in small spaces (claustrophobia). If you do, your doctor may give you medicine to help you relax. If you have diabetes:   Have your exam early in the morning. Your blood glucose will go up as the day goes by.   Your glucose level must be 180 or less at the start of the exam. Please take any medicines you need to ensure this blood glucose level.   If you are taking insulin in the morning take with breakfast by 6 am and schedule procedure between 12 and 2:15 pm.   If you are taking insulin at night take nightly dose,  fast overnight, schedule procedure before 10 am.   If you take insulin both morning and night take morning dose by 6am and schedule procedure between 12 and 2:15 pm.   24 hours before your scan: Don t do any heavy exercise. (No jogging, aerobics or other workouts.) Exercise will make your pictures less accurate.  At least 7 hours before your scan, or the evening before if you have an early appointment: Eat a low carb, high protein meal (Lean meats, seafood, beans, soy, low-fat dairy, eggs, nuts & seeds). 6 hours before your scan:   Stop all food and liquids (except water).   Do not chew gum or suck on mints.   If you need to take medicine with food, you may take it with a few crackers.  Please call your Imaging Department at your exam site with any questions.            Aug 07, 2018  1:45 PM CDT   Return Visit with Benedicto Nieto MD   [facility]    [address] [place] [address]   [phone]            Aug 07, 2018  2:30 PM CDT   Return Visit with Albert Ambrosio MD   [facility])    [address] [place] [address]   [phone]            Sep 14, 2018 10:30 AM CDT   LAB with LAB ONC [facility])    [address] [place] [address]   [phone]           Please do not eat 10-12 hours before your appointment if you are coming in fasting for labs on lipids, cholesterol, or glucose (sugar). This does not apply to pregnant women. Water, hot tea and black coffee (with nothing added) are okay. Do not drink other fluids, diet soda or chew gum.            Sep 14, 2018 11:15 AM CDT   Return Visit with Benedicto Nieto MD   [facility])    [address] [place] [address]   [phone]            Sep 14, 2018 12:00 PM CDT   Level 2 with [address]  [facility] ([facility])    [address] [address] [place] [address] [phone]              Who to contact     If you have questions or need follow up information about today's clinic visit or your schedule please contact [facility] directly at [phone].  Normal or non-critical lab and imaging results will be communicated to you by MyChart, letter or phone within 4 business days after the clinic has received the results. If you do not hear from us within 7 days, please contact the clinic through MyChart or phone. If you have a critical or abnormal lab result, we will notify you by phone as soon as possible.  Submit refill requests through [employer] or call your pharmacy and they will forward the refill request to us. Please allow 3 business days for your refill to be completed.          Additional Information About Your Visit        Nipendo Information     Nipendo is an electronic gateway that provides easy, online access to your medical records. With Nipendo, you can request a clinic appointment, read your test results, renew a prescription or communicate with your care team.     To sign up for Nipendo visit the website at [email or web address]   You will be asked to enter the access code listed below, as well as some personal information. Please follow the directions to create your username and password.     Your access code is: [email or web address]  Expires: 2018  6:30 AM     Your access code will  in 90 days. If you need help or a new code, please contact your [facility] or call [phone] for assistance.        Care EveryWhere ID     This is your Care EveryWhere ID. This could be used by other organizations to access your [facility] medical records  [id number]         Blood Pressure from Last 3 Encounters:   18 116/76   18 99/69   18 93/61    Weight from Last 3 Encounters:   18 37.3 kg (82 lb 4.8  oz)   05/11/18 36.9 kg (81 lb 5 oz)   05/02/18 35.8 kg (79 lb)              Today, you had the following     No orders found for display       Primary Care Provider Office Phone # Fax #    Quang Wall PA-C [phone]       [employer] [address]  [facility] [address]        Equal Access to Services     RADHA MORALES : Hadii aad ku hadasho Soomaali, waaxda luqadaha, qaybta kaalmada adeegyada, waxay idiin hayaan adeeg kharash la'aan . So [facility] [phone].    ATENCIÓN: Si rhiannon harvey, tiene a hawley disposición servicios gratuitos de asistencia lingüística. Llame al [phone].    We comply with applicable federal civil rights laws and [place] laws. We do not discriminate on the basis of race, color, national origin, age, disability, sex, sexual orientation, or gender identity.            Thank you!     Thank you for choosing [facility]  for your care. Our goal is always to provide you with excellent care. Hearing back from our patients is one way we can continue to improve our services. Please take a few minutes to complete the written survey that you may receive in the mail after your visit with us. Thank you!             Your Updated Medication List - Protect others around you: Learn how to safely use, store and throw away your medicines at www.disposemymeds.org.          This list is accurate as of 6/13/18 10:39 AM.  Always use your most recent med list.                   Brand Name Dispense Instructions for use Diagnosis    ACETAMINOPHEN PO      Take 1,000 mg by mouth        ALLEGRA ALLERGY PO      Take by mouth as needed for allergies        cevimeline 30 MG capsule    EVOXAC    90 capsule    Take 1 capsule (30 mg) by mouth 3 times daily    Squamous cell carcinoma of oropharynx (H)       folic acid 1 MG tablet    FOLVITE     Take 1 mg by mouth        magic mouthwash suspension (diphenhydrAMINE, lidocaine, aluminum-magnesium & simethicone) Susp compounding  kit     237 mL    Swish and swallow 5-10 mLs in mouth every 6 hours as needed for mouth sores    Squamous cell carcinoma of oropharynx (H)       order for DME     90 Can    Sig:  Isosource 1.5 calories:  Instill 3.5 cartons per day via PEG tube by syringe or gravity flow    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    Squamous cell carcinoma of oropharynx (H)       * varenicline 0.5 MG X 11 & 1 MG X 42 tablet    CHANTIX STARTING MONTH KLAUS    42 tablet    Take as instructed    Tobacco use       * Notice:  This list has 2 medication(s) that are the same as other medications prescribed for you. Read the directions carefully, and ask your doctor or other care provider to review them with you.

## 2018-06-13 NOTE — LETTER
6/13/2018         RE: Melinda Milligan  [address] [facility] [place] [address]        Dear Colleague,    Thank you for referring your patient, Melinda Milligan, to the [facility]. Please see a copy of my visit note below.    Palliative Care Outpatient Clinic Progress Note    This note was written using voice recognition. I did proof read, but might have missed some things. Please contact me for major errors.    Patient Name:  Melinda Milligan  Primary Provider:  Quang Wall    Chief Complaint: taste alterations    Interim History:  Melinda Milligan 42 year old female with SCC of her tongue returns to be seen by palliative care today.      Patient is here by herself    Medical History:  - SCC of tongue diagnosed Feb 2018   - Undergoing chemoradiation with cisplatin since March   - S/p PEG placement                         Melinda is doing well. Her pain is much improved. Has occasional jaw soreness that responds to tylenol. She was given cevilemine for dry mouth, but hasn't needed it yet.   She struggles to find things she enjoys eating. Her taste is still quite numbed and she can only perceive peanut butter, conde, etc, all of which she doesn't really enjoy. She is hoping to be able to taste fruit and vegetables again soon. Currently she still supplements with tube feeds. She is gradually gaining weight.     Social History:  Pertinent changes to social history/social situation since last visit: none  Key support resources:   Advance Directive Status:  Has not completed. She states she prefers to complete the directive at home by herself. She wants her  to be her healthcare agent, likely her mother as alternate.     Social History   Substance Use Topics     Smoking status: Current Some Day Smoker     Packs/day: 1.00     Years: 23.00     Types: Cigarettes     Start date: 1/1/1993     Smokeless tobacco: Never Used      Comment: Patient reports currently smoking 3  "cigarettes per day - updated 3/6/2018     Alcohol use No       Allergies   Allergen Reactions     Ceftriaxone      Other reaction(s): Unknown Reaction     Chicken-Derived Products (Egg)      Other reaction(s): Vomiting  Other reaction(s): Unknown Reaction     Current pertinent medications:  Acetaminophen  cevimeline prn - hasn't used yet    Past Medical History:   Diagnosis Date     Allergic rhinitis      Anemia      Hoarseness      Oropharyngeal cancer (H) 02/15/2018     Uncomplicated asthma      Past Surgical History:   Procedure Laterality Date     BIOPSY  02/15/2018    Left Tongue - [facility]     LAPAROSCOPIC ASSISTED INSERTION TUBE GASTROTOMY N/A 3/14/2018    Procedure: LAPAROSCOPIC ASSISTED INSERTION TUBE GASTROSTOMY;  Percutaneous Endoscopic Gastrostomy Tube Insertion, Esophagogastroduodenoscopy;  Surgeon: Edna Martinez MD;  Location: [facility] OR       Review of Systems:   ROS: 10 point ROS neg other than the symptoms noted above in the HPI          Physical Exam:   There were no vitals taken for this visit.    CONSTIT: awake, appears comfortable  EENT: MMM, EOMI, no icterus  RESP: reg, nl effort  MSK:moves x4, nl gait  SKIN:  warm, no rash, no obvious lesions  NEURO: alert, oriented x3  PSYCH: appropriate affect, memory and thought process intact      Key Data Reviewed:  None recent    : ok      Impression & Recommendations & Counseliny/o female with SCC of the tongue. PET planned for August.    Pain: much improved, now controlled with acetaminophen    Appetite, taste: still blunted, but improving. She is hoping to be able to d/c tube feeds soon    Advance Care Planning: plans to complete HCD at home. Will call with questions or when \"stuck\".       Return to clinic in 6 months.     Deneen Ceron MD  Palliative Medicine  Pager [phone]      Again, thank you for allowing me to participate in the care of your patient.        Sincerely,        Deneen Ceron MD    "

## 2018-06-13 NOTE — PATIENT INSTRUCTIONS
Patient Instructions      Expand All Collapse All    Thank you for coming into the Palliative Care Clinic today.     1. Please complete a health care directive. You can have it either signed by two witnesses (none of whom can be your designated health care agent) or you can get it notarized here in clinic      Return to clinic in 6 months or as needed for a follow up.     You can reach the Palliative Care Team during business hours at the following number:    - [phone]    To reach the Palliative Care Provider on-call After-hours or on holidays and weekends, call: [phone].  Please note that we are not able to provide pain medication refills on evenings or weekends.

## 2018-06-13 NOTE — PROGRESS NOTES
Palliative Care Outpatient Clinic Progress Note    This note was written using voice recognition. I did proof read, but might have missed some things. Please contact me for major errors.    Patient Name:  Melinda Milligan  Primary Provider:  Quang Wall    Chief Complaint: taste alterations    Interim History:  Melinda Milligan 42 year old female with SCC of her tongue returns to be seen by palliative care today.      Patient is here by herself    Medical History:  - SCC of tongue diagnosed Feb 2018   - Undergoing chemoradiation with cisplatin since March   - S/p PEG placement                         Melinda is doing well. Her pain is much improved. Has occasional jaw soreness that responds to tylenol. She was given cevilemine for dry mouth, but hasn't needed it yet.   She struggles to find things she enjoys eating. Her taste is still quite numbed and she can only perceive peanut butter, conde, etc, all of which she doesn't really enjoy. She is hoping to be able to taste fruit and vegetables again soon. Currently she still supplements with tube feeds. She is gradually gaining weight.     Social History:  Pertinent changes to social history/social situation since last visit: none  Key support resources:   Advance Directive Status:  Has not completed. She states she prefers to complete the directive at home by herself. She wants her  to be her healthcare agent, likely her mother as alternate.     Social History   Substance Use Topics     Smoking status: Current Some Day Smoker     Packs/day: 1.00     Years: 23.00     Types: Cigarettes     Start date: 1/1/1993     Smokeless tobacco: Never Used      Comment: Patient reports currently smoking 3 cigarettes per day - updated 3/6/2018     Alcohol use No       Allergies   Allergen Reactions     Ceftriaxone      Other reaction(s): Unknown Reaction     Chicken-Derived Products (Egg)      Other reaction(s): Vomiting  Other reaction(s): Unknown Reaction  "    Current pertinent medications:  Acetaminophen  cevimeline prn - hasn't used yet    Past Medical History:   Diagnosis Date     Allergic rhinitis      Anemia      Hoarseness      Oropharyngeal cancer (H) 02/15/2018     Uncomplicated asthma      Past Surgical History:   Procedure Laterality Date     BIOPSY  02/15/2018    Left Tongue - [facility]     LAPAROSCOPIC ASSISTED INSERTION TUBE GASTROTOMY N/A 3/14/2018    Procedure: LAPAROSCOPIC ASSISTED INSERTION TUBE GASTROSTOMY;  Percutaneous Endoscopic Gastrostomy Tube Insertion, Esophagogastroduodenoscopy;  Surgeon: Edna Martinez MD;  Location: [facility] OR       Review of Systems:   ROS: 10 point ROS neg other than the symptoms noted above in the HPI          Physical Exam:   There were no vitals taken for this visit.    CONSTIT: awake, appears comfortable  EENT: MMM, EOMI, no icterus  RESP: reg, nl effort  MSK:moves x4, nl gait  SKIN:  warm, no rash, no obvious lesions  NEURO: alert, oriented x3  PSYCH: appropriate affect, memory and thought process intact      Key Data Reviewed:  None recent    : ok      Impression & Recommendations & Counseliny/o female with SCC of the tongue. PET planned for August.    Pain: much improved, now controlled with acetaminophen    Appetite, taste: still blunted, but improving. She is hoping to be able to d/c tube feeds soon    Advance Care Planning: plans to complete HCD at home. Will call with questions or when \"stuck\".       Return to clinic in 6 months.     Deneen Ceron MD  Palliative Medicine  Pager [phone]    "

## 2018-06-27 ENCOUNTER — OFFICE VISIT (OUTPATIENT)
Dept: OTOLARYNGOLOGY | Facility: CLINIC | Age: 43
End: 2018-06-27
Payer: COMMERCIAL

## 2018-06-27 ENCOUNTER — THERAPY VISIT (OUTPATIENT)
Dept: SPEECH THERAPY | Facility: CLINIC | Age: 43
End: 2018-06-27
Payer: COMMERCIAL

## 2018-06-27 DIAGNOSIS — C10.9 SQUAMOUS CELL CARCINOMA OF OROPHARYNX (H): Primary | ICD-10-CM

## 2018-06-27 ASSESSMENT — PAIN SCALES - GENERAL: PAINLEVEL: MODERATE PAIN (5)

## 2018-06-27 NOTE — PATIENT INSTRUCTIONS
1. Please follow-up in clinic in 6 weeks with Dr. Arana.   2. Please call the ENT clinic with any questions,concerns, new or worsening symptoms.    -Clinic number is [phone]   - Meeta's direct line (Dr. Arana's nurse) [phone]    3. A prescription for magic mouthwash was sent to your pharmacy.

## 2018-06-27 NOTE — LETTER
6/27/2018       RE: Melinda Milligan  [address] [facility] [address]     Dear Colleague,    Thank you for referring your patient, Melinda Milligan, to the [facility] EAR NOSE AND THROAT at [facility]. Please see a copy of my visit note below.    Dear Dr. Monsalve:    I had the pleasure of seeing Melinda Milligan in followup today at the [facility].     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  She had a long history of thrush lasting about 6-8 months that was treated without improvement by her primary care physician.  She was then referred to a hematologist for anemia who evaluated her oropharynx and was concerned about a malignancy.  She was referred to Dr. Monsalve who performed a biopsy of the oral tongue consistent with squamous cell carcinoma.  She was initially seen here at the end of February.  She had a PET CT scan which showed the primary disease in both tonsils, soft palate, base of tongue, right oral tongue with bilateral lymphadenopathy.  Given the extent of her primary disease she was recommended for definitive chemoradiation.  She completed her treatment under the care of Dr. Ambrosio and Dr. Nieto at [facility].  She received a total of 70 Gy and high-dose cisplatin completed on 5/2/2018.    She continues to recover from her treatment.  She recently has been able to increase her oral intake however in the last 1-1.5 weeks she said that she again had to increase use of her tube because of pain.  She is having increased tongue and teeth sensitivity.  She is currently taking about 4 cans of tube feeds per day and was previously just doing a couple of cans in addition to her oral nutrition.  She is actually been able to eat a quarter pounder hamburger without significant difficulty. She is having some nasal regurgitation. She is  not doing her swallowing exercises as she does not remember them. She does endorse continued fatigue.  She is not currently working.  She was as low as 73 pounds during her treatment but she has been up to 83 pounds.  More recently she dropped down to 79 pounds.      MEDICATIONS:     Current Outpatient Prescriptions   Medication Sig Dispense Refill     ACETAMINOPHEN PO Take 1,000 mg by mouth       cevimeline (EVOXAC) 30 MG capsule Take 1 capsule (30 mg) by mouth 3 times daily 90 capsule 0     Fexofenadine HCl (ALLEGRA ALLERGY PO) Take by mouth as needed for allergies       folic acid (FOLVITE) 1 MG tablet Take 1 mg by mouth       magic mouthwash (ENTER INGREDIENTS IN COMMENTS) suspension Swish and spit 5 ml every 6 hours 1000 mL 3     order for DME Sig:  Isosource 1.5 calories:  Instill 3.5 cartons per day via PEG tube by syringe or gravity flow 90 Can 3     polyethylene glycol (MIRALAX/GLYCOLAX) powder Take 17 g (1 capful) by mouth daily 225 g 1     Potassium Chloride 40 MEQ/15ML (20%) SOLN 7.5 mLs (20 mEq) by Oral or G tube route daily 1 Bottle 1     varenicline (CHANTIX STARTING MONTH KLAUS) 0.5 MG X 11 & 1 MG X 42 tablet Take as instructed 42 tablet 0     varenicline (CHANTIX STARTING MONTH KLAUS) 0.5 MG X 11 & 1 MG X 42 tablet Take 0.5 mg tab daily for 3 days, then 0.5 mg tab twice daily for 4 days, then 1 mg twice daily. 53 tablet 0       ALLERGIES:    Allergies   Allergen Reactions     Ceftriaxone      Other reaction(s): Unknown Reaction     Chicken-Derived Products (Egg)      Other reaction(s): Vomiting  Other reaction(s): Unknown Reaction       HABITS/SOCIAL HISTORY:   She cut back significantly on her smoking last week but was previously at least a 1 pack per day smoker since 1994.  She denies any chewing tobacco use.  She quit drinking alcohol.  She denies any drug use.  She is  and has a young child at home and another who just went off to college.  She works from home in .    Social History      Social History     Marital status:      Spouse name: N/A     Number of children: N/A     Years of education: N/A     Occupational History     Not on file.     Social History Main Topics     Smoking status: Current Some Day Smoker     Packs/day: 1.00     Years: 23.00     Types: Cigarettes     Start date: 1/1/1993     Smokeless tobacco: Never Used      Comment: Patient reports currently smoking 3 cigarettes per day - updated 3/6/2018     Alcohol use No     Drug use: No     Sexual activity: Yes     Partners: Male     Birth control/ protection: Pull-out method, Condom     Other Topics Concern     Not on file     Social History Narrative       PAST MEDICAL HISTORY:   Past Medical History:   Diagnosis Date     Allergic rhinitis      Anemia      Hoarseness      Oropharyngeal cancer (H) 02/15/2018     Uncomplicated asthma         PAST SURGICAL HISTORY:   Past Surgical History:   Procedure Laterality Date     BIOPSY  02/15/2018    Left Tongue - [facility]     LAPAROSCOPIC ASSISTED INSERTION TUBE GASTROTOMY N/A 3/14/2018    Procedure: LAPAROSCOPIC ASSISTED INSERTION TUBE GASTROSTOMY;  Percutaneous Endoscopic Gastrostomy Tube Insertion, Esophagogastroduodenoscopy;  Surgeon: Edna Martinez MD;  Location:  OR       FAMILY HISTORY:    Family History   Problem Relation Age of Onset     Substance Abuse Father      Dementia Maternal Grandmother      Diabetes Maternal Grandmother      Asthma Brother      Prostate Cancer Maternal Grandfather        REVIEW OF SYSTEMS:  12 point ROS was negative other than the symptoms noted above in the HPI.  Patient Supplied Answers to Review of Systems  [facility] ROS 6/27/2018   Constitutional Weight loss, Appetite change, Problems with sleep   Neurology -   Ears, Nose, Throat Ear pain, Nasal congestion or drainage, Sore throat, Trouble swallowing   Allergy/Immunology -   Hematologic -   Endocrine Heat or cold intolerance         PHYSICAL EXAMINATION:   There were no vitals taken for this  visit.   Appearance:   normal; NAD, slightly older appearing than stated age, thin appearing, small stature   Communication:   normal; communicates verbally, slight hypernasality with incomplete soft palate closure   Head/Face:   inspection -  Normal; no scars or visible lesions   Salivary glands -  Normal size, no tenderness, swelling, or palpable masses   Facial strength -  Normal and symmetric bilateral; H/B I/VI   Skin:  normal, no rash   Ocular Motility:  normal occular movements   Ears:  auricle (AD) -  normal  EAC (AD) -  normal  TM (AD) -  Mild effusion  auricle (AS) -  normal  EAC (AS) -  normal  TM (AS) - serous effusion  Normal clinical speech reception   Nose:  Ext. inspection -  Normal  Internal Inspection -  Normal mucosa, septum, and turbinates; thick secretions   Nasopharynx:  interval reoslution of nasopharyngeal component of mass  Thick secretions and crusting   Oral Cavity:  lips -  Normal mucosa, oral competence, and stoma size  Dentition in poor repair, thrush along gingiva  Some trismus   Hard palate, buccal, floor of mouth mucosa with radiation changes   Tongue - interval resolution of lateral tongue mass   Oropharynx:  uvula absent with mucositis present on the soft palate  Previous large tumors of the bilateral tonsils has demonstrated interval resolution   Hypopharynx:  Normal pyriform sinus and pharyngeal wall mucosa   No pooled secretions    Larynx:  Epiglottis, false vocal cord, true vocal cord normal in appearance, bilaterally mobile cords    Neck: No visible mass or asymmetry   Normal range of motion   Lymphatic:  Interval resolution of neck adenopathy   Cardiovascular:  warm, pink, well-perfused extremities without swelling, tenderness, or edema   Respiratory:  Normal respiratory effort, no stridor   Neuro/Psych.:  mood/affect -  normal  mental status -  normal  cranial nerves -  normal          PROCEDURES:   Flexible fiberoptic laryngoscopy: Scope exam was indicated due to  oropharyngeal tumor. Verbal consent was obtained. The nasal cavity was prepped with an aerosolized solution of topical anesthetic and vasoconstrictive agent. The scope was passed through the anterior nasal cavity and advanced. Inspection of the nasopharynx revealed interval resolution of the nasopharyngeal mass.  There are thick secretions present along the nasopharyngeal mucosa.  The posterior aspect of the soft palate has interval resolution of that mass.  The tonsils are no longer bulky from tumor.  There is slight thickening of the epiglottis consistent with a radiation history.  The vocal cords are mobile bilaterally.  There is mild edema of the arytenoidss bilaterally.  The piriform sinuses are clear. Due to patient tolerance of exam the views were limited. The airway is patent.  Patient tolerated the procedure well with no immediate consultations.    RESULTS REVIEWED:   I reviewed the treatment records from Dr. Ambrosio and Dr. Nieto.    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  She is now status post completion of definitive chemoradiation in May 2018.  She does continue to recover from the side effects from her treatment.  I had her see speech pathology today regarding her swallowing.  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  Unfortunately this is just secondary to her treatment and tumor.  I discussed her trying to pop her ear on the left side to help with her serous effusion.  She was counseled on trismus exercises.  She does have a bit of thrush present and we will have her do some Magic mouth rinse.  I think sinus rinses also be beneficial to help with nasal crusting.  Once her teeth are less sensitive she will should start use of the fluoride trays for her teeth along with regular follow-up with the dental.  She is due for a PET scan at the beginning of August 2018.  I will see  her back in about 6 weeks.    Thank you very much for the opportunity to participate in the care of your patient.      Marga Arana M.D.  Otolaryngology- Head & Neck Surgery    CC:  Noel Monsalve, [facility], Nose [facility] Throat [address], [address]  [place] [id number]    Albert Ambrosio MD  Department of Radiation Oncology  [facility]

## 2018-06-27 NOTE — MR AVS SNAPSHOT
After Visit Summary   6/27/2018    Melinda Milligan    MRN: [id number]           Patient Information     Date Of Birth          1975        Visit Information        Provider Department      6/27/2018 12:00 PM Marga Arana MD [facility] Ear Nose and Throat        Today's Diagnoses     Squamous cell carcinoma of oropharynx (H)    -  1      Care Instructions    1. Please follow-up in clinic in 6 weeks with Dr. Arana.   2. Please call the ENT clinic with any questions,concerns, new or worsening symptoms.    -Clinic number is [phone]   - Meeta's direct line (Dr. Arana's nurse) [phone]    3. A prescription for magic mouthwash was sent to your pharmacy.           Follow-ups after your visit        Your next 10 appointments already scheduled     Aug 03, 2018  8:30 AM CDT   PET ONCOLOGY WHOLE BODY with [id number]   [facility]    [address] [place] [id number] [phone]           Tell your doctor:   If there is any chance you may be pregnant or if you are breastfeeding.   If you have problems lying in small spaces (claustrophobia). If you do, your doctor may give you medicine to help you relax. If you have diabetes:   Have your exam early in the morning. Your blood glucose will go up as the day goes by.   Your glucose level must be 180 or less at the start of the exam. Please take any medicines you need to ensure this blood glucose level.   If you are taking insulin in the morning take with breakfast by 6 am and schedule procedure between 12 and 2:15 pm.   If you are taking insulin at night take nightly dose, fast overnight, schedule procedure before 10 am.   If you take insulin both morning and night take morning dose by 6am and schedule procedure between 12 and 2:15 pm.   24 hours before your scan: Don t do any heavy exercise. (No jogging, aerobics or other workouts.) Exercise will make your pictures less accurate.  At  least 7 hours before your scan, or the evening before if you have an early appointment: Eat a low carb, high protein meal (Lean meats, seafood, beans, soy, low-fat dairy, eggs, nuts & seeds). 6 hours before your scan:   Stop all food and liquids (except water).   Do not chew gum or suck on mints.   If you need to take medicine with food, you may take it with a few crackers.  Please call your Imaging Department at your exam site with any questions.            Aug 07, 2018  1:45 PM CDT   Return Visit with Benedicto Nieto MD   [facility])    [address] [place] [id number] [phone]            Aug 07, 2018  2:30 PM CDT   Return Visit with Albert Ambrosio MD   [facility])    [address] [place] [id number] [phone]            Aug 08, 2018 10:20 AM CDT   (Arrive by 10:05 AM)   Return Visit with Marga Arana MD   [facility] Ear Nose and Throat ([facility])    [address] [facility]  4th [facility] [address] [phone]            Sep 14, 2018 10:30 AM CDT   LAB with [facility])    [address] [place] [phone]           Please do not eat 10-12 hours before your appointment if you are coming in fasting for labs on lipids, cholesterol, or glucose (sugar). This does not apply to pregnant women. Water, hot tea and black coffee (with nothing added) are okay. Do not drink other fluids, diet soda or chew gum.            Sep 14, 2018 11:15 AM CDT   Return Visit with Benedicto Nieto MD   [facility])    [address] [place] [id number]   [phone]            Sep 14, 2018 12:00 PM CDT   Level 2 with [address])    [address] [place] [address]  [address]  [place] [phone]              Who to contact     Please call your clinic at [phone] to:    Ask questions about your health    Make or cancel appointments    Discuss your medicines    Learn about your test results    Speak to your doctor            Additional Information About Your Visit        MyChart Information     [employer] is an electronic gateway that provides easy, online access to your medical records. With [email or web address], you can request a clinic appointment, read your test results, renew a prescription or communicate with your care team.     To sign up for [email or web address] visit the website at [email or web address]   You will be asked to enter the access code listed below, as well as some personal information. Please follow the directions to create your username and password.     Your access code is: [email or web address]  Expires: 2018  6:30 AM     Your access code will  in 90 days. If you need help or a new code, please contact your [facility] or call [phone] for assistance.        Care EveryWhere ID     This is your Care EveryWhere ID. This could be used by other organizations to access your [facility] medical records  [id number]         Blood Pressure from Last 3 Encounters:   18 116/76   18 99/69   18 93/61    Weight from Last 3 Encounters:   18 37.3 kg (82 lb 4.8 oz)   18 36.9 kg (81 lb 5 oz)   18 35.8 kg (79 lb)              We Performed the Following     IMAGESTREAM RECORDING ORDER          Today's Medication Changes          These changes are accurate as of 18 11:59 PM.  If you have any questions, ask your nurse or doctor.               Start taking these medicines.        Dose/Directions    magic mouthwash suspension   Commonly known as:  ENTER INGREDIENTS IN COMMENTS   Used for:  Squamous cell carcinoma of oropharynx (H)   Started by:  Marga Arana MD        Swish and spit 5 ml every 6 hours    Quantity:  1000 mL   Refills:  3            Where to get your medicines      These medications were sent to [facility] - [place], [place] - [address])  [address], [place] [address]     Phone:  [phone]     magic mouthwash suspension                Primary Care Provider Office Phone # Fax #    Quang NEWTON RADHA Wall [phone]       [facility] [address] [facility] [address]  [facility] [address]        Equal Access to Services     [facility]: Hadii aad ku hadasho Soomaali, waaxda luqadaha, qaybta kaalmada adeegyada, waxay idiin hayaan kyle keller . So [facility] [phone].    ATENCIÓN: Si habla español, tiene a hawley disposición servicios gratuitos de asistencia lingüística. [facility] [phone].    We comply with applicable federal civil rights laws and [place] laws. We do not discriminate on the basis of race, color, national origin, age, disability, sex, sexual orientation, or gender identity.            Thank you!     Thank you for choosing [facility] EAR NOSE AND THROAT  for your care. Our goal is always to provide you with excellent care. Hearing back from our patients is one way we can continue to improve our services. Please take a few minutes to complete the written survey that you may receive in the mail after your visit with us. Thank you!             Your Updated Medication List - Protect others around you: Learn how to safely use, store and throw away your medicines at www.disposemymeds.org.          This list is accurate as of 6/27/18 11:59 PM.  Always use your most recent med list.                   Brand Name Dispense Instructions for use Diagnosis    ACETAMINOPHEN PO      Take 1,000 mg by mouth        ALLEGRA ALLERGY PO      Take by mouth as needed for allergies        cevimeline 30 MG capsule    EVOXAC    90 capsule    Take 1 capsule (30 mg) by mouth 3 times daily    Squamous cell carcinoma of oropharynx  (H)       folic acid 1 MG tablet    FOLVITE     Take 1 mg by mouth        magic mouthwash suspension    ENTER INGREDIENTS IN COMMENTS    1000 mL    Swish and spit 5 ml every 6 hours    Squamous cell carcinoma of oropharynx (H)       order for DME     90 Can    Sig:  Isosource 1.5 calories:  Instill 3.5 cartons per day via PEG tube by syringe or gravity flow    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    Squamous cell carcinoma of oropharynx (H)       * varenicline 0.5 MG X 11 & 1 MG X 42 tablet    CHANTIX STARTING MONTH KLAUS    42 tablet    Take as instructed    Tobacco use       * Notice:  This list has 2 medication(s) that are the same as other medications prescribed for you. Read the directions carefully, and ask your doctor or other care provider to review them with you.

## 2018-06-27 NOTE — MR AVS SNAPSHOT
"              After Visit Summary   6/27/2018    Melinda Milligan    MRN: [id number]           Patient Information     Date Of Birth          1975        Visit Information        Provider Department      6/27/2018 12:00 PM Cielo Aguayo SLP [facility]        Today's Diagnoses     Squamous cell carcinoma of oropharynx (H)    -  1       Follow-ups after your visit        Additional Services     SPEECH THERAPY REFERRAL       *This therapy referral will be filtered to a centralized scheduling office at [facility] and the patient will receive a call to schedule an appointment at a [facility] location most convenient for them. *     [facility] provides Speech Therapy evaluation and treatment and many specialty services across the [facility] system.  If requesting a specialty program, please choose from the list below.  If you have not heard from the scheduling office within 2 business days, please call [phone] for all locations, with the exception of [place], please call [phone] and [facility], please call [phone]      Treatment: Evaluation & Treatment  Speech Treatment Diagnosis: Dysphagia  Special Instructions: Does not need to be scheduled.  Special Programs: Clinical Swallow Study    Please be aware that coverage of these services is subject to the terms and limitations of your health insurance plan.  Call member services at your health plan with any benefit or coverage questions.      **Note to Provider:  If you are referring outside of [facility] for the therapy appointment, please list the name of the location in the \"special instructions\" above, print the referral and give to the patient to schedule the appointment.                  Your next 10 appointments already scheduled     Aug 03, 2018  8:30 AM CDT   PET ONCOLOGY WHOLE BODY with MGPET[facility]   [facility]    [address] [place] [address] " [id number] [phone]           Tell your doctor:   If there is any chance you may be pregnant or if you are breastfeeding.   If you have problems lying in small spaces (claustrophobia). If you do, your doctor may give you medicine to help you relax. If you have diabetes:   Have your exam early in the morning. Your blood glucose will go up as the day goes by.   Your glucose level must be 180 or less at the start of the exam. Please take any medicines you need to ensure this blood glucose level.   If you are taking insulin in the morning take with breakfast by 6 am and schedule procedure between 12 and 2:15 pm.   If you are taking insulin at night take nightly dose, fast overnight, schedule procedure before 10 am.   If you take insulin both morning and night take morning dose by 6am and schedule procedure between 12 and 2:15 pm.   24 hours before your scan: Don t do any heavy exercise. (No jogging, aerobics or other workouts.) Exercise will make your pictures less accurate.  At least 7 hours before your scan, or the evening before if you have an early appointment: Eat a low carb, high protein meal (Lean meats, seafood, beans, soy, low-fat dairy, eggs, nuts & seeds). 6 hours before your scan:   Stop all food and liquids (except water).   Do not chew gum or suck on mints.   If you need to take medicine with food, you may take it with a few crackers.  Please call your Imaging Department at your exam site with any questions.            Aug 07, 2018  1:45 PM CDT   Return Visit with Benedicto Nieto MD   [facility])    [address] [place] [id number]   [phone]            Aug 07, 2018  2:30 PM CDT   Return Visit with Albert Ambrosio MD   [facility])    [address] [place] [id number]   [phone]            Aug 08, 2018 10:20 AM CDT   (Arrive by 10:05 AM)   Return Visit with Marga Abbasi  MD Sumit   [facility] ([facility])    [address] [facility]  [address] [facility] [id number] [phone]            Sep 14, 2018 10:30 AM CDT   LAB with LAB ONC [facility] ([facility])    [address] [place] [id number] [phone]           Please do not eat 10-12 hours before your appointment if you are coming in fasting for labs on lipids, cholesterol, or glucose (sugar). This does not apply to pregnant women. Water, hot tea and black coffee (with nothing added) are okay. Do not drink other fluids, diet soda or chew gum.            Sep 14, 2018 11:15 AM CDT   Return Visit with Benedicto Nieto MD   [facility] ([facility])    [address] [place] [id number] [phone]            Sep 14, 2018 12:00 PM CDT   Level 2 with [address] ([facility])    [address] [place] [id number] [phone]              Who to contact     Please call your clinic at [phone] to:    Ask questions about your health    Make or cancel appointments    Discuss your medicines    Learn about your test results    Speak to your doctor            Additional Information About Your Visit        MyChart Information     [employer] is an electronic gateway that provides easy, online access to your medical records. With Intense, you can request a clinic appointment, read your test results, renew a prescription or communicate with your care team.     To sign up for [employer] visit the website at [email or web address]   You will be asked to enter the access code listed below, as well as some personal information. Please follow the directions to create your username and password.     Your access code is: [email or web address]  Expires: 2018  6:30 AM     Your access code will  in 90 days. If you need help or a new  code, please contact your [facility] or call [phone] for assistance.        Care EveryWhere ID     This is your Care EveryWhere ID. This could be used by other organizations to access your [facility] medical records  [id number]         Blood Pressure from Last 3 Encounters:   No data found for BP    Weight from Last 3 Encounters:   No data found for Wt              Today, you had the following     No orders found for display         Today's Medication Changes          These changes are accurate as of 6/27/18 11:59 PM.  If you have any questions, ask your nurse or doctor.               Start taking these medicines.        Dose/Directions    magic mouthwash suspension   Commonly known as:  ENTER INGREDIENTS IN COMMENTS   Used for:  Squamous cell carcinoma of oropharynx (H)   Started by:  Marga Arana MD        Swish and spit 5 ml every 6 hours   Quantity:  1000 mL   Refills:  3            Where to get your medicines      These medications were sent to [facility] - [place], [place] - [address] AT [facility] [address] ( [facility])  [address], [place] [address]     Phone:  [phone]     magic mouthwash suspension                Primary Care Provider Office Phone # Fax #    Quang Wall PA-C [phone]       [facility] [address] [facility] [address]  [facility] [address]        Equal Access to Services     JAVIER MORALES AH: Hadii aad ku hadasho Soomaali, waaxda luqadaha, qaybta kaalmada adeegyada, whitney pace. So [facility] [phone].    ATENCIÓN: Si habla español, tiene a hawley disposición servicios gratuitos de asistencia lingüística. Juanjose al [phone].    We comply with applicable federal civil rights laws and [place] laws. We do not discriminate on the basis of race, color, national origin, age, disability, sex, sexual orientation, or gender identity.            Thank you!     Thank you for  choosing [facility]  for your care. Our goal is always to provide you with excellent care. Hearing back from our patients is one way we can continue to improve our services. Please take a few minutes to complete the written survey that you may receive in the mail after your visit with us. Thank you!             Your Updated Medication List - Protect others around you: Learn how to safely use, store and throw away your medicines at www.disposemymeds.org.          This list is accurate as of 6/27/18 11:59 PM.  Always use your most recent med list.                   Brand Name Dispense Instructions for use Diagnosis    ACETAMINOPHEN PO      Take 1,000 mg by mouth        ALLEGRA ALLERGY PO      Take by mouth as needed for allergies        cevimeline 30 MG capsule    EVOXAC    90 capsule    Take 1 capsule (30 mg) by mouth 3 times daily    Squamous cell carcinoma of oropharynx (H)       folic acid 1 MG tablet    FOLVITE     Take 1 mg by mouth        magic mouthwash suspension    ENTER INGREDIENTS IN COMMENTS    1000 mL    Swish and spit 5 ml every 6 hours    Squamous cell carcinoma of oropharynx (H)       order for DME     90 Can    Sig:  Isosource 1.5 calories:  Instill 3.5 cartons per day via PEG tube by syringe or gravity flow    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    Squamous cell carcinoma of oropharynx (H)       * varenicline 0.5 MG X 11 & 1 MG X 42 tablet     CHANTIX STARTING MONTH KLAUS    42 tablet    Take as instructed    Tobacco use       * Notice:  This list has 2 medication(s) that are the same as other medications prescribed for you. Read the directions carefully, and ask your doctor or other care provider to review them with you.

## 2018-06-27 NOTE — NURSING NOTE
Chief Complaint   Patient presents with     RECHECK     follow up post chemo/ radiation     Gabriele Burrell LPN

## 2018-06-28 ENCOUNTER — HOME INFUSION (PRE-WILLOW HOME INFUSION) (OUTPATIENT)
Dept: PHARMACY | Facility: CLINIC | Age: 43
End: 2018-06-28

## 2018-06-29 NOTE — PROGRESS NOTES
This is a recent snapshot of the patient's [facility] medical record.  For current drug dose and complete information and questions, call [phone] or In Basket pool, [facility] home infusion ([id number])  CSN Number:  [id number]

## 2018-06-30 NOTE — PROGRESS NOTES
Dear Dr. Monsalve:    I had the pleasure of seeing Melinda Milligan in followup today at the [facility].     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  She had a long history of thrush lasting about 6-8 months that was treated without improvement by her primary care physician.  She was then referred to a hematologist for anemia who evaluated her oropharynx and was concerned about a malignancy.  She was referred to Dr. Monsalve who performed a biopsy of the oral tongue consistent with squamous cell carcinoma.  She was initially seen here at the end of February.  She had a PET CT scan which showed the primary disease in both tonsils, soft palate, base of tongue, right oral tongue with bilateral lymphadenopathy.  Given the extent of her primary disease she was recommended for definitive chemoradiation.  She completed her treatment under the care of Dr. Ambrosio and Dr. Nieto at [place].  She received a total of 70 Gy and high-dose cisplatin completed on 5/2/2018.    She continues to recover from her treatment.  She recently has been able to increase her oral intake however in the last 1-1.5 weeks she said that she again had to increase use of her tube because of pain.  She is having increased tongue and teeth sensitivity.  She is currently taking about 4 cans of tube feeds per day and was previously just doing a couple of cans in addition to her oral nutrition.  She is actually been able to eat a quarter pounder hamburger without significant difficulty. She is having some nasal regurgitation. She is not doing her swallowing exercises as she does not remember them. She does endorse continued fatigue.  She is not currently working.  She was as low as 73 pounds during her treatment but she has been up to 83 pounds.  More recently she dropped down to 79 pounds.      MEDICATIONS:     Current  Outpatient Prescriptions   Medication Sig Dispense Refill     ACETAMINOPHEN PO Take 1,000 mg by mouth       cevimeline (EVOXAC) 30 MG capsule Take 1 capsule (30 mg) by mouth 3 times daily 90 capsule 0     Fexofenadine HCl (ALLEGRA ALLERGY PO) Take by mouth as needed for allergies       folic acid (FOLVITE) 1 MG tablet Take 1 mg by mouth       magic mouthwash (ENTER INGREDIENTS IN COMMENTS) suspension Swish and spit 5 ml every 6 hours 1000 mL 3     order for DME Sig:  Isosource 1.5 calories:  Instill 3.5 cartons per day via PEG tube by syringe or gravity flow 90 Can 3     polyethylene glycol (MIRALAX/GLYCOLAX) powder Take 17 g (1 capful) by mouth daily 225 g 1     Potassium Chloride 40 MEQ/15ML (20%) SOLN 7.5 mLs (20 mEq) by Oral or G tube route daily 1 Bottle 1     varenicline (CHANTIX STARTING MONTH KLAUS) 0.5 MG X 11 & 1 MG X 42 tablet Take as instructed 42 tablet 0     varenicline (CHANTIX STARTING MONTH KLAUS) 0.5 MG X 11 & 1 MG X 42 tablet Take 0.5 mg tab daily for 3 days, then 0.5 mg tab twice daily for 4 days, then 1 mg twice daily. 53 tablet 0       ALLERGIES:    Allergies   Allergen Reactions     Ceftriaxone      Other reaction(s): Unknown Reaction     Chicken-Derived Products (Egg)      Other reaction(s): Vomiting  Other reaction(s): Unknown Reaction       HABITS/SOCIAL HISTORY:   She cut back significantly on her smoking last week but was previously at least a 1 pack per day smoker since 1994.  She denies any chewing tobacco use.  She quit drinking alcohol.  She denies any drug use.  She is  and has a young child at home and another who just went off to college.  She works from home in .    Social History     Social History     Marital status:      Spouse name: N/A     Number of children: N/A     Years of education: N/A     Occupational History     Not on file.     Social History Main Topics     Smoking status: Current Some Day Smoker     Packs/day: 1.00     Years: 23.00     Types:  Cigarettes     Start date: 1/1/1993     Smokeless tobacco: Never Used      Comment: Patient reports currently smoking 3 cigarettes per day - updated 3/6/2018     Alcohol use No     Drug use: No     Sexual activity: Yes     Partners: Male     Birth control/ protection: Pull-out method, Condom     Other Topics Concern     Not on file     Social History Narrative       PAST MEDICAL HISTORY:   Past Medical History:   Diagnosis Date     Allergic rhinitis      Anemia      Hoarseness      Oropharyngeal cancer (H) 02/15/2018     Uncomplicated asthma         PAST SURGICAL HISTORY:   Past Surgical History:   Procedure Laterality Date     BIOPSY  02/15/2018    Left Tongue - [facility]     LAPAROSCOPIC ASSISTED INSERTION TUBE GASTROTOMY N/A 3/14/2018    Procedure: LAPAROSCOPIC ASSISTED INSERTION TUBE GASTROSTOMY;  Percutaneous Endoscopic Gastrostomy Tube Insertion, Esophagogastroduodenoscopy;  Surgeon: Edna Martinez MD;  Location:  OR       FAMILY HISTORY:    Family History   Problem Relation Age of Onset     Substance Abuse Father      Dementia Maternal Grandmother      Diabetes Maternal Grandmother      Asthma Brother      Prostate Cancer Maternal Grandfather        REVIEW OF SYSTEMS:  12 point ROS was negative other than the symptoms noted above in the HPI.  Patient Supplied Answers to Review of Systems  [facility] ROS 6/27/2018   Constitutional Weight loss, Appetite change, Problems with sleep   Neurology -   Ears, Nose, Throat Ear pain, Nasal congestion or drainage, Sore throat, Trouble swallowing   Allergy/Immunology -   Hematologic -   Endocrine Heat or cold intolerance         PHYSICAL EXAMINATION:   There were no vitals taken for this visit.   Appearance:   normal; NAD, slightly older appearing than stated age, thin appearing, small stature   Communication:   normal; communicates verbally, slight hypernasality with incomplete soft palate closure   Head/Face:   inspection -  Normal; no scars or visible lesions    Salivary glands -  Normal size, no tenderness, swelling, or palpable masses   Facial strength -  Normal and symmetric bilateral; H/B I/VI   Skin:  normal, no rash   Ocular Motility:  normal occular movements   Ears:  auricle (AD) -  normal  EAC (AD) -  normal  TM (AD) -  Mild effusion  auricle (AS) -  normal  EAC (AS) -  normal  TM (AS) - serous effusion  Normal clinical speech reception   Nose:  Ext. inspection -  Normal  Internal Inspection -  Normal mucosa, septum, and turbinates; thick secretions   Nasopharynx:  interval reoslution of nasopharyngeal component of mass  Thick secretions and crusting   Oral Cavity:  lips -  Normal mucosa, oral competence, and stoma size  Dentition in poor repair, thrush along gingiva  Some trismus   Hard palate, buccal, floor of mouth mucosa with radiation changes   Tongue - interval resolution of lateral tongue mass   Oropharynx:  uvula absent with mucositis present on the soft palate  Previous large tumors of the bilateral tonsils has demonstrated interval resolution   Hypopharynx:  Normal pyriform sinus and pharyngeal wall mucosa   No pooled secretions    Larynx:  Epiglottis, false vocal cord, true vocal cord normal in appearance, bilaterally mobile cords    Neck: No visible mass or asymmetry   Normal range of motion   Lymphatic:  Interval resolution of neck adenopathy   Cardiovascular:  warm, pink, well-perfused extremities without swelling, tenderness, or edema   Respiratory:  Normal respiratory effort, no stridor   Neuro/Psych.:  mood/affect -  normal  mental status -  normal  cranial nerves -  normal          PROCEDURES:   Flexible fiberoptic laryngoscopy: Scope exam was indicated due to oropharyngeal tumor. Verbal consent was obtained. The nasal cavity was prepped with an aerosolized solution of topical anesthetic and vasoconstrictive agent. The scope was passed through the anterior nasal cavity and advanced. Inspection of the nasopharynx revealed interval resolution of the  nasopharyngeal mass.  There are thick secretions present along the nasopharyngeal mucosa.  The posterior aspect of the soft palate has interval resolution of that mass.  The tonsils are no longer bulky from tumor.  There is slight thickening of the epiglottis consistent with a radiation history.  The vocal cords are mobile bilaterally.  There is mild edema of the arytenoidss bilaterally.  The piriform sinuses are clear. Due to patient tolerance of exam the views were limited. The airway is patent.  Patient tolerated the procedure well with no immediate consultations.    RESULTS REVIEWED:   I reviewed the treatment records from Dr. Ambrosio and Dr. Nieto.    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  She is now status post completion of definitive chemoradiation in May 2018.  She does continue to recover from the side effects from her treatment.  I had her see speech pathology today regarding her swallowing.  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  Unfortunately this is just secondary to her treatment and tumor.  I discussed her trying to pop her ear on the left side to help with her serous effusion.  She was counseled on trismus exercises.  She does have a bit of thrush present and we will have her do some Magic mouth rinse.  I think sinus rinses also be beneficial to help with nasal crusting.  Once her teeth are less sensitive she will should start use of the fluoride trays for her teeth along with regular follow-up with the dental.  She is due for a PET scan at the beginning of August 2018.  I will see her back in about 6 weeks.    Thank you very much for the opportunity to participate in the care of your patient.      Marga Arana M.D.  Otolaryngology- Head & Neck Surgery        CC:  Noel Monsalve,   [facility]  [address]  [place], [address]  [place] [address]      Albert Ambrosio MD  Department of Radiation Oncology  [facility]

## 2018-07-02 ENCOUNTER — HOME INFUSION (PRE-WILLOW HOME INFUSION) (OUTPATIENT)
Dept: PHARMACY | Facility: CLINIC | Age: 43
End: 2018-07-02

## 2018-07-03 NOTE — PROGRESS NOTES
This is a recent snapshot of the patient's [facility] medical record.  For current drug dose and complete information and questions, call [phone] or In Basket pool, [facility] home infusion ([id number])  CSN Number:  [id number]

## 2018-07-17 NOTE — PROGRESS NOTES
06/28/18 1500   General Information   Type Of Visit Initial   Start Of Care Date 06/27/18   Referring Physician Dr. Marga Arana   Orders Evaluate And Treat   Orders Comment Clinical Swallow evaluation   Medical Diagnosis tongue cancer, oropharyngeal dysphagia   Onset Of Illness/injury Or Date Of Surgery 06/27/18   Precautions/limitations No Known Precautions/limitations   Hearing Adequate for conversation   Pertinent History of Current Problem/OT: Additional Occupational Profile Info Ms. 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  She underwent definitive chemoradiation which she completed on 5/2/18.  She presents for follow-up with Dr. Arana and was referred for swallowing evaluation and she did not see speech pathology services during her treatment.  She is concerned about her swallowing would like to optimize her swallow function.  She has been able to eat some different foods but she is still using her PEG tube for sustenance.   Respiratory Status Room air   Prior Level Of Function Swallowing   Prior Level Of Function Comment Pt eating/drinking a small variety of items.    Living Environment House/townhome   Home/community Accessibility Comments (flowsheet Row) Indepenent   General Observations Pt highly pleasant and cooperative.    Patient/family Goals Pt wanting to maxmize swallow function post treatment   General Information Comments Pt accompanied by her spouse.    Pain Assessment   Pain Reported Yes   Pain Location mouth   Fall Risk Screen   Have you fallen 2 or more times in the past year? No   Have you fallen and had an injury in the past year? No   Is patient a fall risk? No   Clinical Swallow Evaluation   Oral Musculature anomalies present   Structural Abnormalities present   Dentition present and adequate   Mandibular Strength and Mobility impaired  (reduced ROM of jaw)   Oral Labial Strength and Mobility WFL   Lingual Strength and  Mobility impaired protrusion;impaired anterior elevation;impaired left lateral movement;impaired right lateral movement;impaired coordination   Velar Elevation impaired  (VPI noted)   Oral Musculature Comments Pt demonstrates reduced strength and ROM of tongue, soft palate and jaw.    Clinical Swallow Eval: Thin Liquid Texture Trial   Mode of Presentation, Thin Liquids cup;self-fed   Volume of Liquid or Food Presented sips of water via cup   Oral Phase of Swallow WFL   Pharyngeal Phase of Swallow intact   Diagnostic Statement No overt clinical s/sx of aspiration/penetration noted on thin liquid trials.   Swallow Compensations   Swallow Compensations Alternate viscosity of consistencies;Reduce amounts   Results (Pain with po intake)   Educational Assessment   Barriers to Learning No barriers   Esophageal Phase of Swallow   Patient reports or presents with symptoms of esophageal dysphagia No   General Therapy Interventions   Planned Therapy Interventions Dysphagia Treatment   Dysphagia treatment Oropharyngeal exercise training;Modified diet education;Instruction of safe swallow strategies   Swallow Eval: Clinical Impressions   Skilled Criteria for Therapy Intervention Skilled criteria met.  Treatment indicated.   Functional Assessment Scale (FAS) 3   Dysphagia Outcome Severity Scale (VITALIY) Level 3 - VITALIY   Treatment Diagnosis Moderate oropharyngeal dysphagia   Diet texture recommendations Thin liquids;Dysphagia diet level 1;Dysphagia diet level 2   Recommended Feeding/Eating Techniques alternate between small bites and sips of food/liquid;hard swallow w/ each bite or sip;maintain upright posture during/after eating for 30 mins;small sips/bites   Rehab Potential fair, will monitor progress closely   Therapy Frequency other (see comments)   Anticipated Discharge Disposition home w/ outpatient services   Risks and Benefits of Treatment have been explained. Yes   Patient, family and/or staff in agreement with Plan of Care  Yes   Clinical Impression Comments Ms. Milligan demonstrates moderate oral pharyngeal dysphasia at this time is characterized by xerostomia, decreased p.o. intake due to radiation, decreased differential function.  No overt clinical signs or symptoms of aspiration/penetration noted on thin liquid trials.  Patient continues to report dysgeusia following her treatment which limits her appetite and p.o. intake.  Patient also struggles with nasal regurgitation of liquids.  Recommend patient continue with dysphagia diet level 1/2 and thin liquids.  Patient would benefit from continued intake of puréed consistencies with the occasional trial of some soft solids.  She should sit upright when she is eating and drinking, take small bites and sips, using slow rate.  She would benefit from speech pathology services to maximize swallow function following radiation as well as decreased long-term effects of radiation fibrosis on her swallow mechanism.   Swallow Goal 1   Goal Identifier Diet   Goal Description 1.  Patient will tolerate regular moist solids and thin liquids without overt clinical signs or symptoms of aspiration/penetration 100% of the time independently.   Target Date 09/25/18   Swallow Goal 2   Goal Identifier Exercises   Goal Description 2.  Patient will improve range of motion and strength of her jaw, base of tongue, and pharynx by completing 10 repetitions of 6/6 exercises 3 times daily with minimal written or verbal cues.   Target Date 09/25/18   Total Session Time   Total Session Time 30   Total Evaluation Time 15

## 2018-08-03 ENCOUNTER — RADIANT APPOINTMENT (OUTPATIENT)
Dept: PET IMAGING | Facility: CLINIC | Age: 43
End: 2018-08-03
Attending: INTERNAL MEDICINE
Payer: COMMERCIAL

## 2018-08-03 DIAGNOSIS — C02.9 TONGUE CANCER (H): ICD-10-CM

## 2018-08-03 DIAGNOSIS — C10.9 SQUAMOUS CELL CARCINOMA OF OROPHARYNX (H): ICD-10-CM

## 2018-08-03 PROCEDURE — 78815 PET IMAGE W/CT SKULL-THIGH: CPT | Mod: 59

## 2018-08-03 PROCEDURE — 74177 CT ABD & PELVIS W/CONTRAST: CPT

## 2018-08-03 PROCEDURE — 70491 CT SOFT TISSUE NECK W/DYE: CPT | Performed by: RADIOLOGY

## 2018-08-03 PROCEDURE — 71260 CT THORAX DX C+: CPT

## 2018-08-03 PROCEDURE — A9552 F18 FDG: HCPCS | Performed by: RADIOLOGY

## 2018-08-03 RX ORDER — IOPAMIDOL 755 MG/ML
1-135 INJECTION, SOLUTION INTRAVASCULAR ONCE
Status: COMPLETED | OUTPATIENT
Start: 2018-08-03 | End: 2018-08-03

## 2018-08-03 RX ADMIN — IOPAMIDOL 100 ML: 755 INJECTION, SOLUTION INTRAVASCULAR at 09:40

## 2018-08-07 ENCOUNTER — ONCOLOGY VISIT (OUTPATIENT)
Dept: ONCOLOGY | Facility: CLINIC | Age: 43
End: 2018-08-07
Payer: COMMERCIAL

## 2018-08-07 ENCOUNTER — OFFICE VISIT (OUTPATIENT)
Dept: RADIATION ONCOLOGY | Facility: CLINIC | Age: 43
End: 2018-08-07
Payer: COMMERCIAL

## 2018-08-07 VITALS
BODY MASS INDEX: 15.77 KG/M2 | OXYGEN SATURATION: 98 % | DIASTOLIC BLOOD PRESSURE: 87 MMHG | SYSTOLIC BLOOD PRESSURE: 120 MMHG | TEMPERATURE: 98 F | RESPIRATION RATE: 16 BRPM | HEART RATE: 93 BPM | HEIGHT: 59 IN | WEIGHT: 78.25 LBS

## 2018-08-07 VITALS
SYSTOLIC BLOOD PRESSURE: 120 MMHG | HEIGHT: 59 IN | TEMPERATURE: 98 F | BODY MASS INDEX: 15.77 KG/M2 | DIASTOLIC BLOOD PRESSURE: 87 MMHG | WEIGHT: 78.25 LBS | OXYGEN SATURATION: 98 % | HEART RATE: 93 BPM | RESPIRATION RATE: 16 BRPM

## 2018-08-07 DIAGNOSIS — C10.9 SQUAMOUS CELL CARCINOMA OF OROPHARYNX (H): ICD-10-CM

## 2018-08-07 DIAGNOSIS — C10.9 SQUAMOUS CELL CARCINOMA OF OROPHARYNX (H): Primary | ICD-10-CM

## 2018-08-07 PROCEDURE — 99214 OFFICE O/P EST MOD 30 MIN: CPT | Performed by: RADIOLOGY

## 2018-08-07 PROCEDURE — 99214 OFFICE O/P EST MOD 30 MIN: CPT | Performed by: INTERNAL MEDICINE

## 2018-08-07 RX ORDER — DEXAMETHASONE 0.5 MG/5ML
SOLUTION ORAL
Qty: 500 ML | Refills: 1 | Status: SHIPPED | OUTPATIENT
Start: 2018-08-07 | End: 2018-11-28

## 2018-08-07 ASSESSMENT — PAIN SCALES - GENERAL
PAINLEVEL: MODERATE PAIN (5)
PAINLEVEL: SEVERE PAIN (7)

## 2018-08-07 NOTE — PROGRESS NOTES
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        Primary Physician: No Ref-Primary, Physician     History Of Present Illness:    Please see previous note for details. I have copied and updated from prior note.  Ms. 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 She initially presented with thrush several months ago which was not improved after treatment and more recently she was noted to have worsening swelling and pain on the right side of the tongue and cheek and she was referred to ENT for a biopsy of the tongue lesion which was consistent with squamous cell cancer. P 16 negative.  As part of her workup she had a PET scan and neck CT which showed   1. Hypermetabolic mass involving the bilateral palatine tonsils, anteriorly extending to and involving the soft palate, superiorly extending to and involving the right posterior lateral nasopharyngeal wall and inferiorly extending to the right lateral oropharyngeal wall representing primary squamosal cancer.  2. Right and left metastatic FDG avid level IIa lymph nodes.    Treatment:   3/13/2018 gtube placed  3/14/2018 Started chemoradiation with high dose cisplatin  Cycle #2 of chemotherapy was delayed by one week because of neutropenia. She got it on 4/11/18  C#3 not given due to cytopenias    She completed radiation 7000 cGy on 5/2/18    12 weeks post treatment PET CT is negative      Overall she feels better although still she has pain in her neck and throat and her mouth feels very sensitive.  She also feels very sensitive in her teeth.  She notices fullness in her ears although denies loss of hearing or tinnitus.  She can swallow well.  She is not eating any significant amounts and is relying basically on tube feeds.  Usually she has 3 cans a day.  Her weight for the most part has remained stable.   She might have lost a couple of pounds.  Denies infections.  Energy has been good and she is keeping herself active.  No neuropathy.  No new swellings.  She continues to smoke.        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   Previously she was getting IV iron through her GYN for heavy periods but she got 2 out of 4 planned doses of intravenous iron. She forgot about the last 2 doses.   Past Surgical History:   Procedure Laterality Date     BIOPSY  02/15/2018    Left Tongue - [facility]     LAPAROSCOPIC ASSISTED INSERTION TUBE GASTROTOMY N/A 3/14/2018    Procedure: LAPAROSCOPIC ASSISTED INSERTION TUBE GASTROSTOMY;  Percutaneous Endoscopic Gastrostomy Tube Insertion, Esophagogastroduodenoscopy;  Surgeon: Edna Martinez MD;  Location: [facility] OR     Cancer History:   As above    Allergies:  Allergies as of 08/07/2018 - Review Complete 08/07/2018   Allergen Reaction Noted     Ceftriaxone  08/25/2012     Chicken-derived products (egg)  08/25/2012     Current Medications:  Current Outpatient Prescriptions   Medication Sig Dispense Refill     ACETAMINOPHEN PO Take 1,000 mg by mouth       cevimeline (EVOXAC) 30 MG capsule Take 1 capsule (30 mg) by mouth 3 times daily 90 capsule 0     Fexofenadine HCl (ALLEGRA ALLERGY PO) Take by mouth as needed for allergies       magic mouthwash (ENTER INGREDIENTS IN COMMENTS) suspension Swish and spit 5 ml every 6 hours 1000 mL 3     order for DME Sig:  Isosource 1.5 calories:  Instill 3.5 cartons per day via PEG tube by syringe or gravity flow 90 Can 3     varenicline (CHANTIX STARTING MONTH PAK) 0.5 MG X 11 & 1 MG X 42 tablet Take as instructed 42 tablet 0     varenicline (CHANTIX  "STARTING MONTH PAK) 0.5 MG X 11 & 1 MG X 42 tablet Take 0.5 mg tab daily for 3 days, then 0.5 mg tab twice daily for 4 days, then 1 mg twice daily. 53 tablet 0     folic acid (FOLVITE) 1 MG tablet Take 1 mg by mouth       polyethylene glycol (MIRALAX/GLYCOLAX) powder Take 17 g (1 capful) by mouth daily (Patient not taking: Reported on 8/7/2018) 225 g 1     Potassium Chloride 40 MEQ/15ML (20%) SOLN 7.5 mLs (20 mEq) by Oral or G tube route daily (Patient not taking: Reported on 8/7/2018) 1 Bottle 1      Family History:  Family History   Problem Relation Age of Onset     Substance Abuse Father      Dementia Maternal Grandmother      Diabetes Maternal Grandmother      Asthma Brother      Prostate Cancer Maternal Grandfather      Social History:  Social History     Social History     Marital status:      Spouse name: N/A     Number of children: N/A     Years of education: N/A     Occupational History     Not on file.     Social History Main Topics     Smoking status: Current Some Day Smoker     Packs/day: 1.00     Years: 23.00     Types: Cigarettes     Start date: 1/1/1993     Smokeless tobacco: Never Used      Comment: Patient reports currently smoking 3 cigarettes per day - updated 3/6/2018     Alcohol use No     Drug use: No     Sexual activity: Yes     Partners: Male     Birth control/ protection: Pull-out method, Condom     Other Topics Concern     Not on file     Social History Narrative    she used to smoke 1 pack a day but now smoking about half a pack a day. We again discussed the importance of completely abstaining from smoking. I again discussed referring her for smoking cessation program but she is not interested      Denies any use of alcohol. Lives with her . 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         Physical Exam:  /87  Pulse 93  Temp 98  F (36.7  C)  Resp 16  Ht 1.499 m (4' 11\")  Wt 35.5 kg (78 lb 4 oz)  SpO2 98%  BMI 15.8 kg/m2  CONSTITUTIONAL: " no acute distress  EYES: PERRLA, no palor or icterus.   ENT/MOUTH: She has evidence of mild thrush on her tongue.  No other mouth lesions.  Ears normal  CVS: s1s2 no m r g .   RESPIRATORY: clear to auscultation b/l  GI: soft non tender no hepatosplenomegaly.  G-tube site is clean  NEURO: AAOX3  Grossly non focal neuro exam  INTEGUMENT: no obvious rashes  LYMPHATIC: no palpable cervical, supraclavicular, axillary LAD  MUSCULOSKELETAL: Unremarkable. No bony tenderness.   EXTREMITIES: no edema  PSYCH: Mentation, mood and affect are normal. Decision making capacity is intact        Laboratory/Imaging Studies      Results for orders placed or performed in visit on 08/03/18   PE NPET Oncology (Eyes To Thighs)    Narrative    Combined Report of:    PET and CT on  8/3/2018 10:07 AM :    1. PET of the neck, chest, abdomen, and pelvis.  2. PET CT Fusion for Attenuation Correction and Anatomical  Localization:    3. Diagnostic CT scan of the chest, abdomen, and pelvis with  intravenous contrast for interpretation.  3. CT of the chest, abdomen and pelvis obtained for diagnostic  interpretation.  4. 3D MIP and PET-CT fused images were processed on an independent  workstation and archived to PACS and reviewed by a radiologist.    Technique:    1. PET: The patient received 13.3 mCi of F-18-FDG; the serum glucose  was 70 prior to administration, body weight was 37.3 kg. Images were  evaluated in the axial, sagittal, and coronal planes as well as the  rotational whole body MIP. Images were acquired from the Eyes to the  proximal thighs.    UPTAKE WAS MEASURED AT 62 MINUTES.     BACKGROUND:  Liver SUV max= 2.1,   Aorta Blood SUV Max: 2.0.     2. CT: Volumetric acquisition for clinical interpretation of the  chest, abdomen, and pelvis acquired at 3 mm sections after the  uneventful administration of intravenous contrast. The chest, abdomen,  and pelvis were evaluated at 5 mm sections in bone, soft tissue, and  lung windows.      The  patient received 100 mL of Isovue 370 intravenously for the  examination.    High resolution images of the neck were obtained with multiple oblique  projection reformats.    3. 3D MIP and PET-CT fused images were processed on an independent  workstation and archived to PACS and reviewed by a radiologist.    INDICATION: Tongue cancer    ADDITIONAL INFORMATION OBTAINED FROM EMR: 42-year-old woman with a  history of squamous cell carcinoma of the tongue, status post  chemoradiation.    COMPARISON: PET-CT 4/13/2018    FINDINGS:     HEAD/NECK:  Please see dedicated neuroradiology report for the results of the high  resolution PET-CT of the neck.     CHEST:  There is no suspicious FDG uptake in the chest.     The central tracheobronchial tree is patent, however with inspissated  mucous in the upper trachea (example series 6, image 40). Mild  dependent atelectasis. No consolidation. No pleural effusion. Heart  size is within normal limits. No pericardial effusion. No enlarged  mediastinal lymph nodes.    ABDOMEN AND PELVIS:  There is no suspicious FDG uptake in the abdomen or pelvis.    Postsurgical changes of percutaneous gastrostomy. The liver,  gallbladder, spleen, pancreas, adrenal glands are unremarkable. No  hydronephrosis. Bladder and uterus are unremarkable. Normal caliber of  the small and large bowel. No peritoneal fluid collection or free air.  No enlarged lymph nodes identified.    LOWER EXTREMITIES:   No abnormal masses or hypermetabolic lesions.    BONES:   There is no abnormal FDG uptake in the skeleton. Multilevel  degenerative changes of the spine. Possible old avulsion injury versus  ligamentous calcification anterior to L5.      Impression    IMPRESSION: In this patient with a history of squamous cell carcinoma  of the tongue status post chemoradiation:  1. No evidence of metastatic disease in the chest, abdomen, or pelvis.  2. Please see dedicated neuroradiology report for the results of the  high  resolution PET-CT of the neck.     I have personally reviewed the examination and initial interpretation  and I agree with the findings.    GALI JONES MD   CT Soft Tissue Neck w Contrast    Narrative    PET CT fusion examination 8/3/2018 10:40 AM  1. Neck CT with contrast  2. PET study of the neck  3. PET CT fusion study of the neck    History:  Squamous cell carcinoma of the tongue, completing  chemoradiation 5/2/2018.    Comparison: PET-CT 4/13/2018, 2/23/2018.    Technique: Please refer to the accompanying whole body PET-CT for  report of the dose and whole body PET-CT findings.  Regarding the neck, axial images were obtained without nonionic  intravenous contrast administration, with sagittal and coronal  reconstructions performed. Neck CT images were reviewed in bone, soft  tissue, and lung windows, with review of the fused PET-CT images as  well in multiple planes. Postcontrast images of the neck were obtained  as part of the PET-CT body exam.    Findings: Postcontrast images of the neck under the PET-CT body  accession were also reviewed as part of this dictation.    No apparent residual mass at the tongue base or oropharynx. Mild  diffuse oropharyngeal mucosal FDG uptake. No focal abnormal mucosal  FDG uptake.    Mildly hypermetabolic right 2A lymph node 8 x 6 mm, SUV max 3.4  (series 3, image 70), likely reactive. No enlarging or increasingly  hypermetabolic lymph node.    Major salivary glands are unremarkable. Normal thyroid gland.    Limited evaluation of the cervical vertebral column demonstrates  multilevel degenerative changes of the cervical spine. The visualized  paranasal sinuses and mastoid air cells are clear.       Impression    Impression: In this patient with a history of squamous cell carcinoma  status post chemoradiation, findings indicate a positive response to  treatment:  1. No local recurrence. Mild residual FDG uptake in the oropharynx is  attributed to postradiation changes.   2.  No cervical adenopathy.  3. Please refer to the whole body PET-CT performed as a separate  report, for the findings of the remainder of the body.    I have personally reviewed the examination and initial interpretation  and I agree with the findings.    CHHAYA HUNTER MD     ASSESSMENT/PLAN:    Stage IV a [id number] 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  She started concurrent chemotherapy with high-dose cisplatin alongside radiation on 3/14/2018. Cycle #2 of chemotherapy was delayed by one week because of neutropenia.   She received C#2 on 4/11/18  She is completed radiation on 5/2/2018   C#3 which was due on 5/2/18 was not given due to neutropenia    Repeat PET CT is negative for any residual disease.  She will see ENT again tomorrow.  I would recommend repeating his scans in 6 months.  She also has fullness in her ears and she will discuss with ENT tomorrow.      Mouth pain.  Although it is a little better but it has not completely resolved.  She still uses Magic mouthwash.  I refilled it.  She takes Tylenol for it as well.  Very occasionally she takes Vicodin.        Nutrition.  This has been her main issue.  She is working with the speech and swallow.  I strongly advised her to use more cancer daily.  Currently she is using 3 cans a day.  She said she is going to try to use more.        Smoking.  I advised her to follow with a smoking cessation program but at this time she is not willing to do that.  She thinks that if she is able to eat better then she will start using Chantix.      Cytopenias. Related to prior chemotherapy. She also has ANDRES 2/2 menorrhagia. She got 2 out of 4 planned doses of IV iron. Will repeat labs today        We did not address the following today  She had baseline audiogram done prior to start of cisplatin. Currently she is not noticing any changes in her hearing or tinnitus. We can consider repeating audiogram if she has any problems with hearing or tinnitus      I will see  her back in 3 months    All questions answered.  She is comfortable with the plan.      Benedicto Nieto

## 2018-08-07 NOTE — LETTER
8/7/2018         RE: Melinda Milligan  [address] [facility] [address]        Dear Colleague,    Thank you for referring your patient, Melinda Milligan, to the [facility]. Please see a copy of my visit note below.    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        Primary Physician: No Ref-Primary, Physician     History Of Present Illness:    Please see previous note for details. I have copied and updated from prior note.  Ms. 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 She initially presented with thrush several months ago which was not improved after treatment and more recently she was noted to have worsening swelling and pain on the right side of the tongue and cheek and she was referred to ENT for a biopsy of the tongue lesion which was consistent with squamous cell cancer. P 16 negative.  As part of her workup she had a PET scan and neck CT which showed   1. Hypermetabolic mass involving the bilateral palatine tonsils, anteriorly extending to and involving the soft palate, superiorly extending to and involving the right posterior lateral nasopharyngeal wall and inferiorly extending to the right lateral oropharyngeal wall representing primary squamosal cancer.  2. Right and left metastatic FDG avid level IIa lymph nodes.    Treatment:   3/13/2018 gtube placed  3/14/2018 Started chemoradiation with high dose cisplatin  Cycle #2 of chemotherapy was delayed by one week because of neutropenia. She got it on 4/11/18  C#3 not given due to cytopenias    She completed radiation 7000 cGy on 5/2/18    12 weeks post treatment PET CT is negative      Overall she feels better although still she has pain in her neck and throat and her mouth feels very sensitive.  She also feels very sensitive in her teeth.  She notices  fullness in her ears although denies loss of hearing or tinnitus.  She can swallow well.  She is not eating any significant amounts and is relying basically on tube feeds.  Usually she has 3 cans a day.  Her weight for the most part has remained stable.  She might have lost a couple of pounds.  Denies infections.  Energy has been good and she is keeping herself active.  No neuropathy.  No new swellings.  She continues to smoke.        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   Previously she was getting IV iron through her GYN for heavy periods but she got 2 out of 4 planned doses of intravenous iron. She forgot about the last 2 doses.   Past Surgical History:   Procedure Laterality Date     BIOPSY  02/15/2018    Left Tongue - [facility]     LAPAROSCOPIC ASSISTED INSERTION TUBE GASTROTOMY N/A 3/14/2018    Procedure: LAPAROSCOPIC ASSISTED INSERTION TUBE GASTROSTOMY;  Percutaneous Endoscopic Gastrostomy Tube Insertion, Esophagogastroduodenoscopy;  Surgeon: Edna Martinez MD;  Location: [facility] OR     Cancer History:   As above    Allergies:  Allergies as of 08/07/2018 - Review Complete 08/07/2018   Allergen Reaction Noted     Ceftriaxone  08/25/2012     Chicken-derived products (egg)  08/25/2012     Current Medications:  Current Outpatient Prescriptions   Medication Sig Dispense Refill     ACETAMINOPHEN PO Take 1,000 mg by mouth       cevimeline (EVOXAC) 30 MG capsule Take 1 capsule (30 mg) by mouth 3 times daily 90 capsule 0     Fexofenadine HCl (ALLEGRA ALLERGY PO) Take by mouth as needed for allergies       magic mouthwash (ENTER INGREDIENTS IN COMMENTS) suspension Swish and spit 5 ml every 6 hours 1000 mL 3      order for DME Sig:  Isosource 1.5 calories:  Instill 3.5 cartons per day via PEG tube by syringe or gravity flow 90 Can 3     varenicline (CHANTIX STARTING MONTH PAK) 0.5 MG X 11 & 1 MG X 42 tablet Take as instructed 42 tablet 0     varenicline (CHANTIX STARTING MONTH PAK) 0.5 MG X 11 & 1 MG X 42 tablet Take 0.5 mg tab daily for 3 days, then 0.5 mg tab twice daily for 4 days, then 1 mg twice daily. 53 tablet 0     folic acid (FOLVITE) 1 MG tablet Take 1 mg by mouth       polyethylene glycol (MIRALAX/GLYCOLAX) powder Take 17 g (1 capful) by mouth daily (Patient not taking: Reported on 8/7/2018) 225 g 1     Potassium Chloride 40 MEQ/15ML (20%) SOLN 7.5 mLs (20 mEq) by Oral or G tube route daily (Patient not taking: Reported on 8/7/2018) 1 Bottle 1      Family History:  Family History   Problem Relation Age of Onset     Substance Abuse Father      Dementia Maternal Grandmother      Diabetes Maternal Grandmother      Asthma Brother      Prostate Cancer Maternal Grandfather      Social History:  Social History     Social History     Marital status:      Spouse name: N/A     Number of children: N/A     Years of education: N/A     Occupational History     Not on file.     Social History Main Topics     Smoking status: Current Some Day Smoker     Packs/day: 1.00     Years: 23.00     Types: Cigarettes     Start date: 1/1/1993     Smokeless tobacco: Never Used      Comment: Patient reports currently smoking 3 cigarettes per day - updated 3/6/2018     Alcohol use No     Drug use: No     Sexual activity: Yes     Partners: Male     Birth control/ protection: Pull-out method, Condom     Other Topics Concern     Not on file     Social History Narrative    she used to smoke 1 pack a day but now smoking about half a pack a day. We again discussed the importance of completely abstaining from smoking. I again discussed referring her for smoking cessation program but she is not interested      Denies any use of alcohol. Lives  "with her . 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         Physical Exam:  /87  Pulse 93  Temp 98  F (36.7  C)  Resp 16  Ht 1.499 m (4' 11\")  Wt 35.5 kg (78 lb 4 oz)  SpO2 98%  BMI 15.8 kg/m2  CONSTITUTIONAL: no acute distress  EYES: PERRLA, no palor or icterus.   ENT/MOUTH: She has evidence of mild thrush on her tongue.  No other mouth lesions.  Ears normal  CVS: s1s2 no m r g .   RESPIRATORY: clear to auscultation b/l  GI: soft non tender no hepatosplenomegaly.  G-tube site is clean  NEURO: AAOX3  Grossly non focal neuro exam  INTEGUMENT: no obvious rashes  LYMPHATIC: no palpable cervical, supraclavicular, axillary LAD  MUSCULOSKELETAL: Unremarkable. No bony tenderness.   EXTREMITIES: no edema  PSYCH: Mentation, mood and affect are normal. Decision making capacity is intact        Laboratory/Imaging Studies      Results for orders placed or performed in visit on 08/03/18   PE NPET Oncology (Eyes To Thighs)    Narrative    Combined Report of:    PET and CT on  8/3/2018 10:07 AM :    1. PET of the neck, chest, abdomen, and pelvis.  2. PET CT Fusion for Attenuation Correction and Anatomical  Localization:    3. Diagnostic CT scan of the chest, abdomen, and pelvis with  intravenous contrast for interpretation.  3. CT of the chest, abdomen and pelvis obtained for diagnostic  interpretation.  4. 3D MIP and PET-CT fused images were processed on an independent  workstation and archived to PACS and reviewed by a radiologist.    Technique:    1. PET: The patient received 13.3 mCi of F-18-FDG; the serum glucose  was 70 prior to administration, body weight was 37.3 kg. Images were  evaluated in the axial, sagittal, and coronal planes as well as the  rotational whole body MIP. Images were acquired from the Eyes to the  proximal thighs.    UPTAKE WAS MEASURED AT 62 MINUTES.     BACKGROUND:  Liver SUV max= 2.1,   Aorta Blood SUV Max: 2.0.     2. CT: Volumetric acquisition for " clinical interpretation of the  chest, abdomen, and pelvis acquired at 3 mm sections after the  uneventful administration of intravenous contrast. The chest, abdomen,  and pelvis were evaluated at 5 mm sections in bone, soft tissue, and  lung windows.      The patient received 100 mL of Isovue 370 intravenously for the  examination.    High resolution images of the neck were obtained with multiple oblique  projection reformats.    3. 3D MIP and PET-CT fused images were processed on an independent  workstation and archived to PACS and reviewed by a radiologist.    INDICATION: Tongue cancer    ADDITIONAL INFORMATION OBTAINED FROM EMR: 42-year-old woman with a  history of squamous cell carcinoma of the tongue, status post  chemoradiation.    COMPARISON: PET-CT 4/13/2018    FINDINGS:     HEAD/NECK:  Please see dedicated neuroradiology report for the results of the high  resolution PET-CT of the neck.     CHEST:  There is no suspicious FDG uptake in the chest.     The central tracheobronchial tree is patent, however with inspissated  mucous in the upper trachea (example series 6, image 40). Mild  dependent atelectasis. No consolidation. No pleural effusion. Heart  size is within normal limits. No pericardial effusion. No enlarged  mediastinal lymph nodes.    ABDOMEN AND PELVIS:  There is no suspicious FDG uptake in the abdomen or pelvis.    Postsurgical changes of percutaneous gastrostomy. The liver,  gallbladder, spleen, pancreas, adrenal glands are unremarkable. No  hydronephrosis. Bladder and uterus are unremarkable. Normal caliber of  the small and large bowel. No peritoneal fluid collection or free air.  No enlarged lymph nodes identified.    LOWER EXTREMITIES:   No abnormal masses or hypermetabolic lesions.    BONES:   There is no abnormal FDG uptake in the skeleton. Multilevel  degenerative changes of the spine. Possible old avulsion injury versus  ligamentous calcification anterior to L5.      Impression     IMPRESSION: In this patient with a history of squamous cell carcinoma  of the tongue status post chemoradiation:  1. No evidence of metastatic disease in the chest, abdomen, or pelvis.  2. Please see dedicated neuroradiology report for the results of the  high resolution PET-CT of the neck.     I have personally reviewed the examination and initial interpretation  and I agree with the findings.    GALI JONES MD   CT Soft Tissue Neck w Contrast    Narrative    PET CT fusion examination 8/3/2018 10:40 AM  1. Neck CT with contrast  2. PET study of the neck  3. PET CT fusion study of the neck    History:  Squamous cell carcinoma of the tongue, completing  chemoradiation 5/2/2018.    Comparison: PET-CT 4/13/2018, 2/23/2018.    Technique: Please refer to the accompanying whole body PET-CT for  report of the dose and whole body PET-CT findings.  Regarding the neck, axial images were obtained without nonionic  intravenous contrast administration, with sagittal and coronal  reconstructions performed. Neck CT images were reviewed in bone, soft  tissue, and lung windows, with review of the fused PET-CT images as  well in multiple planes. Postcontrast images of the neck were obtained  as part of the PET-CT body exam.    Findings: Postcontrast images of the neck under the PET-CT body  accession were also reviewed as part of this dictation.    No apparent residual mass at the tongue base or oropharynx. Mild  diffuse oropharyngeal mucosal FDG uptake. No focal abnormal mucosal  FDG uptake.    Mildly hypermetabolic right 2A lymph node 8 x 6 mm, SUV max 3.4  (series 3, image 70), likely reactive. No enlarging or increasingly  hypermetabolic lymph node.    Major salivary glands are unremarkable. Normal thyroid gland.    Limited evaluation of the cervical vertebral column demonstrates  multilevel degenerative changes of the cervical spine. The visualized  paranasal sinuses and mastoid air cells are clear.       Impression     Impression: In this patient with a history of squamous cell carcinoma  status post chemoradiation, findings indicate a positive response to  treatment:  1. No local recurrence. Mild residual FDG uptake in the oropharynx is  attributed to postradiation changes.   2. No cervical adenopathy.  3. Please refer to the whole body PET-CT performed as a separate  report, for the findings of the remainder of the body.    I have personally reviewed the examination and initial interpretation  and I agree with the findings.    CHHAYA HUNTER MD     ASSESSMENT/PLAN:    Stage IV a [id number] 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  She started concurrent chemotherapy with high-dose cisplatin alongside radiation on 3/14/2018. Cycle #2 of chemotherapy was delayed by one week because of neutropenia.   She received C#2 on 4/11/18  She is completed radiation on 5/2/2018   C#3 which was due on 5/2/18 was not given due to neutropenia    Repeat PET CT is negative for any residual disease.  She will see ENT again tomorrow.  I would recommend repeating his scans in 6 months.  She also has fullness in her ears and she will discuss with ENT tomorrow.      Mouth pain.  Although it is a little better but it has not completely resolved.  She still uses Magic mouthwash.  I refilled it.  She takes Tylenol for it as well.  Very occasionally she takes Vicodin.        Nutrition.  This has been her main issue.  She is working with the speech and swallow.  I strongly advised her to use more cancer daily.  Currently she is using 3 cans a day.  She said she is going to try to use more.        Smoking.  I advised her to follow with a smoking cessation program but at this time she is not willing to do that.  She thinks that if she is able to eat better then she will start using Chantix.      Cytopenias. Related to prior chemotherapy. She also has ANDRES 2/2 menorrhagia. She got 2 out of 4 planned doses of IV iron. Will repeat labs today        We  did not address the following today  She had baseline audiogram done prior to start of cisplatin. Currently she is not noticing any changes in her hearing or tinnitus. We can consider repeating audiogram if she has any problems with hearing or tinnitus      I will see her back in 3 months    All questions answered.  She is comfortable with the plan.      Benedicto Nieto        Again, thank you for allowing me to participate in the care of your patient.        Sincerely,        Benedicto Nieto MD

## 2018-08-07 NOTE — NURSING NOTE
"Oncology Rooming Note    August 7, 2018 1:53 PM   Melinda Milligan is a 42 year old female who presents for:    Chief Complaint   Patient presents with     Oncology Clinic Visit     follow up PET on 8/3     Initial Vitals: /87  Pulse 93  Temp 98  F (36.7  C)  Resp 16  Ht 1.499 m (4' 11\")  Wt 35.5 kg (78 lb 4 oz)  SpO2 98%  BMI 15.8 kg/m2 Estimated body mass index is 15.8 kg/(m^2) as calculated from the following:    Height as of this encounter: 1.499 m (4' 11\").    Weight as of this encounter: 35.5 kg (78 lb 4 oz). Body surface area is 1.22 meters squared.  Moderate Pain (5) Comment: mouth and neck - tylenol   No LMP recorded.  Allergies reviewed: Yes  Medications reviewed: Yes    Medications: MEDICATION REFILLS NEEDED TODAY. Provider was notified.  Pharmacy name entered into [facility]: [facility] - [place], [place] - [address])        5 minutes for nursing intake (face to face time)     Sri Artis LPN              "

## 2018-08-07 NOTE — PATIENT INSTRUCTIONS
Labs today    Follow with ENT    Use Nystatin swish and spit for thrush    See me back 3 months

## 2018-08-07 NOTE — MR AVS SNAPSHOT
After Visit Summary   8/7/2018    Melinda Milligan    MRN: [id number]           Patient Information     Date Of Birth          1975        Visit Information        Provider Department      8/7/2018 1:45 PM Benedicto Nieto MD [facility] [[email or web address]]        Today's Diagnoses     Squamous cell carcinoma of oropharynx (H)          Care Instructions    Labs today    Follow with ENT    Use Nystatin swish and spit for thrush    See me back 3 months              Follow-ups after your visit        Your next 10 appointments already scheduled     Aug 08, 2018 10:20 AM CDT   (Arrive by 10:05 AM)   Return Visit with Marga Arana MD   [facility] ([facility])    [address]  4th Floor  [facility] [address] [phone]            Sep 14, 2018 10:30 AM CDT   LAB with LAB ONC [facility])    [address] [place] [address] [phone]           Please do not eat 10-12 hours before your appointment if you are coming in fasting for labs on lipids, cholesterol, or glucose (sugar). This does not apply to pregnant women. Water, hot tea and black coffee (with nothing added) are okay. Do not drink other fluids, diet soda or chew gum.            Sep 14, 2018 11:15 AM CDT   Return Visit with Benedicto Nieto MD   [facility])    [id number] [place] [address] [phone]            Sep 14, 2018 12:00 PM CDT   Level 2 with [address])    [address] [place] [address] [phone]              Who to contact     If you have questions or need follow up information about today's clinic visit or your schedule please contact [facility] directly at [phone].  Normal or non-critical lab and imaging results will be  "communicated to you by MyChart, letter or phone within 4 business days after the clinic has received the results. If you do not hear from us within 7 days, please contact the clinic through MyChart or phone. If you have a critical or abnormal lab result, we will notify you by phone as soon as possible.  Submit refill requests through [employer] or call your pharmacy and they will forward the refill request to us. Please allow 3 business days for your refill to be completed.          Additional Information About Your Visit        Care EveryWhere ID     This is your Care EveryWhere ID. This could be used by other organizations to access your [facility] medical records  [id number]        Your Vitals Were     Pulse Temperature Respirations Height Pulse Oximetry BMI (Body Mass Index)    93 98  F (36.7  C) 16 1.499 m (4' 11\") 98% 15.8 kg/m2       Blood Pressure from Last 3 Encounters:   08/07/18 120/87   08/07/18 120/87   06/05/18 116/76    Weight from Last 3 Encounters:   08/07/18 35.5 kg (78 lb 4 oz)   08/07/18 35.5 kg (78 lb 4 oz)   06/05/18 37.3 kg (82 lb 4.8 oz)              Today, you had the following     No orders found for display         Today's Medication Changes          These changes are accurate as of 8/7/18  2:36 PM.  If you have any questions, ask your nurse or doctor.               Start taking these medicines.        Dose/Directions    magic mouthwash suspension   Commonly known as:  ENTER INGREDIENTS IN COMMENTS   Used for:  Squamous cell carcinoma of oropharynx (H)   Started by:  Benedicto Nieto MD        Swish and spit 5 ml every 6 hours   Quantity:  1000 mL   Refills:  3            Where to get your medicines      Some of these will need a paper prescription and others can be bought over the counter.  Ask your nurse if you have questions.     Bring a paper prescription for each of these medications     magic mouthwash suspension                Primary Care Provider Office Phone # Fax #    Quang NEWTON " RADHA Wall [phone]       [facility] [address]  [facility] [address]        Equal Access to Services     JAVIER MORALES : Hadii aad ku hadtajo Soomaali, waaxda luqadaha, qaybta kaalmada adeegyada, whitney mcnairn magangerardo greer laCharliebess pace. So [facility] [phone].    ATENCIÓN: Si habla español, tiene a hawley disposición servicios gratuitos de asistencia lingüística. Llame al [phone].    We comply with applicable federal civil rights laws and [place] laws. We do not discriminate on the basis of race, color, national origin, age, disability, sex, sexual orientation, or gender identity.            Thank you!     Thank you for choosing [facility]  for your care. Our goal is always to provide you with excellent care. Hearing back from our patients is one way we can continue to improve our services. Please take a few minutes to complete the written survey that you may receive in the mail after your visit with us. Thank you!             Your Updated Medication List - Protect others around you: Learn how to safely use, store and throw away your medicines at www.disposemymeds.org.          This list is accurate as of 8/7/18  2:36 PM.  Always use your most recent med list.                   Brand Name Dispense Instructions for use Diagnosis    ACETAMINOPHEN PO      Take 1,000 mg by mouth        ALLEGRA ALLERGY PO      Take by mouth as needed for allergies        cevimeline 30 MG capsule    EVOXAC    90 capsule    Take 1 capsule (30 mg) by mouth 3 times daily    Squamous cell carcinoma of oropharynx (H)       folic acid 1 MG tablet    FOLVITE     Take 1 mg by mouth        magic mouthwash suspension    ENTER INGREDIENTS IN COMMENTS    1000 mL    Swish and spit 5 ml every 6 hours    Squamous cell carcinoma of oropharynx (H)       order for DME     90 Can    Sig:  Isosource 1.5 calories:  Instill 3.5 cartons per day via PEG tube by syringe or gravity flow    Squamous cell  carcinoma of oral cavity (H)       polyethylene glycol powder    MIRALAX/GLYCOLAX    225 g    Take 17 g (1 capful) by mouth daily    Drug-induced constipation, Cancer related pain, 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    Squamous cell carcinoma of oropharynx (H)       * varenicline 0.5 MG X 11 & 1 MG X 42 tablet    CHANTIX STARTING MONTH KLAUS    42 tablet    Take as instructed    Tobacco use       * Notice:  This list has 2 medication(s) that are the same as other medications prescribed for you. Read the directions carefully, and ask your doctor or other care provider to review them with you.

## 2018-08-07 NOTE — MR AVS SNAPSHOT
After Visit Summary   8/7/2018    Melinda Milligan    MRN: [id number]           Patient Information     Date Of Birth          1975        Visit Information        Provider Department      8/7/2018 2:30 PM Albert Ambrosio MD [facility]        Today's Diagnoses     Squamous cell carcinoma of oropharynx (H)    -  1      Care Instructions    Please contact [facility]iation Oncology RN with questions or concerns following today's appointment: [phone].    Thank you!            Follow-ups after your visit        Your next 10 appointments already scheduled     Aug 08, 2018 10:20 AM CDT   (Arrive by 10:05 AM)   Return Visit with Marga Arana MD   [facility] ([facility])    [address] [place] [address] [phone]            Aug 08, 2018 10:20 AM CDT   (Arrive by 10:05 AM)   Swallow Treatment with LIANNA Mcarthur   [facility] ([facility])    [address] [place] [address] [phone]            Sep 14, 2018 10:30 AM CDT   LAB with LAB ONC Pending sale to [facility] ([facility])    [id number] [place] [address] [phone]           Please do not eat 10-12 hours before your appointment if you are coming in fasting for labs on lipids, cholesterol, or glucose (sugar). This does not apply to pregnant women. Water, hot tea and black coffee (with nothing added) are okay. Do not drink other fluids, diet soda or chew gum.            Sep 14, 2018 11:15 AM CDT   Return Visit with Benedicto Nieto MD   [facility] ([facility])    [address] [address] [place] [address] [phone]            Sep 14, 2018 12:00 PM CDT   Level 2 with [address] ([facility])    [id number] [place] [address]  "[id number]   [phone]              Future tests that were ordered for you today     Open Future Orders        Priority Expected Expires Ordered    Creatinine Routine 11/7/2018 8/7/2019 8/7/2018    CT Chest w/o Contrast Routine  8/7/2019 8/7/2018    CT Soft Tissue Neck w Contrast Routine 11/7/2018 8/7/2019 8/7/2018            Who to contact     If you have questions or need follow up information about today's clinic visit or your schedule please contact [facility] directly at [phone].  Normal or non-critical lab and imaging results will be communicated to you by MyChart, letter or phone within 4 business days after the clinic has received the results. If you do not hear from us within 7 days, please contact the clinic through MyChart or phone. If you have a critical or abnormal lab result, we will notify you by phone as soon as possible.  Submit refill requests through [employer] or call your pharmacy and they will forward the refill request to us. Please allow 3 business days for your refill to be completed.          Additional Information About Your Visit        Care EveryWhere ID     This is your Care EveryWhere ID. This could be used by other organizations to access your [place] medical records  [id number]        Your Vitals Were     Pulse Temperature Respirations Height Pulse Oximetry BMI (Body Mass Index)    93 98  F (36.7  C) (Oral) 16 4' 11\" 98% 15.8 kg/m2       Blood Pressure from Last 3 Encounters:   08/07/18 120/87   08/07/18 120/87   06/05/18 116/76    Weight from Last 3 Encounters:   08/07/18 78 lb 4 oz   08/07/18 78 lb 4 oz   06/05/18 82 lb 4.8 oz                 Today's Medication Changes          These changes are accurate as of 8/7/18  3:40 PM.  If you have any questions, ask your nurse or doctor.               Start taking these medicines.        Dose/Directions    dexamethasone 0.1 MG/ML solution   Used for:  Squamous cell carcinoma of oropharynx (H)   Started by:  Hebert" Albert Payton MD        Swish and Spit 5-10 mL QID prn pain   Quantity:  500 mL   Refills:  1       magic mouthwash suspension   Commonly known as:  ENTER INGREDIENTS IN COMMENTS   Used for:  Squamous cell carcinoma of oropharynx (H)   Started by:  Benedicto Nieto MD        Swish and spit 5 ml every 6 hours   Quantity:  1000 mL   Refills:  3            Where to get your medicines      These medications were sent to [id number] - [place], [place] - [address] AT [facility] &  [address]  [address], [place] [address]     Phone:  [phone]     dexamethasone 0.1 MG/ML solution         Some of these will need a paper prescription and others can be bought over the counter.  Ask your nurse if you have questions.     Bring a paper prescription for each of these medications     magic mouthwash suspension                Primary Care Provider Office Phone # Fax #    Quang Wall PA-C [phone]       [facility] [address] [facility] [address]  [facility] [address]        Equal Access to Services     [facility] : Hadii aad ku hadasho Soomaali, waaxda luqadaha, qaybta kaalmada adeegyada, waxay marcelina keller . So [facility] [phone].    ATENCIÓN: Si habla español, tiene a hawley disposición servicios gratQuVISos de asistencia lingüística. [facility] [phone].    We comply with applicable federal civil rights laws and [place] laws. We do not discriminate on the basis of race, color, national origin, age, disability, sex, sexual orientation, or gender identity.            Thank you!     Thank you for choosing [facility]  for your care. Our goal is always to provide you with excellent care. Hearing back from our patients is one way we can continue to improve our services. Please take a few minutes to complete the written survey that you may receive in the mail after your visit with us. Thank you!             Your Updated Medication List  - Protect others around you: Learn how to safely use, store and throw away your medicines at www.disposemymeds.org.          This list is accurate as of 8/7/18  3:40 PM.  Always use your most recent med list.                   Brand Name Dispense Instructions for use Diagnosis    ACETAMINOPHEN PO      Take 1,000 mg by mouth        ALLEGRA ALLERGY PO      Take by mouth as needed for allergies        cevimeline 30 MG capsule    EVOXAC    90 capsule    Take 1 capsule (30 mg) by mouth 3 times daily    Squamous cell carcinoma of oropharynx (H)       dexamethasone 0.1 MG/ML solution     500 mL    Swish and Spit 5-10 mL QID prn pain    Squamous cell carcinoma of oropharynx (H)       folic acid 1 MG tablet    FOLVITE     Take 1 mg by mouth        magic mouthwash suspension    ENTER INGREDIENTS IN COMMENTS    1000 mL    Swish and spit 5 ml every 6 hours    Squamous cell carcinoma of oropharynx (H)       order for DME     90 Can    Sig:  Isosource 1.5 calories:  Instill 3.5 cartons per day via PEG tube by syringe or gravity flow    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    Squamous cell carcinoma of oropharynx (H)       * varenicline 0.5 MG X 11 & 1 MG X 42 tablet    CHANTIX STARTING MONTH KLAUS    42 tablet    Take as instructed    Tobacco use       * Notice:  This list has 2 medication(s) that are the same as other medications prescribed for you. Read the directions  carefully, and ask your doctor or other care provider to review them with you.

## 2018-08-07 NOTE — NURSING NOTE
"FOLLOW-UP VISIT    Patient Name: Melinda Milligan      : 1975     Age: 42 year old        ______________________________________________________________________________     Chief Complaint   Patient presents with     Cancer     Radiation oncology return visit with Dr. Ambrosio     /87  Pulse 93  Temp 98  F (36.7  C) (Oral)  Resp 16  Ht 4' 11\"  Wt 78 lb 4 oz  SpO2 98%  BMI 15.8 kg/m2     Date Radiation Completed: 7,000 cGy completed on 2018 to head and neck    Pain  Current history of pain associated with this visit:   Intensity: 7/10  Current: burning and sensitivity  Location: oral cavity/tongue  Treatment: patient reports taking Tylenol 1,000 mg po two times daily, Magic Mouthwash as needed, Vicodin as needed (last dose 2018)    Labs  Other Labs: No    Imaging  PET/CT: 8/3/2018      Dental:   Most Recent Dental Visit: 2018 prior to start of radiation treatment  Patient reports using fluoride toothpaste, not using fluoride trays due to teeth sensitivity    Speech/Swallowing:   Most Recent evaluation or testin2018 [facility]  Swallowing Restrictions: patient reports no solid food intake related to mouth and tongue pain, drinking water by mouth without difficulty        Nutrition:  PEG Intake 3# cans per day, type of solution Isosource  Oral Intake: water  Weight:   Wt Readings from Last 3 Encounters:   18 78 lb 4 oz   18 78 lb 4 oz   18 82 lb 4.8 oz         Other Appointments:     MD Name: Dr. Arana Appointment Date: 2018   MD Name: Appointment Date:   MD Name: Appointment Date:     Nurse face-to-face time: Level 3:  10 min face to face time        "

## 2018-08-07 NOTE — PATIENT INSTRUCTIONS
Please contact [place] Radiation Oncology RN with questions or concerns following today's appointment: [phone].    Thank you!

## 2018-08-07 NOTE — PROGRESS NOTES
[facility], [place]  Radiation Oncology Follow-up Note  2018    Melinda Milligan  MRN: [id number]  : 1975    DISEASE TREATED: [id number] SCCA of the oropharynx, p16 negative      RADIATION THERAPY DELIVERED: 70 Gy in 35 fractions to gross disease and high risk areas, 60 Gy to intermediate risk and 54 Gy to low risk regions. Concurrent with high-dose cisplatin.      INTERVAL SINCE COMPLETION OF RADIATION THERAPY: 3 months (completed 2018)      SUBJECTIVE: Ms. Milligan is a 42 year old female with cT3N2c SCC of the oropharynx p16 negative.  She completed definitive chemoradiation per above, and presents for routine 3 months follow-up. Her initial disease was significant for  a bilateral primary lesions that extended to the palatine tonsils, posterior soft palate, and right posterolateral NPX. There was also disease involvement of bilateral level II lymph nodes, without distant metastases.     She was last seen at 1 month follow-up, when she was noted to be recovering well from the acute toxicities of therapy. Her weight was stable and her diet was advancing. She stopped using narcotics for pain control, but continued to have significant xerostomia. She was prescribed Evoxac at that visit.    Since last visit she has continued to seen in follow-up with Dr. Arana. On 2018, Dr. Arana noted there was no evidence of residual disease on scope exam, although the patient continued to have poor dentition, thrush along the gingiva, and some trismus. The uvula was absent, and the patient complained of some velopharyngeal insufficiency.     The patient s first set of surveillance images were completed on 8/3/2018, and revealed good treatment response (mild residual FDG uptake in the oropharynx consistent with post-radiation changes) with no evidence of regional or distant metastases. She was seen in follow-up with Dr. Nieto earlier today, with no major concerns being  "raised. She is scheduled to see Dr. Arana again tomorrow.     Today she states that she is generally well, and states her energy level is good. Her weight is stable but still below baseline of ~94 lbs. She continues to use her PEG tube (3 Isosource a day), and is taking only water by mouth. Most other types of food or liquid cause her to have a burning mouth pain (rated 7/10), especially acidic food. She was prescribed magic mouthwash by Dr. Nieto this morning to address this symptom. She states that her dry mouth has improved, and she is no longer using Evoxac. She has mild morning neck stiffness but has full neck ROM. She denies dysphagia or trismus. She continues to smoke cigarettes,  5-20 cigarettes per day. She has a prescription for Chantix, but is not using it. She expresses an aversion to use nicotine patches because they cause palpitations. She is not using her fluoride trays due to tongue sensitivity.    OBJECTIVE:   /87  Pulse 93  Temp 98  F (36.7  C) (Oral)  Resp 16  Ht 4' 11\"  Wt 78 lb 4 oz  SpO2 98%  BMI 15.8 kg/m2  GENERAL:  Appears well, in no acute distress  HEENT: MMM. No concerning masses or lesions on inspection of oc/opx. Full ROM in the tongue, no lesions visible/palpable on oral tongue or FOM. Uvula absent. No TTP. Scattered whitish spots on tongue that do not scrape off easily. No cervical lymphadenopathy appreciated. No submental lymphedema.  NECK: No cervical or SCV lymphadenopathy b/l  CV: regular rate, no JVD or LE edema, good distal perfusion.   RESP: normal respiration on room air    IMPRESSION/RECOMMENDATIONS:  Ms. Milligan is a 42 year old female with [id number] SCC of the oropharynx, p16 negative, now 3 months s/p treatment with definitive chemoradiation. She has no clinical or radiographic evidence of disease. She has continued to heal from the acute toxicities of her therapy, but has persistent mouth pain and remains on tube feeds. It is somewhat unusual to have this " severe pain so far out from treatment, although she did require treatment of part of her tongue due to extension of the primary tumor. I suspect that her continued smoking is exacerbating her discomfort, so I encouraged her to quit; she will try Chantix. We also encouraged the use of magic mouthwash and pain meds prn, and I prescribed her dexamethasone mouth rinse. We reinforced the importance of good dental hygiene, encouraged the use of her fluoride trays, and emphasized regular visits to the dentists.  Encouraged her to advance diet as tolerated, recommended she try to re-introduce clear liquids (like broth and juice) into her oral diet, and try foods she can tolerate. She will continue to follow up with med onc and ENT. I would like her to return to my clinic in 3 months for reassessment, with f/u CT scan neck/chest. I instructed her to call us with any concerns, or if her pain does not improve within ~1 month.     Patrick Schwartz MD-PhD PGY-3  Radiation Oncology Resident, [facility]    Attending Physician Attestation:  I saw and evaluated the patient. I reviewed the resident's note and edited it as needed, and I agree with the plan of care as documented.    Albert Ambrosio M.D.  Attending Physician  Radiation Oncology  Clinic Phone: [phone]  Pager #: [phone]

## 2018-08-08 ENCOUNTER — THERAPY VISIT (OUTPATIENT)
Dept: SPEECH THERAPY | Facility: CLINIC | Age: 43
End: 2018-08-08
Payer: COMMERCIAL

## 2018-08-08 ENCOUNTER — OFFICE VISIT (OUTPATIENT)
Dept: OTOLARYNGOLOGY | Facility: CLINIC | Age: 43
End: 2018-08-08
Payer: COMMERCIAL

## 2018-08-08 VITALS — WEIGHT: 78 LBS | HEIGHT: 59 IN | BODY MASS INDEX: 15.72 KG/M2

## 2018-08-08 DIAGNOSIS — C10.9 SQUAMOUS CELL CARCINOMA OF OROPHARYNX (H): Primary | ICD-10-CM

## 2018-08-08 DIAGNOSIS — R13.12 OROPHARYNGEAL DYSPHAGIA: ICD-10-CM

## 2018-08-08 ASSESSMENT — PAIN SCALES - GENERAL: PAINLEVEL: MILD PAIN (2)

## 2018-08-08 NOTE — MR AVS SNAPSHOT
After Visit Summary   8/8/2018    Melinda Milligan    MRN: [id number]           Patient Information     Date Of Birth          1975        Visit Information        Provider Department      8/8/2018 10:20 AM Marga Arana MD [facility] Ear Nose and Throat        Today's Diagnoses     Squamous cell carcinoma of oropharynx (H)    -  1      Care Instructions    1. Please follow-up in clinic in 6 weeks with Dr. Arana and schedule follow-up with Nidhi in speech in 2 weeks.   2. Please call the ENT clinic with any questions,concerns, new or worsening symptoms.    -Clinic number is [phone]   Tasha Tim's direct line (Dr. Arana's nurse) [phone]              Follow-ups after your visit        Your next 10 appointments already scheduled     Aug 24, 2018 10:00 AM CDT   (Arrive by 9:45 AM)   Swallow Treatment with LIANNA Mcarthur   [facility] ([facility])    [address]  4th [facility] [id number] [phone]            Sep 14, 2018 10:30 AM CDT   LAB with LAB ONC [facility] ([facility])    [address] [place] [address] [phone]           Please do not eat 10-12 hours before your appointment if you are coming in fasting for labs on lipids, cholesterol, or glucose (sugar). This does not apply to pregnant women. Water, hot tea and black coffee (with nothing added) are okay. Do not drink other fluids, diet soda or chew gum.            Sep 14, 2018 11:15 AM CDT   Return Visit with Benedicto Nieto MD   [facility] ([facility])    [address] [address] [place] [address] [phone]            Sep 14, 2018 12:00 PM CDT   Level 2 with [address] ([facility])    [address] [address] [place] [address] [phone]            Sep 26, 2018 10:00 AM CDT   (Arrive by 9:45 AM)   Return  Visit with Marga Arana MD   [facility]ose [facility] ([facility])    [address]  4th Floor  [facility] [address] [phone]            Nov 05, 2018  1:30 PM CST   CT CHEST W/O CONTRAST with MGCT1   [facility] ([facility])    [address] [facility] [address] [phone]           Please bring any scans or X-rays taken at other hospitals, if similar tests were done. Also bring a list of your medicines, including vitamins, minerals and over-the-counter drugs. It is safest to leave personal items at home.  Be sure to tell your doctor:   If you have any allergies.   If there s any chance you are pregnant.   If you are breastfeeding.  You do not need to do anything special to prepare for this exam.  Please wear loose clothing, such as a sweat suit or jogging clothes. Avoid snaps, zippers and other metal. We may ask you to undress and put on a hospital gown.            Nov 05, 2018  1:45 PM CST   CT SOFT TISSUE NECK W CONTRAST with MGCT1   [facility] ([facility])    [address] [facility] [address] [phone]           Please bring any scans or X-rays taken at other hospitals, if similar tests were done. Also bring a list of your medicines, including vitamins, minerals and over-the-counter drugs. It is safest to leave personal items at home.  Be sure to tell your doctor:   If you have any allergies.   If there s any chance you are pregnant.   If you are breastfeeding.    If you have diabetes as your medication may need to be adjusted for this exam.  You will have contrast for this exam. To prepare:   Do not eat or drink for 2 hours before your exam. If you need to take medicine, you may take it with small sips of water. (We may ask you to take liquid medicine as well.)   The day before your exam, drink extra fluids at least six 8-ounce glasses (unless your doctor  "tells you to restrict your fluids).  Patients over 70 or patients with diabetes or kidney problems:   If you haven t had a blood test (creatinine test) within the last 30 days, the Cardiologist/Radiologist may require you to get this test prior to your exam.  Please wear loose clothing, such as a sweat suit or jogging clothes. Avoid snaps, zippers and other metal. We may ask you to undress and put on a hospital gown.  If you have any questions, please call the Imaging Department where you will have your exam.            Nov 06, 2018  3:00 PM CST   Return Visit with Albert Ambrosio MD   [facility] ([facility])    [address] [place] [address] [phone]              Future tests that were ordered for you today     Open Future Orders        Priority Expected Expires Ordered    Creatinine Routine 11/7/2018 8/7/2019 8/7/2018    CT Chest w/o Contrast Routine  8/7/2019 8/7/2018    CT Soft Tissue Neck w Contrast Routine 11/7/2018 8/7/2019 8/7/2018            Who to contact     Please call your clinic at [phone] to:    Ask questions about your health    Make or cancel appointments    Discuss your medicines    Learn about your test results    Speak to your doctor            Additional Information About Your Visit        Care EveryWhere ID     This is your Care EveryWhere ID. This could be used by other organizations to access your [place] medical records  [id number]        Your Vitals Were     Height BMI (Body Mass Index)                1.499 m (4' 11\") 15.75 kg/m2           Blood Pressure from Last 3 Encounters:   08/07/18 120/87   08/07/18 120/87   06/05/18 116/76    Weight from Last 3 Encounters:   08/08/18 35.4 kg (78 lb)   08/07/18 35.5 kg (78 lb 4 oz)   08/07/18 35.5 kg (78 lb 4 oz)              We Performed the Following     IMAGESTREAM RECORDING ORDER        Primary Care Provider Office Phone # Fax #    Quang Wall PA-C [phone] "       [facility] [address]  [facility] [address]        Equal Access to Services     JAVIER MORALES : Hadii aad ku hadtajo Soomaali, waaxda luqadaha, qaybta kaalmada adeegyada, whitney hewitt xuchinmay higginshugolucretia pace. So [facility] [phone].    ATENCIÓN: Si habla español, tiene a hawley disposición servicios gratuitos de asistencia lingüística. Llame al [phone].    We comply with applicable federal civil rights laws and [place] laws. We do not discriminate on the basis of race, color, national origin, age, disability, sex, sexual orientation, or gender identity.            Thank you!     Thank you for choosing [facility] EAR NOSE AND THROAT  for your care. Our goal is always to provide you with excellent care. Hearing back from our patients is one way we can continue to improve our services. Please take a few minutes to complete the written survey that you may receive in the mail after your visit with us. Thank you!             Your Updated Medication List - Protect others around you: Learn how to safely use, store and throw away your medicines at www.disposemymeds.org.          This list is accurate as of 8/8/18 11:42 AM.  Always use your most recent med list.                   Brand Name Dispense Instructions for use Diagnosis    ACETAMINOPHEN PO      Take 1,000 mg by mouth        ALLEGRA ALLERGY PO      Take by mouth as needed for allergies        cevimeline 30 MG capsule    EVOXAC    90 capsule    Take 1 capsule (30 mg) by mouth 3 times daily    Squamous cell carcinoma of oropharynx (H)       dexamethasone 0.1 MG/ML solution     500 mL    Swish and Spit 5-10 mL QID prn pain    Squamous cell carcinoma of oropharynx (H)       folic acid 1 MG tablet    FOLVITE     Take 1 mg by mouth        magic mouthwash suspension    ENTER INGREDIENTS IN COMMENTS    1000 mL    Swish and spit 5 ml every 6 hours    Squamous cell carcinoma of oropharynx (H)       order for DME     90 Can    Sig:  Isosource 1.5  calories:  Instill 3.5 cartons per day via PEG tube by syringe or gravity flow    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    Squamous cell carcinoma of oropharynx (H)       * varenicline 0.5 MG X 11 & 1 MG X 42 tablet    CHANTIX STARTING MONTH KLAUS    42 tablet    Take as instructed    Tobacco use       * Notice:  This list has 2 medication(s) that are the same as other medications prescribed for you. Read the directions carefully, and ask your doctor or other care provider to review them with you.

## 2018-08-08 NOTE — LETTER
8/8/2018       RE: Melinda Milligan  [address] [facility] [address]     Dear Colleague,    Thank you for referring your patient, Melinda Milligan, to the [facility] EAR NOSE AND THROAT at [facility]. Please see a copy of my visit note below.    Dear Dr. Monsalve:    I had the pleasure of seeing Melinda Milligan in followup today at the [facility].     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  She had a long history of thrush lasting about 6-8 months that was treated without improvement by her primary care physician.  She was then referred to a hematologist for anemia who evaluated her oropharynx and was concerned about a malignancy.  She was referred to Dr. Monsalve who performed a biopsy of the oral tongue consistent with squamous cell carcinoma.  She was initially seen here at the end of February.  She had a PET CT scan which showed the primary disease in both tonsils, soft palate, base of tongue, right oral tongue with bilateral lymphadenopathy.  Given the extent of her primary disease she was recommended for definitive chemoradiation.  She completed her treatment under the care of Dr. Ambrosio and Dr. Nieto at [place].  She received a total of 70 Gy and high-dose cisplatin completed on 5/2/2018.    Interval history:  She comes in today for follow-up.  She was last seen in June 2018.  Since that time she had her 3 month posttreatment PET scan in August 2018.  This was negative for any recurrence.  She recently saw Dr. Ambrosio and Dr. Nieto.  She is having ongoing issues with her oral nutrition.  She has continued tongue pain with trying to eat.  She has been able to drink some things without significant difficulty.  She is still using her feeding tube for the most of her nutrition and uses about 3 cans per day.  Her weight is stable at  about 78 pounds.  She continues to have tooth sensitivity and is unable to use her fluoride trays.  She is using some Magic mouth rinse.  She continues to have ear pain and pressure.  She denies any neck masses or hemoptysis.  She is trying to do her swallowing exercises.  She continues to have stable nasal regurgitation.  She does continue to smoke anywhere from 5-20 cigarettes per day and was counseled recently by Dr. Ambrosio on the importance of quitting.      MEDICATIONS:     Current Outpatient Prescriptions   Medication Sig Dispense Refill     ACETAMINOPHEN PO Take 1,000 mg by mouth       cevimeline (EVOXAC) 30 MG capsule Take 1 capsule (30 mg) by mouth 3 times daily 90 capsule 0     dexamethasone 0.1 MG/ML solution Swish and Spit 5-10 mL QID prn pain 500 mL 1     Fexofenadine HCl (ALLEGRA ALLERGY PO) Take by mouth as needed for allergies       folic acid (FOLVITE) 1 MG tablet Take 1 mg by mouth       magic mouthwash (ENTER INGREDIENTS IN COMMENTS) suspension Swish and spit 5 ml every 6 hours 1000 mL 3     order for DME Sig:  Isosource 1.5 calories:  Instill 3.5 cartons per day via PEG tube by syringe or gravity flow 90 Can 3     polyethylene glycol (MIRALAX/GLYCOLAX) powder Take 17 g (1 capful) by mouth daily 225 g 1     Potassium Chloride 40 MEQ/15ML (20%) SOLN 7.5 mLs (20 mEq) by Oral or G tube route daily 1 Bottle 1     varenicline (CHANTIX STARTING MONTH KLAUS) 0.5 MG X 11 & 1 MG X 42 tablet Take as instructed 42 tablet 0     varenicline (CHANTIX STARTING MONTH KLAUS) 0.5 MG X 11 & 1 MG X 42 tablet Take 0.5 mg tab daily for 3 days, then 0.5 mg tab twice daily for 4 days, then 1 mg twice daily. 53 tablet 0       ALLERGIES:    Allergies   Allergen Reactions     Ceftriaxone      Other reaction(s): Unknown Reaction     Chicken-Derived Products (Egg)      Other reaction(s): Vomiting  Other reaction(s): Unknown Reaction       HABITS/SOCIAL HISTORY:   She cut back significantly on her smoking last week but was previously  at least a 1 pack per day smoker since 1994.  She denies any chewing tobacco use.  She quit drinking alcohol.  She denies any drug use.  She is  and has a young child at home and another who just went off to college.  She works from home in .    Social History     Social History     Marital status:      Spouse name: N/A     Number of children: N/A     Years of education: N/A     Occupational History     Not on file.     Social History Main Topics     Smoking status: Current Some Day Smoker     Packs/day: 1.00     Years: 23.00     Types: Cigarettes     Start date: 1/1/1993     Smokeless tobacco: Never Used      Comment: Patient reports currently smoking 3 cigarettes per day - updated 3/6/2018     Alcohol use No     Drug use: No     Sexual activity: Yes     Partners: Male     Birth control/ protection: Pull-out method, Condom     Other Topics Concern     Not on file     Social History Narrative       PAST MEDICAL HISTORY:   Past Medical History:   Diagnosis Date     Allergic rhinitis      Anemia      Hoarseness      Oropharyngeal cancer (H) 02/15/2018     S/P radiation therapy     7,000 cGy completed on 5/2/2018 to head and neck [facility] with Dr. Albert Ambrosio     Uncomplicated asthma         PAST SURGICAL HISTORY:   Past Surgical History:   Procedure Laterality Date     BIOPSY  02/15/2018    Left Tongue - [facility]     LAPAROSCOPIC ASSISTED INSERTION TUBE GASTROTOMY N/A 3/14/2018    Procedure: LAPAROSCOPIC ASSISTED INSERTION TUBE GASTROSTOMY;  Percutaneous Endoscopic Gastrostomy Tube Insertion, Esophagogastroduodenoscopy;  Surgeon: Edna Martinez MD;  Location:  OR       FAMILY HISTORY:    Family History   Problem Relation Age of Onset     Substance Abuse Father      Dementia Maternal Grandmother      Diabetes Maternal Grandmother      Asthma Brother      Prostate Cancer Maternal Grandfather        REVIEW OF SYSTEMS:  12 point ROS was negative other than the symptoms noted  "above in the HPI.  Patient Supplied Answers to Review of Systems   ENT ROS 8/8/2018   Constitutional Weight loss, Problems with sleep   Neurology -   Ears, Nose, Throat Ear pain, Nasal congestion or drainage   Gastrointestinal/Genitourinary Diarrhea   Musculoskeletal Neck pain   Allergy/Immunology Allergies or hay fever   Hematologic -   Endocrine Heat or cold intolerance         PHYSICAL EXAMINATION:   Ht 1.499 m (4' 11\")  Wt 35.4 kg (78 lb)  BMI 15.75 kg/m2   Appearance:   normal; NAD, slightly older appearing than stated age, thin appearing, small stature   Communication:   normal; communicates verbally, slight hypernasality with incomplete soft palate closure   Head/Face:   inspection -  Normal; no scars or visible lesions   Salivary glands -  Normal size, no tenderness, swelling, or palpable masses   Facial strength -  Normal and symmetric bilateral; H/B I/VI   Skin:  normal, no rash   Ocular Motility:  normal occular movements   Ears:  auricle (AD) -  normal  EAC (AD) -  normal  TM (AD) -  Mild effusion - improved  auricle (AS) -  normal  EAC (AS) -  normal  TM (AS) - serous effusion - improved  Normal clinical speech reception   Nose:  Ext. inspection -  Normal  Internal Inspection -  Normal mucosa, septum, and turbinates; thick secretions   Nasopharynx: Thick secretions and crusting, no masses   Oral Cavity:  lips -  Normal mucosa, oral competence, and stoma size  Dentition in poor repair  Some trismus but improved from previous   Hard palate, buccal, floor of mouth mucosa with radiation changes   Tongue -right lateral tongue with anticipation but no masses   Oropharynx:  uvula absent   No evidence of masses in either tonsillar fossa, radiation changes and scar   Hypopharynx:  Normal pyriform sinus and pharyngeal wall mucosa   No pooled secretions    Larynx:  Epiglottis with mild thickening from radiation  False vocal cord, true vocal cord normal in appearance, bilaterally mobile cords    Neck: No " visible mass or asymmetry   Normal range of motion   Lymphatic:  Interval resolution of neck adenopathy   Cardiovascular:  warm, pink, well-perfused extremities without swelling, tenderness, or edema   Respiratory:  Normal respiratory effort, no stridor   Neuro/Psych.:  mood/affect -  normal  mental status -  normal        PROCEDURES:   Flexible fiberoptic laryngoscopy: Scope exam was indicated due to oropharyngeal tumor. Verbal consent was obtained. The nasal cavity was prepped with an aerosolized solution of topical anesthetic and vasoconstrictive agent. The scope was passed through the anterior nasal cavity and advanced. Inspection of the nasopharynx demonstrate crusting posteriorly along the lateral posterior walls.  There are radiation changes to the posterior oropharynx but no masses.  There is interval resolution of the previous mucositis.  The epiglottis is normal appearing with only mild thickening from the radiation.  The vocal cords are mobile bilaterally.  The previous edema has improved of the arytenoids.  The piriform sinuses are clear.  The airway is patent.  Patient tolerated the procedure well with no immediate complications.    RESULTS REVIEWED:   I independently reviewed the PET CT scan images which show no evidence of recurrent disease in the neck or oropharynx.  There is no evidence of disease in the chest.    CT neck:  Findings: Postcontrast images of the neck under the PET-CT body  accession were also reviewed as part of this dictation.     No apparent residual mass at the tongue base or oropharynx. Mild  diffuse oropharyngeal mucosal FDG uptake. No focal abnormal mucosal  FDG uptake.     Mildly hypermetabolic right 2A lymph node 8 x 6 mm, SUV max 3.4  (series 3, image 70), likely reactive. No enlarging or increasingly  hypermetabolic lymph node.     Major salivary glands are unremarkable. Normal thyroid gland.     Limited evaluation of the cervical vertebral column demonstrates  multilevel  degenerative changes of the cervical spine. The visualized  paranasal sinuses and mastoid air cells are clear.          Impression: In this patient with a history of squamous cell carcinoma  status post chemoradiation, findings indicate a positive response to  treatment:  1. No local recurrence. Mild residual FDG uptake in the oropharynx is  attributed to postradiation changes.   2. No cervical adenopathy.  3. Please refer to the whole body PET-CT performed as a separate  report, for the findings of the remainder of the body.         PET:  CHEST:  There is no suspicious FDG uptake in the chest.      The central tracheobronchial tree is patent, however with inspissated  mucous in the upper trachea (example series 6, image 40). Mild  dependent atelectasis. No consolidation. No pleural effusion. Heart  size is within normal limits. No pericardial effusion. No enlarged  mediastinal lymph nodes.     ABDOMEN AND PELVIS:  There is no suspicious FDG uptake in the abdomen or pelvis.     Postsurgical changes of percutaneous gastrostomy. The liver,  gallbladder, spleen, pancreas, adrenal glands are unremarkable. No  hydronephrosis. Bladder and uterus are unremarkable. Normal caliber of  the small and large bowel. No peritoneal fluid collection or free air.  No enlarged lymph nodes identified.     LOWER EXTREMITIES:   No abnormal masses or hypermetabolic lesions.     BONES:   There is no abnormal FDG uptake in the skeleton. Multilevel  degenerative changes of the spine. Possible old avulsion injury versus  ligamentous calcification anterior to L5.         IMPRESSION: In this patient with a history of squamous cell carcinoma  of the tongue status post chemoradiation:  1. No evidence of metastatic disease in the chest, abdomen, or pelvis.  2. Please see dedicated neuroradiology report for the results of the  high resolution PET-CT of the neck.     IMPRESSION AND PLAN:   Melinda Milligan is a 42-year-old woman with a p16 negative  T3N2c SCC of the oropharynx.  She is now status post completion of definitive chemoradiation in May 2018.  She had her 3 month posttreatment PET scan which was negative.  We spent time today discussing the fact that she needs to really push herself on the oral nutrition.  I am concerned that she is only taking 3 cans of tube feedings a day.  I really would like her to try and increase her oral nutrition or her PEG nutrition to try and gain some weight.  I think many of her issues related to the pain in her tongue and oral cavity may be related to her ongoing smoking.  She was again counseled that she really needs to stop smoking and she says that she and her  are motivated with regards this.  I had her see speech pathology again in clinic who also encouraged her similarly.  She does need to get to the point where she can tolerate her dental care with fluoride trays and routine cleanings.  She was encouraged to try salt and soda rinses for her mouth.  She was also again encouraged to use saline irrigations or saline spray to help with the nasal crusting in her nose which she has been previously recommended.  I will see her back in about 6 weeks.    Thank you very much for the opportunity to participate in the care of your patient.      Marga Arana M.D.  Otolaryngology- Head & Neck Surgery        CC:  Noel Monsalve, [facility] Ear, Nose & Throat [address], [address]  [place] [address]    Albert Ambrosio MD  Department of Radiation Oncology  [facility]

## 2018-08-08 NOTE — NURSING NOTE
Chief Complaint   Patient presents with     RECHECK     6 week follow up after PET scan     Apolinar Leslie, EMT

## 2018-08-09 NOTE — PROGRESS NOTES
Dear Dr. Monsalve:    I had the pleasure of seeing Melinda Milligan in followup today at the [facility].     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  She had a long history of thrush lasting about 6-8 months that was treated without improvement by her primary care physician.  She was then referred to a hematologist for anemia who evaluated her oropharynx and was concerned about a malignancy.  She was referred to Dr. Monsalve who performed a biopsy of the oral tongue consistent with squamous cell carcinoma.  She was initially seen here at the end of February.  She had a PET CT scan which showed the primary disease in both tonsils, soft palate, base of tongue, right oral tongue with bilateral lymphadenopathy.  Given the extent of her primary disease she was recommended for definitive chemoradiation.  She completed her treatment under the care of Dr. Ambrosio and Dr. Nieto at [facility].  She received a total of 70 Gy and high-dose cisplatin completed on 5/2/2018.    Interval history:  She comes in today for follow-up.  She was last seen in June 2018.  Since that time she had her 3 month posttreatment PET scan in August 2018.  This was negative for any recurrence.  She recently saw Dr. Ambrosio and Dr. Nieto.  She is having ongoing issues with her oral nutrition.  She has continued tongue pain with trying to eat.  She has been able to drink some things without significant difficulty.  She is still using her feeding tube for the most of her nutrition and uses about 3 cans per day.  Her weight is stable at about 78 pounds.  She continues to have tooth sensitivity and is unable to use her fluoride trays.  She is using some Magic mouth rinse.  She continues to have ear pain and pressure.  She denies any neck masses or hemoptysis.  She is trying to do her swallowing exercises.  She continues to have  stable nasal regurgitation.  She does continue to smoke anywhere from 5-20 cigarettes per day and was counseled recently by Dr. Ambrosio on the importance of quitting.      MEDICATIONS:     Current Outpatient Prescriptions   Medication Sig Dispense Refill     ACETAMINOPHEN PO Take 1,000 mg by mouth       cevimeline (EVOXAC) 30 MG capsule Take 1 capsule (30 mg) by mouth 3 times daily 90 capsule 0     dexamethasone 0.1 MG/ML solution Swish and Spit 5-10 mL QID prn pain 500 mL 1     Fexofenadine HCl (ALLEGRA ALLERGY PO) Take by mouth as needed for allergies       folic acid (FOLVITE) 1 MG tablet Take 1 mg by mouth       magic mouthwash (ENTER INGREDIENTS IN COMMENTS) suspension Swish and spit 5 ml every 6 hours 1000 mL 3     order for DME Sig:  Isosource 1.5 calories:  Instill 3.5 cartons per day via PEG tube by syringe or gravity flow 90 Can 3     polyethylene glycol (MIRALAX/GLYCOLAX) powder Take 17 g (1 capful) by mouth daily 225 g 1     Potassium Chloride 40 MEQ/15ML (20%) SOLN 7.5 mLs (20 mEq) by Oral or G tube route daily 1 Bottle 1     varenicline (CHANTIX STARTING MONTH KLAUS) 0.5 MG X 11 & 1 MG X 42 tablet Take as instructed 42 tablet 0     varenicline (CHANTIX STARTING MONTH KLAUS) 0.5 MG X 11 & 1 MG X 42 tablet Take 0.5 mg tab daily for 3 days, then 0.5 mg tab twice daily for 4 days, then 1 mg twice daily. 53 tablet 0       ALLERGIES:    Allergies   Allergen Reactions     Ceftriaxone      Other reaction(s): Unknown Reaction     Chicken-Derived Products (Egg)      Other reaction(s): Vomiting  Other reaction(s): Unknown Reaction       HABITS/SOCIAL HISTORY:   She cut back significantly on her smoking last week but was previously at least a 1 pack per day smoker since 1994.  She denies any chewing tobacco use.  She quit drinking alcohol.  She denies any drug use.  She is  and has a young child at home and another who just went off to college.  She works from home in .    Social History     Social  History     Marital status:      Spouse name: N/A     Number of children: N/A     Years of education: N/A     Occupational History     Not on file.     Social History Main Topics     Smoking status: Current Some Day Smoker     Packs/day: 1.00     Years: 23.00     Types: Cigarettes     Start date: 1/1/1993     Smokeless tobacco: Never Used      Comment: Patient reports currently smoking 3 cigarettes per day - updated 3/6/2018     Alcohol use No     Drug use: No     Sexual activity: Yes     Partners: Male     Birth control/ protection: Pull-out method, Condom     Other Topics Concern     Not on file     Social History Narrative       PAST MEDICAL HISTORY:   Past Medical History:   Diagnosis Date     Allergic rhinitis      Anemia      Hoarseness      Oropharyngeal cancer (H) 02/15/2018     S/P radiation therapy     7,000 cGy completed on 5/2/2018 to head and neck [facility] with Dr. Albert Ambrosio     Uncomplicated asthma         PAST SURGICAL HISTORY:   Past Surgical History:   Procedure Laterality Date     BIOPSY  02/15/2018    Left Tongue - [facility]     LAPAROSCOPIC ASSISTED INSERTION TUBE GASTROTOMY N/A 3/14/2018    Procedure: LAPAROSCOPIC ASSISTED INSERTION TUBE GASTROSTOMY;  Percutaneous Endoscopic Gastrostomy Tube Insertion, Esophagogastroduodenoscopy;  Surgeon: Edna Martinez MD;  Location:  OR       FAMILY HISTORY:    Family History   Problem Relation Age of Onset     Substance Abuse Father      Dementia Maternal Grandmother      Diabetes Maternal Grandmother      Asthma Brother      Prostate Cancer Maternal Grandfather        REVIEW OF SYSTEMS:  12 point ROS was negative other than the symptoms noted above in the HPI.  Patient Supplied Answers to Review of Systems  [facility] ROS 8/8/2018   Constitutional Weight loss, Problems with sleep   Neurology -   Ears, Nose, Throat Ear pain, Nasal congestion or drainage   Gastrointestinal/Genitourinary Diarrhea   Musculoskeletal Neck pain  "  Allergy/Immunology Allergies or hay fever   Hematologic -   Endocrine Heat or cold intolerance         PHYSICAL EXAMINATION:   Ht 1.499 m (4' 11\")  Wt 35.4 kg (78 lb)  BMI 15.75 kg/m2   Appearance:   normal; NAD, slightly older appearing than stated age, thin appearing, small stature   Communication:   normal; communicates verbally, slight hypernasality with incomplete soft palate closure   Head/Face:   inspection -  Normal; no scars or visible lesions   Salivary glands -  Normal size, no tenderness, swelling, or palpable masses   Facial strength -  Normal and symmetric bilateral; H/B I/VI   Skin:  normal, no rash   Ocular Motility:  normal occular movements   Ears:  auricle (AD) -  normal  EAC (AD) -  normal  TM (AD) -  Mild effusion - improved  auricle (AS) -  normal  EAC (AS) -  normal  TM (AS) - serous effusion - improved  Normal clinical speech reception   Nose:  Ext. inspection -  Normal  Internal Inspection -  Normal mucosa, septum, and turbinates; thick secretions   Nasopharynx: Thick secretions and crusting, no masses   Oral Cavity:  lips -  Normal mucosa, oral competence, and stoma size  Dentition in poor repair  Some trismus but improved from previous   Hard palate, buccal, floor of mouth mucosa with radiation changes   Tongue -right lateral tongue with anticipation but no masses   Oropharynx:  uvula absent   No evidence of masses in either tonsillar fossa, radiation changes and scar   Hypopharynx:  Normal pyriform sinus and pharyngeal wall mucosa   No pooled secretions    Larynx:  Epiglottis with mild thickening from radiation  False vocal cord, true vocal cord normal in appearance, bilaterally mobile cords    Neck: No visible mass or asymmetry   Normal range of motion   Lymphatic:  Interval resolution of neck adenopathy   Cardiovascular:  warm, pink, well-perfused extremities without swelling, tenderness, or edema   Respiratory:  Normal respiratory effort, no stridor   Neuro/Psych.:  mood/affect -  " normal  mental status -  normal        PROCEDURES:   Flexible fiberoptic laryngoscopy: Scope exam was indicated due to oropharyngeal tumor. Verbal consent was obtained. The nasal cavity was prepped with an aerosolized solution of topical anesthetic and vasoconstrictive agent. The scope was passed through the anterior nasal cavity and advanced. Inspection of the nasopharynx demonstrate crusting posteriorly along the lateral posterior walls.  There are radiation changes to the posterior oropharynx but no masses.  There is interval resolution of the previous mucositis.  The epiglottis is normal appearing with only mild thickening from the radiation.  The vocal cords are mobile bilaterally.  The previous edema has improved of the arytenoids.  The piriform sinuses are clear.  The airway is patent.  Patient tolerated the procedure well with no immediate complications.    RESULTS REVIEWED:   I independently reviewed the PET CT scan images which show no evidence of recurrent disease in the neck or oropharynx.  There is no evidence of disease in the chest.    CT neck:  Findings: Postcontrast images of the neck under the PET-CT body  accession were also reviewed as part of this dictation.     No apparent residual mass at the tongue base or oropharynx. Mild  diffuse oropharyngeal mucosal FDG uptake. No focal abnormal mucosal  FDG uptake.     Mildly hypermetabolic right 2A lymph node 8 x 6 mm, SUV max 3.4  (series 3, image 70), likely reactive. No enlarging or increasingly  hypermetabolic lymph node.     Major salivary glands are unremarkable. Normal thyroid gland.     Limited evaluation of the cervical vertebral column demonstrates  multilevel degenerative changes of the cervical spine. The visualized  paranasal sinuses and mastoid air cells are clear.          Impression: In this patient with a history of squamous cell carcinoma  status post chemoradiation, findings indicate a positive response to  treatment:  1. No local  recurrence. Mild residual FDG uptake in the oropharynx is  attributed to postradiation changes.   2. No cervical adenopathy.  3. Please refer to the whole body PET-CT performed as a separate  report, for the findings of the remainder of the body.         PET:  CHEST:  There is no suspicious FDG uptake in the chest.      The central tracheobronchial tree is patent, however with inspissated  mucous in the upper trachea (example series 6, image 40). Mild  dependent atelectasis. No consolidation. No pleural effusion. Heart  size is within normal limits. No pericardial effusion. No enlarged  mediastinal lymph nodes.     ABDOMEN AND PELVIS:  There is no suspicious FDG uptake in the abdomen or pelvis.     Postsurgical changes of percutaneous gastrostomy. The liver,  gallbladder, spleen, pancreas, adrenal glands are unremarkable. No  hydronephrosis. Bladder and uterus are unremarkable. Normal caliber of  the small and large bowel. No peritoneal fluid collection or free air.  No enlarged lymph nodes identified.     LOWER EXTREMITIES:   No abnormal masses or hypermetabolic lesions.     BONES:   There is no abnormal FDG uptake in the skeleton. Multilevel  degenerative changes of the spine. Possible old avulsion injury versus  ligamentous calcification anterior to L5.         IMPRESSION: In this patient with a history of squamous cell carcinoma  of the tongue status post chemoradiation:  1. No evidence of metastatic disease in the chest, abdomen, or pelvis.  2. Please see dedicated neuroradiology report for the results of the  high resolution PET-CT of the neck.     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  She is now status post completion of definitive chemoradiation in May 2018.  She had her 3 month posttreatment PET scan which was negative.  We spent time today discussing the fact that she needs to really push herself on the oral nutrition.  I am  concerned that she is only taking 3 cans of tube feedings a day.  I really would like her to try and increase her oral nutrition or her PEG nutrition to try and gain some weight.  I think many of her issues related to the pain in her tongue and oral cavity may be related to her ongoing smoking.  She was again counseled that she really needs to stop smoking and she says that she and her  are motivated with regards this.  I had her see speech pathology again in clinic who also encouraged her similarly.  She does need to get to the point where she can tolerate her dental care with fluoride trays and routine cleanings.  She was encouraged to try salt and soda rinses for her mouth.  She was also again encouraged to use saline irrigations or saline spray to help with the nasal crusting in her nose which she has been previously recommended.  I will see her back in about 6 weeks.    Thank you very much for the opportunity to participate in the care of your patient.      Marga Arana M.D.  Otolaryngology- Head & Neck Surgery        CC:  Noel Monsalve, [facility] [address], [address]  [place] [address]      Albert Ambrosio MD  Department of Radiation Oncology  [facility]

## 2018-08-24 ENCOUNTER — THERAPY VISIT (OUTPATIENT)
Dept: SPEECH THERAPY | Facility: CLINIC | Age: 43
End: 2018-08-24
Payer: COMMERCIAL

## 2018-08-24 DIAGNOSIS — R13.12 OROPHARYNGEAL DYSPHAGIA: ICD-10-CM

## 2018-08-24 DIAGNOSIS — C10.9 SQUAMOUS CELL CARCINOMA OF OROPHARYNX (H): Primary | ICD-10-CM

## 2018-09-26 ENCOUNTER — OFFICE VISIT (OUTPATIENT)
Dept: OTOLARYNGOLOGY | Facility: CLINIC | Age: 43
End: 2018-09-26
Payer: COMMERCIAL

## 2018-09-26 ENCOUNTER — THERAPY VISIT (OUTPATIENT)
Dept: SPEECH THERAPY | Facility: CLINIC | Age: 43
End: 2018-09-26
Payer: COMMERCIAL

## 2018-09-26 VITALS — HEIGHT: 59 IN | WEIGHT: 77.7 LBS | BODY MASS INDEX: 15.66 KG/M2

## 2018-09-26 DIAGNOSIS — C10.9 SQUAMOUS CELL CARCINOMA OF OROPHARYNX (H): Primary | ICD-10-CM

## 2018-09-26 DIAGNOSIS — R13.12 OROPHARYNGEAL DYSPHAGIA: ICD-10-CM

## 2018-09-26 ASSESSMENT — PAIN SCALES - GENERAL: PAINLEVEL: MILD PAIN (2)

## 2018-09-26 NOTE — NURSING NOTE
Chief Complaint   Patient presents with     RECHECK     6 week follow up      Apolinar Leslie, EMT

## 2018-09-26 NOTE — MR AVS SNAPSHOT
After Visit Summary   9/26/2018    Melinda Milligan    MRN: [id number]           Patient Information     Date Of Birth          1975        Visit Information        Provider Department      9/26/2018 10:00 AM Marga Arana MD [facility] Ear Nose and Throat        Today's Diagnoses     Squamous cell carcinoma of oropharynx (H)    -  1      Care Instructions    1. Please follow-up in clinic in 6-8 weeks with Dr. Arana.   2. Please call the ENT clinic with any questions,concerns, new or worsening symptoms.    -Clinic number is [phone]   - Meeta's direct line (Dr. Arana's nurse) [phone]              Follow-ups after your visit        Your next 10 appointments already scheduled     Nov 05, 2018  1:30 PM CST   CT CHEST W/O CONTRAST with MGCT1   [facility]    [address] [place] [address] [phone]           How do I prepare for my exam? (Food and drink instructions) No Food and Drink Restrictions.  How do I prepare for my exam? (Other instructions) You do not need to do anything special to prepare for this exam. For a sinus scan: Use your nose spray (nasal decongestant spray) as directed.  What should I wear: Please wear loose clothing, such as a sweat suit or jogging clothes. Avoid snaps, zippers and other metal. We may ask you to undress and put on a hospital gown.  How long does the exam take: Most scans take less than 20 minutes.  What should I bring: Please bring any scans or X-rays taken at other hospitals, if similar tests were done. Also bring a list of your medicines, including vitamins, minerals and over-the-counter drugs. It is safest to leave personal items at home.  Do I need a : No  is needed.  What do I need to tell my doctor? Be sure to tell your doctor: * If you have any allergies. * If there s any chance you are pregnant. * If you are breastfeeding.  What should I do after the exam: No  restrictions, You may resume normal activities.  What is this test: A CT (computed tomography) scan is a series of pictures that allows us to look inside your body. The scanner creates images of the body in cross sections, much like slices of bread. This helps us see any problems more clearly.  Who should I call with questions: If you have any questions, please call the Imaging Department where you will have your exam. Directions, parking instructions, and other information is available on our website, [email or web address].            Nov 05, 2018  1:45 PM CST   CT SOFT TISSUE NECK W CONTRAST with MGCT1   [facility] ([facility])    [address] [address] [place] [address] [phone]           How do I prepare for my exam? (Food and drink instructions) **You will have contrast for this exam.** Do not eat or drink for 2 hours before your exam. If you need to take medicine, you may take it with small sips of water. (We may ask you to take liquid medicine as well.)  The day before your exam, drink extra fluids at least six 8-ounce glasses (unless your doctor tells you to restrict your fluids).  How do I prepare for my exam? (Other instructions) Patients over 70 or patients with diabetes or kidney problems: If you haven t had a blood test (creatinine test) within the last 30 days, the Cardiologist/Radiologist may require you to get this test prior to your exam.  What should I wear: Please wear loose clothing, such as a sweat suit or jogging clothes.  Avoid snaps, zippers and other metal. We may ask you to undress and put on a hospital gown.  How long does the exam take: Most scans take less than 20 minutes.  What should I bring: Please bring any scans or X-rays taken at other hospitals, if similar tests were done. Also bring a list of your medicines, including vitamins, minerals and over-the-counter drugs. It is safest to leave personal items at home.  Do I need a :  No   is needed.  What do I need to tell my doctor? Be sure to tell your doctor: * If you have any allergies. * If there s any chance you are pregnant. * If you are breastfeeding. * If you have diabetes as your medication may need to be adjusted for this exam.  What should I do after the exam: No restrictions, You may resume normal activities.  What is this test: A CT (computed tomography) scan is a series of pictures that allows us to look inside your body. The scanner creates images of the body in cross sections, much like slices of bread. This helps us see any problems more clearly. You may receive contrast (X-ray dye) before or during your scan. Contrast is given through an IV (small needle in your arm).  Who should I call with questions: If you have any questions, please call the Imaging Department where you will have your exam. Directions, parking instructions, and other information is available on our website, [email or web address].            Nov 06, 2018  1:30 PM CST   LAB with LAB ONC [facility])    [address] [place] [id number] [phone]           Please do not eat 10-12 hours before your appointment if you are coming in fasting for labs on lipids, cholesterol, or glucose (sugar). This does not apply to pregnant women. Water, hot tea and black coffee (with nothing added) are okay. Do not drink other fluids, diet soda or chew gum.            Nov 06, 2018  2:15 PM CST   Return Visit with Benedicto Nieto MD   [facility])    [id number] [place] [address]   [phone]            Nov 06, 2018  3:00 PM CST   Return Visit with Albert Ambrosio MD   [facility])    [address] [place] [address]   [phone]            Nov 30, 2018  2:00 PM CST   (Arrive by 1:45 PM)   Return Visit with Marga Arana MD  "  [facility] Ear Nose [facility] Throat ([facility] [place])    [address]  4th Floor  [facility] [place] [address] [phone]              Who to contact     Please call your clinic at [phone] to:    Ask questions about your health    Make or cancel appointments    Discuss your medicines    Learn about your test results    Speak to your doctor            Additional Information About Your Visit        Care EveryWhere ID     This is your Care EveryWhere ID. This could be used by other organizations to access your [facility] medical records  [id number]        Your Vitals Were     Height BMI (Body Mass Index)                1.499 m (4' 11\") 15.69 kg/m2           Blood Pressure from Last 3 Encounters:   08/07/18 120/87   08/07/18 120/87   06/05/18 116/76    Weight from Last 3 Encounters:   09/26/18 35.2 kg (77 lb 11.2 oz)   08/08/18 35.4 kg (78 lb)   08/07/18 35.5 kg (78 lb 4 oz)              We Performed the Following     IMAGESTREAM RECORDING ORDER        Primary Care Provider Office Phone # Fax #    Quang Wall PA-C [phone]       [facility] [address]  [facility] [address]        Equal Access to Services     JAVIER MOARLES : Hadii aad ku hadasho Soomaali, waaxda luqadaha, qaybta kaalmada adeegyada, waxay idiin hayaan kyle greer lamargarita . [facility] [phone].    ATENCIÓN: Si habla español, tiene a hawley disposición servicios gratuitos de asistencia lingüística. [facility] [phone].    We comply with applicable federal civil rights laws and [place] laws. We do not discriminate on the basis of race, color, national origin, age, disability, sex, sexual orientation, or gender identity.            Thank you!     Thank you for choosing [facility] EAR NOSE AND THROAT  for your care. Our goal is always to provide you with excellent care. Hearing back from our patients is one way we can continue to improve our services. Please take a few minutes to complete the " written survey that you may receive in the mail after your visit with us. Thank you!             Your Updated Medication List - Protect others around you: Learn how to safely use, store and throw away your medicines at www.disposemymeds.org.          This list is accurate as of 9/26/18 10:30 AM.  Always use your most recent med list.                   Brand Name Dispense Instructions for use Diagnosis    ACETAMINOPHEN PO      Take 1,000 mg by mouth        ALLEGRA ALLERGY PO      Take by mouth as needed for allergies        cevimeline 30 MG capsule    EVOXAC    90 capsule    Take 1 capsule (30 mg) by mouth 3 times daily    Squamous cell carcinoma of oropharynx (H)       dexamethasone alcohol free 0.1 MG/ML solution     500 mL    Swish and Spit 5-10 mL QID prn pain    Squamous cell carcinoma of oropharynx (H)       folic acid 1 MG tablet    FOLVITE     Take 1 mg by mouth        magic mouthwash suspension    ENTER INGREDIENTS IN COMMENTS    1000 mL    Swish and spit 5 ml every 6 hours    Squamous cell carcinoma of oropharynx (H)       order for DME     90 Can    Sig:  Isosource 1.5 calories:  Instill 3.5 cartons per day via PEG tube by syringe or gravity flow    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    Squamous cell carcinoma of oropharynx (H)       * varenicline 0.5 MG X 11 & 1 MG X 42 tablet    CHANTIX STARTING MONTH PAK 42 tablet    Take as instructed     Tobacco use       * Notice:  This list has 2 medication(s) that are the same as other medications prescribed for you. Read the directions carefully, and ask your doctor or other care provider to review them with you.

## 2018-09-26 NOTE — PROGRESS NOTES
Dear Dr. Monsalve:    I had the pleasure of seeing Melinda Milligan in followup today at the [facility].     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  She had a long history of thrush lasting about 6-8 months that was treated without improvement by her primary care physician.  She was then referred to a hematologist for anemia who evaluated her oropharynx and was concerned about a malignancy.  She was referred to Dr. Monsalve who performed a biopsy of the oral tongue consistent with squamous cell carcinoma.  She was initially seen here at the end of February.  She had a PET CT scan which showed the primary disease in both tonsils, soft palate, base of tongue, right oral tongue with bilateral lymphadenopathy.  Given the extent of her primary disease she was recommended for definitive chemoradiation.  She completed her treatment under the care of Dr. Ambrosio and Dr. Nieto at [place].  She received a total of 70 Gy and high-dose cisplatin completed on 5/2/2018.  She had her 3-month posttreatment PET scan in August 2018 which was negative for any recurrence.    Interval history:  She comes in today for follow-up.  She was last seen in clinic in August 2018.   Since that time she has had some difficulty with her oral nutrition.  She dropped about 4 pounds since her last visit and then regained 3.  She says that she was having problems eating and realized that she then needed to go up on her tube feedings.  She says that she has started eating better since that time and is using her PEG tube twice per day.  She recently started using saline irrigations for her nose which is significantly helping with her congestion.  She did have some ear pain on the right side after her last visit which resolved.  She does get fluid in her ears with her irrigations.  She unfortunately continues to smoke 1  pack/day.  She has still not seen a dentist.        MEDICATIONS:     Current Outpatient Prescriptions   Medication Sig Dispense Refill     ACETAMINOPHEN PO Take 1,000 mg by mouth       cevimeline (EVOXAC) 30 MG capsule Take 1 capsule (30 mg) by mouth 3 times daily 90 capsule 0     dexamethasone 0.1 MG/ML solution Swish and Spit 5-10 mL QID prn pain 500 mL 1     Fexofenadine HCl (ALLEGRA ALLERGY PO) Take by mouth as needed for allergies       folic acid (FOLVITE) 1 MG tablet Take 1 mg by mouth       magic mouthwash (ENTER INGREDIENTS IN COMMENTS) suspension Swish and spit 5 ml every 6 hours 1000 mL 3     order for DME Sig:  Isosource 1.5 calories:  Instill 3.5 cartons per day via PEG tube by syringe or gravity flow 90 Can 3     polyethylene glycol (MIRALAX/GLYCOLAX) powder Take 17 g (1 capful) by mouth daily 225 g 1     Potassium Chloride 40 MEQ/15ML (20%) SOLN 7.5 mLs (20 mEq) by Oral or G tube route daily 1 Bottle 1     varenicline (CHANTIX STARTING MONTH KLAUS) 0.5 MG X 11 & 1 MG X 42 tablet Take as instructed 42 tablet 0     varenicline (CHANTIX STARTING MONTH KLAUS) 0.5 MG X 11 & 1 MG X 42 tablet Take 0.5 mg tab daily for 3 days, then 0.5 mg tab twice daily for 4 days, then 1 mg twice daily. 53 tablet 0       ALLERGIES:    Allergies   Allergen Reactions     Ceftriaxone      Other reaction(s): Unknown Reaction     Chicken-Derived Products (Egg)      Other reaction(s): Vomiting  Other reaction(s): Unknown Reaction       HABITS/SOCIAL HISTORY:   She cut back significantly on her smoking last week but was previously at least a 1 pack per day smoker since 1994.  She denies any chewing tobacco use.  She quit drinking alcohol.  She denies any drug use.  She is  and has a young child at home and another who just went off to college.  She works from home in .    Social History     Social History     Marital status:      Spouse name: N/A     Number of children: N/A     Years of education: N/A  "    Occupational History     Not on file.     Social History Main Topics     Smoking status: Current Some Day Smoker     Packs/day: 1.00     Years: 23.00     Types: Cigarettes     Start date: 1/1/1993     Smokeless tobacco: Never Used      Comment: Patient reports currently smoking 3 cigarettes per day - updated 3/6/2018     Alcohol use No     Drug use: No     Sexual activity: Yes     Partners: Male     Birth control/ protection: Pull-out method, Condom     Other Topics Concern     Not on file     Social History Narrative       PAST MEDICAL HISTORY:   Past Medical History:   Diagnosis Date     Allergic rhinitis      Anemia      Hoarseness      Oropharyngeal cancer (H) 02/15/2018     S/P radiation therapy     7,000 cGy completed on 5/2/2018 to head and neck [facility] with Dr. Albert Ambrosio     Uncomplicated asthma         PAST SURGICAL HISTORY:   Past Surgical History:   Procedure Laterality Date     BIOPSY  02/15/2018    Left Tongue - [facility]     LAPAROSCOPIC ASSISTED INSERTION TUBE GASTROTOMY N/A 3/14/2018    Procedure: LAPAROSCOPIC ASSISTED INSERTION TUBE GASTROSTOMY;  Percutaneous Endoscopic Gastrostomy Tube Insertion, Esophagogastroduodenoscopy;  Surgeon: Edna Martinez MD;  Location:  OR       FAMILY HISTORY:    Family History   Problem Relation Age of Onset     Substance Abuse Father      Dementia Maternal Grandmother      Diabetes Maternal Grandmother      Asthma Brother      Prostate Cancer Maternal Grandfather        REVIEW OF SYSTEMS:  12 point ROS was negative other than the symptoms noted above in the HPI.  Patient Supplied Answers to Review of Systems  [facility] ROS 9/26/2018   Constitutional -   Neurology -   Ears, Nose, Throat Ear pain, Nasal congestion or drainage, Trouble swallowing   Gastrointestinal/Genitourinary Diarrhea   Musculoskeletal -   Allergy/Immunology Allergies or hay fever   Hematologic -   Endocrine -         PHYSICAL EXAMINATION:   Ht 1.499 m (4' 11\")  Wt 35.2 kg (77 " lb 11.2 oz)  BMI 15.69 kg/m2   Appearance:   normal; NAD, slightly older appearing than stated age, thin appearing, small stature   Communication:   normal; communicates verbally, slight hypernasality with incomplete soft palate closure   Head/Face:   inspection -  Normal; no scars or visible lesions   Salivary glands -  Normal size, no tenderness, swelling, or palpable masses   Facial strength -  Normal and symmetric bilateral; H/B I/VI   Skin:  normal, no rash   Ocular Motility:  normal occular movements   Ears:  auricle (AD) -  normal  EAC (AD) -  normal  TM (AD) -  Mild effusion with retraction  auricle (AS) -  normal  EAC (AS) -  normal  TM (AS) - serous effusion  Normal clinical speech reception   Nose:  Ext. inspection -  Normal  Internal Inspection -  Normal mucosa, septum, and turbinates; minimal secretions   Nasopharynx:  Improvement in the thick secretions in the nasopharynx, much thinner than previous   Oral Cavity:  lips -  Normal mucosa, oral competence, and stoma size  Dentition in poor repair  Some trismus but improved from previous   Hard palate, buccal, floor of mouth mucosa with radiation changes   Tongue -no palpable masses, no mucosal lesions   Oropharynx:  uvula absent   No evidence of masses in either tonsillar fossa, radiation changes and scar  Fewer secretions coating the mucosa    Hypopharynx:  Normal pyriform sinus and pharyngeal wall mucosa   No pooled secretions    Larynx:  Epiglottis with mild thickening from radiation  False vocal cord, true vocal cord normal in appearance, bilaterally mobile cords   significant improvement in the overall appearance of the larynx and mucosa without previously seen pulled secretions   Neck: No visible mass or asymmetry   Normal range of motion   Lymphatic:  No palpable lymphadenopathy   Cardiovascular:  warm, pink, well-perfused extremities without swelling, tenderness, or edema   Respiratory:  Normal respiratory effort, no stridor   Neuro/Psych.:   mood/affect -  normal  mental status -  normal        PROCEDURES:   Flexible fiberoptic laryngoscopy: Scope exam was indicated due to oropharyngeal tumor. Verbal consent was obtained. The nasal cavity was prepped with an aerosolized solution of topical anesthetic and vasoconstrictive agent. The scope was passed through the anterior nasal cavity and advanced. Inspection of the nasopharynx demonstrate minimal secretions in the nasopharynx, fewer than previous and much thinner.  There are radiation changes to the posterior oropharynx but no masses.  There are fewer pooled secretions present here.  The vallecula is clear.  Epiglottis has mild thickening from radiation.  The vocal cords are mobile bilaterally.  There are radiation changes to the false cords and arytenoids.  The piriform sinuses are clear.  The airway is patent. Patient tolerated the procedure well with no immediate complications.    RESULTS REVIEWED:       IMPRESSION AND PLAN:   Melinda Milligan is a 43-year-old woman with a p16 negative T3N2c SCC of the oropharynx.  She is now status post completion of definitive chemoradiation in May 2018.  She has overall made improvement with regards to her oral nutrition.  We discussed this again in detail and making sure that she finds a balance between trying to take her nutrition orally and not continuing to lose weight.  She certainly is not appropriate to have her feeding tube removed at this time.  We had a lengthy discussion today about her smoking again.  She and her  said that they have reached the point where they are motivated to quit.  I strongly encourage this in the setting of increased risk of recurrence versus lung primary.  Her larynx does appears significantly improved compared to her previous scope exams and the saline irrigations seem to be helping with her secretions quite a bit.  She was seen by speech pathology today.  I will see her back in about 6 weeks.    Thank you very much for  the opportunity to participate in the care of your patient.      Marga Arana M.D.  Otolaryngology- Head & Neck Surgery        CC:  Noel Monsalve, [facility] Ear, Nose & Throat [address], [address]  [place] [address]      Albert Ambrosio MD  Department of Radiation Oncology  [facility]

## 2018-09-26 NOTE — PATIENT INSTRUCTIONS
1. Please follow-up in clinic in 6-8 weeks with Dr. Arana.   2. Please call the ENT clinic with any questions,concerns, new or worsening symptoms.    -Clinic number is [phone]   - Meeta's direct line (Dr. Arana's nurse) [phone]

## 2018-09-26 NOTE — LETTER
9/26/2018       RE: Melinda Milligan  [address] [facility] [address]     Dear Colleague,    Thank you for referring your patient, Melinda Milligan, to the [facility] EAR NOSE AND THROAT at [facility]. Please see a copy of my visit note below.    Dear Dr. Monsalve:    I had the pleasure of seeing Melinda Milligan in followup today at the [facility].     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  She had a long history of thrush lasting about 6-8 months that was treated without improvement by her primary care physician.  She was then referred to a hematologist for anemia who evaluated her oropharynx and was concerned about a malignancy.  She was referred to Dr. Monsalve who performed a biopsy of the oral tongue consistent with squamous cell carcinoma.  She was initially seen here at the end of February.  She had a PET CT scan which showed the primary disease in both tonsils, soft palate, base of tongue, right oral tongue with bilateral lymphadenopathy.  Given the extent of her primary disease she was recommended for definitive chemoradiation.  She completed her treatment under the care of Dr. Ambrosio and Dr. Nieto at [place].  She received a total of 70 Gy and high-dose cisplatin completed on 5/2/2018.  She had her 3-month posttreatment PET scan in August 2018 which was negative for any recurrence.    Interval history:  She comes in today for follow-up.  She was last seen in clinic in August 2018.   Since that time she has had some difficulty with her oral nutrition.  She dropped about 4 pounds since her last visit and then regained 3.  She says that she was having problems eating and realized that she then needed to go up on her tube feedings.  She says that she has started eating better since that time and is using her PEG tube twice per  day.  She recently started using saline irrigations for her nose which is significantly helping with her congestion.  She did have some ear pain on the right side after her last visit which resolved.  She does get fluid in her ears with her irrigations.  She unfortunately continues to smoke 1 pack/day.  She has still not seen a dentist.        MEDICATIONS:     Current Outpatient Prescriptions   Medication Sig Dispense Refill     ACETAMINOPHEN PO Take 1,000 mg by mouth       cevimeline (EVOXAC) 30 MG capsule Take 1 capsule (30 mg) by mouth 3 times daily 90 capsule 0     dexamethasone 0.1 MG/ML solution Swish and Spit 5-10 mL QID prn pain 500 mL 1     Fexofenadine HCl (ALLEGRA ALLERGY PO) Take by mouth as needed for allergies       folic acid (FOLVITE) 1 MG tablet Take 1 mg by mouth       magic mouthwash (ENTER INGREDIENTS IN COMMENTS) suspension Swish and spit 5 ml every 6 hours 1000 mL 3     order for DME Sig:  Isosource 1.5 calories:  Instill 3.5 cartons per day via PEG tube by syringe or gravity flow 90 Can 3     polyethylene glycol (MIRALAX/GLYCOLAX) powder Take 17 g (1 capful) by mouth daily 225 g 1     Potassium Chloride 40 MEQ/15ML (20%) SOLN 7.5 mLs (20 mEq) by Oral or G tube route daily 1 Bottle 1     varenicline (CHANTIX STARTING MONTH KLAUS) 0.5 MG X 11 & 1 MG X 42 tablet Take as instructed 42 tablet 0     varenicline (CHANTIX STARTING MONTH KLAUS) 0.5 MG X 11 & 1 MG X 42 tablet Take 0.5 mg tab daily for 3 days, then 0.5 mg tab twice daily for 4 days, then 1 mg twice daily. 53 tablet 0       ALLERGIES:    Allergies   Allergen Reactions     Ceftriaxone      Other reaction(s): Unknown Reaction     Chicken-Derived Products (Egg)      Other reaction(s): Vomiting  Other reaction(s): Unknown Reaction       HABITS/SOCIAL HISTORY:   She cut back significantly on her smoking last week but was previously at least a 1 pack per day smoker since 1994.  She denies any chewing tobacco use.  She quit drinking alcohol.  She  denies any drug use.  She is  and has a young child at home and another who just went off to college.  She works from home in .    Social History     Social History     Marital status:      Spouse name: N/A     Number of children: N/A     Years of education: N/A     Occupational History     Not on file.     Social History Main Topics     Smoking status: Current Some Day Smoker     Packs/day: 1.00     Years: 23.00     Types: Cigarettes     Start date: 1/1/1993     Smokeless tobacco: Never Used      Comment: Patient reports currently smoking 3 cigarettes per day - updated 3/6/2018     Alcohol use No     Drug use: No     Sexual activity: Yes     Partners: Male     Birth control/ protection: Pull-out method, Condom     Other Topics Concern     Not on file     Social History Narrative       PAST MEDICAL HISTORY:   Past Medical History:   Diagnosis Date     Allergic rhinitis      Anemia      Hoarseness      Oropharyngeal cancer (H) 02/15/2018     S/P radiation therapy     7,000 cGy completed on 5/2/2018 to head and neck [facility] with Dr. Albert Ambrosio     Uncomplicated asthma         PAST SURGICAL HISTORY:   Past Surgical History:   Procedure Laterality Date     BIOPSY  02/15/2018    Left Tongue - [facility]     LAPAROSCOPIC ASSISTED INSERTION TUBE GASTROTOMY N/A 3/14/2018    Procedure: LAPAROSCOPIC ASSISTED INSERTION TUBE GASTROSTOMY;  Percutaneous Endoscopic Gastrostomy Tube Insertion, Esophagogastroduodenoscopy;  Surgeon: Edna Martinez MD;  Location:  OR       FAMILY HISTORY:    Family History   Problem Relation Age of Onset     Substance Abuse Father      Dementia Maternal Grandmother      Diabetes Maternal Grandmother      Asthma Brother      Prostate Cancer Maternal Grandfather        REVIEW OF SYSTEMS:  12 point ROS was negative other than the symptoms noted above in the HPI.  Patient Supplied Answers to Review of Systems   ENT ROS 9/26/2018   Constitutional -   Neurology -  "  Ears, Nose, Throat Ear pain, Nasal congestion or drainage, Trouble swallowing   Gastrointestinal/Genitourinary Diarrhea   Musculoskeletal -   Allergy/Immunology Allergies or hay fever   Hematologic -   Endocrine -         PHYSICAL EXAMINATION:   Ht 1.499 m (4' 11\")  Wt 35.2 kg (77 lb 11.2 oz)  BMI 15.69 kg/m2   Appearance:   normal; NAD, slightly older appearing than stated age, thin appearing, small stature   Communication:   normal; communicates verbally, slight hypernasality with incomplete soft palate closure   Head/Face:   inspection -  Normal; no scars or visible lesions   Salivary glands -  Normal size, no tenderness, swelling, or palpable masses   Facial strength -  Normal and symmetric bilateral; H/B I/VI   Skin:  normal, no rash   Ocular Motility:  normal occular movements   Ears:  auricle (AD) -  normal  EAC (AD) -  normal  TM (AD) -  Mild effusion with retraction  auricle (AS) -  normal  EAC (AS) -  normal  TM (AS) - serous effusion  Normal clinical speech reception   Nose:  Ext. inspection -  Normal  Internal Inspection -  Normal mucosa, septum, and turbinates; minimal secretions   Nasopharynx:  Improvement in the thick secretions in the nasopharynx, much thinner than previous   Oral Cavity:  lips -  Normal mucosa, oral competence, and stoma size  Dentition in poor repair  Some trismus but improved from previous   Hard palate, buccal, floor of mouth mucosa with radiation changes   Tongue -no palpable masses, no mucosal lesions   Oropharynx:  uvula absent   No evidence of masses in either tonsillar fossa, radiation changes and scar  Fewer secretions coating the mucosa    Hypopharynx:  Normal pyriform sinus and pharyngeal wall mucosa   No pooled secretions    Larynx:  Epiglottis with mild thickening from radiation  False vocal cord, true vocal cord normal in appearance, bilaterally mobile cords   significant improvement in the overall appearance of the larynx and mucosa without previously seen pulled " secretions   Neck: No visible mass or asymmetry   Normal range of motion   Lymphatic:  No palpable lymphadenopathy   Cardiovascular:  warm, pink, well-perfused extremities without swelling, tenderness, or edema   Respiratory:  Normal respiratory effort, no stridor   Neuro/Psych.:  mood/affect -  normal  mental status -  normal        PROCEDURES:   Flexible fiberoptic laryngoscopy: Scope exam was indicated due to oropharyngeal tumor. Verbal consent was obtained. The nasal cavity was prepped with an aerosolized solution of topical anesthetic and vasoconstrictive agent. The scope was passed through the anterior nasal cavity and advanced. Inspection of the nasopharynx demonstrate minimal secretions in the nasopharynx, fewer than previous and much thinner.  There are radiation changes to the posterior oropharynx but no masses.  There are fewer pooled secretions present here.  The vallecula is clear.  Epiglottis has mild thickening from radiation.  The vocal cords are mobile bilaterally.  There are radiation changes to the false cords and arytenoids.  The piriform sinuses are clear.  The airway is patent. Patient tolerated the procedure well with no immediate complications.    RESULTS REVIEWED:       IMPRESSION AND PLAN:   Melinda Milligan is a 43-year-old woman with a p16 negative T3N2c SCC of the oropharynx.  She is now status post completion of definitive chemoradiation in May 2018.  She has overall made improvement with regards to her oral nutrition.  We discussed this again in detail and making sure that she finds a balance between trying to take her nutrition orally and not continuing to lose weight.  She certainly is not appropriate to have her feeding tube removed at this time.  We had a lengthy discussion today about her smoking again.  She and her  said that they have reached the point where they are motivated to quit.  I strongly encourage this in the setting of increased risk of recurrence versus lung  primary.  Her larynx does appears significantly improved compared to her previous scope exams and the saline irrigations seem to be helping with her secretions quite a bit.  She was seen by speech pathology today.  I will see her back in about 6 weeks.    Thank you very much for the opportunity to participate in the care of your patient.      Marga Arana M.D.  Otolaryngology- Head & Neck Surgery        CC:  Noel Monsalve [facility] Ear, Nose & Throat [address], [address]  [place] [address]      Albert Ambrosio MD  Department of Radiation Oncology  [facility]

## 2018-10-18 ENCOUNTER — HOME INFUSION (PRE-WILLOW HOME INFUSION) (OUTPATIENT)
Dept: PHARMACY | Facility: CLINIC | Age: 43
End: 2018-10-18

## 2018-10-19 NOTE — PROGRESS NOTES
This is a recent snapshot of the patient's [facility] medical record.  For current drug dose and complete information and questions, call [phone] or In Basket pool, [facility] ([id number])  CSN Number:  [id number]

## 2018-10-24 ENCOUNTER — TELEPHONE (OUTPATIENT)
Dept: RADIATION ONCOLOGY | Facility: CLINIC | Age: 43
End: 2018-10-24

## 2018-10-24 NOTE — TELEPHONE ENCOUNTER
I left a voicemail for Noe on 10/24/2018 at 0900, I am attempting to contact patient to reschedule appointment on 11/06/2018 at 1500 with Dr. Ambrosio at [facility].  Dr. Ambrosio will not be available that time/day and has asked that I contact the patient to reschedule.      Denise Glasgow MA

## 2018-10-25 ENCOUNTER — TELEPHONE (OUTPATIENT)
Dept: RADIATION ONCOLOGY | Facility: CLINIC | Age: 43
End: 2018-10-25

## 2018-10-25 NOTE — TELEPHONE ENCOUNTER
I left a voicemail for Noe on 10/25/2018 at 1330, I am attempting to contact patient to reschedule appointment on 11/06/2018 at 1500 with Dr. Ambrosio at [facility].  Dr. Ambrosio will not be available that time/day and has asked that I contact the patient to reschedule.      Denise Glasgow MA

## 2018-10-30 ENCOUNTER — TELEPHONE (OUTPATIENT)
Dept: RADIATION ONCOLOGY | Facility: CLINIC | Age: 43
End: 2018-10-30

## 2018-10-30 NOTE — TELEPHONE ENCOUNTER
Patient contacted to reschedule appointment on 11/06/2018 at 1500 with Dr. Ambrosio at [facility].  Dr. Ambrosio will not be available that time/day and has asked that I contact the patient to reschedule.  Patient rescheduled for 11/09/2018 at 1430 with Dr. Ambrosio, patient repeated information back to me and has no questions at this time.        Denise Glasgow MA

## 2018-11-05 ENCOUNTER — RADIANT APPOINTMENT (OUTPATIENT)
Dept: CT IMAGING | Facility: CLINIC | Age: 43
End: 2018-11-05
Attending: RADIOLOGY
Payer: COMMERCIAL

## 2018-11-05 DIAGNOSIS — C10.9 SQUAMOUS CELL CARCINOMA OF OROPHARYNX (H): ICD-10-CM

## 2018-11-05 LAB — RADIOLOGIST FLAGS: NORMAL

## 2018-11-05 PROCEDURE — 70491 CT SOFT TISSUE NECK W/DYE: CPT | Performed by: RADIOLOGY

## 2018-11-05 PROCEDURE — 71260 CT THORAX DX C+: CPT | Performed by: STUDENT IN AN ORGANIZED HEALTH CARE EDUCATION/TRAINING PROGRAM

## 2018-11-05 RX ORDER — IOPAMIDOL 755 MG/ML
38 INJECTION, SOLUTION INTRAVASCULAR ONCE
Status: COMPLETED | OUTPATIENT
Start: 2018-11-05 | End: 2018-11-05

## 2018-11-05 RX ADMIN — IOPAMIDOL 38 ML: 755 INJECTION, SOLUTION INTRAVASCULAR at 14:07

## 2018-11-06 ENCOUNTER — ONCOLOGY VISIT (OUTPATIENT)
Dept: ONCOLOGY | Facility: CLINIC | Age: 43
End: 2018-11-06
Payer: COMMERCIAL

## 2018-11-06 VITALS
HEIGHT: 59 IN | HEART RATE: 75 BPM | RESPIRATION RATE: 18 BRPM | WEIGHT: 75.31 LBS | SYSTOLIC BLOOD PRESSURE: 137 MMHG | BODY MASS INDEX: 15.18 KG/M2 | TEMPERATURE: 98.7 F | DIASTOLIC BLOOD PRESSURE: 82 MMHG | OXYGEN SATURATION: 100 %

## 2018-11-06 DIAGNOSIS — C02.9 TONGUE CANCER (H): ICD-10-CM

## 2018-11-06 DIAGNOSIS — C10.9 SQUAMOUS CELL CARCINOMA OF OROPHARYNX (H): ICD-10-CM

## 2018-11-06 DIAGNOSIS — D75.89 MACROCYTOSIS: Primary | ICD-10-CM

## 2018-11-06 DIAGNOSIS — D75.89 MACROCYTOSIS: ICD-10-CM

## 2018-11-06 LAB
ANION GAP SERPL CALCULATED.3IONS-SCNC: 7 MMOL/L (ref 3–14)
BASOPHILS # BLD AUTO: 0 10E9/L (ref 0–0.2)
BASOPHILS NFR BLD AUTO: 0.8 %
BUN SERPL-MCNC: 7 MG/DL (ref 7–30)
CALCIUM SERPL-MCNC: 9 MG/DL (ref 8.5–10.1)
CHLORIDE SERPL-SCNC: 101 MMOL/L (ref 94–109)
CO2 SERPL-SCNC: 29 MMOL/L (ref 20–32)
CREAT SERPL-MCNC: 0.5 MG/DL (ref 0.52–1.04)
DIFFERENTIAL METHOD BLD: ABNORMAL
EOSINOPHIL # BLD AUTO: 0.1 10E9/L (ref 0–0.7)
EOSINOPHIL NFR BLD AUTO: 1.8 %
ERYTHROCYTE [DISTWIDTH] IN BLOOD BY AUTOMATED COUNT: 12.2 % (ref 10–15)
FOLATE SERPL-MCNC: 9.1 NG/ML
GFR SERPL CREATININE-BSD FRML MDRD: >90 ML/MIN/1.7M2
GLUCOSE SERPL-MCNC: 76 MG/DL (ref 70–99)
HCT VFR BLD AUTO: 36.9 % (ref 35–47)
HGB BLD-MCNC: 12.4 G/DL (ref 11.7–15.7)
IMM GRANULOCYTES # BLD: 0 10E9/L (ref 0–0.4)
IMM GRANULOCYTES NFR BLD: 0.5 %
LYMPHOCYTES # BLD AUTO: 0.4 10E9/L (ref 0.8–5.3)
LYMPHOCYTES NFR BLD AUTO: 10.3 %
MCH RBC QN AUTO: 36.6 PG (ref 26.5–33)
MCHC RBC AUTO-ENTMCNC: 33.6 G/DL (ref 31.5–36.5)
MCV RBC AUTO: 109 FL (ref 78–100)
MONOCYTES # BLD AUTO: 0.4 10E9/L (ref 0–1.3)
MONOCYTES NFR BLD AUTO: 10.3 %
NEUTROPHILS # BLD AUTO: 3.1 10E9/L (ref 1.6–8.3)
NEUTROPHILS NFR BLD AUTO: 76.3 %
PLATELET # BLD AUTO: 263 10E9/L (ref 150–450)
POTASSIUM SERPL-SCNC: 3.7 MMOL/L (ref 3.4–5.3)
RBC # BLD AUTO: 3.39 10E12/L (ref 3.8–5.2)
RETICS # AUTO: 56.3 10E9/L (ref 25–95)
RETICS/RBC NFR AUTO: 1.6 % (ref 0.5–2)
SODIUM SERPL-SCNC: 137 MMOL/L (ref 133–144)
TSH SERPL DL<=0.005 MIU/L-ACNC: 1.26 MU/L (ref 0.4–4)
VIT B12 SERPL-MCNC: 558 PG/ML (ref 193–986)
WBC # BLD AUTO: 4 10E9/L (ref 4–11)

## 2018-11-06 PROCEDURE — 82607 VITAMIN B-12: CPT | Performed by: INTERNAL MEDICINE

## 2018-11-06 PROCEDURE — 36415 COLL VENOUS BLD VENIPUNCTURE: CPT | Performed by: INTERNAL MEDICINE

## 2018-11-06 PROCEDURE — 85045 AUTOMATED RETICULOCYTE COUNT: CPT | Performed by: INTERNAL MEDICINE

## 2018-11-06 PROCEDURE — 85025 COMPLETE CBC W/AUTO DIFF WBC: CPT | Performed by: INTERNAL MEDICINE

## 2018-11-06 PROCEDURE — 80048 BASIC METABOLIC PNL TOTAL CA: CPT | Performed by: INTERNAL MEDICINE

## 2018-11-06 PROCEDURE — 99215 OFFICE O/P EST HI 40 MIN: CPT | Performed by: INTERNAL MEDICINE

## 2018-11-06 PROCEDURE — 84443 ASSAY THYROID STIM HORMONE: CPT | Performed by: INTERNAL MEDICINE

## 2018-11-06 PROCEDURE — 82746 ASSAY OF FOLIC ACID SERUM: CPT | Performed by: INTERNAL MEDICINE

## 2018-11-06 ASSESSMENT — PAIN SCALES - GENERAL: PAINLEVEL: MILD PAIN (2)

## 2018-11-06 NOTE — LETTER
11/6/2018         RE: Melinda Milligan  [address] [facility] [address]        Dear Colleague,    Thank you for referring your patient, Melinda Milligan, to the [facility], [place]. Please see a copy of my visit note below.    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        Primary Physician: No Ref-Primary, Physician     History Of Present Illness:    Please see previous note for details. I have copied and updated from prior note.  Ms. 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 She initially presented with thrush several months ago which was not improved after treatment and more recently she was noted to have worsening swelling and pain on the right side of the tongue and cheek and she was referred to ENT for a biopsy of the tongue lesion which was consistent with squamous cell cancer. P 16 negative.  As part of her workup she had a PET scan and neck CT which showed   1. Hypermetabolic mass involving the bilateral palatine tonsils, anteriorly extending to and involving the soft palate, superiorly extending to and involving the right posterior lateral nasopharyngeal wall and inferiorly extending to the right lateral oropharyngeal wall representing primary squamosal cancer.  2. Right and left metastatic FDG avid level IIa lymph nodes.    Treatment:   3/13/2018 gtube placed  3/14/2018 Started chemoradiation with high dose cisplatin  Cycle #2 of chemotherapy was delayed by one week because of neutropenia. She got it on 4/11/18  C#3 not given due to cytopenias    She completed radiation 7000 cGy on 5/2/18    12 weeks post treatment PET CT is negative    She had another set of his scans done on 11/5/2018.  CT Neck showed posttreatment changes but no evidence of recurrence.  CT Chest showed a stable size of lung nodules no new  or enlarging lung nodule.    She tells me that she feels well.  About a couple of weeks ago she had a bad cold from which she has recovered.  She tells me that she lost weight during that time and was down to 71 pounds.  Around that same time she stopped using the tube feeds because she was having issues with diarrhea at night.  She tells me that she is able to swallow well and denies any significant pain in the mouth apart from mild pain in the jaw and currently she is not even taking Tylenol for it.  She has gained back 4 pounds and today weighs 75 pounds.  She continues to use nasal irrigation which has helped.  She continues to work with a speech therapist who is also helping her with nutrition.  She otherwise feels well.  Denies any significant neuropathy apart from occasional mild tingling in the right hand which resolves when she shakes her hands.  She has occasional fullness in the ears but otherwise denies any hearing problems.  No new swellings.  No trouble breathing.  After stopping the tube feeds diarrhea has resolved.  She continues to smoke on average half a pack a day and she tells me that there are good and bad days.  She understands that it is extremely important for her to quit his smoking.    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 Albert Ambrosio     Uncomplicated asthma     iron deficiency anemia due to heavy menses.   Previously she was getting IV iron through her GYN for heavy periods but she got 2 out of 4 planned doses of intravenous iron. She forgot about the last 2 doses.   Past Surgical History:   Procedure Laterality Date     BIOPSY  02/15/2018    Left  Tongue - [facility]     LAPAROSCOPIC ASSISTED INSERTION TUBE GASTROTOMY N/A 3/14/2018    Procedure: LAPAROSCOPIC ASSISTED INSERTION TUBE GASTROSTOMY;  Percutaneous Endoscopic Gastrostomy Tube Insertion, Esophagogastroduodenoscopy;  Surgeon: Edna Martinez MD;  Location: [facility] OR     Cancer History:   As above    Allergies:  Allergies as of 11/06/2018 - Alfred as Reviewed 11/06/2018   Allergen Reaction Noted     Ceftriaxone  08/25/2012     Chicken-derived products (egg)  08/25/2012     Current Medications:  Current Outpatient Prescriptions   Medication Sig Dispense Refill     ACETAMINOPHEN PO Take 1,000 mg by mouth       cevimeline (EVOXAC) 30 MG capsule Take 1 capsule (30 mg) by mouth 3 times daily (Patient not taking: Reported on 11/6/2018) 90 capsule 0     dexamethasone 0.1 MG/ML solution Swish and Spit 5-10 mL QID prn pain (Patient not taking: Reported on 11/6/2018) 500 mL 1     Fexofenadine HCl (ALLEGRA ALLERGY PO) Take by mouth as needed for allergies       folic acid (FOLVITE) 1 MG tablet Take 1 mg by mouth       magic mouthwash (ENTER INGREDIENTS IN COMMENTS) suspension Swish and spit 5 ml every 6 hours (Patient not taking: Reported on 11/6/2018) 1000 mL 3     order for DME Sig:  Isosource 1.5 calories:  Instill 3.5 cartons per day via PEG tube by syringe or gravity flow (Patient not taking: Reported on 11/6/2018) 90 Can 3     polyethylene glycol (MIRALAX/GLYCOLAX) powder Take 17 g (1 capful) by mouth daily (Patient not taking: Reported on 11/6/2018) 225 g 1     Potassium Chloride 40 MEQ/15ML (20%) SOLN 7.5 mLs (20 mEq) by Oral or G tube route daily (Patient not taking: Reported on 11/6/2018) 1 Bottle 1     varenicline (CHANTIX STARTING MONTH KLAUS) 0.5 MG X 11 & 1 MG X 42 tablet Take as instructed (Patient not taking: Reported on 11/6/2018) 42 tablet 0     varenicline (CHANTIX STARTING MONTH KLAUS) 0.5 MG X 11 & 1 MG X 42 tablet Take 0.5 mg tab daily for 3 days, then 0.5 mg tab twice daily for 4 days, then 1 mg  "twice daily. (Patient not taking: Reported on 11/6/2018) 53 tablet 0      Family History:  Family History   Problem Relation Age of Onset     Substance Abuse Father      Dementia Maternal Grandmother      Diabetes Maternal Grandmother      Asthma Brother      Prostate Cancer Maternal Grandfather      Social History:  Social History     Social History     Marital status:      Spouse name: N/A     Number of children: N/A     Years of education: N/A     Occupational History     Not on file.     Social History Main Topics     Smoking status: Current Some Day Smoker     Packs/day: 1.00     Years: 23.00     Types: Cigarettes     Start date: 1/1/1993     Smokeless tobacco: Never Used      Comment: Patient reports currently smoking 3 cigarettes per day - updated 3/6/2018     Alcohol use No     Drug use: No     Sexual activity: Yes     Partners: Male     Birth control/ protection: Pull-out method, Condom     Other Topics Concern     Not on file     Social History Narrative    she used to smoke 1 pack a day but now smoking about half a pack a day. We again discussed the importance of completely abstaining from smoking. I again discussed referring her for smoking cessation program but she is not interested      Denies any use of alcohol. Lives with her . 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         Physical Exam:  /82 (BP Location: Left arm)  Pulse 75  Temp 98.7  F (37.1  C) (Oral)  Resp 18  Ht 1.499 m (4' 11\")  Wt 34.2 kg (75 lb 5 oz)  SpO2 100%  BMI 15.21 kg/m2  CONSTITUTIONAL: No apparent distress  EYES: PERRLA, without pallor or jaundice  ENT/MOUTH: Ears unremarkable. No gross oral lesions  CVS: s1s2 normal  RESPIRATORY: Chest is clear  GI: Abdomen is benign.  PEG tube site is clean  NEURO: Alert and oriented ×3  INTEGUMENT: no concerning skin rashes   LYMPHATIC: no palpable lymphadenopathy  MUSCULOSKELETAL: Unremarkable. No bony tenderness.   EXTREMITIES: no pedal " edema  PSYCH: Mentation, mood and affect are appropriate          Laboratory/Imaging Studies      Results for orders placed or performed in visit on 11/06/18   *CBC with platelets differential   Result Value Ref Range    WBC 4.0 4.0 - 11.0 10e9/L    RBC Count 3.39 (L) 3.8 - 5.2 10e12/L    Hemoglobin 12.4 11.7 - 15.7 g/dL    Hematocrit 36.9 35.0 - 47.0 %     (H) 78 - 100 fl    MCH 36.6 (H) 26.5 - 33.0 pg    MCHC 33.6 31.5 - 36.5 g/dL    RDW 12.2 10.0 - 15.0 %    Platelet Count 263 150 - 450 10e9/L    % Neutrophils 76.3 %    % Lymphocytes 10.3 %    % Monocytes 10.3 %    % Eosinophils 1.8 %    % Basophils 0.8 %    % Immature Granulocytes 0.5 %    Absolute Neutrophil 3.1 1.6 - 8.3 10e9/L    Absolute Lymphocytes 0.4 (L) 0.8 - 5.3 10e9/L    Absolute Monocytes 0.4 0.0 - 1.3 10e9/L    Absolute Eosinophils 0.1 0.0 - 0.7 10e9/L    Absolute Basophils 0.0 0.0 - 0.2 10e9/L    Abs Immature Granulocytes 0.0 0 - 0.4 10e9/L    Diff Method Automated Method    Basic metabolic panel   Result Value Ref Range    Sodium 137 133 - 144 mmol/L    Potassium 3.7 3.4 - 5.3 mmol/L    Chloride 101 94 - 109 mmol/L    Carbon Dioxide 29 20 - 32 mmol/L    Anion Gap 7 3 - 14 mmol/L    Glucose 76 70 - 99 mg/dL    Urea Nitrogen 7 7 - 30 mg/dL    Creatinine 0.50 (L) 0.52 - 1.04 mg/dL    GFR Estimate >90 >60 mL/min/1.7m2    GFR Estimate If Black >90 >60 mL/min/1.7m2    Calcium 9.0 8.5 - 10.1 mg/dL   TSH with free T4 reflex   Result Value Ref Range    TSH 1.26 0.40 - 4.00 mU/L   Reticulocyte count   Result Value Ref Range    % Retic 1.6 0.5 - 2.0 %    Absolute Retic 56.3 25 - 95 10e9/L     ASSESSMENT/PLAN:    Stage IV a [id number] 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  She started concurrent chemotherapy with high-dose cisplatin alongside radiation on 3/14/2018. Cycle #2 of chemotherapy was delayed by one week because of neutropenia.   She received C#2 on 4/11/18  She is completed radiation on 5/2/2018   C#3 which was due on 5/2/18 was  not given due to neutropenia    Repeat PET CT is negative for any residual disease.  She had a repeat CT of the neck and CT chest which are without any evidence of recurrence.  She is going to see ENT later this month.  I will discussed with Dr. Arana about timing of her next scan because initially I was planning to repeat his scans in 6 months but since it was done yesterday we might delay  the subsequent scan        Mouth pain.  This is much better and currently she is not using anything for it.        Nutrition.  She is not using tube feeds.  She tells me that she has good appetite and now she is eating much better.  I strongly advised her to follow with nutritionist.  I also advised her to use high protein foods/drinks like carnation because she tells me that she cannot tolerate Ensure.  I really want her to gain weight with time.    She tells me that she gained about 4 pounds over the last few days.  If she continues to lose weight then she will have to restart tube feeds.      Smoking.  We again had a long discussion today about the importance of his smoking cessation.  I strongly encouraged her to follow with a smoking cessation program but she is not interested in that.  She tells me that she will quit on her own.  She has Chantix at home which she has not tried but she might try.  We will continue to address this at each visit.    Cytopenias. Related to prior chemotherapy.  This is resolved apart from lymphocyte count being 0.4, but today her MCV is elevated so I will check a reticulocyte count as well as vitamin B12 and folate levels.  Previously she had evidence of iron deficiency anemia secondary to menorrhagia and got 2 out of 4 planned doses of IV iron.         We did not address the following today  She had baseline audiogram done prior to start of cisplatin. Currently she is not noticing any changes in her hearing or tinnitus. We can consider repeating audiogram if she has any problems with hearing  or tinnitus    Return to clinic in 3 months    All questions answered.  She is comfortable with the plan.      Benedicto Nieto        Again, thank you for allowing me to participate in the care of your patient.        Sincerely,        Benedicto Nieto MD

## 2018-11-06 NOTE — NURSING NOTE
"Oncology Rooming Note    November 6, 2018 2:06 PM   Melinda Milligan is a 43 year old female who presents for:    Chief Complaint   Patient presents with     Oncology Clinic Visit     3 month Follow Up     Initial Vitals: /82 (BP Location: Left arm)  Pulse 75  Temp 98.7  F (37.1  C) (Oral)  Resp 18  Ht 1.499 m (4' 11\")  Wt 34.2 kg (75 lb 5 oz)  SpO2 100%  BMI 15.21 kg/m2 Estimated body mass index is 15.21 kg/(m^2) as calculated from the following:    Height as of this encounter: 1.499 m (4' 11\").    Weight as of this encounter: 34.2 kg (75 lb 5 oz). Body surface area is 1.19 meters squared.  Mild Pain (2) Comment: Data Unavailable   No LMP recorded.  Allergies reviewed: Yes  Medications reviewed: Yes    Medications: Medication refills not needed today.  Pharmacy name entered into [facility]: [facility] - [place], [place] - [address]      5 minutes for nursing intake (face to face time)     Jennifer Mckoy LPN              "

## 2018-11-06 NOTE — PROGRESS NOTES
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        Primary Physician: No Ref-Primary, Physician     History Of Present Illness:    Please see previous note for details. I have copied and updated from prior note.  Ms. 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 She initially presented with thrush several months ago which was not improved after treatment and more recently she was noted to have worsening swelling and pain on the right side of the tongue and cheek and she was referred to ENT for a biopsy of the tongue lesion which was consistent with squamous cell cancer. P 16 negative.  As part of her workup she had a PET scan and neck CT which showed   1. Hypermetabolic mass involving the bilateral palatine tonsils, anteriorly extending to and involving the soft palate, superiorly extending to and involving the right posterior lateral nasopharyngeal wall and inferiorly extending to the right lateral oropharyngeal wall representing primary squamosal cancer.  2. Right and left metastatic FDG avid level IIa lymph nodes.    Treatment:   3/13/2018 gtube placed  3/14/2018 Started chemoradiation with high dose cisplatin  Cycle #2 of chemotherapy was delayed by one week because of neutropenia. She got it on 4/11/18  C#3 not given due to cytopenias    She completed radiation 7000 cGy on 5/2/18    12 weeks post treatment PET CT is negative    She had another set of his scans done on 11/5/2018.  CT Neck showed posttreatment changes but no evidence of recurrence.  CT Chest showed a stable size of lung nodules no new or enlarging lung nodule.    She tells me that she feels well.  About a couple of weeks ago she had a bad cold from which she has recovered.  She tells me that she lost weight during that time and was down to 71 pounds.  Around that same time she  stopped using the tube feeds because she was having issues with diarrhea at night.  She tells me that she is able to swallow well and denies any significant pain in the mouth apart from mild pain in the jaw and currently she is not even taking Tylenol for it.  She has gained back 4 pounds and today weighs 75 pounds.  She continues to use nasal irrigation which has helped.  She continues to work with a speech therapist who is also helping her with nutrition.  She otherwise feels well.  Denies any significant neuropathy apart from occasional mild tingling in the right hand which resolves when she shakes her hands.  She has occasional fullness in the ears but otherwise denies any hearing problems.  No new swellings.  No trouble breathing.  After stopping the tube feeds diarrhea has resolved.  She continues to smoke on average half a pack a day and she tells me that there are good and bad days.  She understands that it is extremely important for her to quit his smoking.    ECOG 1    ROS:  A comprehensive ROS was otherwise neg        I reviewed other history in Epic as below      Past Medical/Surgical History:  Past Medical History:   Diagnosis Date     Allergic rhinitis      Anemia      Hoarseness      Oropharyngeal cancer (H) 02/15/2018     S/P radiation therapy     7,000 cGy completed on 5/2/2018 to [facility] and neck [facility] with Dr. Albert Ambrosio     Uncomplicated asthma     iron deficiency anemia due to heavy menses.   Previously she was getting IV iron through her GYN for heavy periods but she got 2 out of 4 planned doses of intravenous iron. She forgot about the last 2 doses.   Past Surgical History:   Procedure Laterality Date     BIOPSY  02/15/2018    Left Tongue - [facility]     LAPAROSCOPIC ASSISTED INSERTION TUBE GASTROTOMY N/A 3/14/2018    Procedure: LAPAROSCOPIC ASSISTED INSERTION TUBE GASTROSTOMY;  Percutaneous Endoscopic Gastrostomy Tube Insertion, Esophagogastroduodenoscopy;  Surgeon: Juan  Edna LOZOYA MD;  Location: [facility] OR     Cancer History:   As above    Allergies:  Allergies as of 11/06/2018 - Alfred as Reviewed 11/06/2018   Allergen Reaction Noted     Ceftriaxone  08/25/2012     Chicken-derived products (egg)  08/25/2012     Current Medications:  Current Outpatient Prescriptions   Medication Sig Dispense Refill     ACETAMINOPHEN PO Take 1,000 mg by mouth       cevimeline (EVOXAC) 30 MG capsule Take 1 capsule (30 mg) by mouth 3 times daily (Patient not taking: Reported on 11/6/2018) 90 capsule 0     dexamethasone 0.1 MG/ML solution Swish and Spit 5-10 mL QID prn pain (Patient not taking: Reported on 11/6/2018) 500 mL 1     Fexofenadine HCl (ALLEGRA ALLERGY PO) Take by mouth as needed for allergies       folic acid (FOLVITE) 1 MG tablet Take 1 mg by mouth       magic mouthwash (ENTER INGREDIENTS IN COMMENTS) suspension Swish and spit 5 ml every 6 hours (Patient not taking: Reported on 11/6/2018) 1000 mL 3     order for DME Sig:  Isosource 1.5 calories:  Instill 3.5 cartons per day via PEG tube by syringe or gravity flow (Patient not taking: Reported on 11/6/2018) 90 Can 3     polyethylene glycol (MIRALAX/GLYCOLAX) powder Take 17 g (1 capful) by mouth daily (Patient not taking: Reported on 11/6/2018) 225 g 1     Potassium Chloride 40 MEQ/15ML (20%) SOLN 7.5 mLs (20 mEq) by Oral or G tube route daily (Patient not taking: Reported on 11/6/2018) 1 Bottle 1     varenicline (CHANTIX STARTING MONTH KLAUS) 0.5 MG X 11 & 1 MG X 42 tablet Take as instructed (Patient not taking: Reported on 11/6/2018) 42 tablet 0     varenicline (CHANTIX STARTING MONTH KLAUS) 0.5 MG X 11 & 1 MG X 42 tablet Take 0.5 mg tab daily for 3 days, then 0.5 mg tab twice daily for 4 days, then 1 mg twice daily. (Patient not taking: Reported on 11/6/2018) 53 tablet 0      Family History:  Family History   Problem Relation Age of Onset     Substance Abuse Father      Dementia Maternal Grandmother      Diabetes Maternal Grandmother      Asthma  "Brother      Prostate Cancer Maternal Grandfather      Social History:  Social History     Social History     Marital status:      Spouse name: N/A     Number of children: N/A     Years of education: N/A     Occupational History     Not on file.     Social History Main Topics     Smoking status: Current Some Day Smoker     Packs/day: 1.00     Years: 23.00     Types: Cigarettes     Start date: 1/1/1993     Smokeless tobacco: Never Used      Comment: Patient reports currently smoking 3 cigarettes per day - updated 3/6/2018     Alcohol use No     Drug use: No     Sexual activity: Yes     Partners: Male     Birth control/ protection: Pull-out method, Condom     Other Topics Concern     Not on file     Social History Narrative    she used to smoke 1 pack a day but now smoking about half a pack a day. We again discussed the importance of completely abstaining from smoking. I again discussed referring her for smoking cessation program but she is not interested      Denies any use of alcohol. Lives with her . 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         Physical Exam:  /82 (BP Location: Left arm)  Pulse 75  Temp 98.7  F (37.1  C) (Oral)  Resp 18  Ht 1.499 m (4' 11\")  Wt 34.2 kg (75 lb 5 oz)  SpO2 100%  BMI 15.21 kg/m2  CONSTITUTIONAL: No apparent distress  EYES: PERRLA, without pallor or jaundice  ENT/MOUTH: Ears unremarkable. No gross oral lesions  CVS: s1s2 normal  RESPIRATORY: Chest is clear  GI: Abdomen is benign.  PEG tube site is clean  NEURO: Alert and oriented ×3  INTEGUMENT: no concerning skin rashes   LYMPHATIC: no palpable lymphadenopathy  MUSCULOSKELETAL: Unremarkable. No bony tenderness.   EXTREMITIES: no pedal edema  PSYCH: Mentation, mood and affect are appropriate          Laboratory/Imaging Studies      Results for orders placed or performed in visit on 11/06/18   *CBC with platelets differential   Result Value Ref Range    WBC 4.0 4.0 - 11.0 10e9/L "    RBC Count 3.39 (L) 3.8 - 5.2 10e12/L    Hemoglobin 12.4 11.7 - 15.7 g/dL    Hematocrit 36.9 35.0 - 47.0 %     (H) 78 - 100 fl    MCH 36.6 (H) 26.5 - 33.0 pg    MCHC 33.6 31.5 - 36.5 g/dL    RDW 12.2 10.0 - 15.0 %    Platelet Count 263 150 - 450 10e9/L    % Neutrophils 76.3 %    % Lymphocytes 10.3 %    % Monocytes 10.3 %    % Eosinophils 1.8 %    % Basophils 0.8 %    % Immature Granulocytes 0.5 %    Absolute Neutrophil 3.1 1.6 - 8.3 10e9/L    Absolute Lymphocytes 0.4 (L) 0.8 - 5.3 10e9/L    Absolute Monocytes 0.4 0.0 - 1.3 10e9/L    Absolute Eosinophils 0.1 0.0 - 0.7 10e9/L    Absolute Basophils 0.0 0.0 - 0.2 10e9/L    Abs Immature Granulocytes 0.0 0 - 0.4 10e9/L    Diff Method Automated Method    Basic metabolic panel   Result Value Ref Range    Sodium 137 133 - 144 mmol/L    Potassium 3.7 3.4 - 5.3 mmol/L    Chloride 101 94 - 109 mmol/L    Carbon Dioxide 29 20 - 32 mmol/L    Anion Gap 7 3 - 14 mmol/L    Glucose 76 70 - 99 mg/dL    Urea Nitrogen 7 7 - 30 mg/dL    Creatinine 0.50 (L) 0.52 - 1.04 mg/dL    GFR Estimate >90 >60 mL/min/1.7m2    GFR Estimate If Black >90 >60 mL/min/1.7m2    Calcium 9.0 8.5 - 10.1 mg/dL   TSH with free T4 reflex   Result Value Ref Range    TSH 1.26 0.40 - 4.00 mU/L   Reticulocyte count   Result Value Ref Range    % Retic 1.6 0.5 - 2.0 %    Absolute Retic 56.3 25 - 95 10e9/L     ASSESSMENT/PLAN:    Stage IV a [id number] 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  She started concurrent chemotherapy with high-dose cisplatin alongside radiation on 3/14/2018. Cycle #2 of chemotherapy was delayed by one week because of neutropenia.   She received C#2 on 4/11/18  She is completed radiation on 5/2/2018   C#3 which was due on 5/2/18 was not given due to neutropenia    Repeat PET CT is negative for any residual disease.  She had a repeat CT of the neck and CT chest which are without any evidence of recurrence.  She is going to see ENT later this month.  I will discussed with   Sumit about timing of her next scan because initially I was planning to repeat his scans in 6 months but since it was done yesterday we might delay  the subsequent scan        Mouth pain.  This is much better and currently she is not using anything for it.        Nutrition.  She is not using tube feeds.  She tells me that she has good appetite and now she is eating much better.  I strongly advised her to follow with nutritionist.  I also advised her to use high protein foods/drinks like carnation because she tells me that she cannot tolerate Ensure.  I really want her to gain weight with time.    She tells me that she gained about 4 pounds over the last few days.  If she continues to lose weight then she will have to restart tube feeds.      Smoking.  We again had a long discussion today about the importance of his smoking cessation.  I strongly encouraged her to follow with a smoking cessation program but she is not interested in that.  She tells me that she will quit on her own.  She has Chantix at home which she has not tried but she might try.  We will continue to address this at each visit.    Cytopenias. Related to prior chemotherapy.  This is resolved apart from lymphocyte count being 0.4, but today her MCV is elevated so I will check a reticulocyte count as well as vitamin B12 and folate levels.  Previously she had evidence of iron deficiency anemia secondary to menorrhagia and got 2 out of 4 planned doses of IV iron.         We did not address the following today  She had baseline audiogram done prior to start of cisplatin. Currently she is not noticing any changes in her hearing or tinnitus. We can consider repeating audiogram if she has any problems with hearing or tinnitus    Return to clinic in 3 months    All questions answered.  She is comfortable with the plan.      Benedicto Nieto

## 2018-11-06 NOTE — MR AVS SNAPSHOT
After Visit Summary   11/6/2018    Melinda Milligan    MRN: [id number]           Patient Information     Date Of Birth          1975        Visit Information        Provider Department      11/6/2018 2:15 PM Benedicto Nieto MD [facility]        Today's Diagnoses     Macrocytosis    -  1      Care Instructions    Follow with ENT    See me back in 3 months with labs prior              Follow-ups after your visit        Your next 10 appointments already scheduled     Nov 09, 2018  2:30 PM CST   Return Visit with Albert Ambrosio MD   [facility]    [address] [place] [id number] [phone]            Nov 30, 2018  2:00 PM CST   (Arrive by 1:45 PM)   Return Visit with Marga Arana MD   [facility] ([facility])    [address] [phone] [phone]            Feb 05, 2019  1:30 PM CST   LAB with LAB ONC [facility] ([facility])    [address] [place] [address] [phone]           Please do not eat 10-12 hours before your appointment if you are coming in fasting for labs on lipids, cholesterol, or glucose (sugar). This does not apply to pregnant women. Water, hot tea and black coffee (with nothing added) are okay. Do not drink other fluids, diet soda or chew gum.            Feb 05, 2019  2:15 PM CST   Return Visit with Benedicto Nieto MD   [facility] ([facility])    [address] [place] [id number] [phone]              Who to contact     If you have questions or need follow up information about today's clinic visit or your schedule please contact [facility] directly at [phone].  Normal or non-critical lab and imaging results will be communicated to you by MyChart, letter or phone within 4  "business days after the clinic has received the results. If you do not hear from us within 7 days, please contact the clinic through [employer] or phone. If you have a critical or abnormal lab result, we will notify you by phone as soon as possible.  Submit refill requests through [employer] or call your pharmacy and they will forward the refill request to us. Please allow 3 business days for your refill to be completed.          Additional Information About Your Visit        Care EveryWhere ID     This is your Care EveryWhere ID. This could be used by other organizations to access your [facility] medical records  [id number]        Your Vitals Were     Pulse Temperature Respirations Height Pulse Oximetry BMI (Body Mass Index)    75 98.7  F (37.1  C) (Oral) 18 1.499 m (4' 11\") 100% 15.21 kg/m2       Blood Pressure from Last 3 Encounters:   11/06/18 137/82   08/07/18 120/87   08/07/18 120/87    Weight from Last 3 Encounters:   11/06/18 34.2 kg (75 lb 5 oz)   09/26/18 35.2 kg (77 lb 11.2 oz)   08/08/18 35.4 kg (78 lb)               Primary Care Provider Office Phone # Fax #    Quang Wall PA-C [phone]       [facility] [address] [facility] [address]  [facility] [address]        Equal Access to Services     JAVIER MORALES AH: Hadii aad ku hadasho Soomaali, waaxda luqadaha, qaybta kaalmada adeegyada, whitney keller . So [facility] [phone].    ATENCIÓN: Si habla español, tiene a hawley disposición servicios gratuitos de asistencia lingüística. Juanjose al [phone].    We comply with applicable federal civil rights laws and [place] laws. We do not discriminate on the basis of race, color, national origin, age, disability, sex, sexual orientation, or gender identity.            Thank you!     Thank you for choosing [facility]  for your care. Our goal is always to provide you with excellent care. Hearing back from our patients is one way we can continue to improve our " services. Please take a few minutes to complete the written survey that you may receive in the mail after your visit with us. Thank you!             Your Updated Medication List - Protect others around you: Learn how to safely use, store and throw away your medicines at www.disposemymeds.org.          This list is accurate as of 11/6/18  2:26 PM.  Always use your most recent med list.                   Brand Name Dispense Instructions for use Diagnosis    ACETAMINOPHEN PO      Take 1,000 mg by mouth        ALLEGRA ALLERGY PO      Take by mouth as needed for allergies        cevimeline 30 MG capsule    EVOXAC    90 capsule    Take 1 capsule (30 mg) by mouth 3 times daily    Squamous cell carcinoma of oropharynx (H)       dexamethasone alcohol free 0.1 MG/ML solution     500 mL    Swish and Spit 5-10 mL QID prn pain    Squamous cell carcinoma of oropharynx (H)       folic acid 1 MG tablet    FOLVITE     Take 1 mg by mouth        magic mouthwash suspension    ENTER INGREDIENTS IN COMMENTS    1000 mL    Swish and spit 5 ml every 6 hours    Squamous cell carcinoma of oropharynx (H)       order for DME     90 Can    Sig:  Isosource 1.5 calories:  Instill 3.5 cartons per day via PEG tube by syringe or gravity flow    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    Squamous cell carcinoma of oropharynx (H)       * varenicline 0.5 MG X 11 & 1 MG X 42 tablet    CHANTIX STARTING  MONTH KLAUS    42 tablet    Take as instructed    Tobacco use       * Notice:  This list has 2 medication(s) that are the same as other medications prescribed for you. Read the directions carefully, and ask your doctor or other care provider to review them with you.

## 2018-11-09 ENCOUNTER — TELEPHONE (OUTPATIENT)
Dept: RADIATION ONCOLOGY | Facility: CLINIC | Age: 43
End: 2018-11-09

## 2018-11-09 NOTE — TELEPHONE ENCOUNTER
Melinda called in to reschedule her appointment for today 11/09/2018, we set her up with a new appointment on 11/14/2018 at 1530.  Patient did not have any further questions at this time    Denise Glasgow MA

## 2018-11-09 NOTE — PROGRESS NOTES
Radiation Oncology Follow-up Note    Melinda Milligan  MRN: [id number]  : 1975    DISEASE TREATED: [id number] SCCA of the oropharynx, p16 negative      RADIATION THERAPY DELIVERED: 70 Gy in 35 fractions to gross disease and high risk areas, 60 Gy to intermediate risk and 54 Gy to low risk regions. Concurrent with high-dose cisplatin.      INTERVAL SINCE COMPLETION OF RADIATION THERAPY: 6 months (completed 2018)      SUBJECTIVE: Ms. Milligan is a 43 year old female with a history of cT3N2c SCC of the oropharynx, p16 negative.  She completed definitive chemoradiation per above, and presents for routine 3 months follow-up. Her initial disease was significant for a primary lesion that involved the bilateral tonsils, posterior soft palate, and right posterolateral NPX. There was also involvement of bilateral level II lymph nodes. Her 3-mo post-treatment PET scan showed no evidence of disease. She followed up with Dr. Arana on 18, and scope at that time also showed NORBERTO. CT neck/chest earlier this month showed no evidence of recurrent or metastatic disease, stable small pulm nodules. TSH was normal on  at 1.26.    Today she states that she is generally well, and her energy level is good. Her weight is slightly higher at 77 lbs today but still below baseline of ~90 lbs. She is no longer using her feeding tube and is taking everything PO. She measures her weight at home and says it has been around 78 lbs at home. She has no xerostomia and is able to eat and swallow all foods without difficulty. She continues seeing speech [facility] and is doing her exercises. She denies dysphagia or trismus. She does have dry throat and mucous in the morning, managed with saline spray. She continues to smoke cigarettes, but she says she doesn't smoke a whole cigarette when she lights one up. She was given a prescription for Chantix earlier, but is not using it due to a history of poor tolerance. Of note, her  oral/throat pain has resolved, and she no longer has discomfort or pain. She has not been using her fluoride trays and hasn't seen her dentist yet (she wasn't using trays earlier due to oral pain). Otherwise, she has no complaints and is doing all her normal activities without limitation.    OBJECTIVE:   /81 (BP Location: Left arm, Patient Position: Sitting, Cuff Size: Adult Small)  Pulse 91  Temp 98.3  F (36.8  C) (Oral)  Resp 18  Wt 77 lb  SpO2 98%  BMI 15.55 kg/m2  GENERAL:  Appears well, in no acute distress, thin  HEENT: MOM. No masses or lesions on inspection/palpation of oc/opx. Full ROM in the tongue, no lesions visible/palpable on oral tongue or FOM. Uvula absent. No TTP. No cervical lymphadenopathy. No submental lymphedema. Dry mucous in posterior pharyngeal wall, able to be removed with swab with no underlying abnormality.  NECK: No submental, submandibular, cervical or SCV lymphadenopathy b/l  CV: regular rate, no JVD or LE edema, good distal perfusion.   RESP: normal respiration on room air    TSH   Date Value Ref Range Status   11/06/2018 1.26 0.40 - 4.00 mU/L Final     IMPRESSION/RECOMMENDATIONS:  Ms. Milligan is a 43 year old female with [id number] SCC of the oropharynx, p16 negative, now 6 months s/p treatment with definitive chemoradiation. She has no clinical or radiographic evidence of disease. She has recovered well from the toxicity of treatment, she no longer has xerostomia, pain, or dysphagia. We stressed the importance of good dental hygiene and instructed her to use her fluoride trays and follow up with her dentist as soon as possible, which she said she will do. I also discussed smoking cessation with her again; she does not wish to try Chantix. I offered follow up with our dietitian, but she declined and wished to follow up with SLP only for dietary recommendations. I recommended she count her calories and increase her caloric intake, and adding supplements such as protein  powder and shakes like ensures/boosts. I agree with Dr. Nieto that I would prefer leaving her PEG tube in until she can demonstrate weight gain. She will continue to follow up with med onc and ENT and speech therapy. I would like her to return to my clinic in 3 months for reassessment, with f/u CT scan neck/chest. I instructed her to call us with any concerns, and to update us on her weight gain.     Albert Ambrosio M.D.  Attending Physician  Radiation Oncology  Clinic Phone: [phone]  Pager #: [phone]

## 2018-11-12 ENCOUNTER — TELEPHONE (OUTPATIENT)
Dept: RADIATION ONCOLOGY | Facility: CLINIC | Age: 43
End: 2018-11-12

## 2018-11-12 NOTE — TELEPHONE ENCOUNTER
Attempted to contact patient for a reminder call regarding their appointment with Dr. Ambrosio on 11/14/2018 at 1530 at [facility].  Voicemail was left with appointment time, date and contact information for rescheduling if needed.      Denise Glasgow MA

## 2018-11-15 ENCOUNTER — HOME INFUSION (PRE-WILLOW HOME INFUSION) (OUTPATIENT)
Dept: PHARMACY | Facility: CLINIC | Age: 43
End: 2018-11-15

## 2018-11-16 NOTE — PROGRESS NOTES
This is a recent snapshot of the patient's [facility] medical record.  For current drug dose and complete information and questions, call [phone] or In Basket pool, [facility] ([id number])  CSN Number:  [id number]

## 2018-11-23 ENCOUNTER — TELEPHONE (OUTPATIENT)
Dept: RADIATION ONCOLOGY | Facility: CLINIC | Age: 43
End: 2018-11-23

## 2018-11-23 NOTE — TELEPHONE ENCOUNTER
Patient contacted for reminder call, informed of appointment with Dr. Ambrosio on 11/28/2018 at 0900 at [facility].  Patient confirmed appointment date and time and had no questions at this time.        Denise Glasgow MA

## 2018-11-28 ENCOUNTER — OFFICE VISIT (OUTPATIENT)
Dept: RADIATION ONCOLOGY | Facility: CLINIC | Age: 43
End: 2018-11-28
Payer: COMMERCIAL

## 2018-11-28 VITALS
DIASTOLIC BLOOD PRESSURE: 81 MMHG | HEART RATE: 91 BPM | SYSTOLIC BLOOD PRESSURE: 123 MMHG | OXYGEN SATURATION: 98 % | RESPIRATION RATE: 18 BRPM | TEMPERATURE: 98.3 F | WEIGHT: 77 LBS | BODY MASS INDEX: 15.55 KG/M2

## 2018-11-28 DIAGNOSIS — C10.9 SQUAMOUS CELL CARCINOMA OF OROPHARYNX (H): Primary | ICD-10-CM

## 2018-11-28 PROCEDURE — 99213 OFFICE O/P EST LOW 20 MIN: CPT | Performed by: RADIOLOGY

## 2018-11-28 ASSESSMENT — PAIN SCALES - GENERAL: PAINLEVEL: NO PAIN (0)

## 2018-11-28 NOTE — NURSING NOTE
FOLLOW-UP VISIT    Patient Name: Melinda Milligan      : 1975     Age: 43 year old        ______________________________________________________________________________     Chief Complaint   Patient presents with     Cancer     Radiation Oncology return visit with Dr. Ambrosio     /81 (BP Location: Left arm, Patient Position: Sitting, Cuff Size: Adult Small)  Pulse 91  Temp 98.3  F (36.8  C) (Oral)  Resp 18  Wt 77 lb  SpO2 98%  BMI 15.55 kg/m2     Date Radiation Completed: 7,000 cGy to Head + Neck + LNs completed on 2018    Pain  Denies    Labs  Other Labs: Yes: 2018    Imaging  CT: 2018      Dental:   Most Recent Dental Visit: Patient reports before radiation, but is planning on making an appointment soon  Patient reports only using toothpaste with fluoride    Speech/Swallowing:   Most Recent evaluation or testin2018  Swallowing Restrictions: No difficulties with swallowing    Trismus/Jaw Exercises: Daily    Nutrition:  Oral Intake: Patient reports that she stopped using her feeding tube weeks ago and only takes food by mouth  Weight:  Wt Readings from Last 3 Encounters:   18 77 lb   18 75 lb 5 oz   18 77 lb 11.2 oz         Other Appointments:     MD Name:  Appointment Date: 2018   MD Name: Dr. Nieto Appointment Date: 2019   MD Name: Appointment Date:           Nurse face-to-face time: Level 2:  5 min face to face time

## 2018-11-28 NOTE — PATIENT INSTRUCTIONS
Please contact [facility] RN with questions or concerns following today's appointment: [phone].    Thank you!

## 2018-11-28 NOTE — MR AVS SNAPSHOT
After Visit Summary   11/28/2018    Melinda Milligan    MRN: [id number]           Patient Information     Date Of Birth          1975        Visit Information        Provider Department      11/28/2018 9:00 AM Albert Ambrosio MD [facility]        Today's Diagnoses     Squamous cell carcinoma of oropharynx (H)    -  1      Care Instructions    Please contact [facility] RN with questions or concerns following today's appointment: [phone].    Thank you!            Follow-ups after your visit        Your next 10 appointments already scheduled     Nov 30, 2018  2:00 PM CST   (Arrive by 1:45 PM)   Return Visit with Marga Arana MD   [facility] ([facility])    [address] [address] [phone]            Feb 05, 2019  1:30 PM CST   LAB with LAB ONC [facility] ([facility])    [address] [place] [address] [phone]           Please do not eat 10-12 hours before your appointment if you are coming in fasting for labs on lipids, cholesterol, or glucose (sugar). This does not apply to pregnant women. Water, hot tea and black coffee (with nothing added) are okay. Do not drink other fluids, diet soda or chew gum.            Feb 05, 2019  2:15 PM CST   Return Visit with Benedicto Nieto MD   [facility] ([facility])    [address] [facility] [address] [phone]            Feb 28, 2019  9:00 AM CST   CT CHEST W CONTRAST with MGCT1   [facility] ([facility])    [address] [facility] [address] [phone]           How do I prepare for my exam? (Food and drink instructions) **You will have contrast for this exam.** Do not eat or drink for 2 hours before your exam. If you need to take medicine, you may take it with  small sips of water. (We may ask you to take liquid medicine as well.)  The day before your exam, drink extra fluids at least six 8-ounce glasses (unless your doctor tells you to restrict your fluids).  How do I prepare for my exam? (Other instructions) Patients over 70 or patients with diabetes or kidney problems: If you haven t had a blood test (creatinine test) within the last 30 days, the Cardiologist/Radiologist may require you to get this test prior to your exam.  What should I wear: Please wear loose clothing, such as a sweat suit or jogging clothes.  Avoid snaps, zippers and other metal. We may ask you to undress and put on a hospital gown.  How long does the exam take: Most scans take less than 20 minutes.  What should I bring: Please bring any scans or X-rays taken at other hospitals, if similar tests were done. Also bring a list of your medicines, including vitamins, minerals and over-the-counter drugs. It is safest to leave personal items at home.  Do I need a :  No  is needed.  What do I need to tell my doctor? Be sure to tell your doctor: * If you have any allergies. * If there s any chance you are pregnant. * If you are breastfeeding. * If you have diabetes as your medication may need to be adjusted for this exam.  What should I do after the exam: No restrictions, You may resume normal activities.  What is this test: A CT (computed tomography) scan is a series of pictures that allows us to look inside your body. The scanner creates images of the body in cross sections, much like slices of bread. This helps us see any problems more clearly. You may receive contrast (X-ray dye) before or during your scan. Contrast is given through an IV (small needle in your arm).  Who should I call with questions: If you have any questions, please call the Imaging Department where you will have your exam. Directions, parking instructions, and other information is available on our website,  [email or web address].            Feb 28, 2019  9:30 AM CST   CT SOFT TISSUE NECK W CONTRAST with MGCT1   [facility] ([facility])    [address] [place] [address] [phone]           How do I prepare for my exam? (Food and drink instructions) **You will have contrast for this exam.** Do not eat or drink for 2 hours before your exam. If you need to take medicine, you may take it with small sips of water. (We may ask you to take liquid medicine as well.)  The day before your exam, drink extra fluids at least six 8-ounce glasses (unless your doctor tells you to restrict your fluids).  How do I prepare for my exam? (Other instructions) Patients over 70 or patients with diabetes or kidney problems: If you haven t had a blood test (creatinine test) within the last 30 days, the Cardiologist/Radiologist may require you to get this test prior to your exam.  What should I wear: Please wear loose clothing, such as a sweat suit or jogging clothes.  Avoid snaps, zippers and other metal. We may ask you to undress and put on a hospital gown.  How long does the exam take: Most scans take less than 20 minutes.  What should I bring: Please bring any scans or X-rays taken at other hospitals, if similar tests were done. Also bring a list of your medicines, including vitamins, minerals and over-the-counter drugs. It is safest to leave personal items at home.  Do I need a :  No  is needed.  What do I need to tell my doctor? Be sure to tell your doctor: * If you have any allergies. * If there s any chance you are pregnant. * If you are breastfeeding. * If you have diabetes as your medication may need to be adjusted for this exam.  What should I do after the exam: No restrictions, You may resume normal activities.  What is this test: A CT (computed tomography) scan is a series of pictures that allows us to look inside your body. The scanner creates images of the body in  cross sections, much like slices of bread. This helps us see any problems more clearly. You may receive contrast (X-ray dye) before or during your scan. Contrast is given through an IV (small needle in your arm).  Who should I call with questions: If you have any questions, please call the Imaging Department where you will have your exam. Directions, parking instructions, and other information is available on our website, [email or web address].            Mar 06, 2019  9:30 AM CST   Return Visit with Albert Ambrosio MD   [facility] ([facility])    [address] [place] [address] [phone]              Future tests that were ordered for you today     Open Future Orders        Priority Expected Expires Ordered    CT Soft Tissue Neck w Contrast Routine 2/12/2019 11/28/2019 11/28/2018    CT Chest w Contrast Routine 5/29/2019 11/28/2019 11/28/2018            Who to contact     If you have questions or need follow up information about today's clinic visit or your schedule please contact [facility] directly at [phone].  Normal or non-critical lab and imaging results will be communicated to you by MyChart, letter or phone within 4 business days after the clinic has received the results. If you do not hear from us within 7 days, please contact the clinic through MyChart or phone. If you have a critical or abnormal lab result, we will notify you by phone as soon as possible.  Submit refill requests through [employer] or call your pharmacy and they will forward the refill request to us. Please allow 3 business days for your refill to be completed.          Additional Information About Your Visit        Care EveryWhere ID     This is your Care EveryWhere ID. This could be used by other organizations to access your [facility] medical records  [id number]        Your Vitals Were     Pulse Temperature Respirations Pulse Oximetry BMI (Body Mass Index)        91 98.3  F (36.8  C) (Oral) 18 98% 15.55 kg/m2        Blood Pressure from Last 3 Encounters:   11/28/18 123/81   11/06/18 137/82   08/07/18 120/87    Weight from Last 3 Encounters:   11/28/18 77 lb   11/06/18 75 lb 5 oz   09/26/18 77 lb 11.2 oz               Primary Care Provider Office Phone # Fax #    Quang Wall PA-C [phone]       [facility] [address] [facility] [address]  [facility] [address]        Equal Access to Services     RADHA MORALES : Hadii aad ku hadasho Soomaali, waaxda luqadaha, qaybta kaalmada adeegyada, waxay idiin hayaan adeeg zoey keller . So [facility] [phone].    ATENCIÓN: Si habla español, tiene a hawley disposición servicios gratuitos de asistencia lingüística. Llame al [phone].    We comply with applicable federal civil rights laws and [place] laws. We do not discriminate on the basis of race, color, national origin, age, disability, sex, sexual orientation, or gender identity.            Thank you!     Thank you for choosing [facility] [[email or web address]]  for your care. Our goal is always to provide you with excellent care. Hearing back from our patients is one way we can continue to improve our services. Please take a few minutes to complete the written survey that you may receive in the mail after your visit with us. Thank you!             Your Updated Medication List - Protect others around you: Learn how to safely use, store and throw away your medicines at www.disposemymeds.org.          This list is accurate as of 11/28/18  9:55 AM.  Always use your most recent med list.                   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

## 2018-11-30 ENCOUNTER — OFFICE VISIT (OUTPATIENT)
Dept: OTOLARYNGOLOGY | Facility: CLINIC | Age: 43
End: 2018-11-30
Payer: COMMERCIAL

## 2018-11-30 ENCOUNTER — THERAPY VISIT (OUTPATIENT)
Dept: SPEECH THERAPY | Facility: CLINIC | Age: 43
End: 2018-11-30
Payer: COMMERCIAL

## 2018-11-30 VITALS
DIASTOLIC BLOOD PRESSURE: 86 MMHG | HEIGHT: 59 IN | SYSTOLIC BLOOD PRESSURE: 122 MMHG | WEIGHT: 79 LBS | BODY MASS INDEX: 15.92 KG/M2 | HEART RATE: 79 BPM | OXYGEN SATURATION: 99 %

## 2018-11-30 DIAGNOSIS — R13.12 OROPHARYNGEAL DYSPHAGIA: ICD-10-CM

## 2018-11-30 DIAGNOSIS — C10.9 SQUAMOUS CELL CARCINOMA OF OROPHARYNX (H): Primary | ICD-10-CM

## 2018-11-30 ASSESSMENT — PAIN SCALES - GENERAL: PAINLEVEL: NO PAIN (0)

## 2018-11-30 NOTE — NURSING NOTE
"Chief Complaint   Patient presents with     RECHECK     follow up     Blood pressure 122/86, pulse 79, height 1.5 m (4' 11.06\"), weight 35.8 kg (79 lb), SpO2 99 %.    Gabriele Burrell LPN    "

## 2018-11-30 NOTE — MR AVS SNAPSHOT
After Visit Summary   11/30/2018    Melinda Milligan    MRN: [id number]           Patient Information     Date Of Birth          1975        Visit Information        Provider Department      11/30/2018 2:00 PM Marga Arana MD [facility]        Today's Diagnoses     Squamous cell carcinoma of oropharynx (H)    -  1      Care Instructions    1. Please follow-up in clinic in 6 weeks with Dr. Arana.   2. Please call the ENT clinic with any questions,concerns, new or worsening symptoms.    -Clinic number is [phone]   Tasha Tim's direct line (Dr. Arana's nurse) [phone]              Follow-ups after your visit        Your next 10 appointments already scheduled     Feb 05, 2019  1:30 PM CST   LAB with LAB ONC [facility] ([facility] [[email or web address]    [address] [place] [id number] [phone]           Please do not eat 10-12 hours before your appointment if you are coming in fasting for labs on lipids, cholesterol, or glucose (sugar). This does not apply to pregnant women. Water, hot tea and black coffee (with nothing added) are okay. Do not drink other fluids, diet soda or chew gum.            Feb 05, 2019  2:15 PM CST   Return Visit with Benedicto Nieto MD   [facility])    [address] [place] [id number] [phone]            Feb 28, 2019  9:00 AM CST   CT CHEST W CONTRAST with MGCT1   [facility])    [address] [place] [address] [phone]           How do I prepare for my exam? (Food and drink instructions) **You will have contrast for this exam.** Do not eat or drink for 2 hours before your exam. If you need to take medicine, you may take it with small sips of water. (We may ask you to take liquid medicine as well.)  The day before your exam, drink extra fluids at least six 8-ounce  glasses (unless your doctor tells you to restrict your fluids).  How do I prepare for my exam? (Other instructions) Patients over 70 or patients with diabetes or kidney problems: If you haven t had a blood test (creatinine test) within the last 30 days, the Cardiologist/Radiologist may require you to get this test prior to your exam.  What should I wear: Please wear loose clothing, such as a sweat suit or jogging clothes.  Avoid snaps, zippers and other metal. We may ask you to undress and put on a hospital gown.  How long does the exam take: Most scans take less than 20 minutes.  What should I bring: Please bring any scans or X-rays taken at other hospitals, if similar tests were done. Also bring a list of your medicines, including vitamins, minerals and over-the-counter drugs. It is safest to leave personal items at home.  Do I need a :  No  is needed.  What do I need to tell my doctor? Be sure to tell your doctor: * If you have any allergies. * If there s any chance you are pregnant. * If you are breastfeeding. * If you have diabetes as your medication may need to be adjusted for this exam.  What should I do after the exam: No restrictions, You may resume normal activities.  What is this test: A CT (computed tomography) scan is a series of pictures that allows us to look inside your body. The scanner creates images of the body in cross sections, much like slices of bread. This helps us see any problems more clearly. You may receive contrast (X-ray dye) before or during your scan. Contrast is given through an IV (small needle in your arm).  Who should I call with questions: If you have any questions, please call the Imaging Department where you will have your exam. Directions, parking instructions, and other information is available on our website, [email or web address].            Feb 28, 2019  9:30 AM CST   CT SOFT TISSUE NECK W CONTRAST with MGCT1   [facility]  [facility]    [address] [place] [address] [phone]           How do I prepare for my exam? (Food and drink instructions) **You will have contrast for this exam.** Do not eat or drink for 2 hours before your exam. If you need to take medicine, you may take it with small sips of water. (We may ask you to take liquid medicine as well.)  The day before your exam, drink extra fluids at least six 8-ounce glasses (unless your doctor tells you to restrict your fluids).  How do I prepare for my exam? (Other instructions) Patients over 70 or patients with diabetes or kidney problems: If you haven t had a blood test (creatinine test) within the last 30 days, the Cardiologist/Radiologist may require you to get this test prior to your exam.  What should I wear: Please wear loose clothing, such as a sweat suit or jogging clothes.  Avoid snaps, zippers and other metal. We may ask you to undress and put on a hospital gown.  How long does the exam take: Most scans take less than 20 minutes.  What should I bring: Please bring any scans or X-rays taken at other hospitals, if similar tests were done. Also bring a list of your medicines, including vitamins, minerals and over-the-counter drugs. It is safest to leave personal items at home.  Do I need a :  No  is needed.  What do I need to tell my doctor? Be sure to tell your doctor: * If you have any allergies. * If there s any chance you are pregnant. * If you are breastfeeding. * If you have diabetes as your medication may need to be adjusted for this exam.  What should I do after the exam: No restrictions, You may resume normal activities.  What is this test: A CT (computed tomography) scan is a series of pictures that allows us to look inside your body. The scanner creates images of the body in cross sections, much like slices of bread. This helps us see any problems more clearly. You may receive contrast (X-ray dye) before or during your scan.  "Contrast is given through an IV (small needle in your arm).  Who should I call with questions: If you have any questions, please call the Imaging Department where you will have your exam. Directions, parking instructions, and other information is available on our website, [email or web address].            Mar 06, 2019  9:30 AM CST   Return Visit with Albert Ambrosio MD   [facility] ([facility])    [address] [place] [address] [phone]              Who to contact     Please call your clinic at [phone] to:    Ask questions about your health    Make or cancel appointments    Discuss your medicines    Learn about your test results    Speak to your doctor            Additional Information About Your Visit        Care EveryWhere ID     This is your Care EveryWhere ID. This could be used by other organizations to access your [facility] medical records  [id number]        Your Vitals Were     Pulse Height Pulse Oximetry BMI (Body Mass Index)          79 1.5 m (4' 11.06\") 99% 15.93 kg/m2         Blood Pressure from Last 3 Encounters:   11/30/18 122/86   11/28/18 123/81   11/06/18 137/82    Weight from Last 3 Encounters:   11/30/18 35.8 kg (79 lb)   11/28/18 34.9 kg (77 lb)   11/06/18 34.2 kg (75 lb 5 oz)              We Performed the Following     IMAGESTREAM RECORDING ORDER        Primary Care Provider Office Phone # Fax #    Quang AMAN Wall PA-C [phone]       [facility] [address] [facility] [address]  [facility] [address]        Equal Access to Services     [facility]: Hadii aad ku hadasho Soomaali, waaxda luqadaha, qaybta kaalmada adeegyada, whitney keller . So [facility] [phone].    ATENCIÓN: Si habla español, tiene a hawley disposición servicios gratuitos de asistencia lingüística. Llame al [phone].    We comply with applicable federal civil rights laws and [place] laws. We do not discriminate on the basis " of race, color, national origin, age, disability, sex, sexual orientation, or gender identity.            Thank you!     Thank you for choosing [facility]  for your care. Our goal is always to provide you with excellent care. Hearing back from our patients is one way we can continue to improve our services. Please take a few minutes to complete the written survey that you may receive in the mail after your visit with us. Thank you!             Your Updated Medication List - Protect others around you: Learn how to safely use, store and throw away your medicines at www.disposemymeds.org.          This list is accurate as of 11/30/18 11:59 PM.  Always use your most recent med list.                   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

## 2018-11-30 NOTE — LETTER
11/30/2018       RE: Melinda Milligan  [address] [facility] [address]     Dear Colleague,    Thank you for referring your patient, Melinda Milligan, to the [facility] EAR NOSE AND THROAT at [facility]. Please see a copy of my visit note below.    Dear Dr. Monsalve:    I had the pleasure of seeing Melinda Milligan in followup today at the [facility] Otolaryng[facility].     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  She had a long history of thrush lasting about 6-8 months that was treated without improvement by her primary care physician.  She was then referred to a hematologist for anemia who evaluated her oropharynx and was concerned about a malignancy.  She was referred to Dr. Monsalve who performed a biopsy of the oral tongue consistent with squamous cell carcinoma.  She was initially seen here at the end of February.  She had a PET CT scan which showed the primary disease in both tonsils, soft palate, base of tongue, right oral tongue with bilateral lymphadenopathy.  Given the extent of her primary disease she was recommended for definitive chemoradiation.  She completed her treatment under the care of Dr. Ambrosio and Dr. Nieto at [place].  She received a total of 70 Gy and high-dose cisplatin completed on 5/2/2018.  She had her 3-month posttreatment PET scan in August 2018 which was negative for any recurrence.    Interval history:  She comes in today for follow-up.  She was last seen in clinic in September 2018.  At that point she had dropped some weight due to difficulties with her oral nutrition.  Since that time she says she has not used her feeding tube.  She is up to 79.5 pounds.  She says that she was told she cannot have the tube removed until she is at 85 pounds.  She says that she is able to eat pretty much everything with the exception of  spicy foods.  She does continue to have nasal regurgitation.  She has ear pressure worse on the left side with decreased hearing.  She stopped using her saline rinses and fluoride trays but has restarted them recently.  She is doing her exercises.  She has not been back in to see the dentist recently.  She recently saw Dr. Ambrosio and had scans scheduled for March 2019.      MEDICATIONS:     Current Outpatient Prescriptions   Medication Sig Dispense Refill     ACETAMINOPHEN PO Take 1,000 mg by mouth       Fexofenadine HCl (ALLEGRA ALLERGY PO) Take by mouth as needed for allergies       folic acid (FOLVITE) 1 MG tablet Take 1 mg by mouth       order for DME Sig:  Isosource 1.5 calories:  Instill 3.5 cartons per day via PEG tube by syringe or gravity flow 90 Can 3     polyethylene glycol (MIRALAX/GLYCOLAX) powder Take 17 g (1 capful) by mouth daily 225 g 1     Potassium Chloride 40 MEQ/15ML (20%) SOLN 7.5 mLs (20 mEq) by Oral or G tube route daily 1 Bottle 1     varenicline (CHANTIX STARTING MONTH PAK) 0.5 MG X 11 & 1 MG X 42 tablet Take as instructed 42 tablet 0       ALLERGIES:    Allergies   Allergen Reactions     Ceftriaxone      Other reaction(s): Unknown Reaction     Chicken-Derived Products (Egg)      Other reaction(s): Vomiting  Other reaction(s): Unknown Reaction       HABITS/SOCIAL HISTORY:   She cut back significantly on her smoking last week but was previously at least a 1 pack per day smoker since 1994.  She denies any chewing tobacco use.  She quit drinking alcohol.  She denies any drug use.  She is  and has a young child at home and another who just went off to college.  She works from home in .    Social History     Social History     Marital status:      Spouse name: N/A     Number of children: N/A     Years of education: N/A     Occupational History     Not on file.     Social History Main Topics     Smoking status: Current Some Day Smoker     Packs/day: 1.00     Years: 23.00      "Types: Cigarettes     Start date: 1/1/1993     Smokeless tobacco: Never Used      Comment: Patient reports currently smoking 3 cigarettes per day - updated 3/6/2018     Alcohol use No     Drug use: No     Sexual activity: Yes     Partners: Male     Birth control/ protection: Pull-out method, Condom     Other Topics Concern     Not on file     Social History Narrative       PAST MEDICAL HISTORY:   Past Medical History:   Diagnosis Date     Allergic rhinitis      Anemia      Hoarseness      Oropharyngeal cancer (H) 02/15/2018     S/P radiation therapy     7,000 cGy completed on 5/2/2018 to head and neck [facility] with Dr. Albert Ambrosio     Uncomplicated asthma         PAST SURGICAL HISTORY:   Past Surgical History:   Procedure Laterality Date     BIOPSY  02/15/2018    Left Tongue - [facility]     LAPAROSCOPIC ASSISTED INSERTION TUBE GASTROTOMY N/A 3/14/2018    Procedure: LAPAROSCOPIC ASSISTED INSERTION TUBE GASTROSTOMY;  Percutaneous Endoscopic Gastrostomy Tube Insertion, Esophagogastroduodenoscopy;  Surgeon: Edna Martinez MD;  Location:  OR       FAMILY HISTORY:    Family History   Problem Relation Age of Onset     Substance Abuse Father      Dementia Maternal Grandmother      Diabetes Maternal Grandmother      Asthma Brother      Prostate Cancer Maternal Grandfather        REVIEW OF SYSTEMS:  12 point ROS was negative other than the symptoms noted above in the HPI.  Patient Supplied Answers to Review of Systems  [facility] ROS 11/30/2018   Constitutional -   Neurology -   Ears, Nose, Throat Ringing/noise in ears, Nasal congestion or drainage   Cardiopulmonary Cough   Gastrointestinal/Genitourinary -   Musculoskeletal -   Allergy/Immunology -   Hematologic -   Endocrine -         PHYSICAL EXAMINATION:   /86  Pulse 79  Ht 1.5 m (4' 11.06\")  Wt 35.8 kg (79 lb)  SpO2 99%  BMI 15.93 kg/m2   Appearance:   normal; NAD, slightly older appearing than stated age, thin appearing, small stature "   Communication:   normal; communicates verbally, slight hypernasality with incomplete soft palate closure   Head/Face:   inspection -  Normal; no scars or visible lesions   Salivary glands -  Normal size, no tenderness, swelling, or palpable masses   Facial strength -  Normal and symmetric bilateral; H/B I/VI   Skin:  normal, no rash   Ocular Motility:  normal occular movements   Ears:  auricle (AD) -  normal  EAC (AD) -  normal  TM (AD) -  Mild effusion with retraction  auricle (AS) -  normal  EAC (AS) -  normal  TM (AS) - serous effusion - slightly improved  Normal clinical speech reception   Nose:  Ext. inspection -  Normal  Internal Inspection -  Normal mucosa, septum, and turbinates; minimal secretions   Nasopharynx:  Improvement in the thick secretions in the nasopharynx - minimal   Oral Cavity:  lips -  Normal mucosa, oral competence, and stoma size  Dentition in poor repair  Some trismus but improved from previous   Hard palate, buccal, floor of mouth mucosa with radiation changes   Tongue -no palpable masses, no mucosal lesions   Oropharynx:  uvula absent   No evidence of masses in either tonsillar fossa, radiation changes and scar  Few secretions along posterior pharyngeal wall, no underlying mass   Hypopharynx:  Normal pyriform sinus and pharyngeal wall mucosa   No pooled secretions    Larynx:  Epiglottis with mild thickening from radiation  False vocal cord, true vocal cord normal in appearance, bilaterally mobile cords   No secretions or crusting   Neck: No visible mass or asymmetry   Normal range of motion  Mild lymphedema   Lymphatic:  No palpable lymphadenopathy   Cardiovascular:  warm, pink, well-perfused extremities without swelling, tenderness, or edema   Respiratory:  Normal respiratory effort, no stridor   Neuro/Psych.:  mood/affect -  normal  mental status -  normal        PROCEDURES:   Flexible fiberoptic laryngoscopy: Scope exam was indicated due to oropharyngeal tumor. Verbal consent was  obtained. The nasal cavity was prepped with an aerosolized solution of topical anesthetic and vasoconstrictive agent. The scope was passed through the anterior nasal cavity and advanced. Inspection of the nasopharynx demonstrate minimal secretions in the nasopharynx.  There are radiation changes to the posterior oropharynx but no masses.  There are no pooled secretions present here.  The vallecula is clear.  Epiglottis has mild thickening from radiation.  The vocal cords are mobile bilaterally.  There are radiation changes to the false cords and arytenoids.  The piriform sinuses are clear.  The airway is patent.  The airway is overall improved in appearance compared to previous exams.  Patient tolerated the procedure well with no immediate complications.    RESULTS REVIEWED:       IMPRESSION AND PLAN:   Melinda Milligan is a 43-year-old woman with a p16 negative T3N2c SCC of the oropharynx.  She is now status post completion of definitive chemoradiation in May 2018.      She was congratulated today on the progress she has made with regards to her oral nutrition.  She is no longer using her feeding tube and has managed to gain weight doing this.  Once she reaches the goal she was given of 85 pounds we can certainly facilitate the removal of the PEG by our thoracic surgery colleagues.    We discussed that the serous effusion in her ears causing her conductive hearing loss is secondary to her radiation induced eustachian tube dysfunction.  I explained to her that placement of a tube in the ear will result in potentially chronic drainage from the ear.  Additionally placement of the tube can result in a permanent perforation.  I would advise against this, which is in agreement with my neurotology colleagues.    We reviewed the importance of her exercises along with use of fluoride trays and regular dental cleanings.  She has made progress with the nasal crusting with use of saline irrigations and she was encouraged to  continue this.    She continues to work on smoking cessation.    I will see her back in about 6-8 weeks.    She is already scheduled for imaging per Dr. Ambrosio in March 2019.    Marga Arana M.D.  Otolaryngology- Head & Neck Surgery      CC:  Noel Monsalve, [facility] Ear, Nose & Throat [address], [address]  [place] [id number]      Albert Ambrosio MD  Department of Radiation Oncology  [facility]

## 2018-11-30 NOTE — PATIENT INSTRUCTIONS
1. Please follow-up in clinic in 6 weeks with Dr. Arana.   2. Please call the ENT clinic with any questions,concerns, new or worsening symptoms.    -Clinic number is [phone]   - Meeta's direct line (Dr. Arana's nurse) [phone]

## 2018-12-01 NOTE — PROGRESS NOTES
Dear Dr. Monsalve:    I had the pleasure of seeing Melinda Milligan in followup today at the [facility] Otolaryngology Clinic.     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  She had a long history of thrush lasting about 6-8 months that was treated without improvement by her primary care physician.  She was then referred to a hematologist for anemia who evaluated her oropharynx and was concerned about a malignancy.  She was referred to Dr. Monsalve who performed a biopsy of the oral tongue consistent with squamous cell carcinoma.  She was initially seen here at the end of February.  She had a PET CT scan which showed the primary disease in both tonsils, soft palate, base of tongue, right oral tongue with bilateral lymphadenopathy.  Given the extent of her primary disease she was recommended for definitive chemoradiation.  She completed her treatment under the care of Dr. Ambrosio and Dr. Nieto at [place].  She received a total of 70 Gy and high-dose cisplatin completed on 5/2/2018.  She had her 3-month posttreatment PET scan in August 2018 which was negative for any recurrence.    Interval history:  She comes in today for follow-up.  She was last seen in clinic in September 2018.  At that point she had dropped some weight due to difficulties with her oral nutrition.  Since that time she says she has not used her feeding tube.  She is up to 79.5 pounds.  She says that she was told she cannot have the tube removed until she is at 85 pounds.  She says that she is able to eat pretty much everything with the exception of spicy foods.  She does continue to have nasal regurgitation.  She has ear pressure worse on the left side with decreased hearing.  She stopped using her saline rinses and fluoride trays but has restarted them recently.  She is doing her exercises.  She has not been back in to see the dentist  recently.  She recently saw Dr. Ambrosio and had scans scheduled for March 2019.      MEDICATIONS:     Current Outpatient Prescriptions   Medication Sig Dispense Refill     ACETAMINOPHEN PO Take 1,000 mg by mouth       Fexofenadine HCl (ALLEGRA ALLERGY PO) Take by mouth as needed for allergies       folic acid (FOLVITE) 1 MG tablet Take 1 mg by mouth       order for DME Sig:  Isosource 1.5 calories:  Instill 3.5 cartons per day via PEG tube by syringe or gravity flow 90 Can 3     polyethylene glycol (MIRALAX/GLYCOLAX) powder Take 17 g (1 capful) by mouth daily 225 g 1     Potassium Chloride 40 MEQ/15ML (20%) SOLN 7.5 mLs (20 mEq) by Oral or G tube route daily 1 Bottle 1     varenicline (CHANTIX STARTING MONTH PAK) 0.5 MG X 11 & 1 MG X 42 tablet Take as instructed 42 tablet 0       ALLERGIES:    Allergies   Allergen Reactions     Ceftriaxone      Other reaction(s): Unknown Reaction     Chicken-Derived Products (Egg)      Other reaction(s): Vomiting  Other reaction(s): Unknown Reaction       HABITS/SOCIAL HISTORY:   She cut back significantly on her smoking last week but was previously at least a 1 pack per day smoker since 1994.  She denies any chewing tobacco use.  She quit drinking alcohol.  She denies any drug use.  She is  and has a young child at home and another who just went off to college.  She works from home in .    Social History     Social History     Marital status:      Spouse name: N/A     Number of children: N/A     Years of education: N/A     Occupational History     Not on file.     Social History Main Topics     Smoking status: Current Some Day Smoker     Packs/day: 1.00     Years: 23.00     Types: Cigarettes     Start date: 1/1/1993     Smokeless tobacco: Never Used      Comment: Patient reports currently smoking 3 cigarettes per day - updated 3/6/2018     Alcohol use No     Drug use: No     Sexual activity: Yes     Partners: Male     Birth control/ protection: Pull-out method,  "Condom     Other Topics Concern     Not on file     Social History Narrative       PAST MEDICAL HISTORY:   Past Medical History:   Diagnosis Date     Allergic rhinitis      Anemia      Hoarseness      Oropharyngeal cancer (H) 02/15/2018     S/P radiation therapy     7,000 cGy completed on 5/2/2018 to head and neck [facility] with Dr. Albert Ambrosio     Uncomplicated asthma         PAST SURGICAL HISTORY:   Past Surgical History:   Procedure Laterality Date     BIOPSY  02/15/2018    Left Tongue - [facility]     LAPAROSCOPIC ASSISTED INSERTION TUBE GASTROTOMY N/A 3/14/2018    Procedure: LAPAROSCOPIC ASSISTED INSERTION TUBE GASTROSTOMY;  Percutaneous Endoscopic Gastrostomy Tube Insertion, Esophagogastroduodenoscopy;  Surgeon: Edna Martinez MD;  Location:  OR       FAMILY HISTORY:    Family History   Problem Relation Age of Onset     Substance Abuse Father      Dementia Maternal Grandmother      Diabetes Maternal Grandmother      Asthma Brother      Prostate Cancer Maternal Grandfather        REVIEW OF SYSTEMS:  12 point ROS was negative other than the symptoms noted above in the HPI.  Patient Supplied Answers to Review of Systems  [facility] ROS 11/30/2018   Constitutional -   Neurology -   Ears, Nose, Throat Ringing/noise in ears, Nasal congestion or drainage   Cardiopulmonary Cough   Gastrointestinal/Genitourinary -   Musculoskeletal -   Allergy/Immunology -   Hematologic -   Endocrine -         PHYSICAL EXAMINATION:   /86  Pulse 79  Ht 1.5 m (4' 11.06\")  Wt 35.8 kg (79 lb)  SpO2 99%  BMI 15.93 kg/m2   Appearance:   normal; NAD, slightly older appearing than stated age, thin appearing, small stature   Communication:   normal; communicates verbally, slight hypernasality with incomplete soft palate closure   Head/Face:   inspection -  Normal; no scars or visible lesions   Salivary glands -  Normal size, no tenderness, swelling, or palpable masses   Facial strength -  Normal and symmetric bilateral; " H/B I/VI   Skin:  normal, no rash   Ocular Motility:  normal occular movements   Ears:  auricle (AD) -  normal  EAC (AD) -  normal  TM (AD) -  Mild effusion with retraction  auricle (AS) -  normal  EAC (AS) -  normal  TM (AS) - serous effusion - slightly improved  Normal clinical speech reception   Nose:  Ext. inspection -  Normal  Internal Inspection -  Normal mucosa, septum, and turbinates; minimal secretions   Nasopharynx:  Improvement in the thick secretions in the nasopharynx - minimal   Oral Cavity:  lips -  Normal mucosa, oral competence, and stoma size  Dentition in poor repair  Some trismus but improved from previous   Hard palate, buccal, floor of mouth mucosa with radiation changes   Tongue -no palpable masses, no mucosal lesions   Oropharynx:  uvula absent   No evidence of masses in either tonsillar fossa, radiation changes and scar  Few secretions along posterior pharyngeal wall, no underlying mass   Hypopharynx:  Normal pyriform sinus and pharyngeal wall mucosa   No pooled secretions    Larynx:  Epiglottis with mild thickening from radiation  False vocal cord, true vocal cord normal in appearance, bilaterally mobile cords   No secretions or crusting   Neck: No visible mass or asymmetry   Normal range of motion  Mild lymphedema   Lymphatic:  No palpable lymphadenopathy   Cardiovascular:  warm, pink, well-perfused extremities without swelling, tenderness, or edema   Respiratory:  Normal respiratory effort, no stridor   Neuro/Psych.:  mood/affect -  normal  mental status -  normal        PROCEDURES:   Flexible fiberoptic laryngoscopy: Scope exam was indicated due to oropharyngeal tumor. Verbal consent was obtained. The nasal cavity was prepped with an aerosolized solution of topical anesthetic and vasoconstrictive agent. The scope was passed through the anterior nasal cavity and advanced. Inspection of the nasopharynx demonstrate minimal secretions in the nasopharynx.  There are radiation changes to the  posterior oropharynx but no masses.  There are no pooled secretions present here.  The vallecula is clear.  Epiglottis has mild thickening from radiation.  The vocal cords are mobile bilaterally.  There are radiation changes to the false cords and arytenoids.  The piriform sinuses are clear.  The airway is patent.  The airway is overall improved in appearance compared to previous exams.  Patient tolerated the procedure well with no immediate complications.    RESULTS REVIEWED:       IMPRESSION AND PLAN:   Melinda Milligan is a 43-year-old woman with a p16 negative T3N2c SCC of the oropharynx.  She is now status post completion of definitive chemoradiation in May 2018.      She was congratulated today on the progress she has made with regards to her oral nutrition.  She is no longer using her feeding tube and has managed to gain weight doing this.  Once she reaches the goal she was given of 85 pounds we can certainly facilitate the removal of the PEG by our thoracic surgery colleagues.    We discussed that the serous effusion in her ears causing her conductive hearing loss is secondary to her radiation induced eustachian tube dysfunction.  I explained to her that placement of a tube in the ear will result in potentially chronic drainage from the ear.  Additionally placement of the tube can result in a permanent perforation.  I would advise against this, which is in agreement with my neurotology colleagues.    We reviewed the importance of her exercises along with use of fluoride trays and regular dental cleanings.  She has made progress with the nasal crusting with use of saline irrigations and she was encouraged to continue this.    She continues to work on smoking cessation.    I will see her back in about 6-8 weeks.    She is already scheduled for imaging per Dr. Ambrosio in March 2019.    Thank you very much for the opportunity to participate in the care of your patient.      Marga Arana M.D.  Otolaryngology-  Head & Neck Surgery        CC:  Noel Monsalve, [facility] Ear, Nose & Throat [address], [address]  [place] [address]      Albert Ambrosio MD  Department of Radiation Oncology  [facility]

## 2018-12-03 ENCOUNTER — HOME INFUSION (PRE-WILLOW HOME INFUSION) (OUTPATIENT)
Dept: PHARMACY | Facility: CLINIC | Age: 43
End: 2018-12-03

## 2018-12-04 NOTE — PROGRESS NOTES
This is a recent snapshot of the patient's [facility] medical record.  For current drug dose and complete information and questions, call [phone] or In Basket pool, [facility] home infusion ([id number])  CSN Number:  [id number]

## 2018-12-13 ENCOUNTER — TELEPHONE (OUTPATIENT)
Dept: ONCOLOGY | Facility: CLINIC | Age: 43
End: 2018-12-13

## 2019-01-23 ENCOUNTER — OFFICE VISIT (OUTPATIENT)
Dept: OTOLARYNGOLOGY | Facility: CLINIC | Age: 44
End: 2019-01-23
Payer: COMMERCIAL

## 2019-01-23 ENCOUNTER — OFFICE VISIT (OUTPATIENT)
Dept: SURGERY | Facility: CLINIC | Age: 44
End: 2019-01-23
Attending: CLINICAL NURSE SPECIALIST
Payer: COMMERCIAL

## 2019-01-23 ENCOUNTER — THERAPY VISIT (OUTPATIENT)
Dept: SPEECH THERAPY | Facility: CLINIC | Age: 44
End: 2019-01-23
Payer: COMMERCIAL

## 2019-01-23 VITALS
DIASTOLIC BLOOD PRESSURE: 89 MMHG | TEMPERATURE: 98.1 F | WEIGHT: 80 LBS | BODY MASS INDEX: 16.16 KG/M2 | SYSTOLIC BLOOD PRESSURE: 127 MMHG | HEART RATE: 77 BPM | OXYGEN SATURATION: 98 %

## 2019-01-23 VITALS — BODY MASS INDEX: 16.33 KG/M2 | HEIGHT: 59 IN | WEIGHT: 81 LBS

## 2019-01-23 DIAGNOSIS — C06.9 SQUAMOUS CELL CARCINOMA OF ORAL CAVITY (H): Primary | ICD-10-CM

## 2019-01-23 DIAGNOSIS — C10.9 SQUAMOUS CELL CARCINOMA OF OROPHARYNX (H): Primary | ICD-10-CM

## 2019-01-23 DIAGNOSIS — R13.12 OROPHARYNGEAL DYSPHAGIA: ICD-10-CM

## 2019-01-23 PROCEDURE — G0463 HOSPITAL OUTPT CLINIC VISIT: HCPCS | Mod: ZF

## 2019-01-23 ASSESSMENT — PAIN SCALES - GENERAL
PAINLEVEL: NO PAIN (0)
PAINLEVEL: NO PAIN (0)

## 2019-01-23 ASSESSMENT — MIFFLIN-ST. JEOR: SCORE: 923.04

## 2019-01-23 NOTE — NURSING NOTE
"Oncology Rooming Note    January 23, 2019 10:16 AM   Melinda Milligan is a 43 year old female who presents for:    Chief Complaint   Patient presents with     Oncology Clinic Visit     Return; Feeding tube removal     Initial Vitals: /89   Pulse 77   Temp 98.1  F (36.7  C) (Oral)   Wt 36.3 kg (80 lb)   SpO2 98%   BMI 16.16 kg/m   Estimated body mass index is 16.16 kg/m  as calculated from the following:    Height as of an earlier encounter on 1/23/19: 1.499 m (4' 11\").    Weight as of this encounter: 36.3 kg (80 lb). Body surface area is 1.23 meters squared.  No Pain (0) Comment: Data Unavailable   No LMP recorded.  Allergies reviewed: Yes  Medications reviewed: Yes    Medications: Medication refills not needed today.  Pharmacy name entered into [employer]: [id number] [place], [place] - [address]    Clinical concerns: Feeding tube removal.  [place] was notified.    8 minutes for nursing intake (face to face time)     Cecilia Timmons CMA              "

## 2019-01-23 NOTE — NURSING NOTE
Chief Complaint   Patient presents with     RECHECK     6 week follow up - has some right arm numbness      Apolinar Leslie, EMT

## 2019-01-23 NOTE — LETTER
1/23/2019       RE: Melinda Milligan  [address] [facility] [address]     Dear Colleague,    Thank you for referring your patient, Melinda Milligan, to the [facility] EAR NOSE AND THROAT at [facility]. Please see a copy of my visit note below.    Dear Dr. Monsalve:    I had the pleasure of seeing Melinda Milligan in followup today at the [facility].     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  She had a long history of thrush lasting about 6-8 months that was treated without improvement by her primary care physician.  She was then referred to a hematologist for anemia who evaluated her oropharynx and was concerned about a malignancy.  She was referred to Dr. Monsalve who performed a biopsy of the oral tongue consistent with squamous cell carcinoma.  She was initially seen here at the end of February.  She had a PET CT scan which showed the primary disease in both tonsils, soft palate, base of tongue, right oral tongue with bilateral lymphadenopathy.  Given the extent of her primary disease she was recommended for definitive chemoradiation.  She completed her treatment under the care of Dr. Ambrosio and Dr. Nieto at [place].  She received a total of 70 Gy and high-dose cisplatin completed on 5/2/2018.  She had her 3-month posttreatment PET scan in August 2018 which was negative for any recurrence.    Interval history:  She comes in today for follow-up.  She was last seen in clinic in November 2018.  She says that she is overall doing well.  Her only complaint is related to numbness in her right hand.  She says that she is eating well and she can take an pretty much everything with the exception of rice.  She is up to 81.5 pounds.  She is eager to have her feeding tube removed.  She is doing her swallowing exercises.  She is doing the  nasal irrigations a little less frequently.  She feels like her hearing is improved.  She is using her fluoride toothpaste.  She skipped her last dental appointment due to ride issues.    MEDICATIONS:     Current Outpatient Medications   Medication Sig Dispense Refill     ACETAMINOPHEN PO Take 1,000 mg by mouth       Fexofenadine HCl (ALLEGRA ALLERGY PO) Take by mouth as needed for allergies       folic acid (FOLVITE) 1 MG tablet Take 1 mg by mouth       order for DME Sig:  Isosource 1.5 calories:  Instill 3.5 cartons per day via PEG tube by syringe or gravity flow 90 Can 3     polyethylene glycol (MIRALAX/GLYCOLAX) powder Take 17 g (1 capful) by mouth daily 225 g 1     Potassium Chloride 40 MEQ/15ML (20%) SOLN 7.5 mLs (20 mEq) by Oral or G tube route daily 1 Bottle 1     varenicline (CHANTIX STARTING MONTH PAK) 0.5 MG X 11 & 1 MG X 42 tablet Take as instructed 42 tablet 0       ALLERGIES:    Allergies   Allergen Reactions     Ceftriaxone      Other reaction(s): Unknown Reaction     Chicken-Derived Products (Egg)      Other reaction(s): Vomiting  Other reaction(s): Unknown Reaction       HABITS/SOCIAL HISTORY:   She cut back significantly on her smoking last week but was previously at least a 1 pack per day smoker since 1994.  She denies any chewing tobacco use.  She quit drinking alcohol.  She denies any drug use.  She is  and has a young child at home and another who just went off to college.  She works from home in .    Social History     Socioeconomic History     Marital status:      Spouse name: Not on file     Number of children: Not on file     Years of education: Not on file     Highest education level: Not on file   Social Needs     Financial resource strain: Not on file     Food insecurity - worry: Not on file     Food insecurity - inability: Not on file     Transportation needs - medical: Not on file     Transportation needs - non-medical: Not on file   Occupational History     Not  "on file   Tobacco Use     Smoking status: Current Some Day Smoker     Packs/day: 1.00     Years: 23.00     Pack years: 23.00     Types: Cigarettes     Start date: 1/1/1993     Smokeless tobacco: Never Used     Tobacco comment: Patient reports currently smoking 3 cigarettes per day - updated 3/6/2018   Substance and Sexual Activity     Alcohol use: No     Drug use: No     Sexual activity: Yes     Partners: Male     Birth control/protection: Pull-out method, Condom   Other Topics Concern     Not on file   Social History Narrative     Not on file       PAST MEDICAL HISTORY:   Past Medical History:   Diagnosis Date     Allergic rhinitis      Anemia      Hoarseness      Oropharyngeal cancer (H) 02/15/2018     S/P radiation therapy     7,000 cGy completed on 5/2/2018 to head and neck [facility] with Dr. Albert Ambrosio     Uncomplicated asthma         PAST SURGICAL HISTORY:   Past Surgical History:   Procedure Laterality Date     BIOPSY  02/15/2018    Left Tongue - [facility]     LAPAROSCOPIC ASSISTED INSERTION TUBE GASTROTOMY N/A 3/14/2018    Procedure: LAPAROSCOPIC ASSISTED INSERTION TUBE GASTROSTOMY;  Percutaneous Endoscopic Gastrostomy Tube Insertion, Esophagogastroduodenoscopy;  Surgeon: Edna Martinez MD;  Location:  OR       FAMILY HISTORY:    Family History   Problem Relation Age of Onset     Substance Abuse Father      Dementia Maternal Grandmother      Diabetes Maternal Grandmother      Asthma Brother      Prostate Cancer Maternal Grandfather        REVIEW OF SYSTEMS:  12 point ROS was negative other than the symptoms noted above in the HPI.  Patient Supplied Answers to Review of Systems  [facility] ROS 11/30/2018   Constitutional -   Neurology -   Ears, Nose, Throat Ringing/noise in ears, Nasal congestion or drainage   Cardiopulmonary Cough   Gastrointestinal/Genitourinary -   Musculoskeletal -   Allergy/Immunology -   Hematologic -   Endocrine -         PHYSICAL EXAMINATION:   Ht 1.499 m (4' 11\")   Wt " 36.7 kg (81 lb)   BMI 16.36 kg/m      Appearance:   normal; NAD, slightly older appearing than stated age, thin appearing, small stature   Communication:   normal; communicates verbally, slight hypernasality with incomplete soft palate closure   Head/Face:   inspection -  Normal; no scars or visible lesions   Salivary glands -  Normal size, no tenderness, swelling, or palpable masses   Facial strength -  Normal and symmetric bilateral; H/B I/VI   Skin:  normal, no rash   Ocular Motility:  normal occular movements   Ears:  auricle (AD) -  normal  EAC (AD) -  normal  TM (AD) -  Effusion resolved, mild retraction  auricle (AS) -  normal  EAC (AS) -  normal  TM (AS) - serous effusion - improved  Normal clinical speech reception   Nose:  Ext. inspection -  Normal  Internal Inspection -  Normal mucosa, septum, and turbinates; minimal secretions   Nasopharynx:  Thick dried secretions coating the nasopharynx   Oral Cavity:  lips -  Normal mucosa, oral competence, and stoma size  Dentition in poor repair, plaque on teeth  Improvement in trismus   Hard palate, buccal, floor of mouth mucosa with radiation changes   Tongue -no palpable masses, no mucosal lesions   Oropharynx:  uvula absent, more of the soft palate is gone  No evidence of masses in either tonsillar fossa, radiation changes and scar  Thick secretions along posterior pharyngeal wall   Hypopharynx:  Normal pyriform sinus and pharyngeal wall mucosa   No pooled secretions    Larynx:  Epiglottis with mild thickening from radiation  False vocal cord, true vocal cord normal in appearance, bilaterally mobile cords   No secretions    Neck: No visible mass or asymmetry   Normal range of motion  No significant lymphedema   Lymphatic:  No palpable lymphadenopathy   Cardiovascular:  warm, pink, well-perfused extremities without swelling, tenderness, or edema   Respiratory:  Normal respiratory effort, no stridor   Neuro/Psych.:  mood/affect -  normal  mental status -  normal         PROCEDURES:   Flexible fiberoptic laryngoscopy: Scope exam was indicated due to oropharyngeal tumor. Verbal consent was obtained. The nasal cavity was prepped with an aerosolized solution of topical anesthetic and vasoconstrictive agent. The scope was passed through the anterior nasal cavity and advanced. Inspection of the nasopharynx demonstrate minimal secretions in the nasopharynx.  There are radiation changes to the posterior oropharynx but no masses.  There are no pooled secretions present here.  The vallecula is clear.  Epiglottis has mild thickening from radiation.  The vocal cords are mobile bilaterally.  There are radiation changes to the false cords and arytenoids.  The piriform sinuses are clear.  The airway is patent.  The airway is overall improved in appearance compared to previous exams.  Patient tolerated the procedure well with no immediate complications.    RESULTS REVIEWED:       IMPRESSION AND PLAN:   Melinda Milligan is a 43-year-old woman with a p16 negative T3N2c SCC of the oropharynx.  She is now status post completion of definitive chemoradiation in May 2018.      She continues to work on her oral nutrition and is able to take pretty much everything without restrictions.  She continues to gain weight.  She is eager to have her feeding tube removed which she has not used for 2-1/2 months.    Her serous effusions continue to improve.  I would encourage her to continue to hold off on any tube placement.    She was strongly encouraged to reschedule her dental appointment.  We discussed the importance of regular dental care and the complications that can occur without routine cleanings.  I discussed the risk of osteoradionecrosis and long-term sequela that may result.    She was again encouraged to work on her smoking cessation.    I spoke with the patient and her  today about the [facility] as an option for their son. I am not sure if he would qualify but it may be a something  worthwhile for them to look in to as an option. The information for the camp was provided to the patient and her .    I will see her back in about 6-8 weeks, after she completes her next round of imaging.    Thank you very much for the opportunity to participate in the care of your patient.      Marga Arana M.D.  Otolaryngology- Head & Neck Surgery    CC:  Noel Monsalve, [facility] Ear, Nose & Throat [address], [address]  [place] [address]    Albert Ambrosio MD  Department of Radiation Oncology  [facility]

## 2019-01-23 NOTE — LETTER
"1/23/2019       RE: Melinda Milligan  [address] [facility] [address]     Dear Colleague,    Thank you for referring your patient, Melinda Milligan, to the [facility] CANCER CLINIC. Please see a copy of my visit note below.    REASON FOR VISIT:  Removal of PEG feeding tube    PROCEDURES PERFORMED:    1. Esophagogastroscopy  2. Percutaneous Endoscopic Gastrostomy tube insertion    DATE ABOVE PROCEDURES PERFORMED:  3/14/18    SURGEON:  Dr. Edna Martinez    History of Present Illness:       Melinda is a 43 year old female who has completed treatment for a head/neck cancer.   She saw Dr. Arana (ENT) today who approved removal of the PEG tube.    She has not used it for nutritional support \"for months\" and she has been able to maintain her weight.    Assessment: Melinda is here today with her , Arnol.   She is anxious to get this tube out, said she \"can't wait until I can be in a tub again\".   After explaining the removal process and after care, the PEG was removed without complication.   The tube was intact upon inspection.  A gauze dressing was then applied.    Plan:   Call/return PRN for any concerns or questions.       Total time:  30 minutes    NANCY Sosa, CNS    "

## 2019-01-23 NOTE — PROGRESS NOTES
"REASON FOR VISIT:  Removal of PEG feeding tube    PROCEDURES PERFORMED:    1. Esophagogastroscopy  2. Percutaneous Endoscopic Gastrostomy tube insertion    DATE ABOVE PROCEDURES PERFORMED:  3/14/18    SURGEON:  Dr. Edna Martinez    History of Present Illness:       Melinda is a 43 year old female who has completed treatment for a head/neck cancer.   She saw Dr. Arana (ENT) today who approved removal of the PEG tube.    She has not used it for nutritional support \"for months\" and she has been able to maintain her weight.    Assessment: Melinda is here today with her , Arnol.   She is anxious to get this tube out, said she \"can't wait until I can be in a tub again\".   After explaining the removal process and after care, the PEG was removed without complication.   The tube was intact upon inspection.  A gauze dressing was then applied.    Plan:   Call/return PRN for any concerns or questions.       Total time:  30 minutes    NANCY Sosa, CNS  "

## 2019-01-23 NOTE — PATIENT INSTRUCTIONS
1. Please follow-up in clinic in 6-8 weeks with Dr. Arana.   2. Please call the ENT clinic with any questions,concerns, new or worsening symptoms.    -Clinic number is [phone]   - Meeta's direct line (Dr. Arana's nurse) [phone]    3. You can see Marion Sanchez in thoracic clinic today for PEG removal

## 2019-01-23 NOTE — PROGRESS NOTES
Dear Dr. Monsalve:    I had the pleasure of seeing Melinda Milligan in followup today at the [facility] Otolaryngology Clinic.     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  She had a long history of thrush lasting about 6-8 months that was treated without improvement by her primary care physician.  She was then referred to a hematologist for anemia who evaluated her oropharynx and was concerned about a malignancy.  She was referred to Dr. Monsalve who performed a biopsy of the oral tongue consistent with squamous cell carcinoma.  She was initially seen here at the end of February.  She had a PET CT scan which showed the primary disease in both tonsils, soft palate, base of tongue, right oral tongue with bilateral lymphadenopathy.  Given the extent of her primary disease she was recommended for definitive chemoradiation.  She completed her treatment under the care of Dr. Ambrosio and Dr. Nieto at [place].  She received a total of 70 Gy and high-dose cisplatin completed on 5/2/2018.  She had her 3-month posttreatment PET scan in August 2018 which was negative for any recurrence.    Interval history:  She comes in today for follow-up.  She was last seen in clinic in November 2018.  She says that she is overall doing well.  Her only complaint is related to numbness in her right hand.  She says that she is eating well and she can take an pretty much everything with the exception of rice.  She is up to 81.5 pounds.  She is eager to have her feeding tube removed.  She is doing her swallowing exercises.  She is doing the nasal irrigations a little less frequently.  She feels like her hearing is improved.  She is using her fluoride toothpaste.  She skipped her last dental appointment due to ride issues.    MEDICATIONS:     Current Outpatient Medications   Medication Sig Dispense Refill     ACETAMINOPHEN PO Take  1,000 mg by mouth       Fexofenadine HCl (ALLEGRA ALLERGY PO) Take by mouth as needed for allergies       folic acid (FOLVITE) 1 MG tablet Take 1 mg by mouth       order for DME Sig:  Isosource 1.5 calories:  Instill 3.5 cartons per day via PEG tube by syringe or gravity flow 90 Can 3     polyethylene glycol (MIRALAX/GLYCOLAX) powder Take 17 g (1 capful) by mouth daily 225 g 1     Potassium Chloride 40 MEQ/15ML (20%) SOLN 7.5 mLs (20 mEq) by Oral or G tube route daily 1 Bottle 1     varenicline (CHANTIX STARTING MONTH PAK) 0.5 MG X 11 & 1 MG X 42 tablet Take as instructed 42 tablet 0       ALLERGIES:    Allergies   Allergen Reactions     Ceftriaxone      Other reaction(s): Unknown Reaction     Chicken-Derived Products (Egg)      Other reaction(s): Vomiting  Other reaction(s): Unknown Reaction       HABITS/SOCIAL HISTORY:   She cut back significantly on her smoking last week but was previously at least a 1 pack per day smoker since 1994.  She denies any chewing tobacco use.  She quit drinking alcohol.  She denies any drug use.  She is  and has a young child at home and another who just went off to college.  She works from home in .    Social History     Socioeconomic History     Marital status:      Spouse name: Not on file     Number of children: Not on file     Years of education: Not on file     Highest education level: Not on file   Social Needs     Financial resource strain: Not on file     Food insecurity - worry: Not on file     Food insecurity - inability: Not on file     Transportation needs - medical: Not on file     Transportation needs - non-medical: Not on file   Occupational History     Not on file   Tobacco Use     Smoking status: Current Some Day Smoker     Packs/day: 1.00     Years: 23.00     Pack years: 23.00     Types: Cigarettes     Start date: 1/1/1993     Smokeless tobacco: Never Used     Tobacco comment: Patient reports currently smoking 3 cigarettes per day - updated  "3/6/2018   Substance and Sexual Activity     Alcohol use: No     Drug use: No     Sexual activity: Yes     Partners: Male     Birth control/protection: Pull-out method, Condom   Other Topics Concern     Not on file   Social History Narrative     Not on file       PAST MEDICAL HISTORY:   Past Medical History:   Diagnosis Date     Allergic rhinitis      Anemia      Hoarseness      Oropharyngeal cancer (H) 02/15/2018     S/P radiation therapy     7,000 cGy completed on 5/2/2018 to head and neck [facility] with Dr. Albert Ambrosio     Uncomplicated asthma         PAST SURGICAL HISTORY:   Past Surgical History:   Procedure Laterality Date     BIOPSY  02/15/2018    Left Tongue - [facility]     LAPAROSCOPIC ASSISTED INSERTION TUBE GASTROTOMY N/A 3/14/2018    Procedure: LAPAROSCOPIC ASSISTED INSERTION TUBE GASTROSTOMY;  Percutaneous Endoscopic Gastrostomy Tube Insertion, Esophagogastroduodenoscopy;  Surgeon: Edna Martinez MD;  Location:  OR       FAMILY HISTORY:    Family History   Problem Relation Age of Onset     Substance Abuse Father      Dementia Maternal Grandmother      Diabetes Maternal Grandmother      Asthma Brother      Prostate Cancer Maternal Grandfather        REVIEW OF SYSTEMS:  12 point ROS was negative other than the symptoms noted above in the HPI.  Patient Supplied Answers to Review of Systems  [facility] ROS 11/30/2018   Constitutional -   Neurology -   Ears, Nose, Throat Ringing/noise in ears, Nasal congestion or drainage   Cardiopulmonary Cough   Gastrointestinal/Genitourinary -   Musculoskeletal -   Allergy/Immunology -   Hematologic -   Endocrine -         PHYSICAL EXAMINATION:   Ht 1.499 m (4' 11\")   Wt 36.7 kg (81 lb)   BMI 16.36 kg/m     Appearance:   normal; NAD, slightly older appearing than stated age, thin appearing, small stature   Communication:   normal; communicates verbally, slight hypernasality with incomplete soft palate closure   Head/Face:   inspection -  Normal; no scars or " visible lesions   Salivary glands -  Normal size, no tenderness, swelling, or palpable masses   Facial strength -  Normal and symmetric bilateral; H/B I/VI   Skin:  normal, no rash   Ocular Motility:  normal occular movements   Ears:  auricle (AD) -  normal  EAC (AD) -  normal  TM (AD) -  Effusion resolved, mild retraction  auricle (AS) -  normal  EAC (AS) -  normal  TM (AS) - serous effusion - improved  Normal clinical speech reception   Nose:  Ext. inspection -  Normal  Internal Inspection -  Normal mucosa, septum, and turbinates; minimal secretions   Nasopharynx:  Thick dried secretions coating the nasopharynx   Oral Cavity:  lips -  Normal mucosa, oral competence, and stoma size  Dentition in poor repair, plaque on teeth  Improvement in trismus   Hard palate, buccal, floor of mouth mucosa with radiation changes   Tongue -no palpable masses, no mucosal lesions   Oropharynx:  uvula absent, more of the soft palate is gone  No evidence of masses in either tonsillar fossa, radiation changes and scar  Thick secretions along posterior pharyngeal wall   Hypopharynx:  Normal pyriform sinus and pharyngeal wall mucosa   No pooled secretions    Larynx:  Epiglottis with mild thickening from radiation  False vocal cord, true vocal cord normal in appearance, bilaterally mobile cords   No secretions    Neck: No visible mass or asymmetry   Normal range of motion  No significant lymphedema   Lymphatic:  No palpable lymphadenopathy   Cardiovascular:  warm, pink, well-perfused extremities without swelling, tenderness, or edema   Respiratory:  Normal respiratory effort, no stridor   Neuro/Psych.:  mood/affect -  normal  mental status -  normal        PROCEDURES:   Flexible fiberoptic laryngoscopy: Scope exam was indicated due to oropharyngeal tumor. Verbal consent was obtained. The nasal cavity was prepped with an aerosolized solution of topical anesthetic and vasoconstrictive agent. The scope was passed through the anterior nasal  cavity and advanced. Inspection of the nasopharynx demonstrate minimal secretions in the nasopharynx.  There are radiation changes to the posterior oropharynx but no masses.  There are no pooled secretions present here.  The vallecula is clear.  Epiglottis has mild thickening from radiation.  The vocal cords are mobile bilaterally.  There are radiation changes to the false cords and arytenoids.  The piriform sinuses are clear.  The airway is patent.  The airway is overall improved in appearance compared to previous exams.  Patient tolerated the procedure well with no immediate complications.    RESULTS REVIEWED:       IMPRESSION AND PLAN:   Melinda Milligan is a 43-year-old woman with a p16 negative T3N2c SCC of the oropharynx.  She is now status post completion of definitive chemoradiation in May 2018.      She continues to work on her oral nutrition and is able to take pretty much everything without restrictions.  She continues to gain weight.  She is eager to have her feeding tube removed which she has not used for 2-1/2 months.    Her serous effusions continue to improve.  I would encourage her to continue to hold off on any tube placement.    She was strongly encouraged to reschedule her dental appointment.  We discussed the importance of regular dental care and the complications that can occur without routine cleanings.  I discussed the risk of osteoradionecrosis and long-term sequela that may result.    She was again encouraged to work on her smoking cessation.    I spoke with the patient and her  today about the [facility] as an option for their son. I am not sure if he would qualify but it may be a something worthwhile for them to look in to as an option. The information for the [facility] was provided to the patient and her .    I will see her back in about 6-8 weeks, after she completes her next round of imaging.    Thank you very much for the opportunity to participate in the care of your  patient.      Marga Arana M.D.  Otolaryngology- Head & Neck Surgery        CC:  Noel Monsalve, [facility] Ear, Nose & Throat [address], [address]  [place] [id number]      Albert Ambrosio MD  Department of Radiation Oncology  [facility]

## 2019-01-24 ENCOUNTER — HOME INFUSION (PRE-WILLOW HOME INFUSION) (OUTPATIENT)
Dept: PHARMACY | Facility: CLINIC | Age: 44
End: 2019-01-24

## 2019-01-28 NOTE — PROGRESS NOTES
This is a recent snapshot of the patient's [facility] medical record.  For current drug dose and complete information and questions, call [phone] or In Basket pool, [facility] ([id number])  CSN Number:  [id number]

## 2019-02-04 ENCOUNTER — DOCUMENTATION ONLY (OUTPATIENT)
Dept: LAB | Facility: CLINIC | Age: 44
End: 2019-02-04

## 2019-02-04 DIAGNOSIS — C10.9 SQUAMOUS CELL CARCINOMA OF OROPHARYNX (H): Primary | ICD-10-CM

## 2019-02-12 ENCOUNTER — TELEPHONE (OUTPATIENT)
Dept: RADIATION ONCOLOGY | Facility: CLINIC | Age: 44
End: 2019-02-12

## 2019-02-12 NOTE — TELEPHONE ENCOUNTER
I have spoke to patient's  and they are aware that their provider will be changing to Dr. Neely for their upcoming appointment on 03/07/2019 at 0930.        Denise Glasgow MA

## 2019-02-20 ENCOUNTER — ONCOLOGY VISIT (OUTPATIENT)
Dept: ONCOLOGY | Facility: CLINIC | Age: 44
End: 2019-02-20
Payer: COMMERCIAL

## 2019-02-20 VITALS
WEIGHT: 83 LBS | SYSTOLIC BLOOD PRESSURE: 133 MMHG | HEART RATE: 76 BPM | BODY MASS INDEX: 16.73 KG/M2 | RESPIRATION RATE: 16 BRPM | HEIGHT: 59 IN | TEMPERATURE: 98.7 F | OXYGEN SATURATION: 100 % | DIASTOLIC BLOOD PRESSURE: 87 MMHG

## 2019-02-20 DIAGNOSIS — C10.9 SQUAMOUS CELL CARCINOMA OF OROPHARYNX (H): ICD-10-CM

## 2019-02-20 DIAGNOSIS — M54.12 CERVICAL RADICULOPATHY: Primary | ICD-10-CM

## 2019-02-20 LAB
ANION GAP SERPL CALCULATED.3IONS-SCNC: 8 MMOL/L (ref 3–14)
BASOPHILS # BLD AUTO: 0 10E9/L (ref 0–0.2)
BASOPHILS NFR BLD AUTO: 0.8 %
BUN SERPL-MCNC: 8 MG/DL (ref 7–30)
CALCIUM SERPL-MCNC: 9.3 MG/DL (ref 8.5–10.1)
CHLORIDE SERPL-SCNC: 100 MMOL/L (ref 94–109)
CO2 SERPL-SCNC: 27 MMOL/L (ref 20–32)
CREAT SERPL-MCNC: 0.54 MG/DL (ref 0.52–1.04)
DIFFERENTIAL METHOD BLD: ABNORMAL
EOSINOPHIL # BLD AUTO: 0 10E9/L (ref 0–0.7)
EOSINOPHIL NFR BLD AUTO: 0.8 %
ERYTHROCYTE [DISTWIDTH] IN BLOOD BY AUTOMATED COUNT: 11.5 % (ref 10–15)
GFR SERPL CREATININE-BSD FRML MDRD: >90 ML/MIN/{1.73_M2}
GLUCOSE SERPL-MCNC: 99 MG/DL (ref 70–99)
HCT VFR BLD AUTO: 38.7 % (ref 35–47)
HGB BLD-MCNC: 13.4 G/DL (ref 11.7–15.7)
IMM GRANULOCYTES # BLD: 0 10E9/L (ref 0–0.4)
IMM GRANULOCYTES NFR BLD: 0.2 %
LYMPHOCYTES # BLD AUTO: 0.4 10E9/L (ref 0.8–5.3)
LYMPHOCYTES NFR BLD AUTO: 7.5 %
MCH RBC QN AUTO: 35.9 PG (ref 26.5–33)
MCHC RBC AUTO-ENTMCNC: 34.6 G/DL (ref 31.5–36.5)
MCV RBC AUTO: 104 FL (ref 78–100)
MONOCYTES # BLD AUTO: 0.4 10E9/L (ref 0–1.3)
MONOCYTES NFR BLD AUTO: 9 %
NEUTROPHILS # BLD AUTO: 3.9 10E9/L (ref 1.6–8.3)
NEUTROPHILS NFR BLD AUTO: 81.7 %
PLATELET # BLD AUTO: 237 10E9/L (ref 150–450)
POTASSIUM SERPL-SCNC: 4 MMOL/L (ref 3.4–5.3)
RBC # BLD AUTO: 3.73 10E12/L (ref 3.8–5.2)
SODIUM SERPL-SCNC: 135 MMOL/L (ref 133–144)
WBC # BLD AUTO: 4.8 10E9/L (ref 4–11)

## 2019-02-20 PROCEDURE — 80048 BASIC METABOLIC PNL TOTAL CA: CPT | Performed by: INTERNAL MEDICINE

## 2019-02-20 PROCEDURE — 99215 OFFICE O/P EST HI 40 MIN: CPT | Performed by: INTERNAL MEDICINE

## 2019-02-20 PROCEDURE — 85025 COMPLETE CBC W/AUTO DIFF WBC: CPT | Performed by: INTERNAL MEDICINE

## 2019-02-20 PROCEDURE — 36415 COLL VENOUS BLD VENIPUNCTURE: CPT | Performed by: INTERNAL MEDICINE

## 2019-02-20 ASSESSMENT — PAIN SCALES - GENERAL: PAINLEVEL: EXTREME PAIN (8)

## 2019-02-20 ASSESSMENT — MIFFLIN-ST. JEOR: SCORE: 932.12

## 2019-02-20 NOTE — PROGRESS NOTES
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        Primary Physician: No Ref-Primary, Physician     History Of Present Illness:    Please see previous note for details. I have copied and updated from prior note.  Ms. Milligan is a 43 year old female who was diagnosed with squamous cell carcinoma of the tongue. She initially presented with thrush several months ago which was not improved after treatment and more recently she was noted to have worsening swelling and pain on the right side of the tongue and cheek and she was referred to ENT for a biopsy of the tongue lesion which was consistent with squamous cell cancer. P 16 negative.  As part of her workup she had a PET scan and neck CT which showed   1. Hypermetabolic mass involving the bilateral palatine tonsils, anteriorly extending to and involving the soft palate, superiorly extending to and involving the right posterior lateral nasopharyngeal wall and inferiorly extending to the right lateral oropharyngeal wall representing primary squamosal cancer.  2. Right and left metastatic FDG avid level IIa lymph nodes.    Treatment:   3/13/2018 gtube placed  3/14/2018 Started chemoradiation with high dose cisplatin  Cycle #2 of chemotherapy was delayed by one week because of neutropenia. She got it on 4/11/18  C#3 not given due to cytopenias    She completed radiation 7000 cGy on 5/2/18    12 weeks post treatment PET CT is negative    She had another set of his scans done on 11/5/2018.  CT Neck showed posttreatment changes but no evidence of recurrence.  CT Chest showed a stable size of lung nodules no new or enlarging lung nodule.    She comes in today accompanied by her  and tells me overall she has been doing well.  The feeding tube was removed few weeks back and she is able to eat and drink well and has no trouble swallowing.  She occasionally feels a  little sore in the back of the tongue.  She has gained some weight.  Denies nausea or vomiting or diarrhea or constipation.  Denies infections.  No new swellings.  Over the last 1 month she has noticed that when she turns her neck to one side or the other than she has pain and tingling in her arms.  If the head is a straight then she does not have any tingling or numbness.    She tells me that it is started on the right side but now she feels it on both the sides. Other than that she denies any other neuropathy.  She denies any hearing problems or tinnitus.  She continues to smoke 1 pack a day.  Her energy is good.    ECOG 0    ROS:  Rest of the comprehensive review of the system was essentially unremarkable.        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 Albert Ambrosio     Uncomplicated asthma     iron deficiency anemia due to heavy menses.   Previously she was getting IV iron through her GYN for heavy periods but she got 2 out of 4 planned doses of intravenous iron. She forgot about the last 2 doses.   Past Surgical History:   Procedure Laterality Date     BIOPSY  02/15/2018    Left Tongue - [facility]     LAPAROSCOPIC ASSISTED INSERTION TUBE GASTROTOMY N/A 3/14/2018    Procedure: LAPAROSCOPIC ASSISTED INSERTION TUBE GASTROSTOMY;  Percutaneous Endoscopic Gastrostomy Tube Insertion, Esophagogastroduodenoscopy;  Surgeon: Edna Martinez MD;  Location: [facility] OR     Cancer History:   As above    Allergies:  Allergies as of 02/20/2019 - Reviewed 02/20/2019   Allergen Reaction Noted     Ceftriaxone  08/25/2012     Chicken-derived products (egg)  08/25/2012     Current Medications:  Current Outpatient Medications   Medication Sig Dispense Refill      ACETAMINOPHEN PO Take 1,000 mg by mouth       Fexofenadine HCl (ALLEGRA ALLERGY PO) Take by mouth as needed for allergies       order for DME Sig:  Isosource 1.5 calories:  Instill 3.5 cartons per day via PEG tube by syringe or gravity flow (Patient not taking: Reported on 1/23/2019) 90 Can 3     polyethylene glycol (MIRALAX/GLYCOLAX) powder Take 17 g (1 capful) by mouth daily (Patient not taking: Reported on 1/23/2019) 225 g 1     Potassium Chloride 40 MEQ/15ML (20%) SOLN 7.5 mLs (20 mEq) by Oral or G tube route daily (Patient not taking: Reported on 1/23/2019) 1 Bottle 1     varenicline (CHANTIX STARTING MONTH PAK) 0.5 MG X 11 & 1 MG X 42 tablet Take as instructed (Patient not taking: Reported on 1/23/2019) 42 tablet 0      Family History:  Family History   Problem Relation Age of Onset     Substance Abuse Father      Dementia Maternal Grandmother      Diabetes Maternal Grandmother      Asthma Brother      Prostate Cancer Maternal Grandfather      Social History:  Social History     Socioeconomic History     Marital status:      Spouse name: Not on file     Number of children: Not on file     Years of education: Not on file     Highest education level: Not on file   Occupational History     Not on file   Social Needs     Financial resource strain: Not on file     Food insecurity:     Worry: Not on file     Inability: Not on file     Transportation needs:     Medical: Not on file     Non-medical: Not on file   Tobacco Use     Smoking status: Current Some Day Smoker     Packs/day: 1.00     Years: 23.00     Pack years: 23.00     Types: Cigarettes     Start date: 1/1/1993     Smokeless tobacco: Never Used     Tobacco comment: Patient reports currently smoking 3 cigarettes per day - updated 3/6/2018   Substance and Sexual Activity     Alcohol use: No     Drug use: No     Sexual activity: Yes     Partners: Male     Birth control/protection: Pull-out method, Condom   Lifestyle     Physical activity:     Days per  "week: Not on file     Minutes per session: Not on file     Stress: Not on file   Relationships     Social connections:     Talks on phone: Not on file     Gets together: Not on file     Attends Alevism service: Not on file     Active member of club or organization: Not on file     Attends meetings of clubs or organizations: Not on file     Relationship status: Not on file     Intimate partner violence:     Fear of current or ex partner: Not on file     Emotionally abused: Not on file     Physically abused: Not on file     Forced sexual activity: Not on file   Other Topics Concern     Not on file   Social History Narrative     Not on file    she used to smoke 1 pack a day but now smoking about half a pack a day. We again discussed the importance of completely abstaining from smoking. I again discussed referring her for smoking cessation program but she is not interested      Denies any use of alcohol. Lives with her . 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         Physical Exam:  /87 (BP Location: Right arm)   Pulse 76   Temp 98.7  F (37.1  C) (Oral)   Resp 16   Ht 1.499 m (4' 11\")   Wt 37.6 kg (83 lb)   LMP 01/01/2018 (Approximate)   SpO2 100%   BMI 16.76 kg/m    CONSTITUTIONAL: no acute distress  EYES: PERRLA, no palor or icterus.   ENT/MOUTH: no mouth lesions but there is dryness in the back of the throat. Ears normal  CVS: s1s2 no m r g .   RESPIRATORY: clear to auscultation b/l  GI: soft non tender no hepatosplenomegaly.  Previous G-tube site has now completely healed.  NEURO: AAOX3  Grossly non focal neuro exam  INTEGUMENT: no obvious rashes  LYMPHATIC: no palpable cervical, supraclavicular, axillary or inguinal LAD  MUSCULOSKELETAL: No bony tenderness.  When I asked her to turn her neck to one side or the other she noticed pain and tingling in the corresponding upper extremity.  EXTREMITIES: no edema  PSYCH: Mentation, mood and affect are normal. Decision " making capacity is intact          Laboratory/Imaging Studies      Results for orders placed or performed in visit on 02/20/19   *CBC with platelets differential   Result Value Ref Range    WBC 4.8 4.0 - 11.0 10e9/L    RBC Count 3.73 (L) 3.8 - 5.2 10e12/L    Hemoglobin 13.4 11.7 - 15.7 g/dL    Hematocrit 38.7 35.0 - 47.0 %     (H) 78 - 100 fl    MCH 35.9 (H) 26.5 - 33.0 pg    MCHC 34.6 31.5 - 36.5 g/dL    RDW 11.5 10.0 - 15.0 %    Platelet Count 237 150 - 450 10e9/L    Diff Method Automated Method     % Neutrophils 81.7 %    % Lymphocytes 7.5 %    % Monocytes 9.0 %    % Eosinophils 0.8 %    % Basophils 0.8 %    % Immature Granulocytes 0.2 %    Absolute Neutrophil 3.9 1.6 - 8.3 10e9/L    Absolute Lymphocytes 0.4 (L) 0.8 - 5.3 10e9/L    Absolute Monocytes 0.4 0.0 - 1.3 10e9/L    Absolute Eosinophils 0.0 0.0 - 0.7 10e9/L    Absolute Basophils 0.0 0.0 - 0.2 10e9/L    Abs Immature Granulocytes 0.0 0 - 0.4 10e9/L   Basic metabolic panel   Result Value Ref Range    Sodium 135 133 - 144 mmol/L    Potassium 4.0 3.4 - 5.3 mmol/L    Chloride 100 94 - 109 mmol/L    Carbon Dioxide 27 20 - 32 mmol/L    Anion Gap 8 3 - 14 mmol/L    Glucose 99 70 - 99 mg/dL    Urea Nitrogen 8 7 - 30 mg/dL    Creatinine 0.54 0.52 - 1.04 mg/dL    GFR Estimate >90 >60 mL/min/[1.73_m2]    GFR Estimate If Black >90 >60 mL/min/[1.73_m2]    Calcium 9.3 8.5 - 10.1 mg/dL     ASSESSMENT/PLAN:    Stage IV a [id number] 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  She started concurrent chemotherapy with high-dose cisplatin alongside radiation on 3/14/2018. Cycle #2 of chemotherapy was delayed by one week because of neutropenia.   She received C#2 on 4/11/18  She is completed radiation on 5/2/2018   C#3 which was due on 5/2/18 was not given due to neutropenia    Repeat PET CT is negative for any residual disease.  She had a repeat CT of the neck and CT chest in Nov 2018 which were without any evidence of recurrence.    She is doing well. She will  have repeat scans scheduled for in the next few days and she will follow with ENT after that.      Nutrition.  Her feeding tube was removed.  She is able to eat and drink well.  Overall she has gained some weight.  Continue to use high protein foods/drinks like Dale.      Smoking.  She continues to smoke.  We again discussed about this and I strongly encouraged her to completely stop smoking.  I offered her referral for smoking cessation program.  She is not interested in that but she tells me that she needs to start taking Chantix which she has at home.    Cytopenias. Related to prior chemotherapy.  The cytopenias resolved apart from lymphocyte count.  MCV was previously high and folate and B12 levels were normal.  Repeat MCV is much better and it is 104.  Repeat labs in 3 months.    Cervical radiculopathy.  Her symptoms are concerning for cervical radiculopathy.  I will check MRI of the cervical spine and then decide about the management.  She will likely need to be seen by a spine specialist.    We did not address the following today  She had baseline audiogram done prior to start of cisplatin. Currently she is not noticing any changes in her hearing or tinnitus. We can consider repeating audiogram if she has any problems with hearing or tinnitus    I will see her back in 3 months.    I answered all of her questions to her satisfaction.  She is agreeable and comfortable with the plan.      Benedicto Nieto

## 2019-02-20 NOTE — NURSING NOTE
"Oncology Rooming Note    February 20, 2019 10:26 AM   Melinda Milligan is a 43 year old female who presents for:    Chief Complaint   Patient presents with     Oncology Clinic Visit     3 mon f/u     Initial Vitals: /87 (BP Location: Right arm)   Pulse 76   Temp 98.7  F (37.1  C) (Oral)   Resp 16   Ht 1.499 m (4' 11\")   Wt 37.6 kg (83 lb)   LMP 01/01/2018 (Approximate)   SpO2 100%   BMI 16.76 kg/m   Estimated body mass index is 16.76 kg/m  as calculated from the following:    Height as of this encounter: 1.499 m (4' 11\").    Weight as of this encounter: 37.6 kg (83 lb). Body surface area is 1.25 meters squared.  Extreme Pain (8) Comment: Data Unavailable   Patient's last menstrual period was 01/01/2018 (approximate).  Allergies reviewed: Yes  Medications reviewed: Yes    Medications: Medication refills not needed today.  Pharmacy name entered into [facility]: [facility] - [place], [place] - [address]      Jennifer Mckoy LPN               "

## 2019-02-20 NOTE — PATIENT INSTRUCTIONS
Schedule MRI cervical spine    Follow with Rad Onc and ENT    See me back in 3 months with labs prior    We will determine followup of the cervical radiculopathy after the MRI

## 2019-02-20 NOTE — LETTER
2/20/2019         RE: Melinda Milligan  [address] [facility], [place] [address]        Dear Colleague,    Thank you for referring your patient, Melinda Milligan, to the [facility]. Please see a copy of my visit note below.    Oncology follow up visit:  Date on this visit: 2/20/2019      Chief complaint  Stage IV a [id number] 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        Primary Physician: No Ref-Primary, Physician     History Of Present Illness:    Please see previous note for details. I have copied and updated from prior note.  Ms. Milligan is a 43 year old female who was diagnosed with squamous cell carcinoma of the tongue. She initially presented with thrush several months ago which was not improved after treatment and more recently she was noted to have worsening swelling and pain on the right side of the tongue and cheek and she was referred to ENT for a biopsy of the tongue lesion which was consistent with squamous cell cancer. P 16 negative.  As part of her workup she had a PET scan and neck CT which showed   1. Hypermetabolic mass involving the bilateral palatine tonsils, anteriorly extending to and involving the soft palate, superiorly extending to and involving the right posterior lateral nasopharyngeal wall and inferiorly extending to the right lateral oropharyngeal wall representing primary squamosal cancer.  2. Right and left metastatic FDG avid level IIa lymph nodes.    Treatment:   3/13/2018 gtube placed  3/14/2018 Started chemoradiation with high dose cisplatin  Cycle #2 of chemotherapy was delayed by one week because of neutropenia. She got it on 4/11/18  C#3 not given due to cytopenias    She completed radiation 7000 cGy on 5/2/18    12 weeks post treatment PET CT is negative    She had another set of his scans done on 11/5/2018.  CT Neck showed posttreatment changes but no evidence of recurrence.  CT Chest showed a stable size of lung nodules no new or  enlarging lung nodule.    She comes in today accompanied by her  and tells me overall she has been doing well.  The feeding tube was removed few weeks back and she is able to eat and drink well and has no trouble swallowing.  She occasionally feels a little sore in the back of the tongue.  She has gained some weight.  Denies nausea or vomiting or diarrhea or constipation.  Denies infections.  No new swellings.  Over the last 1 month she has noticed that when she turns her neck to one side or the other than she has pain and tingling in her arms.  If the head is a straight then she does not have any tingling or numbness.    She tells me that it is started on the right side but now she feels it on both the sides. Other than that she denies any other neuropathy.  She denies any hearing problems or tinnitus.  She continues to smoke 1 pack a day.  Her energy is good.    ECOG 0    ROS:  Rest of the comprehensive review of the system was essentially unremarkable.        I reviewed other history in Epic as below      Past Medical/Surgical History:  Past Medical History:   Diagnosis Date     Allergic rhinitis      Anemia      Hoarseness      Oropharyngeal cancer (H) 02/15/2018     S/P radiation therapy     7,000 cGy completed on 5/2/2018 to [facility] and neck [facility] with Dr. Albert Ambrosio     Uncomplicated asthma     iron deficiency anemia due to heavy menses.   Previously she was getting IV iron through her GYN for heavy periods but she got 2 out of 4 planned doses of intravenous iron. She forgot about the last 2 doses.   Past Surgical History:   Procedure Laterality Date     BIOPSY  02/15/2018    Left Tongue - [facility]     LAPAROSCOPIC ASSISTED INSERTION TUBE GASTROTOMY N/A 3/14/2018    Procedure: LAPAROSCOPIC ASSISTED INSERTION TUBE GASTROSTOMY;  Percutaneous Endoscopic Gastrostomy Tube Insertion, Esophagogastroduodenoscopy;  Surgeon: Edna Martinez MD;  Location: [facility] OR     Cancer History:   As  above    Allergies:  Allergies as of 02/20/2019 - Reviewed 02/20/2019   Allergen Reaction Noted     Ceftriaxone  08/25/2012     Chicken-derived products (egg)  08/25/2012     Current Medications:  Current Outpatient Medications   Medication Sig Dispense Refill     ACETAMINOPHEN PO Take 1,000 mg by mouth       Fexofenadine HCl (ALLEGRA ALLERGY PO) Take by mouth as needed for allergies       order for DME Sig:  Isosource 1.5 calories:  Instill 3.5 cartons per day via PEG tube by syringe or gravity flow (Patient not taking: Reported on 1/23/2019) 90 Can 3     polyethylene glycol (MIRALAX/GLYCOLAX) powder Take 17 g (1 capful) by mouth daily (Patient not taking: Reported on 1/23/2019) 225 g 1     Potassium Chloride 40 MEQ/15ML (20%) SOLN 7.5 mLs (20 mEq) by Oral or G tube route daily (Patient not taking: Reported on 1/23/2019) 1 Bottle 1     varenicline (CHANTIX STARTING MONTH PAK) 0.5 MG X 11 & 1 MG X 42 tablet Take as instructed (Patient not taking: Reported on 1/23/2019) 42 tablet 0      Family History:  Family History   Problem Relation Age of Onset     Substance Abuse Father      Dementia Maternal Grandmother      Diabetes Maternal Grandmother      Asthma Brother      Prostate Cancer Maternal Grandfather      Social History:  Social History     Socioeconomic History     Marital status:      Spouse name: Not on file     Number of children: Not on file     Years of education: Not on file     Highest education level: Not on file   Occupational History     Not on file   Social Needs     Financial resource strain: Not on file     Food insecurity:     Worry: Not on file     Inability: Not on file     Transportation needs:     Medical: Not on file     Non-medical: Not on file   Tobacco Use     Smoking status: Current Some Day Smoker     Packs/day: 1.00     Years: 23.00     Pack years: 23.00     Types: Cigarettes     Start date: 1/1/1993     Smokeless tobacco: Never Used     Tobacco comment: Patient reports currently  "smoking 3 cigarettes per day - updated 3/6/2018   Substance and Sexual Activity     Alcohol use: No     Drug use: No     Sexual activity: Yes     Partners: Male     Birth control/protection: Pull-out method, Condom   Lifestyle     Physical activity:     Days per week: Not on file     Minutes per session: Not on file     Stress: Not on file   Relationships     Social connections:     Talks on phone: Not on file     Gets together: Not on file     Attends Restorationist service: Not on file     Active member of club or organization: Not on file     Attends meetings of clubs or organizations: Not on file     Relationship status: Not on file     Intimate partner violence:     Fear of current or ex partner: Not on file     Emotionally abused: Not on file     Physically abused: Not on file     Forced sexual activity: Not on file   Other Topics Concern     Not on file   Social History Narrative     Not on file    she used to smoke 1 pack a day but now smoking about half a pack a day. We again discussed the importance of completely abstaining from smoking. I again discussed referring her for smoking cessation program but she is not interested      Denies any use of alcohol. Lives with her . 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         Physical Exam:  /87 (BP Location: Right arm)   Pulse 76   Temp 98.7  F (37.1  C) (Oral)   Resp 16   Ht 1.499 m (4' 11\")   Wt 37.6 kg (83 lb)   LMP 01/01/2018 (Approximate)   SpO2 100%   BMI 16.76 kg/m     CONSTITUTIONAL: no acute distress  EYES: PERRLA, no palor or icterus.   ENT/MOUTH: no mouth lesions but there is dryness in the back of the throat. Ears normal  CVS: s1s2 no m r g .   RESPIRATORY: clear to auscultation b/l  GI: soft non tender no hepatosplenomegaly.  Previous G-tube site has now completely healed.  NEURO: AAOX3  Grossly non focal neuro exam  INTEGUMENT: no obvious rashes  LYMPHATIC: no palpable cervical, supraclavicular, axillary " or inguinal LAD  MUSCULOSKELETAL: No bony tenderness.  When I asked her to turn her neck to one side or the other she noticed pain and tingling in the corresponding upper extremity.  EXTREMITIES: no edema  PSYCH: Mentation, mood and affect are normal. Decision making capacity is intact          Laboratory/Imaging Studies      Results for orders placed or performed in visit on 02/20/19   *CBC with platelets differential   Result Value Ref Range    WBC 4.8 4.0 - 11.0 10e9/L    RBC Count 3.73 (L) 3.8 - 5.2 10e12/L    Hemoglobin 13.4 11.7 - 15.7 g/dL    Hematocrit 38.7 35.0 - 47.0 %     (H) 78 - 100 fl    MCH 35.9 (H) 26.5 - 33.0 pg    MCHC 34.6 31.5 - 36.5 g/dL    RDW 11.5 10.0 - 15.0 %    Platelet Count 237 150 - 450 10e9/L    Diff Method Automated Method     % Neutrophils 81.7 %    % Lymphocytes 7.5 %    % Monocytes 9.0 %    % Eosinophils 0.8 %    % Basophils 0.8 %    % Immature Granulocytes 0.2 %    Absolute Neutrophil 3.9 1.6 - 8.3 10e9/L    Absolute Lymphocytes 0.4 (L) 0.8 - 5.3 10e9/L    Absolute Monocytes 0.4 0.0 - 1.3 10e9/L    Absolute Eosinophils 0.0 0.0 - 0.7 10e9/L    Absolute Basophils 0.0 0.0 - 0.2 10e9/L    Abs Immature Granulocytes 0.0 0 - 0.4 10e9/L   Basic metabolic panel   Result Value Ref Range    Sodium 135 133 - 144 mmol/L    Potassium 4.0 3.4 - 5.3 mmol/L    Chloride 100 94 - 109 mmol/L    Carbon Dioxide 27 20 - 32 mmol/L    Anion Gap 8 3 - 14 mmol/L    Glucose 99 70 - 99 mg/dL    Urea Nitrogen 8 7 - 30 mg/dL    Creatinine 0.54 0.52 - 1.04 mg/dL    GFR Estimate >90 >60 mL/min/[1.73_m2]    GFR Estimate If Black >90 >60 mL/min/[1.73_m2]    Calcium 9.3 8.5 - 10.1 mg/dL     ASSESSMENT/PLAN:    Stage IV a [id number] 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  She started concurrent chemotherapy with high-dose cisplatin alongside radiation on 3/14/2018. Cycle #2 of chemotherapy was delayed by one week because of neutropenia.   She received C#2 on 4/11/18  She is completed radiation on  5/2/2018   C#3 which was due on 5/2/18 was not given due to neutropenia    Repeat PET CT is negative for any residual disease.  She had a repeat CT of the neck and CT chest in Nov 2018 which were without any evidence of recurrence.    She is doing well. She will have repeat scans scheduled for in the next few days and she will follow with ENT after that.      Nutrition.  Her feeding tube was removed.  She is able to eat and drink well.  Overall she has gained some weight.  Continue to use high protein foods/drinks like [place].      Smoking.  She continues to smoke.  We again discussed about this and I strongly encouraged her to completely stop smoking.  I offered her referral for smoking cessation program.  She is not interested in that but she tells me that she needs to start taking Chantix which she has at home.    Cytopenias. Related to prior chemotherapy.  The cytopenias resolved apart from lymphocyte count.  MCV was previously high and folate and B12 levels were normal.  Repeat MCV is much better and it is 104.  Repeat labs in 3 months.    Cervical radiculopathy.  Her symptoms are concerning for cervical radiculopathy.  I will check MRI of the cervical spine and then decide about the management.  She will likely need to be seen by a spine specialist.    We did not address the following today  She had baseline audiogram done prior to start of cisplatin. Currently she is not noticing any changes in her hearing or tinnitus. We can consider repeating audiogram if she has any problems with hearing or tinnitus    I will see her back in 3 months.    I answered all of her questions to her satisfaction.  She is agreeable and comfortable with the plan.      Benedicto Nieto        Again, thank you for allowing me to participate in the care of your patient.        Sincerely,        Benedicto Nieto MD

## 2019-02-22 ENCOUNTER — TELEPHONE (OUTPATIENT)
Dept: ONCOLOGY | Facility: CLINIC | Age: 44
End: 2019-02-22

## 2019-02-22 ENCOUNTER — TELEPHONE (OUTPATIENT)
Dept: OTOLARYNGOLOGY | Facility: CLINIC | Age: 44
End: 2019-02-22

## 2019-02-22 NOTE — TELEPHONE ENCOUNTER
RN returned patient's 's call regarding patient's care and hospitalization. Voicemail left. Awaiting call back.

## 2019-02-22 NOTE — TELEPHONE ENCOUNTER
Pts , Noe, called to update Dr. Arana, as pt is in the hospital due to a suicide attempt.  Noe would like some advice as to proceed further, if anyone has had experience with a situation like this, and if there is a way to get pts care all under one place - for their peace of mind.  Please follow up with Noe at [phone].  Thank you!

## 2019-02-22 NOTE — TELEPHONE ENCOUNTER
Patient's , Arnol,  contacted writer to inform that patient attempted suicide and is currently an inpatient at [facility] in [place].  Noe states patient is in stable but critical condition, she overdosed on Morphine; this was her second attempt, first being before cancer diagnosis.  Noe is calling to get information on how Melinda can receive care at [facility].  For now writer advised that it would be important for Melinda to become physically stable; that [facility] will make sure she has a follow-up plan when she is discharged and could be referred to [facility] for mental health services.  Advised to call with further questions.  Will follow-up with Noe.

## 2019-02-25 ENCOUNTER — TELEPHONE (OUTPATIENT)
Dept: OTOLARYNGOLOGY | Facility: CLINIC | Age: 44
End: 2019-02-25

## 2019-02-25 NOTE — TELEPHONE ENCOUNTER
Received phone note from medical oncology nurse about patient recent suicide attempt. I called and spoke with the  by phone to get an update. Patient took 17 pills of slow release morphine that was at home - was previously not taking any pain medications, had at home as an old prescription. Patient has since been discharged from the hospital and is home. Per  has multiple issues from her past on top of the cancer diagnosis/side effects from treatment that are impacting things. She has previously attempted suicide in the past including with her diagnosis. She has seen a therapist in the past but did not connect and made it hard for her to talk to them. Patient had been admitted to [facility] but want to transfer care back to the [facility]. They are asking for resources and referrals. I explained that I will reach out to Ranjith Linares here in our clinic to see if he can either work with the patient or give suggestions of how best to proceed/how to refer.  was very appreciative of this information and knows we will be in touch as we get more information. She is scheduled to see rad onc in about a week with repeat scans - I provided reassurance about the patient seeing Dr Neely who will be covering in Dr Ambrosio's absence. I did discuss with the  that we will need to be very careful about providing further narcotics to the patient and making sure there are not freely available pills in the home.     Marga Arana MD    Department of Otolaryngology

## 2019-03-01 ENCOUNTER — TELEPHONE (OUTPATIENT)
Dept: RADIATION ONCOLOGY | Facility: CLINIC | Age: 44
End: 2019-03-01

## 2019-03-01 NOTE — TELEPHONE ENCOUNTER
Attempted to contact patient for a reminder call regarding their appointment with Dr. Neely on 03/07/2019 at 0930 at [facility].  Voicemail was left with appointment time, date and contact information for rescheduling if needed.        Denise Glasgow MA

## 2019-03-05 ENCOUNTER — ANCILLARY PROCEDURE (OUTPATIENT)
Dept: CT IMAGING | Facility: CLINIC | Age: 44
End: 2019-03-05
Attending: RADIOLOGY
Payer: COMMERCIAL

## 2019-03-05 ENCOUNTER — ANCILLARY PROCEDURE (OUTPATIENT)
Dept: MRI IMAGING | Facility: CLINIC | Age: 44
End: 2019-03-05
Attending: INTERNAL MEDICINE
Payer: COMMERCIAL

## 2019-03-05 DIAGNOSIS — C10.9 SQUAMOUS CELL CARCINOMA OF OROPHARYNX (H): ICD-10-CM

## 2019-03-05 DIAGNOSIS — M54.12 CERVICAL RADICULOPATHY: ICD-10-CM

## 2019-03-05 PROCEDURE — 72156 MRI NECK SPINE W/O & W/DYE: CPT | Performed by: RADIOLOGY

## 2019-03-05 PROCEDURE — 70491 CT SOFT TISSUE NECK W/DYE: CPT | Performed by: RADIOLOGY

## 2019-03-05 PROCEDURE — A9585 GADOBUTROL INJECTION: HCPCS | Mod: JW | Performed by: INTERNAL MEDICINE

## 2019-03-05 PROCEDURE — 71260 CT THORAX DX C+: CPT | Performed by: RADIOLOGY

## 2019-03-05 RX ORDER — GADOBUTROL 604.72 MG/ML
7.5 INJECTION INTRAVENOUS ONCE
Status: COMPLETED | OUTPATIENT
Start: 2019-03-05 | End: 2019-03-05

## 2019-03-05 RX ORDER — IOPAMIDOL 755 MG/ML
100 INJECTION, SOLUTION INTRAVASCULAR ONCE
Status: COMPLETED | OUTPATIENT
Start: 2019-03-05 | End: 2019-03-05

## 2019-03-05 RX ADMIN — GADOBUTROL 4 ML: 604.72 INJECTION INTRAVENOUS at 13:02

## 2019-03-05 RX ADMIN — IOPAMIDOL 100 ML: 755 INJECTION, SOLUTION INTRAVASCULAR at 13:05

## 2019-03-07 ENCOUNTER — OFFICE VISIT (OUTPATIENT)
Dept: RADIATION ONCOLOGY | Facility: CLINIC | Age: 44
End: 2019-03-07
Payer: COMMERCIAL

## 2019-03-07 VITALS
OXYGEN SATURATION: 99 % | HEART RATE: 87 BPM | BODY MASS INDEX: 16.28 KG/M2 | TEMPERATURE: 99.4 F | WEIGHT: 80.6 LBS | RESPIRATION RATE: 18 BRPM | DIASTOLIC BLOOD PRESSURE: 84 MMHG | SYSTOLIC BLOOD PRESSURE: 122 MMHG

## 2019-03-07 DIAGNOSIS — C10.9 SQUAMOUS CELL CARCINOMA OF OROPHARYNX (H): Primary | ICD-10-CM

## 2019-03-07 PROCEDURE — 99213 OFFICE O/P EST LOW 20 MIN: CPT | Mod: 25 | Performed by: RADIOLOGY

## 2019-03-07 PROCEDURE — 31575 DIAGNOSTIC LARYNGOSCOPY: CPT | Performed by: RADIOLOGY

## 2019-03-07 RX ORDER — DULOXETIN HYDROCHLORIDE 30 MG/1
30 CAPSULE, DELAYED RELEASE ORAL
COMMUNITY
Start: 2019-02-25

## 2019-03-07 ASSESSMENT — PAIN SCALES - GENERAL: PAINLEVEL: NO PAIN (0)

## 2019-03-07 NOTE — LETTER
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  Her primary disease involve the bilateral tonsils, posterior soft palate and right posterolateral nasopharynx with involvement of the bilateral level 2 lymph nodes.  She received definitive chemoradiotherapy as described above with her 3-month posttreatment PET demonstrated complete response to therapy.  She returns to radiation oncology clinic today for a routine post-treatment follow-up visit.    Ms. Milligan was last seen in radiation oncology clinic on 2018.  Since that time she had a recent suicide attempt in late 2019 in which she took multiple pills of extended-release morphine while at home.  She was hospitalized and discharged in stable condition.  Today on examination she is in much better spirits and denies any suicidal ideation.  Regarding her remaining review of systems, she continues to have moderate posterior neck pain with radiation to the bilateral arms.  She rates this is a 4/10 in severity which is unchanged over the past several months.  Her feeding tube has been removed since her previous follow-up and she is eating a normal diet without difficulty with the exception of small foods such as rice due to  her  insufficiency.  She does describe some continued mild to moderate sensitivity to spicy foods but otherwise reports no significant odynophagia or dysphagia.  Her remaining 10-point review of systems are negative and she specifically denies any headaches, nausea/vomiting, reflux, dyspnea, chest pain or abdominal pain.    Ms. Milligan had restaging imaging performed on 3/5/2019 including a CT neck, chest and MRI of the C-spine.  This imaging demonstrated no evidence of recurrent disease within the head and neck with MRI revealing multilevel cervical spondylitis and moderate to severe foraminal stenosis at C5-6 and C6-7.    PHYSICAL EXAM:  Weight: 36.7 kg  BP: 131/91  Pulse: 87  Temp: 37.4  C    Physical Exam   Constitutional: She is oriented to person, place, and time. She appears well-developed and well-nourished. No distress.   HENT:   Head: Normocephalic and atraumatic.   Right Ear: External ear normal. No drainage.   Left Ear: External ear normal. No drainage.   Nose: Nose normal.   Mouth/Throat: Tonsils are 0 on the right. Tonsils are 0 on the left.   Nasal speech. Mildly dry mucous membranes.  Buccal mucosa, gingiva, floor of mouth, oral tongue and bilateral base of tongue are soft to palpation with no nodularity or masses. There is mild tenderness to palpation over the lingual aspect of the left mandible at approximately the level of the first molar. Visual inspection of this area demonstrates no mucosal irregularity or exposed bone. Visual inspection of the posterior oral cavity and oropharynx reveals a large posterior soft palate defect with dried crusting secretions over the superior aspect of the oropharyngeal wall extending superiorly into the nasopharynx.   Eyes: EOM are normal. Pupils are equal, round, and reactive to light. No scleral icterus.   Neck: Normal range of motion. Neck supple.   Cardiovascular: Intact distal pulses.   Pulmonary/Chest: Effort normal and breath sounds normal.    Musculoskeletal: Normal range of motion.   Lymphadenopathy:     She has no cervical adenopathy.   Neurological: She is alert and oriented to person, place, and time. No cranial nerve deficit.   Skin: Skin is warm and dry. No erythema.   Psychiatric: She has a normal mood and affect.     Flexible Fiberoptic Nasopharyngoscopy:  Consent for fiberoptic laryngoscopy was obtained and I confirmed correctness of procedure and identity of patient. Fiberoptic laryngoscopy was indicated due to post-treatment disease surveillance. The nose was topically decongested and anesthetized. The fiberoptic laryngoscope was passed through the left naris under endoscopic vision. The turbinates were atrophic without any purulence, masses or polyps visualized. The nasopharynx had an extensive amount of dried secretions which prevented visualization of the posterior and lateral walls. Advancement of the scope into the oropharynx revealed bland-appearing mucosa involving the posterolateral oropharyngeal walls and bilateral base of tongue. The vallecula was clear and the epiglottis was sharp. There were no masses visualized concerning for residual disease. The larynx was clear with mobile cords bilaterally and the hypopharynx was normal in appearance with no pooling of secretions. The scope was then removed and the procedure was terminated without incident.     LABS AND IMAGING:  3/5/2019 CT neck:  No evidence of disease within the oropharynx or evidence of lymphadenopathy.    3/5/2019 CT chest:  4 mm perifissural nodule along the left major fissure which is stable since at least 2/23/2018. No additional pulmonary nodules.    3/5/2019 MRI C-spine:  Multilevel cervical spondylitis with no abnormal enhancement of the spinal cord or cervical vertebrae    IMPRESSION:   Ms. 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 She remains NORBERTO  by clinical examination and imaging.    PLAN:   ***    Roge Neely MD/PhD    Department of Radiation Oncology  [facility]         Department of Radiation Oncology  [facility]  [address] [place] [address]  [phone]       Radiation Oncology Follow-up Visit  2019      Melinda Milligan  MRN: [id number]   : 1975     DISEASE TREATED:   cT3 N2c M0 [id number]-[id number] squamous cell carcinoma of the oropharynx    RADIATION THERAPY DELIVERED:   7000 cGy delivered in 35 fractions, from 3/15/2018 - 2018    SYSTEMIC THERAPY:  Cisplatin 100 mg/m     INTERVAL SINCE COMPLETION OF RADIATION THERAPY:   10 months    SUBJECTIVE:   Melinda Milligan is a 43 year old female with a PMH significant for a locally advanced [id number] squamous cell carcinoma of the oropharynx. Her primary disease involve the bilateral tonsils, posterior soft palate and right posterolateral nasopharynx with involvement of the bilateral level 2 lymph nodes. She received definitive chemoradiotherapy as described above with her 3-month posttreatment PET demonstrated complete response to therapy. She returns to radiation oncology clinic today for a routine post-treatment follow-up visit.    Ms. Milligan was last seen in radiation oncology clinic on 2018. Since that time she had a recent suicide attempt in late 2019 in which she took multiple pills of extended-release morphine while at home. She was hospitalized and discharged in stable condition. Today on examination she is in much better spirits and denies any suicidal ideation. Regarding her remaining review of systems, she continues to have moderate posterior neck pain with radiation to the bilateral arms. She rates this is a 4/10 in severity which is unchanged over the past several months. Her feeding tube has been removed and she is eating a normal diet without difficulty with the  exception of small foods such as rice due to her  insufficiency. She does describe some continued mild to moderate sensitivity to spicy foods but otherwise reports no significant odynophagia or dysphagia. Her remaining 10-point review of systems are negative and she specifically denies any headaches, nausea/vomiting, reflux, dyspnea, chest pain or abdominal pain.    Ms. Milligan had restaging imaging performed on 3/5/2019 including a CT neck, chest and MRI of the C-spine. This imaging demonstrated no evidence of recurrent disease within the head and neck with MRI revealing multilevel cervical spondylitis and moderate to severe foraminal stenosis at C5-6 and C6-7.    PHYSICAL EXAM:  Weight: 36.7 kg  BP: 131/91  Pulse: 87  Temp: 37.4  C    Physical Exam   Constitutional: She is oriented to person, place, and time. She appears well-developed and well-nourished. No distress.   HENT:   Head: Normocephalic and atraumatic.   Right Ear: External ear normal. No drainage.   Left Ear: External ear normal. No drainage.   Nose: Nose normal.   Mouth/Throat: Tonsils are 0 on the right. Tonsils are 0 on the left.   Nasal speech. Mildly dry mucous membranes.  Buccal mucosa, gingiva, floor of mouth, oral tongue and bilateral base of tongue are soft to palpation with no nodularity or masses. There is mild tenderness to palpation over the lingual aspect of the left mandible at approximately the level of the first molar. Visual inspection of this area demonstrates no mucosal irregularity or exposed bone. Visual inspection of the posterior oral cavity and oropharynx reveals a large posterior soft palate defect with dried crusting secretions over the superior aspect of the oropharyngeal wall extending superiorly into the nasopharynx.   Eyes: EOM are normal. Pupils are equal, round, and reactive to light. No scleral icterus.   Neck: Normal range of motion. Neck supple.   Cardiovascular: Intact distal pulses.   Pulmonary/Chest: Effort  normal and breath sounds normal.   Musculoskeletal: Normal range of motion.   Lymphadenopathy:     She has no cervical adenopathy.   Neurological: She is alert and oriented to person, place, and time. No cranial nerve deficit.   Skin: Skin is warm and dry. No erythema.   Psychiatric: She has a normal mood and affect.     Flexible Fiberoptic Nasopharyngoscopy:  Consent for fiberoptic laryngoscopy was obtained and I confirmed correctness of procedure and identity of patient. Fiberoptic laryngoscopy was indicated due to post-treatment disease surveillance. The nose was topically decongested and anesthetized. The fiberoptic laryngoscope was passed through the left naris under endoscopic vision. The turbinates were atrophic without any purulence, masses or polyps visualized. The nasopharynx had an extensive amount of dried secretions which prevented visualization of the posterior and lateral walls. Advancement of the scope into the oropharynx revealed bland-appearing mucosa involving the posterolateral oropharyngeal walls and bilateral base of tongue. The vallecula was clear and the epiglottis was sharp. There were no masses visualized concerning for residual disease. The larynx was clear with mobile cords bilaterally and the hypopharynx was normal in appearance with no pooling of secretions. The scope was then removed and the procedure was terminated without incident.     LABS AND IMAGING:  3/5/2019 CT neck:  No evidence of disease within the oropharynx or evidence of lymphadenopathy.    3/5/2019 CT chest:  4 mm perifissural nodule along the left major fissure which is stable since at least 2/23/2018. No additional pulmonary nodules.    3/5/2019 MRI C-spine:  Multilevel cervical spondylitis with no abnormal enhancement of the spinal cord or cervical vertebrae    IMPRESSION:   Ms. 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 She remains NORBERTO by clinical examination and imaging.    PLAN:   1. Follow-up with Dr. Arana as scheduled on 3/13/2019  2. Follow-up with Dr. Nieto as scheduled on 5/21/2018 with repeat labs including thyroid function testing  3. Follow-up with me at the [facility] in coordination with Dr. Arana in approximately 3 months  4. Continue speech/swallow therapy exercises, routine skin and dental cares    Roge Neely MD/PhD    Department of Radiation Oncology  [facility]          Sincerely,        Roge Neely MD

## 2019-03-07 NOTE — NURSING NOTE
FOLLOW-UP VISIT    Patient Name: Melinda Milligan      : 1975     Age: 43 year old        ______________________________________________________________________________     Chief Complaint   Patient presents with     Cancer     Radiation Oncology return visit with Dr. Neely     /84 (BP Location: Left arm, Patient Position: Standing, Cuff Size: Adult Regular)   Pulse 87   Temp 99.4  F (37.4  C) (Oral)   Resp 18   Wt 36.6 kg (80 lb 9.6 oz)   LMP 2018 (Approximate)   SpO2 99%   BMI 16.28 kg/m       Date Radiation Completed: 7,000 cGy to Head and Neck completed on 2018    Pain  Denies    Labs  Other Labs: Yes: 2019    Imaging            Other Appointments:     MD Name: Dr. Arana Appointment Date: 2019   MD Name: Appointment Date:   MD Name: Appointment Date:         Nurse face-to-face time: Level 2:  5 min face to face time

## 2019-03-07 NOTE — PROGRESS NOTES
Department of Radiation Oncology  [facility]  [address] [place] [address]  [phone]       Radiation Oncology Follow-up Visit  2019      Melinda Milligan  MRN: [id number]   : 1975     DISEASE TREATED:   cT3 N2c M0 [id number] squamous cell carcinoma of the oropharynx    RADIATION THERAPY DELIVERED:   7000 cGy delivered in 35 fractions, from 3/15/2018 - 2018    SYSTEMIC THERAPY:  Cisplatin 100 mg/m     INTERVAL SINCE COMPLETION OF RADIATION THERAPY:   10 months    SUBJECTIVE:   Melinda Milligan is a 43 year old female with a PMH significant for a locally advanced [id number] squamous cell carcinoma of the oropharynx. Her primary disease involve the bilateral tonsils, posterior soft palate and right posterolateral nasopharynx with involvement of the bilateral level 2 lymph nodes. She received definitive chemoradiotherapy as described above with her 3-month posttreatment PET demonstrated complete response to therapy. She returns to radiation oncology clinic today for a routine post-treatment follow-up visit.    Ms. Milligan was last seen in radiation oncology clinic on 2018. Since that time she had a recent suicide attempt in late 2019 in which she took multiple pills of extended-release morphine while at home. She was hospitalized and discharged in stable condition. Today on examination she is in much better spirits and denies any suicidal ideation. Regarding her remaining review of systems, she continues to have moderate posterior neck pain with radiation to the bilateral arms. She rates this is a 4/10 in severity which is unchanged over the past several months. Her feeding tube has been removed and she is eating a normal diet without difficulty with the exception of small foods such as rice due to her  insufficiency. She does describe some continued mild to moderate sensitivity to spicy foods but otherwise reports no  significant odynophagia or dysphagia. Her remaining 10-point review of systems are negative and she specifically denies any headaches, nausea/vomiting, reflux, dyspnea, chest pain or abdominal pain.    Ms. Milligan had restaging imaging performed on 3/5/2019 including a CT neck, chest and MRI of the C-spine. This imaging demonstrated no evidence of recurrent disease within the head and neck with MRI revealing multilevel cervical spondylitis and moderate to severe foraminal stenosis at C5-6 and C6-7.    PHYSICAL EXAM:  Weight: 36.7 kg  BP: 131/91  Pulse: 87  Temp: 37.4  C    Physical Exam   Constitutional: She is oriented to person, place, and time. She appears well-developed and well-nourished. No distress.   HENT:   Head: Normocephalic and atraumatic.   Right Ear: External ear normal. No drainage.   Left Ear: External ear normal. No drainage.   Nose: Nose normal.   Mouth/Throat: Tonsils are 0 on the right. Tonsils are 0 on the left.   Nasal speech. Mildly dry mucous membranes.  Buccal mucosa, gingiva, floor of mouth, oral tongue and bilateral base of tongue are soft to palpation with no nodularity or masses. There is mild tenderness to palpation over the lingual aspect of the left mandible at approximately the level of the first molar. Visual inspection of this area demonstrates no mucosal irregularity or exposed bone. Visual inspection of the posterior oral cavity and oropharynx reveals a large posterior soft palate defect with dried crusting secretions over the superior aspect of the oropharyngeal wall extending superiorly into the nasopharynx.   Eyes: EOM are normal. Pupils are equal, round, and reactive to light. No scleral icterus.   Neck: Normal range of motion. Neck supple.   Cardiovascular: Intact distal pulses.   Pulmonary/Chest: Effort normal and breath sounds normal.   Musculoskeletal: Normal range of motion.   Lymphadenopathy:     She has no cervical adenopathy.   Neurological: She is alert and  oriented to person, place, and time. No cranial nerve deficit.   Skin: Skin is warm and dry. No erythema.   Psychiatric: She has a normal mood and affect.     Flexible Fiberoptic Nasopharyngoscopy:  Consent for fiberoptic laryngoscopy was obtained and I confirmed correctness of procedure and identity of patient. Fiberoptic laryngoscopy was indicated due to post-treatment disease surveillance. The nose was topically decongested and anesthetized. The fiberoptic laryngoscope was passed through the left naris under endoscopic vision. The turbinates were atrophic without any purulence, masses or polyps visualized. The nasopharynx had an extensive amount of dried secretions which prevented visualization of the posterior and lateral walls. Advancement of the scope into the oropharynx revealed bland-appearing mucosa involving the posterolateral oropharyngeal walls and bilateral base of tongue. The vallecula was clear and the epiglottis was sharp. There were no masses visualized concerning for residual disease. The larynx was clear with mobile cords bilaterally and the hypopharynx was normal in appearance with no pooling of secretions. The scope was then removed and the procedure was terminated without incident.     LABS AND IMAGING:  3/5/2019 CT neck:  No evidence of disease within the oropharynx or evidence of lymphadenopathy.    3/5/2019 CT chest:  4 mm perifissural nodule along the left major fissure which is stable since at least 2/23/2018. No additional pulmonary nodules.    3/5/2019 MRI C-spine:  Multilevel cervical spondylitis with no abnormal enhancement of the spinal cord or cervical vertebrae    IMPRESSION:   Ms. 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 She remains NORBERTO by clinical examination and imaging.    PLAN:   1. Follow-up with Dr. Arana as scheduled on 3/13/2019  2. Follow-up with Dr. Nieto  as scheduled on 5/21/2018 with repeat labs including thyroid function testing  3. Follow-up with me at the [facility] in coordination with Dr. Arana in approximately 3 months  4. Continue speech/swallow therapy exercises, routine skin and dental cares    Roge Neely MD/PhD    Department of Radiation Oncology  [facility]

## 2019-03-07 NOTE — Clinical Note
Please schedule follow-up with me in approximately 3 months at the [facility]. Please coordinate to schedule same day as Dr. Arana as patient drives from out of town.

## 2019-03-12 ENCOUNTER — DOCUMENTATION ONLY (OUTPATIENT)
Dept: CARE COORDINATION | Facility: CLINIC | Age: 44
End: 2019-03-12

## 2019-03-13 ENCOUNTER — THERAPY VISIT (OUTPATIENT)
Dept: SPEECH THERAPY | Facility: CLINIC | Age: 44
End: 2019-03-13
Payer: COMMERCIAL

## 2019-03-13 ENCOUNTER — OFFICE VISIT (OUTPATIENT)
Dept: OTOLARYNGOLOGY | Facility: CLINIC | Age: 44
End: 2019-03-13
Payer: COMMERCIAL

## 2019-03-13 VITALS — HEIGHT: 59 IN | BODY MASS INDEX: 15.72 KG/M2 | WEIGHT: 78 LBS

## 2019-03-13 DIAGNOSIS — R13.12 OROPHARYNGEAL DYSPHAGIA: ICD-10-CM

## 2019-03-13 DIAGNOSIS — C10.9 SQUAMOUS CELL CARCINOMA OF OROPHARYNX (H): Primary | ICD-10-CM

## 2019-03-13 ASSESSMENT — PAIN SCALES - GENERAL: PAINLEVEL: MODERATE PAIN (4)

## 2019-03-13 ASSESSMENT — MIFFLIN-ST. JEOR: SCORE: 909.44

## 2019-03-13 NOTE — PATIENT INSTRUCTIONS
1. Please follow-up in clinic in 6-8 weeks with Dr. Arana.   2. Please call the ENT clinic with any questions,concerns, new or worsening symptoms.    -Clinic number is [phone]   - Me[facility]'s direct line (Dr. Arana's nurse) [phone]

## 2019-03-13 NOTE — NURSING NOTE
Chief Complaint   Patient presents with     RECHECK     6-8 week follow up     Apolinar Leslie, EMT

## 2019-03-13 NOTE — LETTER
3/13/2019       RE: Melinda Milligan  [address] [facility] [address]     Dear Colleague,    Thank you for referring your patient, Melinda Milligan, to the [facility] at [facility]. Please see a copy of my visit note below.    Dear Dr. Monsalve:    I had the pleasure of seeing Melinda Milligan in followup today at the [facility].     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  She had a long history of thrush lasting about 6-8 months that was treated without improvement by her primary care physician.  She was then referred to a hematologist for anemia who evaluated her oropharynx and was concerned about a malignancy.  She was referred to Dr. Monsalve who performed a biopsy of the oral tongue consistent with squamous cell carcinoma.  She was initially seen here at the end of February.  She had a PET CT scan which showed the primary disease in both tonsils, soft palate, base of tongue, right oral tongue with bilateral lymphadenopathy.  Given the extent of her primary disease she was recommended for definitive chemoradiation.  She completed her treatment under the care of Dr. Ambrosio and Dr. Nieto at [place].  She received a total of 70 Gy and high-dose cisplatin completed on 5/2/2018.  She had her 3-month posttreatment PET scan in August 2018 which was negative for any recurrence.    Interval history:  She comes in today for follow-up.  She was last seen in clinic in January 2019.  Since that time she had a attempted suicide using long-acting morphine.  She was admitted to a local hospital and then released.  We contacted Ranjith Linares for assistance with referrals who put her in touch with local therapist.  She says she met with the therapist and she also actually has had neck cancer which allowed for better relationship.   She had repeat imaging done on 3/5/2019 which showed stable lung nodules and treatment changes but otherwise no recurrent disease.  She also had an MRI of the cervical spine which showed some narrowing.  She was seen by Dr. Neely at [facility] on 3/7/2019.  The patient says that she is overall doing better.  She feels like her mood has improved.  She says her swallowing is stable.  She did have her feeding tube removed after her last visit and her weight is currently at 78 pounds, down from 81.5 lb.  She is having some issues with acid reflux and constipation.  She is using her fluoride paste but is not regularly flossing her teeth.  She brushes twice per day.  She has not gone to see the dentist.  She has been doing her exercises and doing the saline rinses.        MEDICATIONS:     Current Outpatient Medications   Medication Sig Dispense Refill     ACETAMINOPHEN PO Take 1,000 mg by mouth       DULoxetine (CYMBALTA) 30 MG capsule Take 30 mg by mouth       Fexofenadine HCl (ALLEGRA ALLERGY PO) Take by mouth as needed for allergies       order for DME Sig:  Isosource 1.5 calories:  Instill 3.5 cartons per day via PEG tube by syringe or gravity flow 90 Can 3     polyethylene glycol (MIRALAX/GLYCOLAX) powder Take 17 g (1 capful) by mouth daily 225 g 1     Potassium Chloride 40 MEQ/15ML (20%) SOLN 7.5 mLs (20 mEq) by Oral or G tube route daily 1 Bottle 1     varenicline (CHANTIX STARTING MONTH KLAUS) 0.5 MG X 11 & 1 MG X 42 tablet Take as instructed 42 tablet 0       ALLERGIES:    Allergies   Allergen Reactions     Ceftriaxone      Other reaction(s): Unknown Reaction     Chicken-Derived Products (Egg)      Other reaction(s): Vomiting  Other reaction(s): Unknown Reaction       HABITS/SOCIAL HISTORY:   She cut back significantly on her smoking last week but was previously at least a 1 pack per day smoker since 1994.  She denies any chewing tobacco use.  She quit drinking alcohol.  She denies any drug use.  She is   and has a young child at home and another who just went off to college.  She works from home in .    Social History     Socioeconomic History     Marital status:      Spouse name: Not on file     Number of children: Not on file     Years of education: Not on file     Highest education level: Not on file   Occupational History     Not on file   Social Needs     Financial resource strain: Not on file     Food insecurity:     Worry: Not on file     Inability: Not on file     Transportation needs:     Medical: Not on file     Non-medical: Not on file   Tobacco Use     Smoking status: Current Some Day Smoker     Packs/day: 1.00     Years: 23.00     Pack years: 23.00     Types: Cigarettes     Start date: 1/1/1993     Smokeless tobacco: Never Used     Tobacco comment: Patient reports currently smoking 3 cigarettes per day - updated 3/6/2018   Substance and Sexual Activity     Alcohol use: No     Drug use: No     Sexual activity: Yes     Partners: Male     Birth control/protection: Pull-out method, Condom   Lifestyle     Physical activity:     Days per week: Not on file     Minutes per session: Not on file     Stress: Not on file   Relationships     Social connections:     Talks on phone: Not on file     Gets together: Not on file     Attends [employer] service: Not on file     Active member of club or organization: Not on file     Attends meetings of clubs or organizations: Not on file     Relationship status: Not on file     Intimate partner violence:     Fear of current or ex partner: Not on file     Emotionally abused: Not on file     Physically abused: Not on file     Forced sexual activity: Not on file   Other Topics Concern     Not on file   Social History Narrative     Not on file       PAST MEDICAL HISTORY:   Past Medical History:   Diagnosis Date     Allergic rhinitis      Anemia      Hoarseness      Oropharyngeal cancer (H) 02/15/2018     S/P radiation therapy     7,000 cGy completed on 5/2/2018  "to head and neck [facility] with Dr. Albert Ambrosio     Uncomplicated asthma         PAST SURGICAL HISTORY:   Past Surgical History:   Procedure Laterality Date     BIOPSY  02/15/2018    Left Tongue - [facility]     LAPAROSCOPIC ASSISTED INSERTION TUBE GASTROTOMY N/A 3/14/2018    Procedure: LAPAROSCOPIC ASSISTED INSERTION TUBE GASTROSTOMY;  Percutaneous Endoscopic Gastrostomy Tube Insertion, Esophagogastroduodenoscopy;  Surgeon: Edna Martinez MD;  Location:  OR       FAMILY HISTORY:    Family History   Problem Relation Age of Onset     Substance Abuse Father      Dementia Maternal Grandmother      Diabetes Maternal Grandmother      Asthma Brother      Prostate Cancer Maternal Grandfather        REVIEW OF SYSTEMS:  12 point ROS was negative other than the symptoms noted above in the HPI.  Patient Supplied Answers to Review of Systems  [facility] ROS 3/13/2019   Constitutional Weight gain   Neurology Numbness   Psychology Frequently feeling depressed or sad   Ears, Nose, Throat Nasal congestion or drainage   Cardiopulmonary -   Gastrointestinal/Genitourinary Heartburn/indigestion, Constipation   Musculoskeletal Neck pain   Allergy/Immunology -   Hematologic -   Endocrine -         PHYSICAL EXAMINATION:   Ht 1.499 m (4' 11\")   Wt 35.4 kg (78 lb)   LMP 01/01/2018 (Approximate)   BMI 15.75 kg/m      Appearance:   normal; NAD, slightly older appearing than stated age, thin appearing, small stature   Communication:   normal; communicates verbally, slight hypernasality with incomplete soft palate closure   Head/Face:   inspection -  Normal; no scars or visible lesions   Salivary glands -  Normal size, no tenderness, swelling, or palpable masses   Facial strength -  Normal and symmetric bilateral; H/B I/VI   Skin:  normal, no rash   Ocular Motility:  normal occular movements   Ears:  auricle (AD) -  normal  EAC (AD) -  normal  TM (AD) -   mild retraction  auricle (AS) -  normal  EAC (AS) -  normal  TM (AS) - " serous effusion   Normal clinical speech reception   Nose:  Ext. inspection -  Normal  Internal Inspection -  Normal mucosa, septum, and turbinates; some thick secretions   Nasopharynx:  Thick dried secretions coating the nasopharynx but underlying mucosa appears normal without any masses   Oral Cavity:  lips -  Normal mucosa, oral competence, and stoma size  Dentition in poor repair, plaque on teeth  Improvement in trismus   Hard palate, buccal, floor of mouth mucosa with radiation changes   Tongue -no palpable masses, no mucosal lesions   Oropharynx:  uvula absent, more of the soft palate is gone  No evidence of masses in either tonsillar fossa, radiation changes and scar  Thick secretions along posterior pharyngeal wall -fewer than previous   Hypopharynx:  Normal pyriform sinus and pharyngeal wall mucosa   No pooled secretions    Larynx:  Epiglottis with mild thickening from radiation  False vocal cord, true vocal cord normal in appearance, bilaterally mobile cords   No secretions    Neck: No visible mass or asymmetry   Normal range of motion  No significant lymphedema   Lymphatic:  No palpable lymphadenopathy   Cardiovascular:  warm, pink, well-perfused extremities without swelling, tenderness, or edema   Respiratory:  Normal respiratory effort, no stridor   Neuro/Psych.:  mood/affect -  normal  mental status -  normal        PROCEDURES:   Flexible fiberoptic laryngoscopy: Scope exam was indicated due to oropharyngeal tumor. Verbal consent was obtained. The nasal cavity was prepped with an aerosolized solution of topical anesthetic and vasoconstrictive agent. The scope was passed through the anterior nasal cavity and advanced. Inspection of the nasopharynx demonstrate some thick secretions in the nasopharynx.  There are no obvious masses or mucosal lesions.  There are radiation changes to the posterior oropharynx but no masses.  There are no pooled secretions present here.  The vallecula is clear.  Epiglottis  has mild thickening from radiation.  The vocal cords are mobile bilaterally.  There are radiation changes to the false cords and arytenoids.  The piriform sinuses are clear.  The airway is patent.  The airway is overall improved in appearance compared to previous exams.  Patient tolerated the procedure well with no immediate complications.    RESULTS REVIEWED:   I reviewed the records from Dr. Neely's visit.    I independently reviewed the CT scan images which show posttreatment changes to the oropharynx and neck without any obvious recurrent disease.      CT neck:  Findings: As grossly, there are postoperative findings with thickness  increased notably of the platysmas muscles bilaterally, and  enhancement of the submandibular glands, as well as fat stranding  within the Submandibular fat and around the carotid spaces, slightly  increased from previously.  Regarding evaluation of the mucosal space, there is no definite  abnormality in the nasopharynx, oropharynx, hypopharynx, or of the  glottis, while there is mild edema circumferentially of the  oropharyngeal airway, and enhancement of the lingual and palatine  tonsils without nodular or masslike enlargement. Hence, these findings  are likely related to therapy or slightly more prominent than  previously Regarding the tongue base, no abnormality is identified,  although the fat planes are slightly obscured due to therapy related  edema. Regarding the major salivary glands, no other abnormality is  identified. Regarding the thyroid gland, no abnormality is identified.     Regarding the cervical lymph nodes, the slight limitation in  evaluation of lymph nodes given the edema and post therapy findings in  the neck, but no definite adenopathy is identified. Nonenlarged lymph  node by size criteria noted bilaterally within level 2, all less than  1 cm size.      Limited evaluation of the cervical vertebra demonstrates possible  borderline spinal canal stenosis at C5-6  and C6-7 due to degenerative  disease, and no overt neural foraminal stenosis. Regarding the  visualized paranasal sinuses, there is no evidence of significant  debris or fluid. Regarding the mastoid air cells, there is debris or  fluid filling most of the left mastoid air cells, as previously.  Regarding the major vasculature in the neck, no abnormality is  identified. Regarding the visualized lung apices, there is no definite  abnormality, and the lung apices appear relatively clear.                                                                       Impression:     1. No evident recurrence of known tonsillar cancer, with slightly  increased post therapy edema in the neck and oropharyngeal mucosa  without focal mass.  2. No evident lymphadenopathy.         CT chest:  Central tracheobronchial tree is patent. There is no mass,  consolidation or infiltration. No significant change in 4 mm  mee-fissural node along the left major fissure laterally (image [id number]  series 8). No pleural effusion or pneumothorax.     Heart size is within normal limits. There is no pericardial effusion.   Normal thoracic vasculature. No significant mediastinal, hilum or  axillary lymphadenopathy.      Bones and soft tissues: No suspicious bone findings.      Partially imaged upper abdomen: Limited. No acute findings.                                                                      IMPRESSION:   No significant change in 4 mm mee-fissural node along the left major  fissure since at least 2/23/2018. No new suspicious pulmonary nodules.  Attention on follow-up is recommended.      IMPRESSION AND PLAN:   Melinda Milligan is a 43-year-old woman with a p16 negative T3N2c SCC of the oropharynx.  She is now status post completion of definitive chemoradiation in May 2018.      She unfortunately recently had a suicide attempt.  We have helped her to arrange follow-up with a therapist which sounds like it is going fairly well.  She says her mood  has improved.  We will need to exercise caution with prescription narcotics given her previous attempt was through overdose.    She should continue to work on her oral nutrition and maintaining her weight.    She was encouraged to continue to do the saline irrigations to help with the crusting in the nose.    She was encouraged to increase her dental care with flossing and to make an appointment to see a dentist for a routine cleaning.    I will see her back in about 6-8 weeks.  The next visit after that will be coordinated with Dr. Neely here at the [place].     Thank you very much for the opportunity to participate in the care of your patient.      Marga Arana M.D.  Otolaryngology- Head & Neck Surgery        CC:  Noel Monsalve, [facility]  [address], [address]  [place] [address]      Albert Ambrosio MD  Department of Radiation Oncology  [facility]      Again, thank you for allowing me to participate in the care of your patient.      Sincerely,    Marga Arana MD

## 2019-03-14 NOTE — PROGRESS NOTES
Dear Dr. Monsalve:    I had the pleasure of seeing Melinda Milligan in followup today at the [facility].     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  She had a long history of thrush lasting about 6-8 months that was treated without improvement by her primary care physician.  She was then referred to a hematologist for anemia who evaluated her oropharynx and was concerned about a malignancy.  She was referred to Dr. Monsalve who performed a biopsy of the oral tongue consistent with squamous cell carcinoma.  She was initially seen here at the end of February.  She had a PET CT scan which showed the primary disease in both tonsils, soft palate, base of tongue, right oral tongue with bilateral lymphadenopathy.  Given the extent of her primary disease she was recommended for definitive chemoradiation.  She completed her treatment under the care of Dr. Ambrosio and Dr. Nieto at [place].  She received a total of 70 Gy and high-dose cisplatin completed on 5/2/2018.  She had her 3-month posttreatment PET scan in August 2018 which was negative for any recurrence.    Interval history:  She comes in today for follow-up.  She was last seen in clinic in January 2019.  Since that time she had a attempted suicide using long-acting morphine.  She was admitted to a local hospital and then released.  We contacted Ranjith Linares for assistance with referrals who put her in touch with local therapist.  She says she met with the therapist and she also actually has had neck cancer which allowed for better relationship.  She had repeat imaging done on 3/5/2019 which showed stable lung nodules and treatment changes but otherwise no recurrent disease.  She also had an MRI of the cervical spine which showed some narrowing.  She was seen by Dr. Neely at [place] on 3/7/2019.  The patient says that she is  overall doing better.  She feels like her mood has improved.  She says her swallowing is stable.  She did have her feeding tube removed after her last visit and her weight is currently at 78 pounds, down from 81.5 lb.  She is having some issues with acid reflux and constipation.  She is using her fluoride paste but is not regularly flossing her teeth.  She brushes twice per day.  She has not gone to see the dentist.  She has been doing her exercises and doing the saline rinses.        MEDICATIONS:     Current Outpatient Medications   Medication Sig Dispense Refill     ACETAMINOPHEN PO Take 1,000 mg by mouth       DULoxetine (CYMBALTA) 30 MG capsule Take 30 mg by mouth       Fexofenadine HCl (ALLEGRA ALLERGY PO) Take by mouth as needed for allergies       order for DME Sig:  Isosource 1.5 calories:  Instill 3.5 cartons per day via PEG tube by syringe or gravity flow 90 Can 3     polyethylene glycol (MIRALAX/GLYCOLAX) powder Take 17 g (1 capful) by mouth daily 225 g 1     Potassium Chloride 40 MEQ/15ML (20%) SOLN 7.5 mLs (20 mEq) by Oral or G tube route daily 1 Bottle 1     varenicline (CHANTIX STARTING MONTH PAK) 0.5 MG X 11 & 1 MG X 42 tablet Take as instructed 42 tablet 0       ALLERGIES:    Allergies   Allergen Reactions     Ceftriaxone      Other reaction(s): Unknown Reaction     Chicken-Derived Products (Egg)      Other reaction(s): Vomiting  Other reaction(s): Unknown Reaction       HABITS/SOCIAL HISTORY:   She cut back significantly on her smoking last week but was previously at least a 1 pack per day smoker since 1994.  She denies any chewing tobacco use.  She quit drinking alcohol.  She denies any drug use.  She is  and has a young child at home and another who just went off to college.  She works from home in .    Social History     Socioeconomic History     Marital status:      Spouse name: Not on file     Number of children: Not on file     Years of education: Not on file      Highest education level: Not on file   Occupational History     Not on file   Social Needs     Financial resource strain: Not on file     Food insecurity:     Worry: Not on file     Inability: Not on file     Transportation needs:     Medical: Not on file     Non-medical: Not on file   Tobacco Use     Smoking status: Current Some Day Smoker     Packs/day: 1.00     Years: 23.00     Pack years: 23.00     Types: Cigarettes     Start date: 1/1/1993     Smokeless tobacco: Never Used     Tobacco comment: Patient reports currently smoking 3 cigarettes per day - updated 3/6/2018   Substance and Sexual Activity     Alcohol use: No     Drug use: No     Sexual activity: Yes     Partners: Male     Birth control/protection: Pull-out method, Condom   Lifestyle     Physical activity:     Days per week: Not on file     Minutes per session: Not on file     Stress: Not on file   Relationships     Social connections:     Talks on phone: Not on file     Gets together: Not on file     Attends Christian service: Not on file     Active member of club or organization: Not on file     Attends meetings of clubs or organizations: Not on file     Relationship status: Not on file     Intimate partner violence:     Fear of current or ex partner: Not on file     Emotionally abused: Not on file     Physically abused: Not on file     Forced sexual activity: Not on file   Other Topics Concern     Not on file   Social History Narrative     Not on file       PAST MEDICAL HISTORY:   Past Medical History:   Diagnosis Date     Allergic rhinitis      Anemia      Hoarseness      Oropharyngeal cancer (H) 02/15/2018     S/P radiation therapy     7,000 cGy completed on 5/2/2018 to head and neck [facility] with Dr. Albert Ambrosio     Uncomplicated asthma         PAST SURGICAL HISTORY:   Past Surgical History:   Procedure Laterality Date     BIOPSY  02/15/2018    Left Tongue - [facility]     LAPAROSCOPIC ASSISTED INSERTION TUBE GASTROTOMY N/A 3/14/2018  "   Procedure: LAPAROSCOPIC ASSISTED INSERTION TUBE GASTROSTOMY;  Percutaneous Endoscopic Gastrostomy Tube Insertion, Esophagogastroduodenoscopy;  Surgeon: Edna Martinez MD;  Location:  OR       FAMILY HISTORY:    Family History   Problem Relation Age of Onset     Substance Abuse Father      Dementia Maternal Grandmother      Diabetes Maternal Grandmother      Asthma Brother      Prostate Cancer Maternal Grandfather        REVIEW OF SYSTEMS:  12 point ROS was negative other than the symptoms noted above in the HPI.  Patient Supplied Answers to Review of Systems  [facility] ROS 3/13/2019   Constitutional Weight gain   Neurology Numbness   Psychology Frequently feeling depressed or sad   Ears, Nose, Throat Nasal congestion or drainage   Cardiopulmonary -   Gastrointestinal/Genitourinary Heartburn/indigestion, Constipation   Musculoskeletal Neck pain   Allergy/Immunology -   Hematologic -   Endocrine -         PHYSICAL EXAMINATION:   Ht 1.499 m (4' 11\")   Wt 35.4 kg (78 lb)   LMP 01/01/2018 (Approximate)   BMI 15.75 kg/m     Appearance:   normal; NAD, slightly older appearing than stated age, thin appearing, small stature   Communication:   normal; communicates verbally, slight hypernasality with incomplete soft palate closure   Head/Face:   inspection -  Normal; no scars or visible lesions   Salivary glands -  Normal size, no tenderness, swelling, or palpable masses   Facial strength -  Normal and symmetric bilateral; H/B I/VI   Skin:  normal, no rash   Ocular Motility:  normal occular movements   Ears:  auricle (AD) -  normal  EAC (AD) -  normal  TM (AD) -   mild retraction  auricle (AS) -  normal  EAC (AS) -  normal  TM (AS) - serous effusion   Normal clinical speech reception   Nose:  Ext. inspection -  Normal  Internal Inspection -  Normal mucosa, septum, and turbinates; some thick secretions   Nasopharynx:  Thick dried secretions coating the nasopharynx but underlying mucosa appears normal without any masses "   Oral Cavity:  lips -  Normal mucosa, oral competence, and stoma size  Dentition in poor repair, plaque on teeth  Improvement in trismus   Hard palate, buccal, floor of mouth mucosa with radiation changes   Tongue -no palpable masses, no mucosal lesions   Oropharynx:  uvula absent, more of the soft palate is gone  No evidence of masses in either tonsillar fossa, radiation changes and scar  Thick secretions along posterior pharyngeal wall -fewer than previous   Hypopharynx:  Normal pyriform sinus and pharyngeal wall mucosa   No pooled secretions    Larynx:  Epiglottis with mild thickening from radiation  False vocal cord, true vocal cord normal in appearance, bilaterally mobile cords   No secretions    Neck: No visible mass or asymmetry   Normal range of motion  No significant lymphedema   Lymphatic:  No palpable lymphadenopathy   Cardiovascular:  warm, pink, well-perfused extremities without swelling, tenderness, or edema   Respiratory:  Normal respiratory effort, no stridor   Neuro/Psych.:  mood/affect -  normal  mental status -  normal        PROCEDURES:   Flexible fiberoptic laryngoscopy: Scope exam was indicated due to oropharyngeal tumor. Verbal consent was obtained. The nasal cavity was prepped with an aerosolized solution of topical anesthetic and vasoconstrictive agent. The scope was passed through the anterior nasal cavity and advanced. Inspection of the nasopharynx demonstrate some thick secretions in the nasopharynx.  There are no obvious masses or mucosal lesions.  There are radiation changes to the posterior oropharynx but no masses.  There are no pooled secretions present here.  The vallecula is clear.  Epiglottis has mild thickening from radiation.  The vocal cords are mobile bilaterally.  There are radiation changes to the false cords and arytenoids.  The piriform sinuses are clear.  The airway is patent.  The airway is overall improved in appearance compared to previous exams.  Patient tolerated  the procedure well with no immediate complications.    RESULTS REVIEWED:   I reviewed the records from Dr. Neely's visit.    I independently reviewed the CT scan images which show posttreatment changes to the oropharynx and neck without any obvious recurrent disease.      CT neck:  Findings: As grossly, there are postoperative findings with thickness  increased notably of the platysmas muscles bilaterally, and  enhancement of the submandibular glands, as well as fat stranding  within the Submandibular fat and around the carotid spaces, slightly  increased from previously.  Regarding evaluation of the mucosal space, there is no definite  abnormality in the nasopharynx, oropharynx, hypopharynx, or of the  glottis, while there is mild edema circumferentially of the  oropharyngeal airway, and enhancement of the lingual and palatine  tonsils without nodular or masslike enlargement. Hence, these findings  are likely related to therapy or slightly more prominent than  previously Regarding the tongue base, no abnormality is identified,  although the fat planes are slightly obscured due to therapy related  edema. Regarding the major salivary glands, no other abnormality is  identified. Regarding the thyroid gland, no abnormality is identified.     Regarding the cervical lymph nodes, the slight limitation in  evaluation of lymph nodes given the edema and post therapy findings in  the neck, but no definite adenopathy is identified. Nonenlarged lymph  node by size criteria noted bilaterally within level 2, all less than  1 cm size.      Limited evaluation of the cervical vertebra demonstrates possible  borderline spinal canal stenosis at C5-6 and C6-7 due to degenerative  disease, and no overt neural foraminal stenosis. Regarding the  visualized paranasal sinuses, there is no evidence of significant  debris or fluid. Regarding the mastoid air cells, there is debris or  fluid filling most of the left mastoid air cells, as  previously.  Regarding the major vasculature in the neck, no abnormality is  identified. Regarding the visualized lung apices, there is no definite  abnormality, and the lung apices appear relatively clear.                                                                       Impression:     1. No evident recurrence of known tonsillar cancer, with slightly  increased post therapy edema in the neck and oropharyngeal mucosa  without focal mass.  2. No evident lymphadenopathy.         CT chest:  Central tracheobronchial tree is patent. There is no mass,  consolidation or infiltration. No significant change in 4 mm  mee-fissural node along the left major fissure laterally (image 157  series 8). No pleural effusion or pneumothorax.     Heart size is within normal limits. There is no pericardial effusion.   Normal thoracic vasculature. No significant mediastinal, hilum or  axillary lymphadenopathy.      Bones and soft tissues: No suspicious bone findings.      Partially imaged upper abdomen: Limited. No acute findings.                                                                      IMPRESSION:   No significant change in 4 mm mee-fissural node along the left major  fissure since at least 2/23/2018. No new suspicious pulmonary nodules.  Attention on follow-up is recommended.      IMPRESSION AND PLAN:   Melinda Milligan is a 43-year-old woman with a p16 negative T3N2c SCC of the oropharynx.  She is now status post completion of definitive chemoradiation in May 2018.      She unfortunately recently had a suicide attempt.  We have helped her to arrange follow-up with a therapist which sounds like it is going fairly well.  She says her mood has improved.  We will need to exercise caution with prescription narcotics given her previous attempt was through overdose.    She should continue to work on her oral nutrition and maintaining her weight.    She was encouraged to continue to do the saline irrigations to help with  the crusting in the nose.    She was encouraged to increase her dental care with flossing and to make an appointment to see a dentist for a routine cleaning.    I will see her back in about 6-8 weeks.  The next visit after that will be coordinated with Dr. Neely here at the [place].     Thank you very much for the opportunity to participate in the care of your patient.      Marga Arana M.D.  Otolaryngology- Head & Neck Surgery        CC:  Noel Monsalve, [facility] [address], [address]  [place] [id number]      Albert Ambrosio MD  Department of Radiation Oncology  [facility]

## 2019-04-15 ENCOUNTER — PATIENT OUTREACH (OUTPATIENT)
Dept: ONCOLOGY | Facility: CLINIC | Age: 44
End: 2019-04-15

## 2019-04-15 NOTE — PROGRESS NOTES
Message left for patient to call back writer regarding: would she like  to put in a referral for spine specialist for her cervical radiculopathy.

## 2019-05-20 ENCOUNTER — PATIENT OUTREACH (OUTPATIENT)
Dept: ONCOLOGY | Facility: CLINIC | Age: 44
End: 2019-05-20

## 2019-05-20 NOTE — PROGRESS NOTES
Spoke with Noe, patient's spouse (best to reach for communication with patient) regarding referral to a spine specialist for her cervical spine.  Noe will discuss with patient today and get back to us in the next couple of days after discussing with Melinda.  He states that she has not been complaining of any discomfort in her cervical spine recently - over the past month.  He will get back to me in the next day or two.

## 2019-05-30 ENCOUNTER — PATIENT OUTREACH (OUTPATIENT)
Dept: ONCOLOGY | Facility: CLINIC | Age: 44
End: 2019-05-30

## 2019-05-30 NOTE — PROGRESS NOTES
Contacted Noe, spouse, re:          Will you able to get in touch with her about her scans and that I have wanted to refer her to a spine specialist.  Please try to find that out.    Noe states patient has not been complaining of cervical spine pain in the past several months.  She will discuss with Dr. Nieto if he still wants her to follow through with the referral when she sees him on 6/7.

## 2019-05-31 ENCOUNTER — TELEPHONE (OUTPATIENT)
Dept: OTOLARYNGOLOGY | Facility: CLINIC | Age: 44
End: 2019-05-31

## 2019-05-31 NOTE — TELEPHONE ENCOUNTER
[facility]    Phone Message    May a detailed message be left on voicemail: yes    Reason for Call: Other: Pt canceled her appt. today due to chemo treatments and just not wanting to go to her appt. per spouse Noe - please call him to discuss and give advice as soon as possible     Action Taken: Message routed to:  [facility] [place] [phone]

## 2019-05-31 NOTE — TELEPHONE ENCOUNTER
Called and tried to reach Noe () after receiving message from nursing staff about patient.   Last night she said she is done. That she did not want to come to appointment today. States that if the cancer comes back and that she was going to die anyway so there is no point in having appointments.   She was seeing therapist - quit 3 weeks ago,  says that the patient says the therapist made her feel bad.   unsure on whether she is on antidepressants but says he has not picked up any medications for her.   No suicidal statements to her .   He has checked the house to make sure no narcotics available to her or other potential overdosing medications. He locked up all medications.   Discussed that probably needs to reach out to her therapist, may need to start on antidepressants. Happy to see her in our clinic if he can get her to agree to come in.   Has a PCP - was the person who initially diagnosed her. Suggested reaching out to them as well.  Discussed treatment impact on the patient is affecting her in multiple ways.   Offered evaluation/discussion here but she is not in a place right now where she is willing to come down here right now. Has f/u appointments with Dr Nieto next week as well.       Marga Arana MD    Department of Otolaryngology

## 2022-02-04 NOTE — LETTER
3/20/2018       RE: Melinda Milligan  [address] [facility] [address]     Dear Colleague,    Thank you for referring your patient, Melinda Milligan, to the [facility]. Please see a copy of my visit note below.    REASON FOR VISIT: Peg Tube Check    TUBE TYPE: 20 [place] [address]    DATE PLACED: 03/14/18    SUBJECTIVE: Pt denies any pain or leaking from her PEG at this point. She has started using for tube feeds. She is flushing BID and after enteral feedings.     OBJECTIVE: Tube site is clean and dry. Surrounding skin is on erythema or swelling.     SITE CARE INSTRUCTIONS: OK to remove dressing to shower. NO submerging in water (hot tubs, pools, etc) advised barium cream to avoid skin breakdown. Reviewed flushing recommendations.    PLAN: Tube was loosened slightly. Instructed pt to contact our office for any problems/concerns related to her PEG.    Follow up once treatment is completed for removal of PEG or PRN for complications.    Total time spent, 10 minutes, all in counseling and coordination of care.    Alejandra Lee, APRN, CNP  Thoracic and Forgut Surgery  [facility]    
no

## 2023-08-21 NOTE — PATIENT INSTRUCTIONS
Abdominal pressure applied. Please contact [facility] RN with questions or concerns following today's appointment: [phone].    Thank you!

## (undated) DEVICE — ENDO CAP AND TUBING STERILE FOR ENDOGATOR  100130

## (undated) DEVICE — PREP CHLORAPREP 26ML TINTED ORANGE  260815

## (undated) DEVICE — TUBE GASTROSTOMY PONSKY PULL DELUXE 20FR 000792

## (undated) DEVICE — ENDO BITE BLOCK ADULT OMNI-BLOC

## (undated) DEVICE — LINEN GOWN XLG 5407

## (undated) DEVICE — LINEN TOWEL PACK X5 5464

## (undated) DEVICE — SU SILK 0 TIE 6X30" A306H

## (undated) DEVICE — ENDO SYSTEM WATER BOTTLE & TUBING W/CO2 FILTER 00711549

## (undated) DEVICE — KIT CONNECTOR FOR OLYMPUS ENDOSCOPES DEFENDO 100310

## (undated) DEVICE — SYR 30ML SLIP TIP W/O NDL 302833

## (undated) DEVICE — DRAPE MAYO STAND 23X54 8337

## (undated) DEVICE — KIT ENDO FIRST STEP DISINFECTANT 200ML W/POUCH EP-4

## (undated) RX ORDER — ONDANSETRON 2 MG/ML
INJECTION INTRAMUSCULAR; INTRAVENOUS
Status: DISPENSED
Start: 2018-03-14

## (undated) RX ORDER — FENTANYL CITRATE 50 UG/ML
INJECTION, SOLUTION INTRAMUSCULAR; INTRAVENOUS
Status: DISPENSED
Start: 2018-03-14

## (undated) RX ORDER — OXYCODONE HCL 5 MG/5 ML
SOLUTION, ORAL ORAL
Status: DISPENSED
Start: 2018-03-14

## (undated) RX ORDER — LIDOCAINE HYDROCHLORIDE 20 MG/ML
INJECTION, SOLUTION EPIDURAL; INFILTRATION; INTRACAUDAL; PERINEURAL
Status: DISPENSED
Start: 2018-03-14

## (undated) RX ORDER — PROPOFOL 10 MG/ML
INJECTION, EMULSION INTRAVENOUS
Status: DISPENSED
Start: 2018-03-14

## (undated) RX ORDER — OXYMETAZOLINE HYDROCHLORIDE 0.05 G/100ML
SPRAY NASAL
Status: DISPENSED
Start: 2018-03-14

## (undated) RX ORDER — CLINDAMYCIN PHOSPHATE 900 MG/50ML
INJECTION, SOLUTION INTRAVENOUS
Status: DISPENSED
Start: 2018-03-14

## (undated) RX ORDER — ROCURONIUM BROMIDE 50 MG/5 ML
SYRINGE (ML) INTRAVENOUS
Status: DISPENSED
Start: 2018-03-14

## (undated) RX ORDER — METOPROLOL TARTRATE 1 MG/ML
INJECTION, SOLUTION INTRAVENOUS
Status: DISPENSED
Start: 2018-03-14

## (undated) RX ORDER — HYDROMORPHONE HYDROCHLORIDE 1 MG/ML
INJECTION, SOLUTION INTRAMUSCULAR; INTRAVENOUS; SUBCUTANEOUS
Status: DISPENSED
Start: 2018-03-14

## (undated) RX ORDER — IPRATROPIUM BROMIDE AND ALBUTEROL SULFATE 2.5; .5 MG/3ML; MG/3ML
SOLUTION RESPIRATORY (INHALATION)
Status: DISPENSED
Start: 2018-03-14